# Patient Record
Sex: FEMALE | Race: WHITE | NOT HISPANIC OR LATINO | Employment: OTHER | URBAN - METROPOLITAN AREA
[De-identification: names, ages, dates, MRNs, and addresses within clinical notes are randomized per-mention and may not be internally consistent; named-entity substitution may affect disease eponyms.]

---

## 2017-06-05 DIAGNOSIS — Z76.0 ENCOUNTER FOR MEDICATION REFILL: ICD-10-CM

## 2017-06-05 RX ORDER — SUMATRIPTAN 100 MG/1
TABLET, FILM COATED ORAL
Qty: 6 TABLET | Refills: 1 | Status: SHIPPED | OUTPATIENT
Start: 2017-06-05 | End: 2017-08-30

## 2017-08-15 DIAGNOSIS — Z76.0 ENCOUNTER FOR MEDICATION REFILL: ICD-10-CM

## 2017-08-15 NOTE — TELEPHONE ENCOUNTER
hydrochlorothiazide (MICROZIDE) 12.5 MG capsule; last OV: 12/18/2015      Component      Latest Ref Rng & Units 12/10/2015   Sodium      133 - 144 mmol/L 138   Potassium      3.4 - 5.3 mmol/L 3.8   Chloride      94 - 109 mmol/L 104   Carbon Dioxide      20 - 32 mmol/L 24   Anion Gap      3 - 14 mmol/L 10   Glucose      70 - 99 mg/dL 109 (H)   Urea Nitrogen      7 - 30 mg/dL 14   Creatinine      0.52 - 1.04 mg/dL 0.65   GFR Estimate      >60 mL/min/1.7m2 88   GFR Estimate If Black      >60 mL/min/1.7m2 >90 . . .   Calcium      8.5 - 10.1 mg/dL 9.4   Bilirubin Total      0.2 - 1.3 mg/dL 0.6   Albumin      3.4 - 5.0 g/dL 3.8   Protein Total      6.8 - 8.8 g/dL 8.0   Alkaline Phosphatase      40 - 150 U/L 81   ALT      0 - 50 U/L 28   AST      0 - 45 U/L 23

## 2017-08-16 RX ORDER — HYDROCHLOROTHIAZIDE 12.5 MG/1
CAPSULE ORAL
Qty: 30 CAPSULE | Refills: 0 | Status: SHIPPED | OUTPATIENT
Start: 2017-08-16 | End: 2017-08-30

## 2017-08-30 ENCOUNTER — OFFICE VISIT (OUTPATIENT)
Dept: FAMILY MEDICINE | Facility: CLINIC | Age: 77
End: 2017-08-30

## 2017-08-30 VITALS
SYSTOLIC BLOOD PRESSURE: 115 MMHG | HEART RATE: 74 BPM | OXYGEN SATURATION: 97 % | DIASTOLIC BLOOD PRESSURE: 70 MMHG | WEIGHT: 106 LBS | BODY MASS INDEX: 20.36 KG/M2

## 2017-08-30 DIAGNOSIS — I10 BENIGN ESSENTIAL HYPERTENSION: ICD-10-CM

## 2017-08-30 DIAGNOSIS — R19.5 LOOSE STOOLS: Primary | ICD-10-CM

## 2017-08-30 DIAGNOSIS — Z76.0 ENCOUNTER FOR MEDICATION REFILL: ICD-10-CM

## 2017-08-30 PROCEDURE — 36415 COLL VENOUS BLD VENIPUNCTURE: CPT | Performed by: FAMILY MEDICINE

## 2017-08-30 PROCEDURE — 99214 OFFICE O/P EST MOD 30 MIN: CPT | Performed by: FAMILY MEDICINE

## 2017-08-30 RX ORDER — HYDROCHLOROTHIAZIDE 12.5 MG/1
CAPSULE ORAL
Qty: 90 CAPSULE | Refills: 3 | Status: SHIPPED | OUTPATIENT
Start: 2017-08-30 | End: 2018-09-20

## 2017-08-30 RX ORDER — SUMATRIPTAN 100 MG/1
TABLET, FILM COATED ORAL
Qty: 6 TABLET | Refills: 11 | Status: SHIPPED | OUTPATIENT
Start: 2017-08-30 | End: 2018-09-11

## 2017-08-30 NOTE — PROGRESS NOTES
Trrouble with loose stools, using 1/2 a imodium prn. Started about 3-4 weeks ago. 3-4 /day. No fever, but slight BR blood in the stool. Her normal pattern is one solid BM daily. No vomiting, but history of mild nausea for years.   She also needs refill of HCTZ, and BMP.  /70  Pulse 74  Wt 48.1 kg (106 lb)  SpO2 97%  BMI 20.36 kg/m2 NAD WDWN lungs are clear, RRR no murmur and abdomen quiet and NT.  (R19.5) Loose stools  (primary encounter diagnosis)  Comment: poss microscopic colitis  Plan: gluten free diet for two weeks    (Z76.0) Encounter for medication refill  Comment:   Plan: hydrochlorothiazide (MICROZIDE) 12.5 MG         capsule, SUMAtriptan (IMITREX) 100 MG tablet            (I10) Benign essential hypertension  Comment:   Plan: Basic Metabolic Panel (8) ([facility])

## 2017-08-30 NOTE — MR AVS SNAPSHOT
"              After Visit Summary   2017    Sara Lehman    MRN: [id number]           Patient Information     Date Of Birth          1940        Visit Information        Provider Department      2017 1:30 PM Harpal Farris MD [facility]        Today's Diagnoses     Loose stools    -  1    Encounter for medication refill        Benign essential hypertension           Follow-ups after your visit        Who to contact     If you have questions or need follow up information about today's clinic visit or your schedule please contact [facility] directly at [phone].  Normal or non-critical lab and imaging results will be communicated to you by XanEduhart, letter or phone within 4 business days after the clinic has received the results. If you do not hear from us within 7 days, please contact the clinic through Scicastst or phone. If you have a critical or abnormal lab result, we will notify you by phone as soon as possible.  Submit refill requests through Personal Capital or call your pharmacy and they will forward the refill request to us. Please allow 3 business days for your refill to be completed.          Additional Information About Your Visit        MyChart Information     Personal Capital lets you send messages to your doctor, view your test results, renew your prescriptions, schedule appointments and more. To sign up, go to [email or web address] . Click on \"Log in\" on the left side of the screen, which will take you to the Welcome page. Then click on \"Sign up Now\" on the right side of the page.     You will be asked to enter the access code listed below, as well as some personal information. Please follow the directions to create your username and password.     Your access code is: [email or web address]  Expires: 2017  2:01 PM     Your access code will  in 90 days. If you need help or a new code, please call your [place] clinic or [phone].        Care EveryWhere ID     " This is your Care EveryWhere ID. This could be used by other organizations to access your [facility] medical records  [id number]        Your Vitals Were     Pulse Pulse Oximetry BMI (Body Mass Index)             74 97% 20.36 kg/m2          Blood Pressure from Last 3 Encounters:   08/30/17 115/70   11/08/16 106/68   01/22/16 118/80    Weight from Last 3 Encounters:   08/30/17 48.1 kg (106 lb)   11/08/16 49.5 kg (109 lb 3.2 oz)   01/22/16 50.6 kg (111 lb 9.6 oz)              We Performed the Following     Basic Metabolic Panel (8) ([facility])          Today's Medication Changes          These changes are accurate as of: 8/30/17  2:01 PM.  If you have any questions, ask your nurse or doctor.               These medicines have changed or have updated prescriptions.        Dose/Directions    hydrochlorothiazide 12.5 MG capsule   Commonly known as:  MICROZIDE   This may have changed:  See the new instructions.   Used for:  Encounter for medication refill   Changed by:  Harpal Farris MD        TAKE ONE CAPSULE BY MOUTH ONE TIME DAILY (due for labs and exam, please schedule)   Quantity:  90 capsule   Refills:  3       SUMAtriptan 100 MG tablet   Commonly known as:  IMITREX   This may have changed:  See the new instructions.   Used for:  Encounter for medication refill   Changed by:  Harpal Farris MD        TAKE 1 TABLET BY MOUTH AT onset OF HEADACHE for migraine.  MAY REPEAT IN 2 HOURS IF NEEDED (MAX OF 2 TABLETS PER DAY)   Quantity:  6 tablet   Refills:  11            Where to get your medicines      These medications were sent to [facility] #[id number] - [place], [place] - [address] [address] [address]     Phone:  [phone]     hydrochlorothiazide 12.5 MG capsule    SUMAtriptan 100 MG tablet                Primary Care Provider Office Phone # Fax #    Harpal Farris [phone] [address]  [facility] [address]        Equal Access to Services      CHERI BEAL : Hadii aad ku georgina Sobob, waaxda luqadaha, qaybta kaalmada adeegjoaquín, hien osiel kimberlynmary kelly joanadave lopez . [facility] [phone].    ATENCIÓN: Si habla español, tiene a botello disposición servicios gratuitos de asistencia lingüística. Llame al [phone].    We comply with applicable federal civil rights laws and [place] laws. We do not discriminate on the basis of race, color, national origin, age, disability sex, sexual orientation or gender identity.            Thank you!     Thank you for choosing [facility]  for your care. Our goal is always to provide you with excellent care. Hearing back from our patients is one way we can continue to improve our services. Please take a few minutes to complete the written survey that you may receive in the mail after your visit with us. Thank you!             Your Updated Medication List - Protect others around you: Learn how to safely use, store and throw away your medicines at www.disposemymeds.org.          This list is accurate as of: 8/30/17  2:01 PM.  Always use your most recent med list.                   Brand Name Dispense Instructions for use Diagnosis    amLODIPine 5 MG tablet    NORVASC    90 tablet    take 1 tablet (5 mg) by mouth daily    Encounter for medication refill       biotin 1000 MCG Tabs tablet      Take 1 tablet (1,000 mcg) by mouth daily        hydrochlorothiazide 12.5 MG capsule    MICROZIDE    90 capsule    TAKE ONE CAPSULE BY MOUTH ONE TIME DAILY (due for labs and exam, please schedule)    Encounter for medication refill       ibuprofen 200 MG capsule     120 capsule    Take 200 mg by mouth as needed for pain        metoprolol 25 MG 24 hr tablet    TOPROL-XL    90 tablet    TAKE ONE TABLET BY MOUTH ONE TIME DAILY    Encounter for medication refill       SUMAtriptan 100 MG tablet    IMITREX    6 tablet    TAKE 1 TABLET BY MOUTH AT onset OF HEADACHE for migraine.  MAY REPEAT IN 2 HOURS IF NEEDED (MAX OF 2 TABLETS  PER DAY)    Encounter for medication refill       VENTOLIN  (90 BASE) MCG/ACT Inhaler   Generic drug:  albuterol     18 g    INHALE TWO PUFFS BY MOUTH EVERY SIX HOURS AS NEEDED FOR SHORTNESS OF BREATH    Encounter for medication refill

## 2017-08-30 NOTE — LETTER
[facility] [address] [place]  [address]  Phone: [phone]    September 1, 2017      Sara Lehman  [address] [facility] [address]              Dear Sara,    The results from your recent visit showed that these results are fine.   Let me know if you have questions.         Sincerely,     Harpal Farris M.D.    Results for orders placed or performed in visit on 08/30/17   Basic Metabolic Panel (8) ([facility])   Result Value Ref Range    Glucose 79 65 - 99 mg/dL    Urea Nitrogen 19 8 - 27 mg/dL    Creatinine 0.69 0.57 - 1.00 mg/dL    eGFR If NonAfricn Am 85 >59 mL/min/1.73    eGFR If Africn Am 98 >59 mL/min/1.73    BUN/Creatinine Ratio 28 12 - 28    Sodium 141 134 - 144 mmol/L    Potassium 3.9 3.5 - 5.2 mmol/L    Chloride 99 96 - 106 mmol/L    Total CO2 28 18 - 28 mmol/L    Calcium 10.1 8.7 - 10.3 mg/dL    Narrative    Performed at:  01 - [facility] [address], [place], [place]  [id number]  : Brian Sheehan MD, Phone:  [phone]

## 2017-08-31 LAB
BUN SERPL-MCNC: 19 MG/DL (ref 8–27)
BUN/CREATININE RATIO: 28 (ref 12–28)
CALCIUM SERPL-MCNC: 10.1 MG/DL (ref 8.7–10.3)
CHLORIDE SERPLBLD-SCNC: 99 MMOL/L (ref 96–106)
CREAT SERPL-MCNC: 0.69 MG/DL (ref 0.57–1)
EGFR IF AFRICN AM: 98 ML/MIN/1.73
EGFR IF NONAFRICN AM: 85 ML/MIN/1.73
GLUCOSE SERPL-MCNC: 79 MG/DL (ref 65–99)
POTASSIUM SERPL-SCNC: 3.9 MMOL/L (ref 3.5–5.2)
SODIUM SERPL-SCNC: 141 MMOL/L (ref 134–144)
TOTAL CO2: 28 MMOL/L (ref 18–28)

## 2017-12-14 DIAGNOSIS — I10 BENIGN ESSENTIAL HYPERTENSION: Primary | ICD-10-CM

## 2017-12-14 DIAGNOSIS — Z76.0 ENCOUNTER FOR MEDICATION REFILL: ICD-10-CM

## 2017-12-14 NOTE — TELEPHONE ENCOUNTER
amLODIPine (NORVASC) 5 MG, metoprolol (TOPROL-XL) 25 MG 24 hr tablet   LAST  Med check 8/30/17   last labs(for RX) 8/30/17  Next  appt scheduled =  none  Dora Casas MA    BP Readings from Last 3 Encounters:   08/30/17 115/70   11/08/16 106/68   01/22/16 118/80     Last Basic Metabolic Panel:  Lab Results   Component Value Date     08/30/2017      Lab Results   Component Value Date    POTASSIUM 3.9 08/30/2017     Lab Results   Component Value Date    CHLORIDE 99 08/30/2017     Lab Results   Component Value Date    ALTHEA 10.1 08/30/2017     Lab Results   Component Value Date    CO2 24 12/10/2015     Lab Results   Component Value Date    BUN 19 08/30/2017    BUN 28 08/30/2017     Lab Results   Component Value Date    CR 0.69 08/30/2017     Lab Results   Component Value Date    GLC 79 08/30/2017

## 2017-12-18 RX ORDER — AMLODIPINE BESYLATE 5 MG/1
TABLET ORAL
Qty: 90 TABLET | Refills: 1 | Status: SHIPPED | OUTPATIENT
Start: 2017-12-18 | End: 2018-06-14

## 2017-12-18 RX ORDER — METOPROLOL SUCCINATE 25 MG/1
TABLET, EXTENDED RELEASE ORAL
Qty: 90 TABLET | Refills: 1 | Status: SHIPPED | OUTPATIENT
Start: 2017-12-18 | End: 2018-06-14

## 2018-02-27 NOTE — PROGRESS NOTES
"Cc rash    Dr Farris patient    HCM  Flu vaccine  Migraine action plan  SUBJECTIVE:   Sara Lehman is a 77 year old female who presents to clinic today for the following health issues:  \"Trixie\"    White female  Soap dove scented  Shower   Shampoo Aussie scented  Detergent Tide scented  Tried rinsing clothing more  Held softener and no change  No new jewelry  Travel no  Exposure: no  Medication changes no  Food change no        Rash      Duration: 10 days    Description  Location: Mid of upper chest  Itching: severe    Intensity:  severe    Accompanying signs and symptoms: Yes redness    History (similar episodes/previous evaluation): None    Precipitating or alleviating factors:  New exposures:  None  Recent travel: no      Therapies tried and outcome: hydrocortisone cream OTC -  usually effective    Migraine last time one month ago. Imitrex is working.    Not working or volunteering    No recent illness    Sleep itching awakening her  Appetite ok  Exercise housework    Smoking yes 1 ppd. Tried lozenges. Quit Plan Card given  ETOH Last drink couple weeks ago with dinner at [facility] drugs/MJ no  Caffeine yes tea    Wt Readings from Last 4 Encounters:   03/01/18 51.7 kg (114 lb)   08/30/17 48.1 kg (106 lb)   11/08/16 49.5 kg (109 lb 3.2 oz)   01/22/16 50.6 kg (111 lb 9.6 oz)   Estimated body mass index is 21.9 kg/(m^2) as calculated from the following:    Height as of 11/8/16: 1.537 m (5' 0.5\").    Weight as of this encounter: 51.7 kg (114 lb).    Flu shot had this.    Macular rash on chest without scale  Very dry skin with seb dermatitis  Cat scratch without sign of infection          Problem list and histories reviewed & adjusted, as indicated.  Additional history: as documented    Patient Active Problem List   Diagnosis     Migraine headache     Neck pain     Nephrolithiasis     COPD (chronic obstructive pulmonary disease) (H)     Tobacco use disorder (SMOKERS')     Benign essential hypertension    "  Past Surgical History:   Procedure Laterality Date     LITHOTRIPSY      Lithotrypsy     ORTHOPEDIC SURGERY      right knee exploration     TUBAL LIGATION         Social History   Substance Use Topics     Smoking status: Heavy Tobacco Smoker     Packs/day: 0.50     Types: Cigarettes     Smokeless tobacco: Never Used     Alcohol use 0.0 oz/week     0 Standard drinks or equivalent per week      Comment: socially only- one per month     Family History   Problem Relation Age of Onset     CEREBROVASCULAR DISEASE Father 75     CANCER Mother 76     uterine     C.A.D. Brother 66         Current Outpatient Prescriptions   Medication Sig Dispense Refill     triamcinolone (KENALOG) 0.1 % cream Apply sparingly to affected area three times daily as needed 80 g 0     amLODIPine (NORVASC) 5 MG tablet TAKE ONE TABLET (5MG) BY MOUTH ONE TIME DAILY  90 tablet 1     metoprolol (TOPROL-XL) 25 MG 24 hr tablet TAKE ONE TABLET BY MOUTH ONE TIME DAILY  90 tablet 1     hydrochlorothiazide (MICROZIDE) 12.5 MG capsule TAKE ONE CAPSULE BY MOUTH ONE TIME DAILY (due for labs and exam, please schedule) 90 capsule 3     VENTOLIN  (90 BASE) MCG/ACT Inhaler INHALE TWO PUFFS BY MOUTH EVERY SIX HOURS AS NEEDED FOR SHORTNESS OF BREATH 18 g 3     ibuprofen 200 MG capsule Take 200 mg by mouth as needed for pain 120 capsule      biotin 1000 MCG TABS tablet Take 1 tablet (1,000 mcg) by mouth daily       FLUZONE HIGH-DOSE 0.5 ML injection ADM 0.5ML IM UTD  0     SUMAtriptan (IMITREX) 100 MG tablet TAKE 1 TABLET BY MOUTH AT onset OF HEADACHE for migraine.  MAY REPEAT IN 2 HOURS IF NEEDED (MAX OF 2 TABLETS PER DAY) 6 tablet 11     Allergies   Allergen Reactions     Ace Inhibitors Cough     Aleve [Naproxen Sodium] GI Disturbance     Asa Buff (Mag [Aspirin Buffered] GI Disturbance     Internal bleeding     Atorvastatin Calcium GI Disturbance     Celebrex [Celecoxib] GI Disturbance     Codeine Sulfate Hives     Pravastatin GI Disturbance     Simvastatin  GI Disturbance     Recent Labs   Lab Test  08/30/17   1406  12/10/15   1615  08/10/15   1348  05/18/15   1328   02/22/13   1740   02/19/13   1016   LDL   --    --    --    --    --    --    --   121*   HDL   --    --    --    --    --    --    --   55   TRIG   --    --    --    --    --    --    --   92   ALT   --   28  10  10   --    --    < >   --    CR  0.69  0.65  0.78  0.70   < >  0.67   --    --    GFRESTIMATED   --   88   --    --    --   87   --    --    GFRESTBLACK   --   >90   GFR Calc     --    --    --   >90   --    --    POTASSIUM  3.9  3.8  3.7  3.4*   < >  3.8   --    --     < > = values in this interval not displayed.      BP Readings from Last 3 Encounters:   03/01/18 126/82   08/30/17 115/70   11/08/16 106/68    Wt Readings from Last 3 Encounters:   03/01/18 51.7 kg (114 lb)   08/30/17 48.1 kg (106 lb)   11/08/16 49.5 kg (109 lb 3.2 oz)                  Labs reviewed in EPIC    Reviewed and updated as needed this visit by clinical staff       Reviewed and updated as needed this visit by Provider         ROS:  Constitutional, HEENT, cardiovascular, pulmonary, GI, , musculoskeletal, neuro, skin, endocrine and psych systems are negative, except as otherwise noted.    OBJECTIVE:     /82  Pulse 76  Temp 97.6  F (36.4  C)  Resp 16  Wt 51.7 kg (114 lb)  SpO2 97%  BMI 21.9 kg/m2  Body mass index is 21.9 kg/(m^2).  GENERAL: healthy, alert and no distress  EYES: Eyes grossly normal to inspection, PERRL and conjunctivae and sclerae normal  HENT: ear canals and TM's normal, nose and mouth without ulcers or lesions  NECK: no adenopathy, no asymmetry, masses, or scars and thyroid normal to palpation  RESP: lungs clear to auscultation - no rales, rhonchi or wheezes  CV: regular rate and rhythm, normal S1 S2, no S3 or S4, no murmur, click or rub, no peripheral edema and peripheral pulses strong  ABDOMEN: soft, nontender, no hepatosplenomegaly, no masses and bowel sounds normal  MS: no  gross musculoskeletal defects noted, no edema  SKIN: no suspicious lesions or rashes  SKIN: Macular rash on chest without scale  Very dry skin with seb dermatitis  Cat scratch on arm without sign of infection    NEURO: Normal strength and tone, mentation intact and speech normal  PSYCH: mentation appears normal, affect normal/bright  LYMPH: no cervical, supraclavicular, axillary, or inguinal adenopathy    Diagnostic Test Results:  Results for orders placed or performed in visit on 08/30/17   Basic Metabolic Panel (8) ([facility])   Result Value Ref Range    Glucose 79 65 - 99 mg/dL    Urea Nitrogen 19 8 - 27 mg/dL    Creatinine 0.69 0.57 - 1.00 mg/dL    eGFR If NonAfricn Am 85 >59 mL/min/1.73    eGFR If Africn Am 98 >59 mL/min/1.73    BUN/Creatinine Ratio 28 12 - 28    Sodium 141 134 - 144 mmol/L    Potassium 3.9 3.5 - 5.2 mmol/L    Chloride 99 96 - 106 mmol/L    Total CO2 28 18 - 28 mmol/L    Calcium 10.1 8.7 - 10.3 mg/dL    Narrative    Performed at:  01 - [facility] [address], [place], [place]  [id number]  : Brian Sheehan MD, Phone:  [phone]       ASSESSMENT/PLAN:     ASSESSMENT / PLAN:  (R21) Rash  (primary encounter diagnosis)  Comment: Consider derm reMacular rash on chest without scale  Very dry skin with seb dermatitis  Cat scratch without sign of infection  ferral if no improvement  Plan: triamcinolone (KENALOG) 0.1 % cream            (Z72.0) Tobacco use  Comment:   Plan: Discussed smoking cessation. She does not seem ready yet    (J43.9) Pulmonary emphysema, unspecified emphysema type (H)  Comment: no issues today  Plan: Continue cares    (I10) Benign essential hypertension  Comment:   BP Readings from Last 3 Encounters:   03/01/18 126/82   08/30/17 115/70   11/08/16 106/68       Plan: Continue cares        Patient Instructions   Unscented soap and detergent  Dove unscented    Canola or sassaflower oil after bathing    Triamcinolone topical   Generic claritin/Loritidine over the  counter    F/u one month if still an issue      Echo Moeller MD  [facility]

## 2018-03-01 ENCOUNTER — OFFICE VISIT (OUTPATIENT)
Dept: FAMILY MEDICINE | Facility: CLINIC | Age: 78
End: 2018-03-01

## 2018-03-01 VITALS
WEIGHT: 114 LBS | SYSTOLIC BLOOD PRESSURE: 126 MMHG | BODY MASS INDEX: 21.9 KG/M2 | OXYGEN SATURATION: 97 % | TEMPERATURE: 97.6 F | HEART RATE: 76 BPM | RESPIRATION RATE: 16 BRPM | DIASTOLIC BLOOD PRESSURE: 82 MMHG

## 2018-03-01 DIAGNOSIS — J43.9 PULMONARY EMPHYSEMA, UNSPECIFIED EMPHYSEMA TYPE (H): ICD-10-CM

## 2018-03-01 DIAGNOSIS — I10 BENIGN ESSENTIAL HYPERTENSION: ICD-10-CM

## 2018-03-01 DIAGNOSIS — R21 RASH: Primary | ICD-10-CM

## 2018-03-01 DIAGNOSIS — Z72.0 TOBACCO USE: ICD-10-CM

## 2018-03-01 PROCEDURE — 99214 OFFICE O/P EST MOD 30 MIN: CPT | Performed by: FAMILY MEDICINE

## 2018-03-01 RX ORDER — INFLUENZA A VIRUS A/VICTORIA/4897/2022 IVR-238 (H1N1) ANTIGEN (FORMALDEHYDE INACTIVATED), INFLUENZA A VIRUS A/CALIFORNIA/122/2022 SAN-022 (H3N2) ANTIGEN (FORMALDEHYDE INACTIVATED), AND INFLUENZA B VIRUS B/MICHIGAN/01/2021 ANTIGEN (FORMALDEHYDE INACTIVATED) 60; 60; 60 UG/.5ML; UG/.5ML; UG/.5ML
INJECTION, SUSPENSION INTRAMUSCULAR
Refills: 0 | COMMUNITY
Start: 2017-10-01 | End: 2019-04-01

## 2018-03-01 RX ORDER — TRIAMCINOLONE ACETONIDE 1 MG/G
CREAM TOPICAL
Qty: 80 G | Refills: 0 | Status: SHIPPED | OUTPATIENT
Start: 2018-03-01 | End: 2019-09-09

## 2018-03-01 NOTE — MR AVS SNAPSHOT
"              After Visit Summary   3/1/2018    Sara Lehman    MRN: [id number]           Patient Information     Date Of Birth          1940        Visit Information        Provider Department      3/1/2018 2:15 PM Echo Moeller MD [facility]        Today's Diagnoses     Rash    -  1    Tobacco use        Pulmonary emphysema, unspecified emphysema type (H)        Benign essential hypertension          Care Instructions    Unscented soap and detergent  Dove unscented    Canola or sassaflower oil after bathing    Triamcinolone topical   Generic claritin/Loritidine over the counter    F/u one month if still an issue          Follow-ups after your visit        Who to contact     If you have questions or need follow up information about today's clinic visit or your schedule please contact [facility] directly at [phone].  Normal or non-critical lab and imaging results will be communicated to you by De Correspondenthart, letter or phone within 4 business days after the clinic has received the results. If you do not hear from us within 7 days, please contact the clinic through De Correspondenthart or phone. If you have a critical or abnormal lab result, we will notify you by phone as soon as possible.  Submit refill requests through SoundRoadie or call your pharmacy and they will forward the refill request to us. Please allow 3 business days for your refill to be completed.          Additional Information About Your Visit        De Correspondenthart Information     SoundRoadie lets you send messages to your doctor, view your test results, renew your prescriptions, schedule appointments and more. To sign up, go to [email or web address] . Click on \"Log in\" on the left side of the screen, which will take you to the Welcome page. Then click on \"Sign up Now\" on the right side of the page.     You will be asked to enter the access code listed below, as well as some personal information. Please follow the directions to create " your username and password.     Your access code is: [email or web address]  Expires: 2018  2:56 PM     Your access code will  in 90 days. If you need help or a new code, please call your [facility] or [phone].        Care EveryWhere ID     This is your Care EveryWhere ID. This could be used by other organizations to access your [facility] medical records  [id number]        Your Vitals Were     Pulse Temperature Respirations Pulse Oximetry BMI (Body Mass Index)       76 97.6  F (36.4  C) 16 97% 21.9 kg/m2        Blood Pressure from Last 3 Encounters:   18 126/82   17 115/70   16 106/68    Weight from Last 3 Encounters:   18 51.7 kg (114 lb)   17 48.1 kg (106 lb)   16 49.5 kg (109 lb 3.2 oz)              Today, you had the following     No orders found for display         Today's Medication Changes          These changes are accurate as of 3/1/18  2:56 PM.  If you have any questions, ask your nurse or doctor.               Start taking these medicines.        Dose/Directions    triamcinolone 0.1 % cream   Commonly known as:  KENALOG   Used for:  Rash   Started by:  Echo Moeller MD        Apply sparingly to affected area three times daily as needed   Quantity:  80 g   Refills:  0            Where to get your medicines      These medications were sent to [facility] #[id number] - [place] [address] [address]     Phone:  [phone]     triamcinolone 0.1 % cream                Primary Care Provider Office Phone # Fax #    Harpal Farris [phone] [address] [facility] [address]        Equal Access to Services     JC BEAL : Samara salomono Sobob, waaxda luqadaha, qaybta kaalmada adeegyabarbara, hien pickett. [facility] [phone].    ATENCIÓN: Si habla español, tiene a botello disposición servicios gratuitos de asistencia lingüística. Llame al  [phone].    We comply with applicable federal civil rights laws and [place] laws. We do not discriminate on the basis of race, color, national origin, age, disability, sex, sexual orientation, or gender identity.            Thank you!     Thank you for choosing [facility]  for your care. Our goal is always to provide you with excellent care. Hearing back from our patients is one way we can continue to improve our services. Please take a few minutes to complete the written survey that you may receive in the mail after your visit with us. Thank you!             Your Updated Medication List - Protect others around you: Learn how to safely use, store and throw away your medicines at www.disposemymeds.org.          This list is accurate as of 3/1/18  2:56 PM.  Always use your most recent med list.                   Brand Name Dispense Instructions for use Diagnosis    amLODIPine 5 MG tablet    NORVASC    90 tablet    TAKE ONE TABLET (5MG) BY MOUTH ONE TIME DAILY    Encounter for medication refill, Benign essential hypertension       biotin 1000 MCG Tabs tablet      Take 1 tablet (1,000 mcg) by mouth daily        FLUZONE HIGH-DOSE 0.5 ML injection   Generic drug:  influenza Vac Split High-Dose      ADM 0.5ML IM UTD        hydrochlorothiazide 12.5 MG capsule    MICROZIDE    90 capsule    TAKE ONE CAPSULE BY MOUTH ONE TIME DAILY (due for labs and exam, please schedule)    Encounter for medication refill       ibuprofen 200 MG capsule     120 capsule    Take 200 mg by mouth as needed for pain        metoprolol succinate 25 MG 24 hr tablet    TOPROL-XL    90 tablet    TAKE ONE TABLET BY MOUTH ONE TIME DAILY    Encounter for medication refill, Benign essential hypertension       SUMAtriptan 100 MG tablet    IMITREX    6 tablet    TAKE 1 TABLET BY MOUTH AT onset OF HEADACHE for migraine.  MAY REPEAT IN 2 HOURS IF NEEDED (MAX OF 2 TABLETS PER DAY)    Encounter for medication refill       triamcinolone 0.1 %  cream    KENALOG    80 g    Apply sparingly to affected area three times daily as needed    Rash       VENTOLIN  (90 BASE) MCG/ACT Inhaler   Generic drug:  albuterol     18 g    INHALE TWO PUFFS BY MOUTH EVERY SIX HOURS AS NEEDED FOR SHORTNESS OF BREATH    Encounter for medication refill

## 2018-03-01 NOTE — PATIENT INSTRUCTIONS
Unscented soap and detergent  Dove unscented    Canola or sassaflower oil after bathing    Triamcinolone topical   Generic claritin/Loritidine over the counter    F/u one month if still an issue

## 2018-05-11 ENCOUNTER — OFFICE VISIT (OUTPATIENT)
Dept: FAMILY MEDICINE | Facility: CLINIC | Age: 78
End: 2018-05-11

## 2018-05-11 VITALS
HEIGHT: 61 IN | DIASTOLIC BLOOD PRESSURE: 78 MMHG | RESPIRATION RATE: 16 BRPM | WEIGHT: 119 LBS | HEART RATE: 64 BPM | OXYGEN SATURATION: 98 % | BODY MASS INDEX: 22.47 KG/M2 | SYSTOLIC BLOOD PRESSURE: 136 MMHG

## 2018-05-11 DIAGNOSIS — R11.0 NAUSEA: Primary | ICD-10-CM

## 2018-05-11 DIAGNOSIS — I10 BENIGN ESSENTIAL HYPERTENSION: ICD-10-CM

## 2018-05-11 DIAGNOSIS — R06.02 SHORTNESS OF BREATH: ICD-10-CM

## 2018-05-11 DIAGNOSIS — D58.2 ELEVATED HEMOGLOBIN (H): ICD-10-CM

## 2018-05-11 DIAGNOSIS — Z87.891 FORMER SMOKER: ICD-10-CM

## 2018-05-11 DIAGNOSIS — J43.9 PULMONARY EMPHYSEMA, UNSPECIFIED EMPHYSEMA TYPE (H): ICD-10-CM

## 2018-05-11 LAB
% GRANULOCYTES: 57.3 % (ref 42.2–75.2)
HCT VFR BLD AUTO: 48.6 % (ref 35–46)
HEMOGLOBIN: 15.6 G/DL (ref 11.8–15.5)
LYMPHOCYTES NFR BLD AUTO: 33.7 % (ref 20.5–51.1)
MCH RBC QN AUTO: 29.6 PG (ref 27–31)
MCHC RBC AUTO-ENTMCNC: 32.1 G/DL (ref 33–37)
MCV RBC AUTO: 92.1 FL (ref 80–100)
MONOCYTES NFR BLD AUTO: 9 % (ref 1.7–9.3)
PLATELET # BLD AUTO: 231 K/UL (ref 140–450)
RBC # BLD AUTO: 5.28 X10/CMM (ref 3.7–5.2)
WBC # BLD AUTO: 7.5 X10/CMM (ref 3.8–11)

## 2018-05-11 PROCEDURE — 99214 OFFICE O/P EST MOD 30 MIN: CPT | Performed by: FAMILY MEDICINE

## 2018-05-11 PROCEDURE — 36415 COLL VENOUS BLD VENIPUNCTURE: CPT | Performed by: FAMILY MEDICINE

## 2018-05-11 PROCEDURE — 93000 ELECTROCARDIOGRAM COMPLETE: CPT | Performed by: FAMILY MEDICINE

## 2018-05-11 PROCEDURE — 85025 COMPLETE CBC W/AUTO DIFF WBC: CPT | Performed by: FAMILY MEDICINE

## 2018-05-11 RX ORDER — ONDANSETRON 4 MG/1
4 TABLET, FILM COATED ORAL EVERY 8 HOURS PRN
Qty: 18 TABLET | Refills: 1 | Status: SHIPPED | OUTPATIENT
Start: 2018-05-11 | End: 2018-11-16

## 2018-05-11 NOTE — LETTER
My COPD Action Plan     Name: Sara Lehman    YOB: 1940   Date: 5/11/2018    My doctor: Kaelyn Radford MD   My clinic: [facility] [address] [place] [address] [phone]  My Controller Medicine: Albuterol (Proair/Ventolin/Proventolin)   Dose: 108 mcg     My Rescue Medicine: none   Dose: none     My Flare Up Medicine: none   Dose: none     My COPD Severity: Mild = FeV1 > 80%      Use of Oxygen: Oxygen Not Prescribed      Make sure you've had your pneumonia   vaccines.          GREEN ZONE       Doing well today      Usual level of activity and exercise    Usual amount of cough and mucus    No shortness of breath    Usual level of health (thinking clearly, sleeping well, feel like eating) Actions:      Take daily medicines    Use oxygen as prescribed    Follow regular exercise and diet plan    Avoid cigarette smoke and other irritants that harm the lungs           YELLOW ZONE          Having a bad day or flare up      Short of breath more than usual    A lot more sputum (mucus) than usual    Sputum looks yellow, green, tan, brown or bloody    More coughing or wheezing    Fever or chills    Less energy; trouble completing activities    Trouble thinking or focusing    Using quick relief inhaler or nebulizer more often    Poor sleep; symptoms wake me up    Do not feel like eating Actions:      Get plenty of rest    Take daily medicines    Use quick relief inhaler every PRN hours    If you use oxygen, call you doctor to see if you should adjust your oxygen    Do breathing exercises or other things to help you relax    Let a loved one, friend or neighbor know you are feeling worse    Call your care team if you have 2 or more symptoms.  Start taking steroids or antibiotics if directed by your care team           RED ZONE       Need medical care now      Severe shortness of breath (feel you can't breathe)    Fever, chills    Not enough breath to do any  activity    Trouble coughing up mucus, walking or talking    Blood in mucus    Frequent coughing   Rescue medicines are not working    Not able to sleep because of breathing    Feel confused or drowsy    Chest pain    Actions:      Call your health care team.  If you cannot reach your care team, call 911 or go to the emergency room.        Annual Reminders:  Meet with Care Team, Flu Shot every Fall  Pharmacy: [id number] - [place], [place] - [address]

## 2018-05-11 NOTE — LETTER
"[facility]  [address] [place]  [address]  Phone: [phone]    May 21, 2018      Sarataran Lehman  [address] [facility] [address]              Dear Trixie,    The EKG was within normal limits.      Sincerely,     Kaelyn \"Salina\" MD Prabhakar    Results for orders placed or performed in visit on 05/11/18   CBC with Diff/Plt ([facility])   Result Value Ref Range    WBC x10/cmm 7.5 3.8 - 11.0 x10/cmm    % Lymphocytes 33.7 20.5 - 51.1 %    % Monocytes 9.0 1.7 - 9.3 %    % Granulocytes 57.3 42.2 - 75.2 %    RBC x10/cmm 5.28 (A) 3.7 - 5.2 x10/cmm    Hemoglobin 15.6 (A) 11.8 - 15.5 g/dl    Hematocrit 48.6 (A) 35 - 46 %    MCV 92.1 80 - 100 fL    MCH 29.6 27.0 - 31.0 pg    MCHC 32.1 (A) 33.0 - 37.0 g/dL    Platelet Count 231 140 - 450 K/uL   TSH ([facility])   Result Value Ref Range    TSH 1.430 0.450 - 4.500 uIU/mL    Narrative    Performed at:  01 - [facility] [address], [place], [place]  [id number]  : Brian Sheehan MD, Phone:  [phone]   Lipase  Serum ([facility])   Result Value Ref Range    Lipase 44 14 - 85 U/L    Narrative    Performed at:  01 - [facility] [address], [place], [place]  [id number]  : Brian Sheehan MD, Phone:  [phone]   Comp. Metabolic Panel (14) ([facility])   Result Value Ref Range    Glucose 84 65 - 99 mg/dL    Urea Nitrogen 18 8 - 27 mg/dL    Creatinine 0.67 0.57 - 1.00 mg/dL    eGFR If NonAfricn Am 86 >59 mL/min/1.73    eGFR If Africn Am 99 >59 mL/min/1.73    BUN/Creatinine Ratio 27 12 - 28    Sodium 141 134 - 144 mmol/L    Potassium 3.7 3.5 - 5.2 mmol/L    Chloride 99 96 - 106 mmol/L    Total CO2 32 (H) 18 - 28 mmol/L    Calcium 9.9 8.7 - 10.3 mg/dL    Protein Total 7.1 6.0 - 8.5 g/dL    Albumin 4.3 3.5 - 4.8 g/dL    Globulin, Total 2.8 1.5 - 4.5 g/dL    A/G Ratio 1.5 1.2 - 2.2    Bilirubin Total 0.2 0.0 - 1.2 mg/dL    Alkaline Phosphatase 71 39 - 117 IU/L    AST 21 0 - 40 IU/L    ALT 14 0 - 32 IU/L    Narrative    Performed " at:  01 - [facility]  [address], [place]  [id number]  : Brian Sheehan MD, Phone:  [phone]

## 2018-05-11 NOTE — MR AVS SNAPSHOT
"              After Visit Summary   5/11/2018    Sara Lehman    MRN: [id number]           Patient Information     Date Of Birth          1940        Visit Information        Provider Department      5/11/2018 12:30 PM Kaelyn Radford MD [facility]        Today's Diagnoses     Nausea    -  1    Benign essential hypertension        Pulmonary emphysema, unspecified emphysema type (H)        Shortness of breath        Former smoker          Care Instructions    Please let us know if the anti-nausea medicine is not working - there are others we can try.     Congratulations on stopping smoking!!! Way to Go!!!          Follow-ups after your visit        Future tests that were ordered for you today     Open Future Orders        Priority Expected Expires Ordered    PULSE OXIMETRY, SINGLE DETERMINATION Routine  5/11/2019 5/11/2018            Who to contact     If you have questions or need follow up information about today's clinic visit or your schedule please contact [facility] directly at [phone].  Normal or non-critical lab and imaging results will be communicated to you by [employer], letter or phone within 4 business days after the clinic has received the results. If you do not hear from us within 7 days, please contact the clinic through [employer] or phone. If you have a critical or abnormal lab result, we will notify you by phone as soon as possible.  Submit refill requests through [employer] or call your pharmacy and they will forward the refill request to us. Please allow 3 business days for your refill to be completed.          Additional Information About Your Visit        [employer] Information     [employer] lets you send messages to your doctor, view your test results, renew your prescriptions, schedule appointments and more. To sign up, go to [email or web address] . Click on \"Log in\" on the left side of the screen, which will take you to the Welcome page. Then click on " "\"Sign up Now\" on the right side of the page.     You will be asked to enter the access code listed below, as well as some personal information. Please follow the directions to create your username and password.     Your access code is: [email or web address]  Expires: 2018  3:56 PM     Your access code will  in 90 days. If you need help or a new code, please call your [facility] or [phone].        Care EveryWhere ID     This is your Care EveryWhere ID. This could be used by other organizations to access your [facility] medical records  [id number]        Your Vitals Were     Pulse Respirations Height Pulse Oximetry BMI (Body Mass Index)       64 16 1.537 m (5' 0.5\") 98% 22.86 kg/m2        Blood Pressure from Last 3 Encounters:   18 136/78   18 126/82   17 115/70    Weight from Last 3 Encounters:   18 54 kg (119 lb)   18 51.7 kg (114 lb)   17 48.1 kg (106 lb)              We Performed the Following     CBC with Diff/Plt ([facility])     Comp. Metabolic Panel (14) ([facility])     EKG 12-lead complete w/read - Clinics     Lipase  Serum ([facility])     TSH ([facility])          Today's Medication Changes          These changes are accurate as of 18  2:01 PM.  If you have any questions, ask your nurse or doctor.               Start taking these medicines.        Dose/Directions    ondansetron 4 MG tablet   Commonly known as:  ZOFRAN   Used for:  Nausea   Started by:  Kaelyn Radford MD        Dose:  4 mg   Take 1 tablet (4 mg) by mouth every 8 hours as needed for nausea   Quantity:  18 tablet   Refills:  1            Where to get your medicines      These medications were sent to [facility] #[id number] - [facility] [address] [facility] [address]     Phone:  [phone]     ondansetron 4 MG tablet                Primary Care Provider Office Phone # Fax #    Kaelyn Radford [phone] [address]  [facility] [address]        Equal Access to Services     [facility] HospitalJOCELIN : Hadii kris chavira hadrobinvictoriano Sogenali, waaxda luqadaha, qaybta kaalmada soila, hien simsheverdave pickett. So [facility] [phone].    ATENCIÓN: Si habla español, tiene a botello disposición servicios gratuitos de asistencia lingüística. Llame al [phone].    We comply with applicable federal civil rights laws and [place] laws. We do not discriminate on the basis of race, color, national origin, age, disability, sex, sexual orientation, or gender identity.            Thank you!     Thank you for choosing [facility]  for your care. Our goal is always to provide you with excellent care. Hearing back from our patients is one way we can continue to improve our services. Please take a few minutes to complete the written survey that you may receive in the mail after your visit with us. Thank you!             Your Updated Medication List - Protect others around you: Learn how to safely use, store and throw away your medicines at www.disposemymeds.org.          This list is accurate as of 5/11/18  2:01 PM.  Always use your most recent med list.                   Brand Name Dispense Instructions for use Diagnosis    amLODIPine 5 MG tablet    NORVASC    90 tablet    TAKE ONE TABLET (5MG) BY MOUTH ONE TIME DAILY    Encounter for medication refill, Benign essential hypertension       biotin 1000 MCG Tabs tablet      Take 1 tablet (1,000 mcg) by mouth daily        FLUZONE HIGH-DOSE 0.5 ML injection   Generic drug:  influenza Vac Split High-Dose      ADM 0.5ML IM UTD        hydrochlorothiazide 12.5 MG capsule    MICROZIDE    90 capsule    TAKE ONE CAPSULE BY MOUTH ONE TIME DAILY (due for labs and exam, please schedule)    Encounter for medication refill       ibuprofen 200 MG capsule     120 capsule    Take 200 mg by mouth as needed for pain        metoprolol succinate 25 MG 24 hr tablet    TOPROL-XL    90 tablet    TAKE ONE TABLET BY  MOUTH ONE TIME DAILY    Encounter for medication refill, Benign essential hypertension       ondansetron 4 MG tablet    ZOFRAN    18 tablet    Take 1 tablet (4 mg) by mouth every 8 hours as needed for nausea    Nausea       SUMAtriptan 100 MG tablet    IMITREX    6 tablet    TAKE 1 TABLET BY MOUTH AT onset OF HEADACHE for migraine.  MAY REPEAT IN 2 HOURS IF NEEDED (MAX OF 2 TABLETS PER DAY)    Encounter for medication refill       triamcinolone 0.1 % cream    KENALOG    80 g    Apply sparingly to affected area three times daily as needed    Rash       VENTOLIN  (90 Base) MCG/ACT Inhaler   Generic drug:  albuterol     18 g    INHALE TWO PUFFS BY MOUTH EVERY SIX HOURS AS NEEDED FOR SHORTNESS OF BREATH    Encounter for medication refill

## 2018-05-11 NOTE — PROGRESS NOTES
"Problem(s) Oriented visit      SUBJECTIVE:                                                    Sara Lehman is a 77 year old female who presents to clinic today for difficulty breathing.     \"Trixie\" - yes  Out for a walk w/ daughter, tried to push self when going up a hill. Trouble breathing.   By the time got home, had abd bloated - leveled out   Very nauseated every day - has had before.   No CP.   Quit 1 month ago.   No back pain, no cough.   Has hx of migraine - Imitrex makes it go away. Imitrex use varies - none yet this month.   Most use is once a week.     Ventolin - occas, but not daily.     Tried nicotine lozenges - had diarrhea.  Quit cold turkey.    Nausea - had before, cant' recall when 6-12 mos ago.   Wave of nausea. Better when get up in the morning. All day long has nausea.   Stays and gets better and worse.   Has not taken anything.   Ibu - often 2 a day.  Stooling and urinating Ok. No abd pain.   Tried alterin when eat - triggered by the walk.     When had before, went away on its own.     Problem list, Medication list, Allergies, and Medical/Social/Surgical histories reviewed in [facility] and updated as appropriate.     ROS:  5 point ROS completed and negative except noted above, including Gen, CV, Resp, GI, MS    Histories:   Patient Active Problem List   Diagnosis     Migraine headache     Neck pain     Nephrolithiasis     COPD (chronic obstructive pulmonary disease) (H)     Tobacco use disorder (SMOKERS')     Benign essential hypertension     Past Medical History:   Diagnosis Date     Benign essential hypertension 8/30/2017     Bronchospasm      COPD (chronic obstructive pulmonary disease) (H) 5/28/2014     Hypertension      Migraine      Neck pain      Nephrolithiasis      Past Surgical History:   Procedure Laterality Date     LITHOTRIPSY      Lithotrypsy     ORTHOPEDIC SURGERY      right knee exploration     TUBAL LIGATION       Social History   Substance Use Topics     Smoking status: Heavy " "Tobacco Smoker     Packs/day: 0.50     Types: Cigarettes     Smokeless tobacco: Never Used     Alcohol use 0.0 oz/week     0 Standard drinks or equivalent per week      Comment: socially only- one per month     Family History   Problem Relation Age of Onset     CEREBROVASCULAR DISEASE Father 75     CANCER Mother 76     uterine     C.A.D. Brother 66       OBJECTIVE:                                                    /78  Pulse 64  Resp 16  Ht 1.537 m (5' 0.5\")  Wt 54 kg (119 lb)  SpO2 98%  BMI 22.86 kg/m2  Body mass index is 22.86 kg/(m^2).   APPEARANCE: = Relaxed and in no distress  Ears/Nose = External structures and Nares have usual shape and form  Ear canals and TM's = Canals are not inflammed and have none or little wax and the drums are not injected and have a light reflex   Neck = No anterior or posterior adenopathy appreciated.  Thyroid = Not enlarged and no masses felt  Resp effort = Calm regular breathing  Breath Sounds = Good air movement with no rales or rhonchi in any lung fields  Heart Rate, Rythym, & sounds (no Murm)  = Regular rate and rythym with no S3, S4, or murmer appreciated.  Abdomen = Soft, nontender, no masses, & bowel sounds in all quadrants  Musculsktl =  Strength and ROM of major joints are within normal limits  SKIN = absent significant rashes or lesions   Recent/Remote Memory = Alert and Oriented x 3  Mood/Affect = Cooperative and interested     EKG   ASSESSMENT/PLAN:                                                      Sara was seen today for breathing problem.    Diagnoses and all orders for this visit:    Nausea  -     EKG 12-lead complete w/read - Clinics  -     CBC with Diff/Plt ([facility])  -     TSH ([facility])  -     Cancel: Basic Metabolic Panel (8) ([facility])  -     Lipase  Serum ([facility])  -     ondansetron (ZOFRAN) 4 MG tablet; Take 1 tablet (4 mg) by mouth every 8 hours as needed for nausea  -     Comp. Metabolic Panel (14) ([facility])   Etiology is not clear. Concerning " that it started at the time she was pushing it physically during a walk.    EKG to rule out cardiac; then look for metabolic etiology.    If negative, consider imaging of abdomen.     Benign essential hypertension  -     EKG 12-lead complete w/read - Clinics  -     Comp. Metabolic Panel (14) ([facility])    Pulmonary emphysema, unspecified emphysema type (H)  -     PULSE OXIMETRY, SINGLE DETERMINATION; Future    Shortness of breath  -     EKG 12-lead complete w/read - Clinics    Former smoker    Stopped tobacco 1 month ago. Congratulated and supported decision to stop smoking.     See Patient Instructions    >25 min spent with patient, greater than 50% spent on discussion/education/planning, etc. About The primary encounter diagnosis was Nausea. Diagnoses of Benign essential hypertension, Pulmonary emphysema, unspecified emphysema type (H), Shortness of breath, Former smoker, and Elevated hemoglobin (H) were also pertinent to this visit.    The following health maintenance items are reviewed in Epic and correct as of today:  Health Maintenance   Topic Date Due     SPIROMETRY ONETIME  1940     COPD ACTION PLAN Q1 YR  1940     MIGRAINE ACTION PLAN  10/14/1959     LIPID SCREEN Q5 YR FEMALE (SYSTEM ASSIGNED)  02/19/2018     FALL RISK ASSESSMENT  08/30/2018     INFLUENZA VACCINE (Season Ended) 09/01/2018     TETANUS IMMUNIZATION (SYSTEM ASSIGNED)  12/02/2018     ADVANCE DIRECTIVE PLANNING Q5 YRS  01/22/2021     DEXA SCAN SCREENING (SYSTEM ASSIGNED)  Completed     PNEUMOCOCCAL  Completed       Kaelyn Radford MD  [facility]  Family Medicine  [phone]    For any issues, please call my office [phone].

## 2018-05-11 NOTE — LETTER
My Migraine Action Plan      Date: 5/11/2018     My Name: Sara Lehman   YOB: 1940  My Pharmacy: [id number] - [facility] [address]       My (Preventative) Control Medicine: Ibuprofen 200 mg        My Rescue Medicine: SUMAtriptan 100 mg   My Doctor: Kaelyn Radford     My Clinic: [facility] [address] [place] [address] [phone]        GREEN ZONE = Good Control    My headache plan is working.   I can do what I need to do.           I WILL:     ? Keep managing my triggers.  ? Write in my migraine diary each time I have a headache.  ? Keep taking my preventive (controller) medicine daily.  ? Take my relief and rescue medicine as needed.             YELLOW ZONE = Not Enough Control    My headache plan isn t always working.   My headaches keep me from doing   some of the things I need to do.       I WILL:     ? Set goals to control my triggers and act on them.  ? Write in my migraine diary each time I have a headache and review it for                      patterns or new triggers.  ? Keep taking my preventive (controller) medicine daily.  ? Take my relief and rescue medicine as needed.  ? Call my doctor or clinic at if I stay in the Yellow Zone.             RED ZONE = Poor or No Control    My headache plan has  failed. I can t do anything  when I have one. My  medicines aren t working.           I WILL:   ? Set goals to control my triggers and act on them.  ? Write in my migraine diary each time I have a headache and review it for                      patterns or new triggers.  ? Keep taking my preventive (controller) medicine daily.  ? Take my relief and rescue medicine as needed.  ? Call my doctor or clinic or go to urgent care or an ER if I m having the worst                  headache of my life.  ? Call my doctor or clinic or go to urgent care or an ER if my medicine doesn t work.  ? Let my doctor or clinic know within 2  weeks if I have gone to an urgent care or             emergency department.          Provider specific instructions:

## 2018-05-11 NOTE — PATIENT INSTRUCTIONS
Please let us know if the anti-nausea medicine is not working - there are others we can try.     Congratulations on stopping smoking!!! Way to Go!!!

## 2018-05-11 NOTE — LETTER
"[facility]  [address] [place]  [address]  Phone: [phone]    May 21, 2018      Sara Lehman  [address] [facility] [address]              Dear Trixie,    Your EKG with within normal limits.    Your TSH shows a normal thyroid function. Your lipase was within normal limits indicating your pancreas is less likely contributing to your nausea. Your liver and kidney tests were all in a good range. I do not think the lone elevated CO2 lab is clinically significant in relation to your symptoms.    Your blood counts were a little high, but not as high the last time they were checked in Dec 2015 when you were seen in the ED with a severe headache. There are several things that may cause elevated blood counts including dehydration and sleep apnea. Let's recheck your blood counts again in about a month to see if they have normalized. I ordered this repeat blood test for you. Please do not fast before you have this blood drawn so we have a more accurate picture of your baseline blood counts. Thank you.        Sincerely,     Kaelyn \"Salina\" MD Prabhakar    Results for orders placed or performed in visit on 05/11/18   CBC with Diff/Plt ([facility])   Result Value Ref Range    WBC x10/cmm 7.5 3.8 - 11.0 x10/cmm    % Lymphocytes 33.7 20.5 - 51.1 %    % Monocytes 9.0 1.7 - 9.3 %    % Granulocytes 57.3 42.2 - 75.2 %    RBC x10/cmm 5.28 (A) 3.7 - 5.2 x10/cmm    Hemoglobin 15.6 (A) 11.8 - 15.5 g/dl    Hematocrit 48.6 (A) 35 - 46 %    MCV 92.1 80 - 100 fL    MCH 29.6 27.0 - 31.0 pg    MCHC 32.1 (A) 33.0 - 37.0 g/dL    Platelet Count 231 140 - 450 K/uL   TSH ([facility])   Result Value Ref Range    TSH 1.430 0.450 - 4.500 uIU/mL    Narrative    Performed at:  01 - [facility] [address], [place], [place]  [id number]  : Brian Sheehan MD, Phone:  [phone]   Lipase  Serum ([facility])   Result Value Ref Range    Lipase 44 14 - 85 U/L    Narrative    Performed at:  01 - [facility] " [place] [address], [place], [place]  [id number]  : Brian Sheehan MD, Phone:  [phone]   Comp. Metabolic Panel (14) ([facility])   Result Value Ref Range    Glucose 84 65 - 99 mg/dL    Urea Nitrogen 18 8 - 27 mg/dL    Creatinine 0.67 0.57 - 1.00 mg/dL    eGFR If NonAfricn Am 86 >59 mL/min/1.73    eGFR If Africn Am 99 >59 mL/min/1.73    BUN/Creatinine Ratio 27 12 - 28    Sodium 141 134 - 144 mmol/L    Potassium 3.7 3.5 - 5.2 mmol/L    Chloride 99 96 - 106 mmol/L    Total CO2 32 (H) 18 - 28 mmol/L    Calcium 9.9 8.7 - 10.3 mg/dL    Protein Total 7.1 6.0 - 8.5 g/dL    Albumin 4.3 3.5 - 4.8 g/dL    Globulin, Total 2.8 1.5 - 4.5 g/dL    A/G Ratio 1.5 1.2 - 2.2    Bilirubin Total 0.2 0.0 - 1.2 mg/dL    Alkaline Phosphatase 71 39 - 117 IU/L    AST 21 0 - 40 IU/L    ALT 14 0 - 32 IU/L    Narrative    Performed at:  01 - [facility] [address], [place], [place]  [id number]  : Brian Sheehan MD, Phone:  [phone]

## 2018-05-12 LAB
ALBUMIN SERPL-MCNC: 4.3 G/DL (ref 3.5–4.8)
ALBUMIN/GLOB SERPL: 1.5 {RATIO} (ref 1.2–2.2)
ALP SERPL-CCNC: 71 IU/L (ref 39–117)
ALT SERPL-CCNC: 14 IU/L (ref 0–32)
AST SERPL-CCNC: 21 IU/L (ref 0–40)
BILIRUB SERPL-MCNC: 0.2 MG/DL (ref 0–1.2)
BUN SERPL-MCNC: 18 MG/DL (ref 8–27)
BUN/CREATININE RATIO: 27 (ref 12–28)
CALCIUM SERPL-MCNC: 9.9 MG/DL (ref 8.7–10.3)
CHLORIDE SERPLBLD-SCNC: 99 MMOL/L (ref 96–106)
CREAT SERPL-MCNC: 0.67 MG/DL (ref 0.57–1)
EGFR IF AFRICN AM: 99 ML/MIN/1.73
EGFR IF NONAFRICN AM: 86 ML/MIN/1.73
GLOBULIN, TOTAL: 2.8 G/DL (ref 1.5–4.5)
GLUCOSE SERPL-MCNC: 84 MG/DL (ref 65–99)
LIPASE SERPL-CCNC: 44 U/L (ref 14–85)
POTASSIUM SERPL-SCNC: 3.7 MMOL/L (ref 3.5–5.2)
PROT SERPL-MCNC: 7.1 G/DL (ref 6–8.5)
SODIUM SERPL-SCNC: 141 MMOL/L (ref 134–144)
TOTAL CO2: 32 MMOL/L (ref 18–28)
TSH BLD-ACNC: 1.43 UIU/ML (ref 0.45–4.5)

## 2018-05-14 ENCOUNTER — TELEPHONE (OUTPATIENT)
Dept: FAMILY MEDICINE | Facility: CLINIC | Age: 78
End: 2018-05-14

## 2018-05-14 NOTE — TELEPHONE ENCOUNTER
Received fax from [facility] that patients rx for Ondansetron needs PA.   Called [facility] and was able to get PA approved over the phone.    Approval dates 02/13/2018-05/14/2019.  Called [facility] pharmacy and left message of approval.

## 2018-06-01 DIAGNOSIS — Z76.0 ENCOUNTER FOR MEDICATION REFILL: ICD-10-CM

## 2018-06-01 RX ORDER — ALBUTEROL SULFATE 90 UG/1
AEROSOL, METERED RESPIRATORY (INHALATION)
Qty: 18 G | Refills: 2 | Status: SHIPPED | OUTPATIENT
Start: 2018-06-01 | End: 2019-01-03

## 2018-06-01 NOTE — TELEPHONE ENCOUNTER
VENTOLIN  (90 Base) MCG/ACT Inhaler   LAST  Med check 5/11/18   last labs(for RX) 5/11/18  Next  appt scheduled =  none  Dora Casas MA

## 2018-06-14 DIAGNOSIS — Z76.0 ENCOUNTER FOR MEDICATION REFILL: ICD-10-CM

## 2018-06-14 DIAGNOSIS — I10 BENIGN ESSENTIAL HYPERTENSION: ICD-10-CM

## 2018-06-14 RX ORDER — METOPROLOL SUCCINATE 25 MG/1
TABLET, EXTENDED RELEASE ORAL
Qty: 90 TABLET | Refills: 3 | Status: SHIPPED | OUTPATIENT
Start: 2018-06-14 | End: 2019-04-01

## 2018-06-14 RX ORDER — AMLODIPINE BESYLATE 5 MG/1
TABLET ORAL
Qty: 90 TABLET | Refills: 3 | Status: SHIPPED | OUTPATIENT
Start: 2018-06-14 | End: 2019-09-09

## 2018-06-14 NOTE — TELEPHONE ENCOUNTER
metoprolol succinate (TOPROL-XL) 25 MG 24 hr tablet  amLODIPine (NORVASC) 5 MG    LAST  Med check 5/11/18   last labs(for RX) 5/11/18  Next  appt scheduled =  none  Dora Casas MA    BP Readings from Last 3 Encounters:   05/11/18 136/78   03/01/18 126/82   08/30/17 115/70     Last Basic Metabolic Panel:  Lab Results   Component Value Date     05/11/2018      Lab Results   Component Value Date    POTASSIUM 3.7 05/11/2018     Lab Results   Component Value Date    CHLORIDE 99 05/11/2018     Lab Results   Component Value Date    ALTHEA 9.9 05/11/2018     Lab Results   Component Value Date    CO2 24 12/10/2015     Lab Results   Component Value Date    BUN 18 05/11/2018    BUN 27 05/11/2018     Lab Results   Component Value Date    CR 0.67 05/11/2018     Lab Results   Component Value Date    GLC 84 05/11/2018

## 2018-09-20 DIAGNOSIS — Z76.0 ENCOUNTER FOR MEDICATION REFILL: ICD-10-CM

## 2018-09-21 RX ORDER — HYDROCHLOROTHIAZIDE 12.5 MG/1
1 CAPSULE ORAL DAILY
Qty: 90 CAPSULE | Refills: 1 | Status: SHIPPED | OUTPATIENT
Start: 2018-09-21 | End: 2018-11-16

## 2018-09-21 RX ORDER — HYDROCHLOROTHIAZIDE 12.5 MG/1
CAPSULE ORAL
Qty: 60 CAPSULE | Refills: 2 | Status: SHIPPED | OUTPATIENT
Start: 2018-09-21 | End: 2018-09-21

## 2018-09-21 NOTE — TELEPHONE ENCOUNTER
Pharmacy called requesting prescription be changed to #90 with 1 refill due to insurance coverage.  Prescription for this sent to pharmacy, pharmacy told to disregard previous prescription.  Fabi Marks

## 2018-11-15 NOTE — PROGRESS NOTES
SUBJECTIVE:   Sara Lehman is a 78 year old female who presents to clinic today for the following health issues:    White female    Left forearm itching Triamcinolone cream 0.1% at night.   Soap unscented TIDE, Dove  H/o cat scratches healing    COPD  Smoking quit 1 year ago  Ok sitting  No ESTRADA    CP no    Migraines 2-3/month Imitrex still helping. Aura, nausea/vomit.    Kidney stones none recent    Neck pain ok    Flu shot UTD    Eye exam due  Dental dentures ok    Fall no  Seizure no  LOC no      BP Readings from Last 3 Encounters:   11/16/18 138/80   05/11/18 136/78   03/01/18 126/82     Creatinine   Date Value Ref Range Status   05/11/2018 0.67 0.57 - 1.00 mg/dL Final         Problem list and histories reviewed & adjusted, as indicated.  Additional history: as documented    Patient Active Problem List   Diagnosis     Migraine headache     Neck pain     Nephrolithiasis     COPD (chronic obstructive pulmonary disease) (H)     Tobacco use disorder (SMOKERS')     Benign essential hypertension     Past Surgical History:   Procedure Laterality Date     LITHOTRIPSY      Lithotrypsy     ORTHOPEDIC SURGERY      right knee exploration     TUBAL LIGATION         Social History   Substance Use Topics     Smoking status: Heavy Tobacco Smoker     Packs/day: 0.50     Types: Cigarettes     Smokeless tobacco: Never Used     Alcohol use 0.0 oz/week     0 Standard drinks or equivalent per week      Comment: socially only- one per month     Family History   Problem Relation Age of Onset     Cerebrovascular Disease Father 75     Cancer Mother 76     uterine     C.A.D. Brother 66         Current Outpatient Prescriptions   Medication Sig Dispense Refill     amLODIPine (NORVASC) 5 MG tablet Take 1 tablet (5 mg) by mouth once daily 90 tablet 3     biotin 1000 MCG TABS tablet Take 1 tablet (1,000 mcg) by mouth daily       FLUZONE HIGH-DOSE 0.5 ML injection ADM 0.5ML IM UTD  0     hydrochlorothiazide (MICROZIDE) 12.5 MG capsule Take  1 capsule (12.5 mg) by mouth daily 90 capsule 1     ibuprofen 200 MG capsule Take 200 mg by mouth as needed for pain 120 capsule      metoprolol succinate (TOPROL-XL) 25 MG 24 hr tablet Take 1 tablet by mouth once daily 90 tablet 3     ondansetron (ZOFRAN) 4 MG tablet Take 1 tablet (4 mg) by mouth every 8 hours as needed for nausea 18 tablet 1     SUMAtriptan (IMITREX) 100 MG tablet TAKE ONE TABLET BY MOUTH AT ONSET OF HEADACHE FOR MIGRAINE, MAY REPEAT IN 2 HOURS IF NEEDED (MAX OF 2 TABLETS PER DAY) 6 tablet 5     triamcinolone (KENALOG) 0.1 % cream Apply sparingly to affected area three times daily as needed 80 g 0     VENTOLIN  (90 Base) MCG/ACT Inhaler INHALE TWO PUFFS BY MOUTH EVERY SIX HOURS AS NEEDED FOR SHORTNESS OF BREATH  18 g 2     Allergies   Allergen Reactions     Ace Inhibitors Cough     Aleve [Naproxen Sodium] GI Disturbance     Asa Buff (Mag [Aspirin Buffered] GI Disturbance     Internal bleeding     Atorvastatin Calcium GI Disturbance     Celebrex [Celecoxib] GI Disturbance     Codeine Sulfate Hives     Pravastatin GI Disturbance     Simvastatin GI Disturbance     Recent Labs   Lab Test  05/11/18   1329  08/30/17   1406  12/10/15   1615  08/10/15   1348   02/22/13   1740   02/19/13   1016   LDL   --    --    --    --    --    --    --   121*   HDL   --    --    --    --    --    --    --   55   TRIG   --    --    --    --    --    --    --   92   ALT  14   --   28  10   < >   --    < >   --    CR  0.67  0.69  0.65  0.78   < >  0.67   --    --    GFRESTIMATED   --    --   88   --    --   87   --    --    GFRESTBLACK   --    --   >90   GFR Calc     --    --   >90   --    --    POTASSIUM  3.7  3.9  3.8  3.7   < >  3.8   --    --     < > = values in this interval not displayed.      BP Readings from Last 3 Encounters:   11/16/18 138/80   05/11/18 136/78   03/01/18 126/82    Wt Readings from Last 3 Encounters:   11/16/18 53.5 kg (118 lb)   05/11/18 54 kg (119 lb)   03/01/18 51.7 kg  "(114 lb)       Estimated body mass index is 22.67 kg/(m^2) as calculated from the following:    Height as of 5/11/18: 1.537 m (5' 0.5\").    Weight as of this encounter: 53.5 kg (118 lb).             Labs reviewed in EPIC    Reviewed and updated as needed this visit by clinical staff       Reviewed and updated as needed this visit by Provider         ROS:  Constitutional, HEENT, cardiovascular, pulmonary, GI, , musculoskeletal, neuro, skin, endocrine and psych systems are negative, except as otherwise noted.    OBJECTIVE:     /80  Pulse 89  Resp 16  Wt 53.5 kg (118 lb)  SpO2 (!) 88%  BMI 22.67 kg/m2  There is no height or weight on file to calculate BMI.  GENERAL: healthy, alert and no distress  EYES: Eyes grossly normal to inspection, PERRL and conjunctivae and sclerae normal  HENT: ear canals and TM's normal, nose and mouth without ulcers or lesions  NECK: no adenopathy, no asymmetry, masses, or scars and thyroid normal to palpation  RESP: lungs clear to auscultation - no rales, rhonchi or wheezes COPD  CV: regular rate and rhythm, normal S1 S2, no S3 or S4, no murmur, click or rub, no peripheral edema and peripheral pulses strong  ABDOMEN: soft, nontender, no hepatosplenomegaly, no masses and bowel sounds normal  MS: no gross musculoskeletal defects noted, no edema  SKIN: no suspicious lesions Left forearm eczema rash  NEURO: Normal strength and tone, mentation intact and speech normal  PSYCH: mentation appears normal, affect normal/bright  LYMPH: no cervical, supraclavicular, axillary, or inguinal adenopathy    Diagnostic Test Results:  Results for orders placed or performed in visit on 05/11/18   CBC with Diff/Plt ([facility])   Result Value Ref Range    WBC x10/cmm 7.5 3.8 - 11.0 x10/cmm    % Lymphocytes 33.7 20.5 - 51.1 %    % Monocytes 9.0 1.7 - 9.3 %    % Granulocytes 57.3 42.2 - 75.2 %    RBC x10/cmm 5.28 (A) 3.7 - 5.2 x10/cmm    Hemoglobin 15.6 (A) 11.8 - 15.5 g/dl    Hematocrit 48.6 (A) 35 - 46 %    " MCV 92.1 80 - 100 fL    MCH 29.6 27.0 - 31.0 pg    MCHC 32.1 (A) 33.0 - 37.0 g/dL    Platelet Count 231 140 - 450 K/uL   TSH ([facility])   Result Value Ref Range    TSH 1.430 0.450 - 4.500 uIU/mL    Narrative    Performed at:  01 - [facility] [address], [place], [place]  [id number]  : Brian Sheehan MD, Phone:  [phone]   Lipase  Serum ([facility])   Result Value Ref Range    Lipase 44 14 - 85 U/L    Narrative    Performed at:  01 - [facility] [address], [place], [place]  [id number]  : Brian Sheehan MD, Phone:  [phone]   Comp. Metabolic Panel (14) ([facility])   Result Value Ref Range    Glucose 84 65 - 99 mg/dL    Urea Nitrogen 18 8 - 27 mg/dL    Creatinine 0.67 0.57 - 1.00 mg/dL    eGFR If NonAfricn Am 86 >59 mL/min/1.73    eGFR If Africn Am 99 >59 mL/min/1.73    BUN/Creatinine Ratio 27 12 - 28    Sodium 141 134 - 144 mmol/L    Potassium 3.7 3.5 - 5.2 mmol/L    Chloride 99 96 - 106 mmol/L    Total CO2 32 (H) 18 - 28 mmol/L    Calcium 9.9 8.7 - 10.3 mg/dL    Protein Total 7.1 6.0 - 8.5 g/dL    Albumin 4.3 3.5 - 4.8 g/dL    Globulin, Total 2.8 1.5 - 4.5 g/dL    A/G Ratio 1.5 1.2 - 2.2    Bilirubin Total 0.2 0.0 - 1.2 mg/dL    Alkaline Phosphatase 71 39 - 117 IU/L    AST 21 0 - 40 IU/L    ALT 14 0 - 32 IU/L    Narrative    Performed at:  01 - [facility] [address], [place], [place]  [id number]  : Brian Sheehan MD, Phone:  [phone]       ASSESSMENT/PLAN:     ASSESSMENT / PLAN:  (L30.9) Eczema, unspecified type  (primary encounter diagnosis)  Comment:   Plan: augmented betamethasone dipropionate         (DIPROLENE-AF) 0.05 % cream            (Z76.0) Encounter for medication refill  Comment:   BP Readings from Last 3 Encounters:   11/16/18 138/80   05/11/18 136/78   03/01/18 126/82       Plan: hydrochlorothiazide (MICROZIDE) 12.5 MG capsule            (R11.0) Nausea  Comment:   Plan: ondansetron (ZOFRAN) 4 MG tablet            (D58.2) Elevated hemoglobin  (H)  Comment:   Hemoglobin   Date Value Ref Range Status   11/16/2018 15.1 11.8 - 15.5 g/dl Final   05/11/2018 15.6 (A) 11.8 - 15.5 g/dl Final       Plan: follow    (Z87.891) Former smoker  Comment:   Plan: Continue not smoking    (J43.9) Pulmonary emphysema, unspecified emphysema type (H)  Comment:   Plan: continue with plan    (G43.901) Migraine with status migrainosus, not intractable, unspecified migraine type  Comment:   Plan: Continue with plan    (I10) Benign essential hypertension  Comment:   BP Readings from Last 3 Encounters:   11/16/18 138/80   05/11/18 136/78   03/01/18 126/82       Plan: Basic Metabolic Panel (8) ([facility])            (M54.2) Neck pain  Comment:   Plan: stable    (N20.0) Nephrolithiasis  Comment:   Plan: no recent stones, hydrate        Patient Instructions   TD update needed at pharmacy    Diprolene cream for left arm rash, when improves go back to the triamcinolone. If not better in 2-3 months see dermatology.    Fasting lipids in the future    Refills done.    Labs                  Ecoh Moeller MD  [facility]

## 2018-11-16 ENCOUNTER — OFFICE VISIT (OUTPATIENT)
Dept: FAMILY MEDICINE | Facility: CLINIC | Age: 78
End: 2018-11-16

## 2018-11-16 VITALS
OXYGEN SATURATION: 88 % | HEART RATE: 89 BPM | RESPIRATION RATE: 16 BRPM | BODY MASS INDEX: 22.67 KG/M2 | DIASTOLIC BLOOD PRESSURE: 80 MMHG | SYSTOLIC BLOOD PRESSURE: 138 MMHG | WEIGHT: 118 LBS

## 2018-11-16 DIAGNOSIS — R11.0 NAUSEA: ICD-10-CM

## 2018-11-16 DIAGNOSIS — Z87.891 FORMER SMOKER: ICD-10-CM

## 2018-11-16 DIAGNOSIS — G43.901 MIGRAINE WITH STATUS MIGRAINOSUS, NOT INTRACTABLE, UNSPECIFIED MIGRAINE TYPE: ICD-10-CM

## 2018-11-16 DIAGNOSIS — Z76.0 ENCOUNTER FOR MEDICATION REFILL: ICD-10-CM

## 2018-11-16 DIAGNOSIS — L30.9 ECZEMA, UNSPECIFIED TYPE: Primary | ICD-10-CM

## 2018-11-16 DIAGNOSIS — D58.2 ELEVATED HEMOGLOBIN (H): ICD-10-CM

## 2018-11-16 DIAGNOSIS — N20.0 NEPHROLITHIASIS: ICD-10-CM

## 2018-11-16 DIAGNOSIS — I10 BENIGN ESSENTIAL HYPERTENSION: ICD-10-CM

## 2018-11-16 DIAGNOSIS — M54.2 NECK PAIN: ICD-10-CM

## 2018-11-16 DIAGNOSIS — J43.9 PULMONARY EMPHYSEMA, UNSPECIFIED EMPHYSEMA TYPE (H): ICD-10-CM

## 2018-11-16 LAB
% GRANULOCYTES: 61.1 % (ref 42.2–75.2)
HCT VFR BLD AUTO: 47 % (ref 35–46)
HEMOGLOBIN: 15.1 G/DL (ref 11.8–15.5)
LYMPHOCYTES NFR BLD AUTO: 30.2 % (ref 20.5–51.1)
MCH RBC QN AUTO: 30.1 PG (ref 27–31)
MCHC RBC AUTO-ENTMCNC: 32.1 G/DL (ref 33–37)
MCV RBC AUTO: 93.8 FL (ref 80–100)
MONOCYTES NFR BLD AUTO: 8.7 % (ref 1.7–9.3)
PLATELET # BLD AUTO: 252 K/UL (ref 140–450)
RBC # BLD AUTO: 5.01 X10/CMM (ref 3.7–5.2)
WBC # BLD AUTO: 7.5 X10/CMM (ref 3.8–11)

## 2018-11-16 PROCEDURE — 99214 OFFICE O/P EST MOD 30 MIN: CPT | Performed by: FAMILY MEDICINE

## 2018-11-16 PROCEDURE — 85025 COMPLETE CBC W/AUTO DIFF WBC: CPT | Performed by: FAMILY MEDICINE

## 2018-11-16 PROCEDURE — 36415 COLL VENOUS BLD VENIPUNCTURE: CPT | Performed by: FAMILY MEDICINE

## 2018-11-16 RX ORDER — BETAMETHASONE DIPROPIONATE 0.5 MG/G
CREAM TOPICAL
Qty: 15 G | Refills: 0 | Status: SHIPPED | OUTPATIENT
Start: 2018-11-16 | End: 2019-04-01

## 2018-11-16 RX ORDER — HYDROCHLOROTHIAZIDE 12.5 MG/1
1 CAPSULE ORAL DAILY
Qty: 90 CAPSULE | Refills: 3 | Status: SHIPPED | OUTPATIENT
Start: 2018-11-16 | End: 2019-04-01

## 2018-11-16 RX ORDER — ONDANSETRON 4 MG/1
4 TABLET, FILM COATED ORAL EVERY 8 HOURS PRN
Qty: 18 TABLET | Refills: 1 | Status: SHIPPED | OUTPATIENT
Start: 2018-11-16 | End: 2019-04-01

## 2018-11-16 NOTE — PATIENT INSTRUCTIONS
TD update needed at pharmacy    Diprolene cream for left arm rash, when improves go back to the triamcinolone. If not better in 2-3 months see dermatology.    Fasting lipids in the future    Refills done.    Labs

## 2018-11-16 NOTE — MR AVS SNAPSHOT
"              After Visit Summary   11/16/2018    Sara Lehman    MRN: [id number]           Patient Information     Date Of Birth          1940        Visit Information        Provider Department      11/16/2018 2:30 PM Echo Moeller MD [facility]        Today's Diagnoses     Eczema, unspecified type    -  1    Encounter for medication refill        Nausea        Elevated hemoglobin (H)        Former smoker        Pulmonary emphysema, unspecified emphysema type (H)        Migraine with status migrainosus, not intractable, unspecified migraine type        Benign essential hypertension        Neck pain        Nephrolithiasis          Care Instructions    TD update needed at pharmacy    Diprolene cream for left arm rash, when improves go back to the triamcinolone. If not better in 2-3 months see dermatology.    Fasting lipids in the future    Refills done.    Labs                      Follow-ups after your visit        Who to contact     If you have questions or need follow up information about today's clinic visit or your schedule please contact [facility] directly at [phone].  Normal or non-critical lab and imaging results will be communicated to you by [employer], letter or phone within 4 business days after the clinic has received the results. If you do not hear from us within 7 days, please contact the clinic through [employer] or phone. If you have a critical or abnormal lab result, we will notify you by phone as soon as possible.  Submit refill requests through [employer] or call your pharmacy and they will forward the refill request to us. Please allow 3 business days for your refill to be completed.          Additional Information About Your Visit        [employer] Information     [employer] lets you send messages to your doctor, view your test results, renew your prescriptions, schedule appointments and more. To sign up, go to [email or web address] . Click on \"Log in\" on the " "left side of the screen, which will take you to the Welcome page. Then click on \"Sign up Now\" on the right side of the page.     You will be asked to enter the access code listed below, as well as some personal information. Please follow the directions to create your username and password.     Your access code is: [email or web address]  Expires: 2019  2:59 PM     Your access code will  in 90 days. If you need help or a new code, please call your [facility] clinic or [phone].        Care EveryWhere ID     This is your Care EveryWhere ID. This could be used by other organizations to access your [facility] medical records  [id number]        Your Vitals Were     Pulse Respirations Pulse Oximetry BMI (Body Mass Index)          89 16 88% 22.67 kg/m2         Blood Pressure from Last 3 Encounters:   18 138/80   18 136/78   18 126/82    Weight from Last 3 Encounters:   18 53.5 kg (118 lb)   18 54 kg (119 lb)   18 51.7 kg (114 lb)              We Performed the Following     Basic Metabolic Panel (8) ([facility])     CBC with Diff/Plt ([facility])          Today's Medication Changes          These changes are accurate as of 18  2:59 PM.  If you have any questions, ask your nurse or doctor.               Start taking these medicines.        Dose/Directions    augmented betamethasone dipropionate 0.05 % cream   Commonly known as:  DIPROLENE-AF   Used for:  Eczema, unspecified type   Started by:  Echo Moeller MD        Apply sparingly to affected area  twice daily for 14 days.  Do not apply to face.   Quantity:  15 g   Refills:  0            Where to get your medicines      These medications were sent to [facility] ([place], [place]) [facility] #[id number] - [place], [place] - [facility] [facility]    [facility] [address]     Phone:  [phone]     augmented betamethasone dipropionate 0.05 % cream    hydrochlorothiazide 12.5 MG capsule    ondansetron 4 MG tablet                Primary Care Provider " Office Phone # Fax #    Kaelyn Darling [place] [phone] [address] [facility] [address]        Equal Access to Services     CHERI BEAL : Hadii aad ku hadrobino Soomaali, waaxda luqadaha, qaybta kaalmada adeegyada, hien alemanleroy pickett. So [facility] [phone].    ATENCIÓN: Si habla español, tiene a botello disposición servicios gratuitos de asistencia lingüística. Llame al [phone].    We comply with applicable federal civil rights laws and [place] laws. We do not discriminate on the basis of race, color, national origin, age, disability, sex, sexual orientation, or gender identity.            Thank you!     Thank you for choosing [facility]  for your care. Our goal is always to provide you with excellent care. Hearing back from our patients is one way we can continue to improve our services. Please take a few minutes to complete the written survey that you may receive in the mail after your visit with us. Thank you!             Your Updated Medication List - Protect others around you: Learn how to safely use, store and throw away your medicines at www.disposemymeds.org.          This list is accurate as of 11/16/18  2:59 PM.  Always use your most recent med list.                   Brand Name Dispense Instructions for use Diagnosis    amLODIPine 5 MG tablet    NORVASC    90 tablet    Take 1 tablet (5 mg) by mouth once daily    Encounter for medication refill, Benign essential hypertension       augmented betamethasone dipropionate 0.05 % cream    DIPROLENE-AF    15 g    Apply sparingly to affected area  twice daily for 14 days.  Do not apply to face.    Eczema, unspecified type       biotin 1000 MCG Tabs tablet      Take 1 tablet (1,000 mcg) by mouth daily        FLUZONE HIGH-DOSE 0.5 ML injection   Generic drug:  influenza Vac Split High-Dose      ADM 0.5ML IM UTD        hydrochlorothiazide 12.5 MG capsule    MICROZIDE    90 capsule    Take 1  capsule (12.5 mg) by mouth daily    Encounter for medication refill       ibuprofen 200 MG capsule    ADVIL/MOTRIN    120 capsule    Take 200 mg by mouth as needed for pain        metoprolol succinate 25 MG 24 hr tablet    TOPROL-XL    90 tablet    Take 1 tablet by mouth once daily    Encounter for medication refill, Benign essential hypertension       ondansetron 4 MG tablet    ZOFRAN    18 tablet    Take 1 tablet (4 mg) by mouth every 8 hours as needed for nausea    Nausea       SUMAtriptan 100 MG tablet    IMITREX    6 tablet    TAKE ONE TABLET BY MOUTH AT ONSET OF HEADACHE FOR MIGRAINE, MAY REPEAT IN 2 HOURS IF NEEDED (MAX OF 2 TABLETS PER DAY)    Encounter for medication refill       triamcinolone 0.1 % cream    KENALOG    80 g    Apply sparingly to affected area three times daily as needed    Rash       VENTOLIN  (90 Base) MCG/ACT inhaler   Generic drug:  albuterol     18 g    INHALE TWO PUFFS BY MOUTH EVERY SIX HOURS AS NEEDED FOR SHORTNESS OF BREATH    Encounter for medication refill

## 2018-11-16 NOTE — LETTER
[facility] [address] [place]  [address]  Phone: [phone]    November 20, 2018      Sara Lehman  [address] [address]              Dear Sara,    The results from your recent visit showed BMP results were good        Sincerely,     Echo Moeller M.D.    Results for orders placed or performed in visit on 11/16/18   CBC with Diff/Plt ([facility])   Result Value Ref Range    WBC x10/cmm 7.5 3.8 - 11.0 x10/cmm    % Lymphocytes 30.2 20.5 - 51.1 %    % Monocytes 8.7 1.7 - 9.3 %    % Granulocytes 61.1 42.2 - 75.2 %    RBC x10/cmm 5.01 3.7 - 5.2 x10/cmm    Hemoglobin 15.1 11.8 - 15.5 g/dl    Hematocrit 47.0 (A) 35 - 46 %    MCV 93.8 80 - 100 fL    MCH 30.1 27.0 - 31.0 pg    MCHC 32.1 (A) 33.0 - 37.0 g/dL    Platelet Count 252 140 - 450 K/uL   Basic Metabolic Panel (8) ([facility])   Result Value Ref Range    Glucose 83 65 - 99 mg/dL    Urea Nitrogen 17 8 - 27 mg/dL    Creatinine 0.77 0.57 - 1.00 mg/dL    eGFR If NonAfricn Am 75 >59 mL/min/1.73    eGFR If Africn Am 86 >59 mL/min/1.73    BUN/Creatinine Ratio 22 12 - 28    Sodium 142 134 - 144 mmol/L    Potassium 3.8 3.5 - 5.2 mmol/L    Chloride 102 96 - 106 mmol/L    Total CO2 25 20 - 29 mmol/L    Calcium 9.9 8.7 - 10.3 mg/dL    Narrative    Performed at:  01 - [facility]  [address] [facility], [place], [place]  [id number]  : Jomar Bowles MD, Phone:  [phone]

## 2018-11-17 LAB
BUN SERPL-MCNC: 17 MG/DL (ref 8–27)
BUN/CREATININE RATIO: 22 (ref 12–28)
CALCIUM SERPL-MCNC: 9.9 MG/DL (ref 8.7–10.3)
CHLORIDE SERPLBLD-SCNC: 102 MMOL/L (ref 96–106)
CREAT SERPL-MCNC: 0.77 MG/DL (ref 0.57–1)
EGFR IF AFRICN AM: 86 ML/MIN/1.73
EGFR IF NONAFRICN AM: 75 ML/MIN/1.73
GLUCOSE SERPL-MCNC: 83 MG/DL (ref 65–99)
POTASSIUM SERPL-SCNC: 3.8 MMOL/L (ref 3.5–5.2)
SODIUM SERPL-SCNC: 142 MMOL/L (ref 134–144)
TOTAL CO2: 25 MMOL/L (ref 20–29)

## 2019-01-03 DIAGNOSIS — Z76.0 ENCOUNTER FOR MEDICATION REFILL: ICD-10-CM

## 2019-01-03 RX ORDER — ALBUTEROL SULFATE 90 UG/1
AEROSOL, METERED RESPIRATORY (INHALATION)
Qty: 18 G | Refills: 11 | Status: SHIPPED | OUTPATIENT
Start: 2019-01-03 | End: 2019-09-09

## 2019-04-01 ENCOUNTER — OFFICE VISIT (OUTPATIENT)
Dept: INTERNAL MEDICINE | Facility: CLINIC | Age: 79
End: 2019-04-01
Payer: COMMERCIAL

## 2019-04-01 VITALS
BODY MASS INDEX: 23.75 KG/M2 | DIASTOLIC BLOOD PRESSURE: 88 MMHG | TEMPERATURE: 97.9 F | HEIGHT: 61 IN | HEART RATE: 84 BPM | WEIGHT: 125.8 LBS | OXYGEN SATURATION: 96 % | SYSTOLIC BLOOD PRESSURE: 148 MMHG | RESPIRATION RATE: 16 BRPM

## 2019-04-01 DIAGNOSIS — K62.89 RECTAL MASS: Primary | ICD-10-CM

## 2019-04-01 PROCEDURE — 99213 OFFICE O/P EST LOW 20 MIN: CPT | Performed by: PHYSICIAN ASSISTANT

## 2019-04-01 RX ORDER — HYDROCHLOROTHIAZIDE 12.5 MG/1
1 CAPSULE ORAL DAILY
Qty: 90 CAPSULE | Refills: 3 | COMMUNITY
Start: 2019-04-01 | End: 2019-09-09

## 2019-04-01 RX ORDER — METOPROLOL SUCCINATE 25 MG/1
25 TABLET, EXTENDED RELEASE ORAL DAILY
Qty: 90 TABLET | Refills: 3 | COMMUNITY
Start: 2019-04-01 | End: 2019-09-09

## 2019-04-01 ASSESSMENT — MIFFLIN-ST. JEOR: SCORE: 985.07

## 2019-04-01 NOTE — PROGRESS NOTES
"  SUBJECTIVE:   Sara Lehman is a 78 year old female who presents to clinic today for the following health issues:      Hemorrhoids       Duration: off and on for a couple of months    Description:   Pain: YES- Sometimes   Itching: YES sometimes    Accompanying signs and symptoms:   Blood in stool: Possibly - blood in the underwear at night, also noting blood intermixed in stool, some blood with wiping   Denies black tar stools     Changes in stool pattern: no     History (similar episodes/previous evaluation): None    Precipitating or alleviating factors: None    Therapies tried and outcome: Preporation H and lotion to help with itching    Pt denies weight loss or night sweats    Pt notes occasional constipation       Problem list and histories reviewed & adjusted, as indicated.  Additional history: as documented    Reviewed and updated as needed this visit by clinical staff  Tobacco  Allergies  Meds  Problems  Soc Hx      Reviewed and updated as needed this visit by Provider  Meds  Problems         OBJECTIVE:     /88   Pulse 84   Temp 97.9  F (36.6  C) (Oral)   Resp 16   Ht 1.537 m (5' 0.5\")   Wt 57.1 kg (125 lb 12.8 oz)   SpO2 96%   BMI 24.16 kg/m    Body mass index is 24.16 kg/m .  GENERAL: healthy, alert and no distress  RESP: lungs clear to auscultation - no rales, rhonchi or wheezes  CV: regular rates and rhythm, normal S1 S2, no S3 or S4 and no murmur, click or rub  ABDOMEN: soft, nontender, no hepatosplenomegaly, no masses and bowel sounds normal  RECTAL (female): anal mass noted about an inch in size, lumpy in texture and shape, firm with varying color, skin around the area is erythematous with skin breakdown     ASSESSMENT/PLAN:       (K62.9) Rectal mass  (primary encounter diagnosis)  Comment: unclear etiology concern for cancer   Plan: COLORECTAL SURGERY REFERRAL        - referral for further evaluation and treatment         - reviewed following up to establish care     Zoila GILL" Stephen Soares PA-C  [facility]

## 2019-04-09 ENCOUNTER — HOSPITAL ENCOUNTER (OUTPATIENT)
Facility: CLINIC | Age: 79
Discharge: HOME | End: 2019-04-09
Attending: COLON & RECTAL SURGERY | Admitting: COLON & RECTAL SURGERY
Payer: COMMERCIAL

## 2019-04-09 VITALS
HEART RATE: 70 BPM | RESPIRATION RATE: 15 BRPM | DIASTOLIC BLOOD PRESSURE: 65 MMHG | SYSTOLIC BLOOD PRESSURE: 143 MMHG | OXYGEN SATURATION: 95 %

## 2019-04-09 PROCEDURE — 88305 TISSUE EXAM BY PATHOLOGIST: CPT | Performed by: COLON & RECTAL SURGERY

## 2019-04-09 PROCEDURE — 88342 IMHCHEM/IMCYTCHM 1ST ANTB: CPT | Performed by: COLON & RECTAL SURGERY

## 2019-04-09 PROCEDURE — 88341 IMHCHEM/IMCYTCHM EA ADD ANTB: CPT | Performed by: COLON & RECTAL SURGERY

## 2019-04-09 PROCEDURE — 45380 COLONOSCOPY AND BIOPSY: CPT | Performed by: COLON & RECTAL SURGERY

## 2019-04-09 PROCEDURE — 00000159 ZZHCL STATISTIC H-SEND OUTS PREP: Performed by: COLON & RECTAL SURGERY

## 2019-04-09 PROCEDURE — 25000128 H RX IP 250 OP 636: Performed by: COLON & RECTAL SURGERY

## 2019-04-09 PROCEDURE — G0500 MOD SEDAT ENDO SERVICE >5YRS: HCPCS | Performed by: COLON & RECTAL SURGERY

## 2019-04-09 RX ORDER — ONDANSETRON 2 MG/ML
4 INJECTION INTRAMUSCULAR; INTRAVENOUS
Status: DISCONTINUED | OUTPATIENT
Start: 2019-04-09 | End: 2019-04-09 | Stop reason: HOSPADM

## 2019-04-09 RX ORDER — PROCHLORPERAZINE MALEATE 5 MG
5 TABLET ORAL EVERY 6 HOURS PRN
Status: DISCONTINUED | OUTPATIENT
Start: 2019-04-09 | End: 2019-04-09 | Stop reason: HOSPADM

## 2019-04-09 RX ORDER — FLUMAZENIL 0.1 MG/ML
0.2 INJECTION, SOLUTION INTRAVENOUS
Status: DISCONTINUED | OUTPATIENT
Start: 2019-04-09 | End: 2019-04-09 | Stop reason: HOSPADM

## 2019-04-09 RX ORDER — ONDANSETRON 4 MG/1
4 TABLET, ORALLY DISINTEGRATING ORAL EVERY 6 HOURS PRN
Status: DISCONTINUED | OUTPATIENT
Start: 2019-04-09 | End: 2019-04-09 | Stop reason: HOSPADM

## 2019-04-09 RX ORDER — LIDOCAINE 40 MG/G
CREAM TOPICAL
Status: DISCONTINUED | OUTPATIENT
Start: 2019-04-09 | End: 2019-04-09 | Stop reason: HOSPADM

## 2019-04-09 RX ORDER — NALOXONE HYDROCHLORIDE 0.4 MG/ML
.1-.4 INJECTION, SOLUTION INTRAMUSCULAR; INTRAVENOUS; SUBCUTANEOUS
Status: DISCONTINUED | OUTPATIENT
Start: 2019-04-09 | End: 2019-04-09 | Stop reason: HOSPADM

## 2019-04-09 RX ORDER — ONDANSETRON 2 MG/ML
4 INJECTION INTRAMUSCULAR; INTRAVENOUS EVERY 6 HOURS PRN
Status: DISCONTINUED | OUTPATIENT
Start: 2019-04-09 | End: 2019-04-09 | Stop reason: HOSPADM

## 2019-04-09 RX ORDER — FENTANYL CITRATE 50 UG/ML
INJECTION, SOLUTION INTRAMUSCULAR; INTRAVENOUS PRN
Status: DISCONTINUED | OUTPATIENT
Start: 2019-04-09 | End: 2019-04-09 | Stop reason: HOSPADM

## 2019-04-12 LAB — COPATH REPORT: NORMAL

## 2019-04-19 ENCOUNTER — TRANSFERRED RECORDS (OUTPATIENT)
Dept: FAMILY MEDICINE | Facility: CLINIC | Age: 79
End: 2019-04-19

## 2019-04-23 LAB — COLONOSCOPY: NORMAL

## 2019-04-26 ENCOUNTER — HOSPITAL ENCOUNTER (OUTPATIENT)
Facility: CLINIC | Age: 79
End: 2019-04-26
Payer: COMMERCIAL

## 2019-04-29 RX ORDER — NICOTINE POLACRILEX 4 MG
15-30 LOZENGE BUCCAL
Status: CANCELLED | OUTPATIENT
Start: 2019-04-29

## 2019-04-29 RX ORDER — LIDOCAINE 40 MG/G
CREAM TOPICAL
Status: CANCELLED | OUTPATIENT
Start: 2019-04-29

## 2019-04-29 RX ORDER — DEXTROSE MONOHYDRATE 25 G/50ML
25-50 INJECTION, SOLUTION INTRAVENOUS
Status: CANCELLED | OUTPATIENT
Start: 2019-04-29

## 2019-05-03 ENCOUNTER — HOSPITAL ENCOUNTER (INPATIENT)
Facility: CLINIC | Age: 79
LOS: 3 days | Discharge: HOME | DRG: 690 | End: 2019-05-08
Attending: EMERGENCY MEDICINE | Admitting: INTERNAL MEDICINE
Payer: COMMERCIAL

## 2019-05-03 ENCOUNTER — TELEPHONE (OUTPATIENT)
Dept: INTERVENTIONAL RADIOLOGY/VASCULAR | Facility: CLINIC | Age: 79
End: 2019-05-03

## 2019-05-03 ENCOUNTER — APPOINTMENT (OUTPATIENT)
Dept: CT IMAGING | Facility: CLINIC | Age: 79
DRG: 690 | End: 2019-05-03
Attending: EMERGENCY MEDICINE
Payer: COMMERCIAL

## 2019-05-03 DIAGNOSIS — N39.0 URINARY TRACT INFECTION WITHOUT HEMATURIA, SITE UNSPECIFIED: ICD-10-CM

## 2019-05-03 DIAGNOSIS — N30.90 CYSTITIS: ICD-10-CM

## 2019-05-03 DIAGNOSIS — R11.0 NAUSEA: Primary | ICD-10-CM

## 2019-05-03 LAB
ALBUMIN SERPL-MCNC: 3.4 G/DL (ref 3.4–5)
ALBUMIN UR-MCNC: 30 MG/DL
ALP SERPL-CCNC: 78 U/L (ref 40–150)
ALT SERPL W P-5'-P-CCNC: 15 U/L (ref 0–50)
ANION GAP SERPL CALCULATED.3IONS-SCNC: 8 MMOL/L (ref 3–14)
APPEARANCE UR: ABNORMAL
AST SERPL W P-5'-P-CCNC: 14 U/L (ref 0–45)
BACTERIA #/AREA URNS HPF: ABNORMAL /HPF
BASOPHILS # BLD AUTO: 0 10E9/L (ref 0–0.2)
BASOPHILS NFR BLD AUTO: 0.1 %
BILIRUB SERPL-MCNC: 0.8 MG/DL (ref 0.2–1.3)
BILIRUB UR QL STRIP: NEGATIVE
BUN SERPL-MCNC: 17 MG/DL (ref 7–30)
CALCIUM SERPL-MCNC: 9.6 MG/DL (ref 8.5–10.1)
CHLORIDE SERPL-SCNC: 95 MMOL/L (ref 94–109)
CO2 SERPL-SCNC: 29 MMOL/L (ref 20–32)
COLOR UR AUTO: YELLOW
CREAT SERPL-MCNC: 0.86 MG/DL (ref 0.52–1.04)
DIFFERENTIAL METHOD BLD: ABNORMAL
EOSINOPHIL # BLD AUTO: 0 10E9/L (ref 0–0.7)
EOSINOPHIL NFR BLD AUTO: 0 %
ERYTHROCYTE [DISTWIDTH] IN BLOOD BY AUTOMATED COUNT: 12.8 % (ref 10–15)
GFR SERPL CREATININE-BSD FRML MDRD: 65 ML/MIN/{1.73_M2}
GLUCOSE SERPL-MCNC: 123 MG/DL (ref 70–99)
GLUCOSE UR STRIP-MCNC: NEGATIVE MG/DL
HCT VFR BLD AUTO: 45 % (ref 35–47)
HGB BLD-MCNC: 15.5 G/DL (ref 11.7–15.7)
HGB UR QL STRIP: ABNORMAL
HYALINE CASTS #/AREA URNS LPF: 4 /LPF (ref 0–2)
IMM GRANULOCYTES # BLD: 0.1 10E9/L (ref 0–0.4)
IMM GRANULOCYTES NFR BLD: 0.4 %
KETONES UR STRIP-MCNC: 40 MG/DL
LEUKOCYTE ESTERASE UR QL STRIP: ABNORMAL
LIPASE SERPL-CCNC: 72 U/L (ref 73–393)
LYMPHOCYTES # BLD AUTO: 1 10E9/L (ref 0.8–5.3)
LYMPHOCYTES NFR BLD AUTO: 6.5 %
MCH RBC QN AUTO: 31.5 PG (ref 26.5–33)
MCHC RBC AUTO-ENTMCNC: 34.4 G/DL (ref 31.5–36.5)
MCV RBC AUTO: 92 FL (ref 78–100)
MONOCYTES # BLD AUTO: 2 10E9/L (ref 0–1.3)
MONOCYTES NFR BLD AUTO: 12.8 %
MUCOUS THREADS #/AREA URNS LPF: PRESENT /LPF
NEUTROPHILS # BLD AUTO: 12.3 10E9/L (ref 1.6–8.3)
NEUTROPHILS NFR BLD AUTO: 80.2 %
NITRATE UR QL: POSITIVE
NRBC # BLD AUTO: 0 10*3/UL
NRBC BLD AUTO-RTO: 0 /100
PH UR STRIP: 5.5 PH (ref 5–7)
PLATELET # BLD AUTO: 264 10E9/L (ref 150–450)
POTASSIUM SERPL-SCNC: 3.4 MMOL/L (ref 3.4–5.3)
PROT SERPL-MCNC: 7.7 G/DL (ref 6.8–8.8)
RBC # BLD AUTO: 4.92 10E12/L (ref 3.8–5.2)
RBC #/AREA URNS AUTO: 13 /HPF (ref 0–2)
SODIUM SERPL-SCNC: 132 MMOL/L (ref 133–144)
SOURCE: ABNORMAL
SP GR UR STRIP: 1.02 (ref 1–1.03)
SQUAMOUS #/AREA URNS AUTO: 3 /HPF (ref 0–1)
TRANS CELLS #/AREA URNS HPF: 4 /HPF (ref 0–1)
UROBILINOGEN UR STRIP-MCNC: 2 MG/DL (ref 0–2)
WBC # BLD AUTO: 15.3 10E9/L (ref 4–11)
WBC #/AREA URNS AUTO: >182 /HPF (ref 0–5)

## 2019-05-03 PROCEDURE — 96375 TX/PRO/DX INJ NEW DRUG ADDON: CPT

## 2019-05-03 PROCEDURE — 81001 URINALYSIS AUTO W/SCOPE: CPT | Performed by: EMERGENCY MEDICINE

## 2019-05-03 PROCEDURE — 87086 URINE CULTURE/COLONY COUNT: CPT | Performed by: EMERGENCY MEDICINE

## 2019-05-03 PROCEDURE — 70450 CT HEAD/BRAIN W/O DYE: CPT

## 2019-05-03 PROCEDURE — 87088 URINE BACTERIA CULTURE: CPT | Performed by: EMERGENCY MEDICINE

## 2019-05-03 PROCEDURE — 25000128 H RX IP 250 OP 636: Performed by: EMERGENCY MEDICINE

## 2019-05-03 PROCEDURE — 96361 HYDRATE IV INFUSION ADD-ON: CPT

## 2019-05-03 PROCEDURE — 83690 ASSAY OF LIPASE: CPT | Performed by: EMERGENCY MEDICINE

## 2019-05-03 PROCEDURE — 80053 COMPREHEN METABOLIC PANEL: CPT | Performed by: EMERGENCY MEDICINE

## 2019-05-03 PROCEDURE — 99285 EMERGENCY DEPT VISIT HI MDM: CPT | Mod: 25

## 2019-05-03 PROCEDURE — 87186 SC STD MICRODIL/AGAR DIL: CPT | Performed by: EMERGENCY MEDICINE

## 2019-05-03 PROCEDURE — 85025 COMPLETE CBC W/AUTO DIFF WBC: CPT | Performed by: EMERGENCY MEDICINE

## 2019-05-03 PROCEDURE — 96365 THER/PROPH/DIAG IV INF INIT: CPT

## 2019-05-03 RX ORDER — CEFTRIAXONE 1 G/1
1 INJECTION, POWDER, FOR SOLUTION INTRAMUSCULAR; INTRAVENOUS ONCE
Status: COMPLETED | OUTPATIENT
Start: 2019-05-03 | End: 2019-05-03

## 2019-05-03 RX ORDER — LORAZEPAM 2 MG/ML
0.5 INJECTION INTRAMUSCULAR ONCE
Status: COMPLETED | OUTPATIENT
Start: 2019-05-03 | End: 2019-05-03

## 2019-05-03 RX ORDER — ONDANSETRON 8 MG/1
8 TABLET, FILM COATED ORAL EVERY 8 HOURS PRN
COMMUNITY

## 2019-05-03 RX ORDER — TRAMADOL HYDROCHLORIDE 50 MG/1
50 TABLET ORAL EVERY 6 HOURS PRN
COMMUNITY
End: 2019-09-09

## 2019-05-03 RX ADMIN — SODIUM CHLORIDE 1000 ML: 9 INJECTION, SOLUTION INTRAVENOUS at 21:30

## 2019-05-03 RX ADMIN — CEFTRIAXONE 1 G: 1 INJECTION, POWDER, FOR SOLUTION INTRAMUSCULAR; INTRAVENOUS at 22:43

## 2019-05-03 RX ADMIN — LORAZEPAM 0.5 MG: 2 INJECTION INTRAMUSCULAR; INTRAVENOUS at 21:58

## 2019-05-03 ASSESSMENT — ENCOUNTER SYMPTOMS
UNEXPECTED WEIGHT CHANGE: 0
NAUSEA: 1
FEVER: 0
FATIGUE: 1
HEADACHES: 1

## 2019-05-03 NOTE — TELEPHONE ENCOUNTER
Interventional Radiology   Radiology at [facility] has been requested to perform a Left lung nodule biopsy from Dr Wisdom.  Sara Lehman is a 78 year old woman with a history of hypertension, renal stones, COPD and recent diagnosis of anal cancer on 4/9/19 when she had a colonoscopy after noticing some blood in her stool.  She had further imaging and a lung nodule was demonstrated suspicious for metastasis. A biopsy is requested for diagnosis and staging.   Reviewed imaging and clinical information with IR staff Dr Mccarty who approved the biopsy with CT guidance.   Trixie to hold daily Aspirin 5 days prior to the biopsy.   Central scheduling has contacted the patient and scheduled the biopsy for Monday 5/6/19.     Thanks Yessy [facility] Interventional Radiology CNP ([phone]) (phone [phone])

## 2019-05-04 ENCOUNTER — APPOINTMENT (OUTPATIENT)
Dept: PHYSICAL THERAPY | Facility: CLINIC | Age: 79
DRG: 690 | End: 2019-05-04
Attending: INTERNAL MEDICINE
Payer: COMMERCIAL

## 2019-05-04 PROBLEM — N39.0 UTI (URINARY TRACT INFECTION): Status: ACTIVE | Noted: 2019-05-04

## 2019-05-04 LAB
ANION GAP SERPL CALCULATED.3IONS-SCNC: 7 MMOL/L (ref 3–14)
BUN SERPL-MCNC: 10 MG/DL (ref 7–30)
CALCIUM SERPL-MCNC: 9.1 MG/DL (ref 8.5–10.1)
CHLORIDE SERPL-SCNC: 102 MMOL/L (ref 94–109)
CO2 SERPL-SCNC: 29 MMOL/L (ref 20–32)
CREAT SERPL-MCNC: 0.75 MG/DL (ref 0.52–1.04)
ERYTHROCYTE [DISTWIDTH] IN BLOOD BY AUTOMATED COUNT: 13 % (ref 10–15)
GFR SERPL CREATININE-BSD FRML MDRD: 76 ML/MIN/{1.73_M2}
GLUCOSE SERPL-MCNC: 104 MG/DL (ref 70–99)
HCT VFR BLD AUTO: 42.3 % (ref 35–47)
HGB BLD-MCNC: 13.9 G/DL (ref 11.7–15.7)
LACTATE BLD-SCNC: 1 MMOL/L (ref 0.7–2)
MCH RBC QN AUTO: 30.2 PG (ref 26.5–33)
MCHC RBC AUTO-ENTMCNC: 32.9 G/DL (ref 31.5–36.5)
MCV RBC AUTO: 92 FL (ref 78–100)
PLATELET # BLD AUTO: 236 10E9/L (ref 150–450)
POTASSIUM SERPL-SCNC: 3.3 MMOL/L (ref 3.4–5.3)
RBC # BLD AUTO: 4.6 10E12/L (ref 3.8–5.2)
SODIUM SERPL-SCNC: 138 MMOL/L (ref 133–144)
WBC # BLD AUTO: 13 10E9/L (ref 4–11)

## 2019-05-04 PROCEDURE — 25800030 ZZH RX IP 258 OP 636: Performed by: INTERNAL MEDICINE

## 2019-05-04 PROCEDURE — 97161 PT EVAL LOW COMPLEX 20 MIN: CPT | Mod: GP | Performed by: PHYSICAL THERAPIST

## 2019-05-04 PROCEDURE — 25000132 ZZH RX MED GY IP 250 OP 250 PS 637: Performed by: INTERNAL MEDICINE

## 2019-05-04 PROCEDURE — 96361 HYDRATE IV INFUSION ADD-ON: CPT

## 2019-05-04 PROCEDURE — 96375 TX/PRO/DX INJ NEW DRUG ADDON: CPT

## 2019-05-04 PROCEDURE — 36415 COLL VENOUS BLD VENIPUNCTURE: CPT | Performed by: INTERNAL MEDICINE

## 2019-05-04 PROCEDURE — 96376 TX/PRO/DX INJ SAME DRUG ADON: CPT

## 2019-05-04 PROCEDURE — G0378 HOSPITAL OBSERVATION PER HR: HCPCS

## 2019-05-04 PROCEDURE — 87040 BLOOD CULTURE FOR BACTERIA: CPT | Performed by: INTERNAL MEDICINE

## 2019-05-04 PROCEDURE — 85027 COMPLETE CBC AUTOMATED: CPT | Performed by: INTERNAL MEDICINE

## 2019-05-04 PROCEDURE — 25000128 H RX IP 250 OP 636: Performed by: INTERNAL MEDICINE

## 2019-05-04 PROCEDURE — 80048 BASIC METABOLIC PNL TOTAL CA: CPT | Performed by: INTERNAL MEDICINE

## 2019-05-04 PROCEDURE — 99220 ZZC INITIAL OBSERVATION CARE,LEVL III: CPT | Performed by: INTERNAL MEDICINE

## 2019-05-04 PROCEDURE — 83605 ASSAY OF LACTIC ACID: CPT | Performed by: INTERNAL MEDICINE

## 2019-05-04 RX ORDER — METOPROLOL SUCCINATE 25 MG/1
25 TABLET, EXTENDED RELEASE ORAL DAILY
Status: DISCONTINUED | OUTPATIENT
Start: 2019-05-04 | End: 2019-05-08 | Stop reason: HOSPADM

## 2019-05-04 RX ORDER — CEFTRIAXONE 1 G/1
1 INJECTION, POWDER, FOR SOLUTION INTRAMUSCULAR; INTRAVENOUS EVERY 12 HOURS
Status: DISCONTINUED | OUTPATIENT
Start: 2019-05-04 | End: 2019-05-06

## 2019-05-04 RX ORDER — METOCLOPRAMIDE HYDROCHLORIDE 5 MG/ML
5 INJECTION INTRAMUSCULAR; INTRAVENOUS EVERY 6 HOURS PRN
Status: DISCONTINUED | OUTPATIENT
Start: 2019-05-04 | End: 2019-05-04

## 2019-05-04 RX ORDER — ONDANSETRON 8 MG/1
8 TABLET, FILM COATED ORAL EVERY 8 HOURS PRN
Status: DISCONTINUED | OUTPATIENT
Start: 2019-05-04 | End: 2019-05-05 | Stop reason: ALTCHOICE

## 2019-05-04 RX ORDER — TRAMADOL HYDROCHLORIDE 50 MG/1
50 TABLET ORAL EVERY 6 HOURS PRN
Status: DISCONTINUED | OUTPATIENT
Start: 2019-05-04 | End: 2019-05-04

## 2019-05-04 RX ORDER — NALOXONE HYDROCHLORIDE 0.4 MG/ML
.1-.4 INJECTION, SOLUTION INTRAMUSCULAR; INTRAVENOUS; SUBCUTANEOUS
Status: DISCONTINUED | OUTPATIENT
Start: 2019-05-04 | End: 2019-05-08 | Stop reason: HOSPADM

## 2019-05-04 RX ORDER — HYDROCHLOROTHIAZIDE 12.5 MG/1
12.5 CAPSULE ORAL DAILY
Status: DISCONTINUED | OUTPATIENT
Start: 2019-05-04 | End: 2019-05-04

## 2019-05-04 RX ORDER — POTASSIUM CHLORIDE 1500 MG/1
40 TABLET, EXTENDED RELEASE ORAL EVERY 4 HOURS
Status: COMPLETED | OUTPATIENT
Start: 2019-05-04 | End: 2019-05-04

## 2019-05-04 RX ORDER — PROCHLORPERAZINE MALEATE 5 MG
5 TABLET ORAL EVERY 6 HOURS PRN
Status: DISCONTINUED | OUTPATIENT
Start: 2019-05-04 | End: 2019-05-08 | Stop reason: HOSPADM

## 2019-05-04 RX ORDER — METOCLOPRAMIDE HYDROCHLORIDE 5 MG/ML
5 INJECTION INTRAMUSCULAR; INTRAVENOUS EVERY 6 HOURS PRN
Status: DISCONTINUED | OUTPATIENT
Start: 2019-05-04 | End: 2019-05-08 | Stop reason: HOSPADM

## 2019-05-04 RX ORDER — CEFTRIAXONE 1 G/1
1 INJECTION, POWDER, FOR SOLUTION INTRAMUSCULAR; INTRAVENOUS EVERY 24 HOURS
Status: DISCONTINUED | OUTPATIENT
Start: 2019-05-04 | End: 2019-05-04

## 2019-05-04 RX ORDER — ACETAMINOPHEN 325 MG/1
650 TABLET ORAL EVERY 4 HOURS PRN
Status: DISCONTINUED | OUTPATIENT
Start: 2019-05-04 | End: 2019-05-08 | Stop reason: HOSPADM

## 2019-05-04 RX ORDER — ALBUTEROL SULFATE 90 UG/1
1-2 AEROSOL, METERED RESPIRATORY (INHALATION) EVERY 6 HOURS PRN
Status: DISCONTINUED | OUTPATIENT
Start: 2019-05-04 | End: 2019-05-08 | Stop reason: HOSPADM

## 2019-05-04 RX ORDER — HYDROMORPHONE HYDROCHLORIDE 1 MG/ML
0.2 INJECTION, SOLUTION INTRAMUSCULAR; INTRAVENOUS; SUBCUTANEOUS EVERY 4 HOURS PRN
Status: DISCONTINUED | OUTPATIENT
Start: 2019-05-04 | End: 2019-05-08 | Stop reason: HOSPADM

## 2019-05-04 RX ORDER — ACETAMINOPHEN 650 MG/1
650 SUPPOSITORY RECTAL EVERY 4 HOURS PRN
Status: DISCONTINUED | OUTPATIENT
Start: 2019-05-04 | End: 2019-05-08 | Stop reason: HOSPADM

## 2019-05-04 RX ORDER — SODIUM CHLORIDE, SODIUM LACTATE, POTASSIUM CHLORIDE, CALCIUM CHLORIDE 600; 310; 30; 20 MG/100ML; MG/100ML; MG/100ML; MG/100ML
INJECTION, SOLUTION INTRAVENOUS CONTINUOUS
Status: ACTIVE | OUTPATIENT
Start: 2019-05-04 | End: 2019-05-04

## 2019-05-04 RX ORDER — ONDANSETRON 4 MG/1
4 TABLET, ORALLY DISINTEGRATING ORAL EVERY 6 HOURS PRN
Status: DISCONTINUED | OUTPATIENT
Start: 2019-05-04 | End: 2019-05-08 | Stop reason: HOSPADM

## 2019-05-04 RX ORDER — ONDANSETRON 2 MG/ML
4 INJECTION INTRAMUSCULAR; INTRAVENOUS EVERY 6 HOURS PRN
Status: DISCONTINUED | OUTPATIENT
Start: 2019-05-04 | End: 2019-05-08 | Stop reason: HOSPADM

## 2019-05-04 RX ORDER — PROCHLORPERAZINE 25 MG
12.5 SUPPOSITORY, RECTAL RECTAL EVERY 12 HOURS PRN
Status: DISCONTINUED | OUTPATIENT
Start: 2019-05-04 | End: 2019-05-08 | Stop reason: HOSPADM

## 2019-05-04 RX ORDER — AMLODIPINE BESYLATE 5 MG/1
5 TABLET ORAL DAILY
Status: DISCONTINUED | OUTPATIENT
Start: 2019-05-04 | End: 2019-05-08 | Stop reason: HOSPADM

## 2019-05-04 RX ADMIN — SODIUM CHLORIDE, POTASSIUM CHLORIDE, SODIUM LACTATE AND CALCIUM CHLORIDE: 600; 310; 30; 20 INJECTION, SOLUTION INTRAVENOUS at 01:28

## 2019-05-04 RX ADMIN — HYDROCHLOROTHIAZIDE 12.5 MG: 12.5 CAPSULE ORAL at 08:28

## 2019-05-04 RX ADMIN — PROCHLORPERAZINE EDISYLATE 5 MG: 5 INJECTION INTRAMUSCULAR; INTRAVENOUS at 11:08

## 2019-05-04 RX ADMIN — TRAMADOL HYDROCHLORIDE 50 MG: 50 TABLET, COATED ORAL at 11:08

## 2019-05-04 RX ADMIN — AMLODIPINE BESYLATE 5 MG: 5 TABLET ORAL at 08:28

## 2019-05-04 RX ADMIN — CEFTRIAXONE 1 G: 1 INJECTION, POWDER, FOR SOLUTION INTRAMUSCULAR; INTRAVENOUS at 23:17

## 2019-05-04 RX ADMIN — METOPROLOL SUCCINATE 25 MG: 25 TABLET, EXTENDED RELEASE ORAL at 08:28

## 2019-05-04 RX ADMIN — ONDANSETRON 4 MG: 2 INJECTION INTRAMUSCULAR; INTRAVENOUS at 08:23

## 2019-05-04 RX ADMIN — ACETAMINOPHEN 650 MG: 325 TABLET, FILM COATED ORAL at 01:44

## 2019-05-04 RX ADMIN — CEFTRIAXONE 1 G: 1 INJECTION, POWDER, FOR SOLUTION INTRAMUSCULAR; INTRAVENOUS at 15:52

## 2019-05-04 RX ADMIN — POTASSIUM CHLORIDE 40 MEQ: 1500 TABLET, EXTENDED RELEASE ORAL at 23:17

## 2019-05-04 RX ADMIN — POTASSIUM CHLORIDE 40 MEQ: 1500 TABLET, EXTENDED RELEASE ORAL at 21:06

## 2019-05-04 NOTE — ED PROVIDER NOTES
"  History     Chief Complaint:  Nausea      HPI   Sara Lehman is a 78 year old female with a history of hypertension, migraines, and newly diagnosed cancer who presents to the emergency department with her  and daughters for evaluation of nausea. Of note, three weeks ago the patient was diagnosed with rectal cancer via a PET scan that has metastasized to the lung and lymph nodes. She is scheduled for radiation on 5/9/19 and lung biopsy on 5/13/19 with her oncologist, Dr. Vega at [facility]. She has been taking pain medication for rectal pain, last took half a pill this morning at 0930. Here, the patient reports that she just doesn't feel like she has \"both oars in the water,\" as she is fatigued and extremely nauseous. Her daughter reports that sometimes the nausea is so severe that she is unable to even get up. Additionally, three days ago she was at [place] for a migraine, which was accompanied by severe nausea at that time, and she states that she feels a posterior headache starting here in the ED. Her daughters note that they are concerned the patient is dehydrated from not drinking as much fluids due to the nausea. She states she is having bowel movements every other day and also has increased urinary urgency. She last took Zofran 8 mg at 1600. The patient denies fevers, chest pain, or weight change.    Allergies:  Ace inhibitors  Aleve  Buffered aspirin  Atorvastatin calcium  Celebrex  Pravastatin  Simvastatin      Medications:    Albuterol  Norvasc  Biotin  Microzide  Ibuprofen  Toprol XL  Imitrex  Kenalog      Past Medical History:    Bronchospasm  COPD  Hypertension  Migraines   Rectal cancer   Neck pain   Nephrolithiasis     Past Surgical History:    Colonoscopy  Lithotripsy  Right knee exploration  Tubal ligation      Family History:    Uterine cancer  Cerebrovascular disease  CAD: brother     Social History:  Tobacco Use: Former, quit: 4/7/2016  Alcohol Use: Socially   PCP: Kaelyn" BRIDGET Radford  Marital Status:        Review of Systems   Constitutional: Positive for fatigue. Negative for fever and unexpected weight change.   Cardiovascular: Negative for chest pain.   Gastrointestinal: Positive for nausea.   Genitourinary: Positive for urgency.   Neurological: Positive for headaches.   All other systems reviewed and are negative.        Physical Exam     Patient Vitals for the past 24 hrs:   BP Temp Temp src Heart Rate SpO2 Weight   05/03/19 2245 -- -- -- -- 92 % --   05/03/19 2230 -- -- -- -- 92 % --   05/03/19 2215 -- -- -- -- 95 % --   05/03/19 1952 127/63 98.5  F (36.9  C) Oral 90 97 % 53.1 kg (117 lb)     Physical Exam    GENERAL: well developed, pleasant  HEAD: atraumatic  EYES: pupils reactive, extraocular muscles intact, conjunctivae normal  ENT:  mucus membranes moist  NECK:  trachea midline, normal range of motion  RESPIRATORY: no tachypnea, breath sounds clear to auscultation   CVS: normal S1/S2, no murmurs, intact distal pulses  ABDOMEN: soft, nontender, nondistention  MUSCULOSKELETAL: no deformities  SKIN: warm and dry, no acute rashes or ulceration  NEURO: GCS 15, cranial nerves intact, alert and oriented x3  PSYCH:  Mood/affect normal    Emergency Department Course   Imaging:  Head CT without contrast:  IMPRESSION: Diffuse cerebral volume loss and cerebral white matter  changes consistent with chronic small vessel ischemic disease. No  evidence for acute intracranial pathology.  Report per radiology.     Radiographic findings were communicated with the patient who voiced understanding of the findings.    Laboratory:  CBC: WBC: 15.3 (H), HGB: 15.5, PLT: 264  CMP: Glucose 123 (H), Sodium: 132 (L), o/w WNL (Creatinine: 0.86)    Lipase: 72 (L)    UA: Slightly cloudy yellow urine, ketones: 40, blood: trace, protein albumin: 30, nitrite: positive, leukocyte esterase: large, WBC: >182 (H), RBC: 13 (H), bacteria: moderate, squamous epithelial/HPF: 3 (H), transitional Epi/HPF: 4 (H),  "mucous: present, hyaline casts: 4 (H), otherwise WNL  Urine culture aerobic bacterial: In process    Interventions:  2130 0.9% Sodium Chloride BOLUS 1000 mLs IV   2158 Ativan 0.5 mg IV  2243 Rocephin 1 g IV    Emergency Department Course:  2102 Nursing notes and vitals reviewed. I performed an exam of the patient as documented above.     IV inserted. Medicine administered as documented above. Blood drawn. This was sent to the lab for further testing, results above.    The patient provided a urine sample here in the emergency department. This was sent for laboratory testing, findings above.     The patient was sent for a head CT while in the emergency department, findings above.     2124 I consulted with Dr. Burk, Oncology, regarding the patient's history and presentation here in the emergency department.    2126 I rechecked the patient and discussed the results of her workup thus far.     2200 I rechecked the patient and discussed the results of her workup thus far.     2240  I consulted with Dr. Harris of the hospitalist services. They are in agreement to accept the patient for admission.    Findings and plan explained to the Patient who consents to admission. Discussed the patient with Dr. Harris, who will admit the patient to a med bed for further monitoring, evaluation, and treatment.    Impression & Plan    Medical Decision Making:    Patient presents with nausea, \"the oars are not in the water\", and headaches.  Patient had a recent diagnosis of rectal cancer and recent PET scan showing spot in her lung and lymph nodes.  Family assumed that the imaging included her head.  Spoke with oncology who could review her images and imaging did not include her head.  CT head was obtained and is unremarkable.    Labs and urine look consistent with urinary tract infection.  Given the ongoing nausea despite 8 mg of Zofran and feeling well patient be admitted for ongoing care.  She denies any significant flank pain to " suggest pyelonephritis.  Spoke with the hospitalist regarding admission.    Diagnosis:    ICD-10-CM    1. Cystitis N30.90 Urine Culture Aerobic Bacterial       Disposition:  Admitted to Dr. Kimberly Hidalgoibe Disclosure:  I, Eduardo Gastelum, am serving as a scribe on 5/3/2019 at 9:02 PM to personally document services performed by Dr. Joon MD based on my observations and the provider's statements to me.     Eduardo Gastelum  5/3/2019    EMERGENCY DEPARTMENT       Pierre Dupree MD  05/04/19 0207

## 2019-05-04 NOTE — PHARMACY-ADMISSION MEDICATION HISTORY
Admission medication history interview status for the 5/3/2019  admission is complete. See EPIC admission navigator for prior to admission medications     Medication history source reliability:Poor, patient's family present to give some insight into medications.    Actions taken by pharmacist (provider contacted, etc): Chart and SureScripts review, discussed with patient and family     Additional medication history information not noted on PTA med list :   - Unsure how frequently patient is taking her amlodipine, hydrochlorothiazide, and metoprolol. She has been nauseous recently and family endorses they do not believe she has been taking hydrochlorothiazide on a regular basis, but patient is not able to provide much insight    Medication reconciliation/reorder completed by provider prior to medication history? No    Time spent in this activity: 10 minutes    Prior to Admission medications    Medication Sig Last Dose Taking? Auth Provider   albuterol (VENTOLIN HFA) 108 (90 Base) MCG/ACT inhaler INHALE TWO PUFFS BY MOUTH EVERY SIX HOURS AS NEEDED FOR SHORTNESS OF BREATH prn Yes Kaelyn Radford MD   amLODIPine (NORVASC) 5 MG tablet Take 1 tablet (5 mg) by mouth once daily Past Week at Unknown time Yes Kaelyn Radford MD   hydrochlorothiazide (MICROZIDE) 12.5 MG capsule Take 1 capsule (12.5 mg) by mouth daily Past Week at Unknown time Yes Zoila Carter PA-C   ibuprofen 200 MG capsule Take 200 mg by mouth every 4 hours as needed for pain  prn Yes Harpal Farris MD   metoprolol succinate ER (TOPROL-XL) 25 MG 24 hr tablet Take 1 tablet (25 mg) by mouth daily Past Week at Unknown time Yes Zoila Carter PA-C   ondansetron (ZOFRAN) 8 MG tablet Take 8 mg by mouth every 8 hours as needed for nausea 5/3/2019 at 1600 Yes Unknown, Entered By History   SUMAtriptan (IMITREX) 100 MG tablet TAKE ONE TABLET BY MOUTH AT ONSET OF HEADACHE FOR MIGRAINE, MAY REPEAT IN 2 HOURS IF NEEDED (MAX OF 2  TABLETS PER DAY) prn Yes Kaelyn Radford MD   traMADol (ULTRAM) 50 MG tablet Take 50 mg by mouth every 6 hours as needed for severe pain 5/3/2019 at Unknown time Yes Unknown, Entered By History   triamcinolone (KENALOG) 0.1 % cream Apply sparingly to affected area three times daily as needed prn Yes Echo Moeller MD

## 2019-05-04 NOTE — PLAN OF CARE
PT:  Discharge Planner PT   Patient plan for discharge: Home  Current status: Pt admitted from the ED for nausea. Pt previously living in [facility] with , independent with mobility without device, all self cares and ADLs. Pt presently completes bed mobility with mod indep and transfers w/ SBA for IV pole. Amb to BR w/ assist for IV pole, toilet transfers independently with indep w/ self cares.  Pt ambulated 300ft w/ WW and IV pole, pt endorsing gait is slower than normal, feeling more fatigue in presence of fever and nausea with decreased PO intake. Pt navigated 5 steps w/ single handrail and SBA for IV lines. Pt appears close to baseline for functional mobility and ambulation, with slight changes related to present symptoms and need for hydration and rest. LE strength WFL.   Barriers to return to prior living situation: none  Recommendations for discharge: Home  Rationale for recommendations: Pt mobilizing near baseline, albeit more slowly due to fatigue and nausea which pt reports is improving. Pt would benefit from mobilizing with nursing staff in halls, and up to chair for all meals, and returning home with symptoms are resovled. No IPPT needs observed, PT orders completed.        Entered by: Laura العراقي 05/04/2019 9:46 AM

## 2019-05-04 NOTE — ED NOTES
"[facility]  ED Nurse Handoff Report    ED Chief complaint: Nausea (Pt reports severe nasuea for the last 2 days.  ); Headache (Pt reports having a headache for the last 2 days.); and Altered Mental Status (Daughter states she thinks her mother has been more confused in conversations over the last 2 days.  Pt diagnosed with anal cancer with mets to lung 3 weeks ago.)      ED Diagnosis:   Final diagnoses:   Cystitis       Code Status: Full Code    Allergies:   Allergies   Allergen Reactions     Ace Inhibitors Cough     Aleve [Naproxen Sodium] GI Disturbance     Asa Buff (Mag [Aspirin Buffered] GI Disturbance     Internal bleeding     Atorvastatin Calcium GI Disturbance     Celebrex [Celecoxib] GI Disturbance     Codeine Sulfate Hives     Pravastatin GI Disturbance     Simvastatin GI Disturbance       Activity level - Baseline/Home:  Independent    Activity Level - Current:   Stand with Assist     Needed?: No    Isolation: No  Infection: Not Applicable  Bariatric?: No    Vital Signs:   Vitals:    05/03/19 1952   BP: 127/63   Temp: 98.5  F (36.9  C)   TempSrc: Oral   SpO2: 97%   Weight: 53.1 kg (117 lb)       Cardiac Rhythm: ,        Pain level: 0-10 Pain Scale: 4(headache)    Is this patient confused?: Yes   Does this patient have a guardian?  No         If yes, is there guardianship documents in the Epic \"Code/ACP\" activity?  N/A         Guardian Notified?  N/A  [place] - Suicide Severity Rating Scale Completed?  Yes  If yes, what color did the patient score?  White    Patient Report: Initial Complaint: nausea, headache, confusion  Focused Assessment: 78 year old female coming in with concern of nausea and some confusion.  Of note, three weeks ago the patient was diagnosed with anal cancer that has metastasized to the lung and lymph nodes via a whole body PET scan. She is scheduled for radiation on 5/9/19 and lung biopsy on 5/13/19 with her oncologist, Dr. Vega at [facility]. She " "has been taking pain medication for rectal pain, last took half a pill this morning at 0930. Here, the patient reports that she just doesn't feel like she has \"both ores in the water,\" as she is fatigued and extremely nauseous. Her daughter reports that sometimes the nausea is so severe that she is unable to even get up. Additionally, three days ago she was in the hospital for a migraine at [place], which was accompanied by a lot of nausea at this time, and she feels a posterior headache starting here in the ED. Her daughters note that they are concerned the patient is dehydrated from not drinking as much fluids due to the nausea. She states she is having bowel movements every other day and also has increased urinary urgency. Pt can answer all questions appropriately but reports that she feels \"foggy and confused\"    Tests Performed: labs, ct  Abnormal Results:   Results for orders placed or performed during the hospital encounter of 05/03/19   CT Head w/o Contrast    Narrative    CT OF THE HEAD WITHOUT CONTRAST 5/3/2019 9:43 PM     COMPARISON: None.    HISTORY: Headache, recent diagnosis of rectal cancer.    TECHNIQUE: 5 mm thick axial CT images of the head were acquired  without IV contrast material.    FINDINGS: There is moderate diffuse cerebral volume loss. There are  subtle patchy areas of decreased density in the cerebral white matter  bilaterally that are consistent with sequela of chronic small vessel  ischemic disease.    The ventricles and basal cisterns are within normal limits in  configuration given the degree of cerebral volume loss.  There is no  midline shift. There are no extra-axial fluid collections.    No intracranial hemorrhage, mass or recent infarct.    The visualized paranasal sinuses are well-aerated. There is no  mastoiditis. There are no fractures of the visualized bones.      Impression    IMPRESSION: Diffuse cerebral volume loss and cerebral white matter  changes consistent with " chronic small vessel ischemic disease. No  evidence for acute intracranial pathology.      Radiation dose for this scan was reduced using automated exposure  control, adjustment of the mA and/or kV according to patient size, or  iterative reconstruction technique    GHADA MCKEON MD   CBC with platelets differential   Result Value Ref Range    WBC 15.3 (H) 4.0 - 11.0 10e9/L    RBC Count 4.92 3.8 - 5.2 10e12/L    Hemoglobin 15.5 11.7 - 15.7 g/dL    Hematocrit 45.0 35.0 - 47.0 %    MCV 92 78 - 100 fl    MCH 31.5 26.5 - 33.0 pg    MCHC 34.4 31.5 - 36.5 g/dL    RDW 12.8 10.0 - 15.0 %    Platelet Count 264 150 - 450 10e9/L    Diff Method Automated Method     % Neutrophils 80.2 %    % Lymphocytes 6.5 %    % Monocytes 12.8 %    % Eosinophils 0.0 %    % Basophils 0.1 %    % Immature Granulocytes 0.4 %    Nucleated RBCs 0 0 /100    Absolute Neutrophil 12.3 (H) 1.6 - 8.3 10e9/L    Absolute Lymphocytes 1.0 0.8 - 5.3 10e9/L    Absolute Monocytes 2.0 (H) 0.0 - 1.3 10e9/L    Absolute Eosinophils 0.0 0.0 - 0.7 10e9/L    Absolute Basophils 0.0 0.0 - 0.2 10e9/L    Abs Immature Granulocytes 0.1 0 - 0.4 10e9/L    Absolute Nucleated RBC 0.0    Comprehensive metabolic panel   Result Value Ref Range    Sodium 132 (L) 133 - 144 mmol/L    Potassium 3.4 3.4 - 5.3 mmol/L    Chloride 95 94 - 109 mmol/L    Carbon Dioxide 29 20 - 32 mmol/L    Anion Gap 8 3 - 14 mmol/L    Glucose 123 (H) 70 - 99 mg/dL    Urea Nitrogen 17 7 - 30 mg/dL    Creatinine 0.86 0.52 - 1.04 mg/dL    GFR Estimate 65 >60 mL/min/[1.73_m2]    GFR Estimate If Black 75 >60 mL/min/[1.73_m2]    Calcium 9.6 8.5 - 10.1 mg/dL    Bilirubin Total 0.8 0.2 - 1.3 mg/dL    Albumin 3.4 3.4 - 5.0 g/dL    Protein Total 7.7 6.8 - 8.8 g/dL    Alkaline Phosphatase 78 40 - 150 U/L    ALT 15 0 - 50 U/L    AST 14 0 - 45 U/L   Lipase   Result Value Ref Range    Lipase 72 (L) 73 - 393 U/L   UA with Microscopic   Result Value Ref Range    Color Urine Yellow     Appearance Urine Slightly Cloudy      Glucose Urine Negative NEG^Negative mg/dL    Bilirubin Urine Negative NEG^Negative    Ketones Urine 40 (A) NEG^Negative mg/dL    Specific Gravity Urine 1.017 1.003 - 1.035    Blood Urine Trace (A) NEG^Negative    pH Urine 5.5 5.0 - 7.0 pH    Protein Albumin Urine 30 (A) NEG^Negative mg/dL    Urobilinogen mg/dL 2.0 0.0 - 2.0 mg/dL    Nitrite Urine Positive (A) NEG^Negative    Leukocyte Esterase Urine Large (A) NEG^Negative    Source Midstream Urine     WBC Urine >182 (H) 0 - 5 /HPF    RBC Urine 13 (H) 0 - 2 /HPF    Bacteria Urine Moderate (A) NEG^Negative /HPF    Squamous Epithelial /HPF Urine 3 (H) 0 - 1 /HPF    Transitional Epi 4 (H) 0 - 1 /HPF    Mucous Urine Present (A) NEG^Negative /LPF    Hyaline Casts 4 (H) 0 - 2 /LPF       Treatments provided: bolus, ativan, rocephin    Family Comments: daughters, spouse    OBS brochure/video discussed/provided to patient/family: N/A              Name of person given brochure if not patient: x              Relationship to patient: x    ED Medications:   Medications   cefTRIAXone (ROCEPHIN) 1 g vial to attach to  mL bag for ADULTS or NS 50 mL bag for PEDS (has no administration in time range)   0.9% sodium chloride BOLUS (1,000 mLs Intravenous New Bag 5/3/19 2130)   LORazepam (ATIVAN) injection 0.5 mg (0.5 mg Intravenous Given 5/3/19 2158)       Drips infusing?:  No    For the majority of the shift this patient was Green.   Interventions performed were x.    Severe Sepsis OR Septic Shock Diagnosis Present: No    To be done/followed up on inpatient unit:  x    ED NURSE PHONE NUMBER: [id number]

## 2019-05-04 NOTE — PROGRESS NOTES
05/04/19 0913   Quick Adds   Type of Visit Initial PT Evaluation   Living Environment   Lives With spouse   Living Arrangements house   Home Accessibility stairs to enter home;stairs within home   Number of Stairs, Main Entrance 2   Number of Stairs, Within Home, Primary other (see comments)  (flight to lower level)   Stair Railings, Within Home, Primary railing on right side (ascending)   Transportation Anticipated family or friend will provide   Self-Care   Equipment Currently Used at Home none   Functional Level Prior   Ambulation 0-->independent   Transferring 0-->independent   Toileting 0-->independent   Bathing 0-->independent   Communication 0-->understands/communicates without difficulty   Swallowing 0-->swallows foods/liquids without difficulty   Cognition 0 - no cognition issues reported   Fall history within last six months no   Prior Functional Level Comment Independent   General Information   Onset of Illness/Injury or Date of Surgery - Date 05/03/19   Referring Physician Kimberly   Pertinent History of Current Problem (include personal factors and/or comorbidities that impact the POC)  history of hypertension, migraines, and newly diagnosed cancer who presents to the emergency department with her  and daughters for evaluation of nausea.   Cognitive Status Examination   Orientation orientation to person, place and time   Level of Consciousness alert   Follows Commands and Answers Questions 100% of the time   Personal Safety and Judgment intact   Posture    Posture Forward head position;Protracted shoulders   Range of Motion (ROM)   ROM Comment LE ROM is WFL   Strength   Strength Comments LE strength is WFL, decreased functional endurance d/t nausea dec PO intake   Bed Mobility   Bed Mobility Comments SBA   Transfer Skills   Transfer Comments SBA for IV Pole   Gait   Gait Comments SBA for IV, dec step lenght and slower gait speed due to nausea and fatigue   Balance   Balance Comments good in  "sitting and standing, SLS x 10 sec, negotiated 5 steps with sSHR   Clinical Impression   Criteria for Skilled Therapeutic Intervention evaluation only   Functional limitations due to impairments slower than normal gait in presence of nausea dec PO intake   Clinical Presentation Stable/Uncomplicated   Clinical Presentation Rationale fatigue and nausea contributing to slight dec in functional mobility   Clinical Decision Making (Complexity) Low complexity   Predicted Duration of Therapy Intervention (days/wks) eval only   Anticipated Discharge Disposition Home   Risk & Benefits of therapy have been explained Yes   Patient, Family & other staff in agreement with plan of care Yes   [facility] TM \"6 Clicks\"   2016, Trustees of [facility], under license to [employer].  All rights reserved.   6 Clicks Short Forms Basic Mobility Inpatient Short Form   [facility]  \"6 Clicks\" V.2 Basic Mobility Inpatient Short Form   1. Turning from your back to your side while in a flat bed without using bedrails? 4 - None   2. Moving from lying on your back to sitting on the side of a flat bed without using bedrails? 4 - None   3. Moving to and from a bed to a chair (including a wheelchair)? 4 - None   4. Standing up from a chair using your arms (e.g., wheelchair, or bedside chair)? 4 - None   5. To walk in hospital room? 3 - A Little   6. Climbing 3-5 steps with a railing? 3 - A Little   Basic Mobility Raw Score (Score out of 24.Lower scores equate to lower levels of function) 22   Total Evaluation Time   Total Evaluation Time (Minutes) 20     "

## 2019-05-04 NOTE — PROVIDER NOTIFICATION
MD Notification    Notified Person: MD    Notified Person Name: phone    Notification Date/Time: 5/4/2019 1:59 AM     Notification Interaction: phone    Purpose of Notification: Temp 101.2    Orders Received: BC x2    Comments: Sepsis BPA fired, lactic 1.0

## 2019-05-04 NOTE — PLAN OF CARE
Alert, disoriented to place/time, pleasant and cooperative.VSS, afebrile, denied pain but nauseated much of day. Zofran-compazine and oils given. Up SBA to bathroom, continent, impulsive, does not always remember to call. IVF not compatible with abx (FYI). Seroquel for restlessness/sleep, possible discharge home Sun/mon.

## 2019-05-04 NOTE — H&P
Admitted:     05/03/2019      PRIMARY CARE PROVIDER:  Kaelyn Radford MD      PRIMARY ONCOLOGIST:  Willow Vega MD      CHIEF COMPLAINT:  Nausea, headache, mild confusion.      HISTORY OF PRESENT ILLNESS:  Sara Lehman is a 78-year-old  female who was brought in by family due to complaints.  The patient recently saw her primary care provider on 04/01 when she was noted to have some bright red blood per rectum.  The patient underwent a screening colonoscopy.  Her last colonoscopy was many years ago.  She had abdominal pain, nausea, vomiting, change in bowel habits or unintentional weight loss.  The patient underwent colonoscopy on 04/09.  This revealed an anal mass with the biopsy pathology coming back as moderately differentiated adenocarcinoma.  There was a notation that there may have been some possible metastatic deposits in the lung and lymph node-based on whole body PET scan.  The patient is currently set to undergo radiation on 05/09 and a lung biopsy on 05/13.  The patient over the last couple days has been feeling very weak and tired and fatigued and nauseated.  Of note, the patient has chronic nausea even preceding the cancer.  She was seen at [facility] a few days ago due to migraines with lots of nausea and headache.  The patient admits to not eating or drinking much.  She came to [facility] for further assessment.      In the emergency room, the patient was seen by Dr. Pierre Dupree.  The patient had temperature of 101.2.  Blood work revealed borderline low sodium 132, potassium 3.4, normal kidney function.  LFTs are normal.  Lipase was 72.  Lactic acid was okay.  White count elevated at 15,300, hemoglobin 15.5, platelets of 264,000 with a left shift with absolute neutrophil count 12,300.  Urinalysis had 40 of ketones, specific gravity was 1.017 with positive nitrites, trace blood, large leukocyte esterase and greater than 182 white cells.  She denies any urinary  complaints.  Due to her recent headaches, a CT scan was done to look for any metastatic deposits in the head.  This shows diffuse cerebral volume loss and white matter changes consistent with small vessel ischemic disease, but no other acute intracranial pathology.  The patient was given ceftriaxone for possible urinary tract infection and is being admitted under observation status.      PAST MEDICAL HISTORY:   1.  Recent diagnosis of moderate differential anal cancer with probably metastases to lung and lymph nodes.  Pathology showed moderate differential adenocarcinoma.  The patient is pending biopsy and radiation treatment of hypertension.   2.  Chronic obstructive pulmonary disease with history of bronchospasm hypertension.   3.  Migraine headache.   4.  Neck pain.   5.  Kidney stones      PAST SURGICAL HISTORY:  Colonoscopy, lithotripsy, right version tubal ligation.      FAMILY HISTORY:  Possible uterine cancer, stroke and heart disease in brother.      SOCIAL HISTORY:  Former tobacco smoker, quit in 2016.  Social drinker.  , presents with her family.      ALLERGIES:   ACE INHIBITORS, LEVAQUIN, BUFFERED ASPIRIN, ATORVASTATIN, CELEBREX, PRAVASTATIN, SIMVASTATIN.      CURRENT MEDICATIONS:   1.  Albuterol metered inhaler 2 puffs every 6 hours.   2.  Amlodipine 5 mg once a day.   3.  Hydrochlorothiazide 12.5 mg once day.   4.  Ibuprofen 200 mg every 4 hours.   5.  Metoprolol XL 25 mg daily.   6.  Zofran 8 mg every 8 hours.   7.  Imitrex 100 mg at onset of headache, may repeat in 2 hours, max 2 doses per day.   8.  Ultram 50 mg every 6 hours.   9.  Triamcinolone apply as needed.      REVIEW OF SYSTEMS:  Ten-point systems  reviewed and as dictated in history of present illness.      PHYSICAL EXAMINATION:   VITAL SIGNS:  Temperature is 100.7, heart rate 75, respirations 16, blood pressure 132/52, sats 95% room air.   GENERAL:  The patient is a 78-year-old  female.  She is slightly confused but  oriented.   HEENT:  Pupils equal.  Sclerae are anicteric.  Oropharynx without lesions.  Mucous membranes are tacky.   NECK:  No jugular venous pressure elevation.  No lymphadenopathy.   PULMONARY:  Lungs are clear to auscultation.   CARDIOVASCULAR:  S1, S2, regular rate and rhythm.   GASTROINTESTINAL:  Abdomen is soft.  Bowel sounds are hyperactive.   MUSCULOSKELETAL:  No edema.   NEUROLOGIC:  She is able to move all 4 extremities.  Cranial nerves grossly intact.     DERMATOLOGIC:  Skin is warm, dry, well perfused.   PSYCHIATRIC:  Mood and affect stable.      LABORATORY AND IMAGING STUDIES:  As dictated in history of present illness.      ASSESSMENT:  Sara Lehman is 78 who is admitted with ongoing nausea for the last 2 days recent headache and some mild confusion and fever being admitted for further evaluation.   1.  Fever with abnormal urinalysis.  The patient has no urinary complaints, but urinalysis abnormal.  Blood cultures and urine cultures have been obtained.  The patient will be on IV ceftriaxone.  If the patient has ongoing fevers, then we can do further imaging studies as well.  We will follow her cultures.   2.  Headache.  She has a history of migraines.  She was treated with Imitrex in the past.  She was recently admitted at [facility] for the headaches.  The headache is somewhat better.  Head CT shows no acute finding.  We will make Imitrex available as needed.  We will make IV Dilaudid for her pain.   3.  Anal cancer.  We will obtain a formal consultation from [facility].  The patient is scheduled for biopsy and radiation early next week.   4.  Hypertension.  We are going to hold the patient's diuretic in the setting for ongoing nausea and decreased oral intake.  We will continue the patient on Norvasc; however, and metoprolol.   5.  History of chronic obstructive pulmonary disease.  The patient will be on albuterol metered dose inhaler every 6 hours as needed.   6.  Deep venous  thrombosis prophylaxis:  The patient will receive compression boots.   7.  CODE STATUS:  Full.   8.  Observation status.         MANNY GODFREY MD             D: 2019   T: 2019   MT: [id number]      Name:     ELSY MCGOWAN   MRN:      [id number]        Account:      [id number]   :      1940        Admitted:     2019                   Document: [id number]       cc: Kaelyn Wisdom MD

## 2019-05-04 NOTE — PLAN OF CARE
Alert, disoriented to place/time/situation, pleasant and cooperative. Tmax 101.2, MD notified, tylenol given and BC drawn. PIV infusing LR at 7cc/hr, IV rocephin. Denies pain and nausea at this time. LS clear, denies SOB. Rectal wound with scant amount of rectal bleeding. Up with SBA. Will continue to monitor.

## 2019-05-04 NOTE — PROGRESS NOTES
RECEIVING UNIT ED HANDOFF REVIEW    ED Nurse Handoff Report was reviewed by: Meredith Lancaster on May 3, 2019 at 11:09 PM

## 2019-05-05 LAB
BACTERIA SPEC CULT: ABNORMAL
LACTATE BLD-SCNC: 0.9 MMOL/L (ref 0.7–2)
Lab: ABNORMAL
POTASSIUM SERPL-SCNC: 3.8 MMOL/L (ref 3.4–5.3)
SPECIMEN SOURCE: ABNORMAL

## 2019-05-05 PROCEDURE — G0378 HOSPITAL OBSERVATION PER HR: HCPCS

## 2019-05-05 PROCEDURE — 99232 SBSQ HOSP IP/OBS MODERATE 35: CPT | Performed by: INTERNAL MEDICINE

## 2019-05-05 PROCEDURE — 83605 ASSAY OF LACTIC ACID: CPT | Performed by: INTERNAL MEDICINE

## 2019-05-05 PROCEDURE — 96376 TX/PRO/DX INJ SAME DRUG ADON: CPT

## 2019-05-05 PROCEDURE — 25000132 ZZH RX MED GY IP 250 OP 250 PS 637: Performed by: INTERNAL MEDICINE

## 2019-05-05 PROCEDURE — 12000000 ZZH R&B MED SURG/OB

## 2019-05-05 PROCEDURE — 84132 ASSAY OF SERUM POTASSIUM: CPT | Performed by: INTERNAL MEDICINE

## 2019-05-05 PROCEDURE — 25000128 H RX IP 250 OP 636: Performed by: INTERNAL MEDICINE

## 2019-05-05 PROCEDURE — 96361 HYDRATE IV INFUSION ADD-ON: CPT

## 2019-05-05 PROCEDURE — 36415 COLL VENOUS BLD VENIPUNCTURE: CPT | Performed by: INTERNAL MEDICINE

## 2019-05-05 PROCEDURE — 96375 TX/PRO/DX INJ NEW DRUG ADDON: CPT

## 2019-05-05 RX ORDER — DRONABINOL 2.5 MG/1
2.5 CAPSULE ORAL 2 TIMES DAILY
Status: DISCONTINUED | OUTPATIENT
Start: 2019-05-05 | End: 2019-05-08 | Stop reason: HOSPADM

## 2019-05-05 RX ADMIN — AMLODIPINE BESYLATE 5 MG: 5 TABLET ORAL at 08:36

## 2019-05-05 RX ADMIN — QUETIAPINE 12.5 MG: 25 TABLET, FILM COATED ORAL at 22:32

## 2019-05-05 RX ADMIN — METOPROLOL SUCCINATE 25 MG: 25 TABLET, EXTENDED RELEASE ORAL at 08:36

## 2019-05-05 RX ADMIN — Medication 0.2 MG: at 11:45

## 2019-05-05 RX ADMIN — OMEPRAZOLE 20 MG: 20 CAPSULE, DELAYED RELEASE ORAL at 10:57

## 2019-05-05 RX ADMIN — CEFTRIAXONE 1 G: 1 INJECTION, POWDER, FOR SOLUTION INTRAMUSCULAR; INTRAVENOUS at 13:41

## 2019-05-05 RX ADMIN — PROCHLORPERAZINE MALEATE 5 MG: 5 TABLET, FILM COATED ORAL at 08:36

## 2019-05-05 RX ADMIN — DRONABINOL 2.5 MG: 2.5 CAPSULE ORAL at 21:11

## 2019-05-05 RX ADMIN — DRONABINOL 2.5 MG: 2.5 CAPSULE ORAL at 10:57

## 2019-05-05 RX ADMIN — ACETAMINOPHEN 650 MG: 325 TABLET, FILM COATED ORAL at 13:41

## 2019-05-05 RX ADMIN — QUETIAPINE 12.5 MG: 25 TABLET, FILM COATED ORAL at 21:11

## 2019-05-05 ASSESSMENT — ACTIVITIES OF DAILY LIVING (ADL): ADLS_ACUITY_SCORE: 11

## 2019-05-05 NOTE — PROGRESS NOTES
SPIRITUAL HEALTH SERVICES Progress Note  [facility]    Visit per pt request for HCD.  Pt said she is tired, and not feeling up to discussing HCD at this time, but would appreciate prayer.   provided listening, support and prayer.  SH will follow, and is available as needs arise and upon request.  SH provided pt with HCD form and information.      Estephanie Morejon  Chaplain Resident

## 2019-05-05 NOTE — CONSULTS
Consult Date:  05/05/2019      REQUESTING PHYSICIAN:  Biju Harris MD      PRIMARY ONCOLOGIST:  Rhona Wisdom MD      REASON FOR CONSULTATION:  Newly diagnosed rectal cancer.      HISTORY OF PRESENT ILLNESS:  Medical records reviewed, history obtained, and patient examined.      Sara Lehman is a 78-year-old pleasant woman, who was diagnosed recently with rectal cancer, moderately differentiated adenocarcinoma.  She presented to the primary care physician on 04/01/2019 with intermittent pain, itching, and blood in the underwear for 2 months.  She underwent colonoscopy on 04/09/2019, which showed perianal mass in the left lateral quadrant.  Biopsy showed invasive moderately differentiated adenocarcinoma with normal MMR expression.  CT scan of chest and abdomen showed 1.3 cm left upper lung nodule concerning for metastatic disease, as well as 2 tiny nodules in the right lower lobe, prominent bilateral inguinal adenopathy, a lymph node in the upper right thigh adjacent to the common femoral vessel 2.9 x 2.3 cm, on the left side 2.6 x 3.5 cm.  She was seen by Dr. iWsdom on 04/25/2019 and initially was seen by Dr. Casas, and surgery was not pursued as up front option based on the possible metastatic disease.  PET scan showed a hypermetabolic left upper lobe lung nodule 1.3 x 1.2 cm and enlarged inguinal lymph nodes measuring 2.9 x 2.1 cm on the right and 3.6 x 2.4 cm on the left.  There was nodular thickening of the adrenal 0.7 cm, in addition to the anal mass.  She is considered for rectal cancer therapy with capecitabine and radiation and is scheduled to be seen by Radiation next week.  In regard to the lung nodules, she is scheduled to have biopsy to rule out second primary.        She has chronic nausea for years, according to her daughter, which occurs mostly in the morning and she has emesis a few times a week.  She is admitted to the hospital with fever and urinary symptoms and was found to have a urine  infection with E. coli.  She is currently on [address] 88 and was treated with an antibiotic.      PAST MEDICAL HISTORY:  Rectal/anal cancer as detailed, COPD, hypertension, migraines, nephrolithiasis, lithotripsy, chronic nausea.      MEDICATIONS ON ADMISSION:     1.  Albuterol.   2.  Norvasc.   3.  Biotene.   4.  Microzide.   5.  Ibuprofen.   6.  Toprol.   7.  Imitrex.   8.  Kenalog.        Currently, she is on:   1.  Metoprolol.   2.  Ceftriaxone.   3.  Amlodipine.   4.  Potassium.      ALLERGIES:  ACE INHIBITORS, ALEVE, ASPIRIN, ATORVASTATIN, CELEBREX, CODEINE, PRAVASTATIN, SIMVASTATIN.      SOCIAL HISTORY:  She smoked for many years, averaged 1 pack a day, quit in 2016 after intermittent attempts to quit in the previous 10 years.  She is  and drinks socially.      FAMILY HISTORY:  Her mother had uterine cancer in her 70s.      REVIEW OF SYSTEMS:  Significant for migraines, nausea, vomiting, urinary symptoms on admission.  Low GI symptoms in the last couple of months; she had upper endoscopy, possible acid.  She used medications previously.  Mild exertional dyspnea, uses an inhaler as needed.  The remainder of comprehensive system review is unremarkable.      PHYSICAL EXAMINATION:   GENERAL:  No acute distress.   VITAL SIGNS:  Stable as charted.  Her temperature was 101 on admission, today is 98.   HEENT:  Normocephalic, atraumatic.  Sclerae are anicteric.   NECK:  No lymphadenopathy, no JVD.   LUNGS:  Clear to auscultation.   HEART:  Regular rhythm.   ABDOMEN:  Soft.  She had discomfort on palpation of the epigastrium and had an episode of nausea, but no vomiting.   EXTREMITIES:  No cyanosis or edema.   LYMPHATIC:  No lymphadenopathy in cervical, supraclavicular, axillary, epitrochlear areas.  Some lymphadenopathy in the inguinal areas.   NEUROLOGIC:  Grossly intact.   SKIN:  Limited skin examination, no petechia or ecchymosis, no rash.      ANCILLARY DATA:  Creatinine 0.75.  Liver enzymes normal.  Lipase  72.  White count 15, down to 13, hemoglobin 13.9, platelets 236.  Urinalysis is positive for nitrates and white cells.  Urine culture is positive for E. coli.  CT scan of the brain shows diffuse volume loss consistent with chronic small vessel disease.      IMPRESSION:   1.  Newly diagnosed rectal/anal cancer, moderately differentiated adenocarcinoma, appears to be T2 N1 and possible M1, preparing for starting combined chemo and radiotherapy.   2.  Left lung nodule suspicious for metastasis versus primary.   3.  Chronic nausea and vomiting.  Could be partly due to migraines, but she does have episodes without headaches and some symptoms reported to suggest acid reflux, although not severe.   5.  Urinary tract infection with fever, Escherichia coli.      DISCUSSION AND RECOMMENDATIONS:  She is currently treated for a urine infection and is feeling somewhat better but continues to have nausea.  I shared my concern about her nausea with a proposed regimen with oral Xeloda.  I suggested that we try a PPI in addition to another nausea medication.  Her daughter indicated that they tried nausea medications at home and she does not want to be sedated; therefore, I believe Marinol, small dose, is reasonable and will be started today.  She is scheduled for a biopsy tomorrow, and Dr. Wisdom will follow on the biopsy and to finalize treatment decision.      I appreciate the opportunity to participate in the care of Elsy Lehman.  Our team will follow while hospitalized.         KATIE DOYLE MD             D: 2019   T: 2019   MT: [id number]      Name:     ELSY LEHMAN   MRN:      [id number]        Account:       [id number]   :      1940           Consult Date:  2019      Document: [id number]       cc: Biju Harris MD

## 2019-05-05 NOTE — PROGRESS NOTES
[facility]  Hospitalist Progress Note  Biju Harris MD  05/05/2019    Assessment & Plan   ASSESSMENT:  Sara Lehman is 78was recently diagnosed with anal cancer who is admitted with ongoing nausea for the last 2 days recent headache and some mild confusion and fever being admitted for further evaluation.   1.  Fever e.coli UTI.  The patient has no urinary complaints, but urinalysis abnormal.  Blood cultures and urine cultures have been obtained, UCx + e.coli.  -- continue on IV ceftriaxone.   -- wbc trending for the better and she is feeling much better but but still having fevers,  abx susceptibility show it to be pan-sensitive.  2.  Headache.  She has a history of migraines.  She was treated with Imitrex in the past.  She was recently admitted at [facility] for the headaches.  The headache is somewhat better.  Head CT shows no acute finding.  We will make Imitrex available as needed.  We will make IV Dilaudid for her pain.   3.  Anal cancer.   -- appreciate formal consultation from [facility].    --The patient is scheduled for biopsy on 5/6  -- noted trial of mannitol for nausea, nausea has improved.  4.  Hypertension.  We are going to hold the patient's diuretic in the setting for ongoing nausea and decreased oral intake.   -- continue the patient on Norvasc and metoprolol.   -- BP stable < 140  5.  History of chronic obstructive pulmonary disease.   -- on albuterol metered dose inhaler every 6 hours as needed.   6.  Deep venous thrombosis prophylaxis:  The patient will receive compression boots.   7.  Disposition  -- anticipate in 2 days    Code status :  Full     Interval History   -- nausea better overall improved  -- wbc improved but still having febrile episodes.    -Data reviewed today: I reviewed all new labs and imaging over the last 24 hours. I personally reviewed no images or EKG's today.    Physical Exam   Heart Rate: 86, Blood pressure 116/55, pulse 86,  temperature 101.4  F (38.6  C), temperature source Oral, resp. rate 16, weight 53.1 kg (117 lb), SpO2 91 %, not currently breastfeeding.  Vitals:    05/03/19 1952   Weight: 53.1 kg (117 lb)     Vital Signs with Ranges  Temp:  [98  F (36.7  C)-101.4  F (38.6  C)] 101.4  F (38.6  C)  Pulse:  [85-86] 86  Heart Rate:  [56-91] 86  Resp:  [16-18] 16  BP: (116-156)/(42-89) 116/55  SpO2:  [91 %-94 %] 91 %  I/O's Last 24 hours  I/O last 3 completed shifts:  In: 125 [P.O.:125]  Out: -     Constitutional: Awake, alert, cooperative, no apparent distress  Respiratory: Clear to auscultation bilaterally, no crackles or wheezing  Cardiovascular: Regular rate and rhythm, normal S1 and S2, and no murmur noted  GI: Normal bowel sounds, soft, non-distended, non-tender  Skin/Integumen: No rashes, no cyanosis, no edema  Other:      Medications   All medications were reviewed.      amLODIPine  5 mg Oral Daily     cefTRIAXone  1 g Intravenous Q12H     dronabinol  2.5 mg Oral BID     metoprolol succinate ER  25 mg Oral Daily     omeprazole  20 mg Oral QAM AC        Data   Recent Labs   Lab 05/05/19  0829 05/04/19  0814 05/03/19  2114   WBC  --  13.0* 15.3*   HGB  --  13.9 15.5   MCV  --  92 92   PLT  --  236 264   NA  --  138 132*   POTASSIUM 3.8 3.3* 3.4   CHLORIDE  --  102 95   CO2  --  29 29   BUN  --  10 17   CR  --  0.75 0.86   ANIONGAP  --  7 8   ALTHEA  --  9.1 9.6   GLC  --  104* 123*   ALBUMIN  --   --  3.4   PROTTOTAL  --   --  7.7   BILITOTAL  --   --  0.8   ALKPHOS  --   --  78   ALT  --   --  15   AST  --   --  14   LIPASE  --   --  72*       No results found for this or any previous visit (from the past 24 hour(s)).    Biju Harris MD  Text Page  (7am to 6pm)

## 2019-05-05 NOTE — PLAN OF CARE
Pt disoriented to place and time. VSS on RA. TMAX 101, tylenol given. Denies pain. Regular diet, poor intake. Up SBA to bathroom. Can be impulsive at times, redirectable. IV LR at 125 + abx. No rectal bleeding this shift. K replaced on nights, recheck in AM. Plan to discharge today or tomorrow.

## 2019-05-05 NOTE — PLAN OF CARE
Pt disoriented to place and time. VSS on RA, temp this afternoon, lactic fired. C/O back pain, had 0.2mg dilaudid (and loved it), aqua-k ordered. Up sba, ambulated w-family, Onc ordered Bx tomorrow, as well as PPI and marinol for nausea and appetite. IV replaced, pt accidentally pulled in BR.

## 2019-05-05 NOTE — PLAN OF CARE
A/O  except to time and place, forgetful and impulsive at times. VSS ex low grade temp. Wound on coccyx, ANAHI. Diminished LS. +Flatus +BS +BM. Voiding adequately. Intermittent nausea. Denies pain. Up with SBA. Regular diet. BX scheduled for tomorrow. PRN Seroquel given x2 for confusion and restlessness.

## 2019-05-05 NOTE — PROGRESS NOTES
Called by utilization review that patient could be transitioned to inpatient as will be here for further workup and treatment.  Order placed for inpatient status.

## 2019-05-05 NOTE — PROGRESS NOTES
Consult dictated:  Rectal cancer planning for chem RT  UTI  Chronic NV    P. Add marinol and PPI  Proceed with bx (scheduled tomorrow)  Has appt with RT to finalize treatment plan.

## 2019-05-06 ENCOUNTER — APPOINTMENT (OUTPATIENT)
Dept: GENERAL RADIOLOGY | Facility: CLINIC | Age: 79
DRG: 690 | End: 2019-05-06
Attending: RADIOLOGY
Payer: COMMERCIAL

## 2019-05-06 ENCOUNTER — APPOINTMENT (OUTPATIENT)
Dept: GENERAL RADIOLOGY | Facility: CLINIC | Age: 79
DRG: 690 | End: 2019-05-06
Attending: NURSE PRACTITIONER
Payer: COMMERCIAL

## 2019-05-06 ENCOUNTER — APPOINTMENT (OUTPATIENT)
Dept: CT IMAGING | Facility: CLINIC | Age: 79
DRG: 690 | End: 2019-05-06
Attending: INTERNAL MEDICINE
Payer: COMMERCIAL

## 2019-05-06 LAB
ANION GAP SERPL CALCULATED.3IONS-SCNC: 11 MMOL/L (ref 3–14)
BUN SERPL-MCNC: 8 MG/DL (ref 7–30)
CALCIUM SERPL-MCNC: 9 MG/DL (ref 8.5–10.1)
CHLORIDE SERPL-SCNC: 102 MMOL/L (ref 94–109)
CO2 SERPL-SCNC: 26 MMOL/L (ref 20–32)
CREAT SERPL-MCNC: 0.64 MG/DL (ref 0.52–1.04)
ERYTHROCYTE [DISTWIDTH] IN BLOOD BY AUTOMATED COUNT: 13.1 % (ref 10–15)
GFR SERPL CREATININE-BSD FRML MDRD: 86 ML/MIN/{1.73_M2}
GLUCOSE SERPL-MCNC: 71 MG/DL (ref 70–99)
HCT VFR BLD AUTO: 41.5 % (ref 35–47)
HGB BLD-MCNC: 13.8 G/DL (ref 11.7–15.7)
INR PPP: 1.15 (ref 0.86–1.14)
MAGNESIUM SERPL-MCNC: 2.1 MG/DL (ref 1.6–2.3)
MCH RBC QN AUTO: 30.7 PG (ref 26.5–33)
MCHC RBC AUTO-ENTMCNC: 33.3 G/DL (ref 31.5–36.5)
MCV RBC AUTO: 92 FL (ref 78–100)
PLATELET # BLD AUTO: 239 10E9/L (ref 150–450)
POTASSIUM SERPL-SCNC: 3.3 MMOL/L (ref 3.4–5.3)
RBC # BLD AUTO: 4.5 10E12/L (ref 3.8–5.2)
SODIUM SERPL-SCNC: 139 MMOL/L (ref 133–144)
WBC # BLD AUTO: 16.5 10E9/L (ref 4–11)

## 2019-05-06 PROCEDURE — 88172 CYTP DX EVAL FNA 1ST EA SITE: CPT | Mod: 26 | Performed by: INTERNAL MEDICINE

## 2019-05-06 PROCEDURE — 80048 BASIC METABOLIC PNL TOTAL CA: CPT | Performed by: INTERNAL MEDICINE

## 2019-05-06 PROCEDURE — 88161 CYTOPATH SMEAR OTHER SOURCE: CPT | Performed by: INTERNAL MEDICINE

## 2019-05-06 PROCEDURE — 99233 SBSQ HOSP IP/OBS HIGH 50: CPT | Performed by: INTERNAL MEDICINE

## 2019-05-06 PROCEDURE — 93005 ELECTROCARDIOGRAM TRACING: CPT

## 2019-05-06 PROCEDURE — 83735 ASSAY OF MAGNESIUM: CPT | Performed by: INTERNAL MEDICINE

## 2019-05-06 PROCEDURE — 25000128 H RX IP 250 OP 636: Performed by: RADIOLOGY

## 2019-05-06 PROCEDURE — 36415 COLL VENOUS BLD VENIPUNCTURE: CPT | Performed by: INTERNAL MEDICINE

## 2019-05-06 PROCEDURE — 40000863 ZZH STATISTIC RADIOLOGY XRAY, US, CT, MAR, NM

## 2019-05-06 PROCEDURE — 99291 CRITICAL CARE FIRST HOUR: CPT | Performed by: NURSE PRACTITIONER

## 2019-05-06 PROCEDURE — 85027 COMPLETE CBC AUTOMATED: CPT | Performed by: INTERNAL MEDICINE

## 2019-05-06 PROCEDURE — 71045 X-RAY EXAM CHEST 1 VIEW: CPT | Mod: 76

## 2019-05-06 PROCEDURE — 88173 CYTOPATH EVAL FNA REPORT: CPT | Performed by: INTERNAL MEDICINE

## 2019-05-06 PROCEDURE — 88305 TISSUE EXAM BY PATHOLOGIST: CPT | Performed by: INTERNAL MEDICINE

## 2019-05-06 PROCEDURE — 25000128 H RX IP 250 OP 636: Performed by: INTERNAL MEDICINE

## 2019-05-06 PROCEDURE — 88342 IMHCHEM/IMCYTCHM 1ST ANTB: CPT | Performed by: INTERNAL MEDICINE

## 2019-05-06 PROCEDURE — 71045 X-RAY EXAM CHEST 1 VIEW: CPT

## 2019-05-06 PROCEDURE — 88341 IMHCHEM/IMCYTCHM EA ADD ANTB: CPT | Performed by: INTERNAL MEDICINE

## 2019-05-06 PROCEDURE — 88305 TISSUE EXAM BY PATHOLOGIST: CPT | Mod: 26 | Performed by: INTERNAL MEDICINE

## 2019-05-06 PROCEDURE — 99152 MOD SED SAME PHYS/QHP 5/>YRS: CPT

## 2019-05-06 PROCEDURE — 85610 PROTHROMBIN TIME: CPT | Performed by: INTERNAL MEDICINE

## 2019-05-06 PROCEDURE — 40000986 XR CHEST 1 VW

## 2019-05-06 PROCEDURE — 93010 ELECTROCARDIOGRAM REPORT: CPT | Performed by: INTERNAL MEDICINE

## 2019-05-06 PROCEDURE — 71046 X-RAY EXAM CHEST 2 VIEWS: CPT

## 2019-05-06 PROCEDURE — 12000000 ZZH R&B MED SURG/OB

## 2019-05-06 PROCEDURE — 74177 CT ABD & PELVIS W/CONTRAST: CPT

## 2019-05-06 PROCEDURE — 25000125 ZZHC RX 250: Performed by: INTERNAL MEDICINE

## 2019-05-06 PROCEDURE — 88172 CYTP DX EVAL FNA 1ST EA SITE: CPT | Performed by: INTERNAL MEDICINE

## 2019-05-06 PROCEDURE — 88342 IMHCHEM/IMCYTCHM 1ST ANTB: CPT | Mod: 26 | Performed by: INTERNAL MEDICINE

## 2019-05-06 PROCEDURE — 25000132 ZZH RX MED GY IP 250 OP 250 PS 637: Performed by: NURSE PRACTITIONER

## 2019-05-06 PROCEDURE — 32405 CT LUNG MEDIASTINUM BIOPSY: CPT

## 2019-05-06 PROCEDURE — 25000132 ZZH RX MED GY IP 250 OP 250 PS 637: Performed by: INTERNAL MEDICINE

## 2019-05-06 RX ORDER — MAGNESIUM SULFATE HEPTAHYDRATE 40 MG/ML
4 INJECTION, SOLUTION INTRAVENOUS EVERY 4 HOURS PRN
Status: DISCONTINUED | OUTPATIENT
Start: 2019-05-06 | End: 2019-05-08 | Stop reason: HOSPADM

## 2019-05-06 RX ORDER — POTASSIUM CL/LIDO/0.9 % NACL 10MEQ/0.1L
10 INTRAVENOUS SOLUTION, PIGGYBACK (ML) INTRAVENOUS
Status: DISCONTINUED | OUTPATIENT
Start: 2019-05-06 | End: 2019-05-08 | Stop reason: HOSPADM

## 2019-05-06 RX ORDER — NALOXONE HYDROCHLORIDE 0.4 MG/ML
.1-.4 INJECTION, SOLUTION INTRAMUSCULAR; INTRAVENOUS; SUBCUTANEOUS
Status: DISCONTINUED | OUTPATIENT
Start: 2019-05-06 | End: 2019-05-06

## 2019-05-06 RX ORDER — LIDOCAINE HYDROCHLORIDE 10 MG/ML
30 INJECTION, SOLUTION EPIDURAL; INFILTRATION; INTRACAUDAL; PERINEURAL ONCE
Status: COMPLETED | OUTPATIENT
Start: 2019-05-06 | End: 2019-05-06

## 2019-05-06 RX ORDER — FENTANYL CITRATE 50 UG/ML
25-50 INJECTION, SOLUTION INTRAMUSCULAR; INTRAVENOUS EVERY 5 MIN PRN
Status: DISCONTINUED | OUTPATIENT
Start: 2019-05-06 | End: 2019-05-06

## 2019-05-06 RX ORDER — POTASSIUM CHLORIDE 7.45 MG/ML
10 INJECTION INTRAVENOUS
Status: DISCONTINUED | OUTPATIENT
Start: 2019-05-06 | End: 2019-05-08 | Stop reason: HOSPADM

## 2019-05-06 RX ORDER — DEXTROSE MONOHYDRATE 25 G/50ML
25-50 INJECTION, SOLUTION INTRAVENOUS
Status: DISCONTINUED | OUTPATIENT
Start: 2019-05-06 | End: 2019-05-08 | Stop reason: HOSPADM

## 2019-05-06 RX ORDER — MAGNESIUM SULFATE HEPTAHYDRATE 40 MG/ML
2 INJECTION, SOLUTION INTRAVENOUS DAILY PRN
Status: DISCONTINUED | OUTPATIENT
Start: 2019-05-06 | End: 2019-05-08 | Stop reason: HOSPADM

## 2019-05-06 RX ORDER — CEFTRIAXONE 2 G/1
2 INJECTION, POWDER, FOR SOLUTION INTRAMUSCULAR; INTRAVENOUS EVERY 24 HOURS
Status: DISCONTINUED | OUTPATIENT
Start: 2019-05-06 | End: 2019-05-08 | Stop reason: HOSPADM

## 2019-05-06 RX ORDER — FLUMAZENIL 0.1 MG/ML
0.2 INJECTION, SOLUTION INTRAVENOUS
Status: DISCONTINUED | OUTPATIENT
Start: 2019-05-06 | End: 2019-05-06

## 2019-05-06 RX ORDER — POTASSIUM CHLORIDE 1500 MG/1
20-40 TABLET, EXTENDED RELEASE ORAL
Status: DISCONTINUED | OUTPATIENT
Start: 2019-05-06 | End: 2019-05-08 | Stop reason: HOSPADM

## 2019-05-06 RX ORDER — POTASSIUM CHLORIDE 1.5 G/1.58G
20-40 POWDER, FOR SOLUTION ORAL
Status: DISCONTINUED | OUTPATIENT
Start: 2019-05-06 | End: 2019-05-08 | Stop reason: HOSPADM

## 2019-05-06 RX ORDER — IOPAMIDOL 755 MG/ML
59 INJECTION, SOLUTION INTRAVASCULAR ONCE
Status: COMPLETED | OUTPATIENT
Start: 2019-05-06 | End: 2019-05-06

## 2019-05-06 RX ORDER — POTASSIUM CHLORIDE 29.8 MG/ML
20 INJECTION INTRAVENOUS
Status: DISCONTINUED | OUTPATIENT
Start: 2019-05-06 | End: 2019-05-08 | Stop reason: HOSPADM

## 2019-05-06 RX ORDER — NICOTINE POLACRILEX 4 MG
15-30 LOZENGE BUCCAL
Status: DISCONTINUED | OUTPATIENT
Start: 2019-05-06 | End: 2019-05-08 | Stop reason: HOSPADM

## 2019-05-06 RX ADMIN — METOPROLOL SUCCINATE 25 MG: 25 TABLET, EXTENDED RELEASE ORAL at 11:08

## 2019-05-06 RX ADMIN — QUETIAPINE 12.5 MG: 25 TABLET, FILM COATED ORAL at 22:53

## 2019-05-06 RX ADMIN — POTASSIUM CHLORIDE 20 MEQ: 1500 TABLET, EXTENDED RELEASE ORAL at 21:53

## 2019-05-06 RX ADMIN — MIDAZOLAM HYDROCHLORIDE 0.5 MG: 1 INJECTION, SOLUTION INTRAMUSCULAR; INTRAVENOUS at 09:43

## 2019-05-06 RX ADMIN — CEFTRIAXONE 1 G: 1 INJECTION, POWDER, FOR SOLUTION INTRAMUSCULAR; INTRAVENOUS at 01:10

## 2019-05-06 RX ADMIN — IOPAMIDOL 59 ML: 755 INJECTION, SOLUTION INTRAVENOUS at 13:50

## 2019-05-06 RX ADMIN — OMEPRAZOLE 20 MG: 20 CAPSULE, DELAYED RELEASE ORAL at 11:08

## 2019-05-06 RX ADMIN — AMLODIPINE BESYLATE 5 MG: 5 TABLET ORAL at 11:08

## 2019-05-06 RX ADMIN — DRONABINOL 2.5 MG: 2.5 CAPSULE ORAL at 11:08

## 2019-05-06 RX ADMIN — POTASSIUM CHLORIDE 40 MEQ: 1500 TABLET, EXTENDED RELEASE ORAL at 19:28

## 2019-05-06 RX ADMIN — CEFTRIAXONE SODIUM 2 G: 2 INJECTION, POWDER, FOR SOLUTION INTRAMUSCULAR; INTRAVENOUS at 12:50

## 2019-05-06 RX ADMIN — ACETAMINOPHEN 650 MG: 325 TABLET, FILM COATED ORAL at 01:25

## 2019-05-06 RX ADMIN — SODIUM CHLORIDE 60 ML: 9 INJECTION, SOLUTION INTRAVENOUS at 13:50

## 2019-05-06 RX ADMIN — LIDOCAINE HYDROCHLORIDE 8 ML: 10 INJECTION, SOLUTION EPIDURAL; INFILTRATION; INTRACAUDAL; PERINEURAL at 09:40

## 2019-05-06 RX ADMIN — DRONABINOL 2.5 MG: 2.5 CAPSULE ORAL at 20:48

## 2019-05-06 RX ADMIN — FENTANYL CITRATE 25 MCG: 50 INJECTION, SOLUTION INTRAMUSCULAR; INTRAVENOUS at 09:43

## 2019-05-06 ASSESSMENT — ACTIVITIES OF DAILY LIVING (ADL)
ADLS_ACUITY_SCORE: 11

## 2019-05-06 NOTE — PLAN OF CARE
Disoriented to place/time/situation-easily reorientated. Forgetful and impulsive at times. Tmax 101.9- gave prn tylenol x1-- recheck was 98.4. Other VSS on RA. Denies pain. Up with SBA. Wound on coccyx-ANAHI. Moves well in bed.  NPO since midnight for planned biopsy today. Continue to monitor.

## 2019-05-06 NOTE — PROGRESS NOTES
[facility]    Medicine Progress Note - Hospitalist Service       Date of Admission:  5/3/2019  Assessment & Plan     Sara Lehman is 78was recently diagnosed with anal cancer who is admitted with ongoing nausea for the last 2 days recent headache and some mild confusion and fever being admitted for further evaluation.      Fever   e.coli UTI.    -Patient has been getting appropriate antibiotics for her UTI which is growing E. coli pansensitive, however does have intermittent fever and had fever of up to 101.9 last night.    -Consulted infectious disease, see consult note for details, appreciate input   -Patient actually feels better and wants to go home however due to her ongoing high-grade fever got abdomen pelvis CT to make sure no evidence of abscess or source of infection.   No evidence of abscess in abdomen pelvis however questionable cellulitis in the groin  -Fever could be from malignancy versus?  Other infection  - will await 1 more day for her ceftriaxone to work, monitor    Headache.    She has a history of migraines.  She was treated with Imitrex in the past.  She was recently admitted at [facility] for the headaches.  Head CT shows no acute finding.  Currently denies any headache    Newly diagnosed rectal /anal cancer.   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     Hypertension.   Continue to hold the patient's diuretic in the setting for ongoing nausea and decreased oral intake.   -- continue the patient on Norvasc and metoprolol.   -- BP stable < 140     History of chronic obstructive  pulmonary disease.   -- on albuterol metered dose inhaler every 6 hours as needed.     Diet: Regular Diet Adult    DVT Prophylaxis: Pneumatic Compression Devices  Diaz Catheter: not present  Code Status: Full    Disposition Plan   Expected discharge: Tomorrow, recommended to prior living arrangement once antibiotic plan established.  Entered: Berna Padilla MD 05/06/2019, 3:30 PM       The patient's care was discussed with the Bedside Nurse, Patient and Patient's Family.    Berna Padilla MD  Hospitalist Service  [facility]    ______________________________________________________________________    Interval History   Patient was feeling fine earlier this morning.  Tolerating oral intake.  Wanted to go home.  However did discuss with her and her family by bedside that she is still having fever and the need for evaluation of fever.    Data reviewed today: I reviewed all medications, new labs and imaging results over the last 24 hours. I personally reviewed the abdominal CT image(s) showing No evidence of pus or intra-abdominal abscess    Physical Exam   Vital Signs: Temp: 97.9  F (36.6  C) Temp src: Oral BP: 133/71 Pulse: 74 Heart Rate: 84 Resp: 16 SpO2: 96 % O2 Device: None (Room air)    Weight: 117 lbs 0 oz  Exam:  Constitutional: Awake, alert and no distress. Appears comfortable  Head: Normocephalic. No masses, lesions, tenderness or abnormalities  ENT: ENT exam normal, no neck nodes or sinus tenderness  Cardiovascular: RRR.  no murmurs, no rubs or JVD  Respiratory:normal WOB,b/l equal air entry, no wheezes or crackles   Gastrointestinal: Abdomen soft, non-tender. BS normal. No masses, organomegaly  :  right groin swollen and tender, lymph node in the left groin palpable   extremities : No edema , no clubbing or cyanosis      Data   Recent Labs   Lab 05/06/19  0714 05/05/19  0829 05/04/19  0814 05/03/19  2114   WBC 16.5*  --  13.0* 15.3*   HGB 13.8  --  13.9 15.5   MCV 92  --  92 92     --   236 264   INR 1.15*  --   --   --      --  138 132*   POTASSIUM 3.3* 3.8 3.3* 3.4   CHLORIDE 102  --  102 95   CO2 26  --  29 29   BUN 8  --  10 17   CR 0.64  --  0.75 0.86   ANIONGAP 11  --  7 8   ALTHEA 9.0  --  9.1 9.6   GLC 71  --  104* 123*   ALBUMIN  --   --   --  3.4   PROTTOTAL  --   --   --  7.7   BILITOTAL  --   --   --  0.8   ALKPHOS  --   --   --  78   ALT  --   --   --  15   AST  --   --   --  14   LIPASE  --   --   --  72*     Recent Results (from the past 24 hour(s))   CT Lung Mediastinum Biopsy    Narrative    CT LUNG MEDIASTINUM BIOPSY 5/6/2019 10:02 AM    HISTORY: New anal/rectal cancer with likely metastatic disease.    COMPARISON: None.    TECHNIQUE: Volumetric helical acquisition of CT images without  contrast. Radiation dose for this scan was reduced using automated  exposure control, adjustment of the mA and/or kV according to patient  size, or iterative reconstruction technique.    MEDICATIONS AND DOSE: 8mL Lidocaine 1%, 0.5mg Versed, 25mcg Fentanyl  given over 15 minutes. RN: Mercy Fontenot    FINDINGS: After written and oral informed consent was obtained and a  pause for the cause procedure verifying the correct procedure and the  correct patient, the area was prepped and draped in the usual sterile  fashion. The overlying soft tissues were anesthetized with 8 mL of 1%  subcutaneous lidocaine. Utilizing CT guidance 4 passes were made into  the lesion in the upper lobe of the left lung with a 20 gauge biopsy  device and the tissue was submitted to pathology. Tiny postprocedural  pneumothorax that will be followed with serial chest x-rays.     CONSCIOUS SEDATION NOTE: After obtaining consent for sedation,  conscious sedation was induced using IV Versed and IV fentanyl.  Patient was monitored by nurse under my direct supervision throughout  the exam. There were no complications of the sedation.       15 minutes face-to-face time.      Impression    IMPRESSION:   1. Technically successful  left lung lesion biopsy.   2. Conscious sedation.    TAMELA BARRETO MD   XR Chest 1 View    Narrative    XR CHEST 1 VW 5/6/2019 10:15 AM     HISTORY: Left lung biopsy, evaluate for delayed pneumothorax.      Impression    IMPRESSION: Suspected small left apical pneumothorax measuring less  than 1 cm.    URSZULA YO MD   XR Chest 1 View    Narrative    XR CHEST 1 VW 5/6/2019 11:45 AM     HISTORY: Left lung biopsy      Impression    IMPRESSION: Small left apical pneumothorax measuring less than 1 cm.    URSZULA YO MD   CT Abdomen Pelvis w Contrast    Narrative    CT ABDOMEN AND PELVIS WITH CONTRAST May 6, 2019 1:54 PM     HISTORY: Persistent fever.    COMPARISON: Lung biopsy CT from May 6, 2019.    TECHNIQUE: Volumetric helical acquisition of CT images from the lung  bases through the symphysis pubis after the administration of 59mL  Isovue-370  intravenous contrast. Radiation dose for this scan was  reduced using automated exposure control, adjustment of the mA and/or  kV according to patient size, or iterative reconstruction technique.    FINDINGS: Visualized portions of the pneumothorax at the left lung  base appear slightly more prominent than previous. Repeat chest film  has been ordered for further evaluation. Prominent simple cyst in the  upper left kidney. Additional smaller renal cysts seen on the right.  The liver, spleen, adrenal glands, kidneys, and pancreas demonstrate  no worrisome focal lesion. Upper normal diameter atherosclerotic aorta  noted. There is stranding in the right inguinal region extending into  the upper thigh, question if this is postprocedural or an area of  cellulitis. No hydronephrosis. No bowel obstruction. No free air or  free fluid. Adenopathy in the left groin is noted measuring 3.7 x 2.5  cm. No frankly destructive bony lesions.      Impression    IMPRESSION:   1. Prominent area of stranding in the right groin, question  postprocedural changes versus an area of cellulitis.  No definite  drainable abscess demonstrated.  2. Left groin adenopathy which is nonspecific.  3. At the lung bases the pneumothorax appears slightly more generous  than earlier today from the lung biopsy. Repeat chest x-ray has been  ordered to reevaluate the size of the pneumothorax.     TAMELA BARRETO MD   XR Chest 1 View    Narrative    XR CHEST 1 VW 5/6/2019 3:25 PM     HISTORY: Re-evaluate pneumothorax    COMPARISON: 5/6/2019 at 1149 hours and 1018 hours      Impression    IMPRESSION: Small left apical pneumothorax, increased in size to  approximately 2.4 cm since earlier today.    URSZULA YO MD     Medications       amLODIPine  5 mg Oral Daily     cefTRIAXone  2 g Intravenous Q24H     dronabinol  2.5 mg Oral BID     metoprolol succinate ER  25 mg Oral Daily     omeprazole  20 mg Oral QAM AC

## 2019-05-06 NOTE — PROGRESS NOTES
Left lung bx performed by Dr Liu in CT dept.  VSS, tolerated procedure well with min discomfort, VSS at baseline.    Specimens in path now, pt comfortable on table.  Per post CT pt has small pneumothorax per Dr Liu.  Will monitor, plan for CXR s/p before returning to room.  Pt is comfortable, denies pain, VSS, maintaining sats at 95% on RA.    CXR complete.  Very small pneumo per  Dr Liu. Will report to primary RN on unit.  Return to room via transport in stable condition with family.  Pt will return to xray in 1.5 hours for repeat CXR.  Report called to Rn on st 88.

## 2019-05-06 NOTE — PROGRESS NOTES
RADIOLOGY PROCEDURE NOTE  Patient name: Sara Lehman  MRN: [id number]  : 1940    Pre-procedure diagnosis: Lung nodule  Post-procedure diagnosis: Same    Procedure Date/Time: May 6, 2019  10:05 AM  Procedure: Biopsy.  Estimated blood loss: None  Specimen(s) collected with description: Core biopsy samples.  The patient tolerated the procedure well with a tiny post procedural pneumothorax.  I determined this patient to be an appropriate candidate for the planned sedation and procedure and reassessed the patient IMMEDIATELY PRIOR to sedation and procedure.    See imaging dictation for procedural details and findings.    Provider name: Roque Liu  Assistant(s):None

## 2019-05-06 NOTE — PROGRESS NOTES
[facility] Progress Note          Assessment and Plan:   1.  Newly diagnosed rectal/anal cancer, moderately differentiated adenocarcinoma, appears to be T2 N1 and possible M1, preparing for starting combined chemo and radiotherapy.   -Discussed in detail starting capecitabine with concurrent radiation this Thursday.   2.  Left lung nodule suspicious for metastasis versus primary.   -Patient going for biopsy today.  3.  Chronic nausea and vomiting.  Could be partly due to migraines, but she does have episodes without headaches and some symptoms reported to suggest acid reflux, although not severe.   -Antiemetics prn, improved today.  5.  Urinary tract infection with fever, Escherichia coli.   -Treated with antibiotics.               Interval History:   Patient reports feeling mentally clearer today.              Review of Systems:   As per subjective, otherwise 5 systems reviewed and negative.           Physical Exam:   Blood pressure 130/74, pulse 74, temperature 97.2  F (36.2  C), temperature source Oral, resp. rate 16, weight 53.1 kg (117 lb), SpO2 95 %, not currently breastfeeding.      Vital Sign Ranges  Temperature Temp  Av.3  F (37.4  C)  Min: 97.2  F (36.2  C)  Max: 101.9  F (38.8  C)   Blood pressure Systolic (24hrs), Av , Min:116 , Max:139        Diastolic (24hrs), Av, Min:54, Max:74      Pulse Pulse  Av  Min: 74  Max: 86   Respirations Resp  Av  Min: 16  Max: 16   Pulse oximetry SpO2  Av.2 %  Min: 91 %  Max: 96 %       No intake or output data in the 24 hours ending 19 0931    Constitutional:   No acute distress.   Skin:   No rashes, petechiae, or ecchymoses.   HEENT:   Normocephalic, atraumatic. Oropharynx clear with no mucosal lesions or thrush.   Neck:   Supple.   Abdomen:   Soft, nontender, nondistended with no palpable hepatosplenomegaly.   Extremities:   No clubbing, cyanosis, or edema.   Neurological:   CN II-XII grossly intact. No focal motor deficits.             Medications:     No current outpatient medications on file.                Data:     Results for orders placed or performed during the hospital encounter of 05/03/19 (from the past 24 hour(s))   Lactic acid level STAT for sepsis protocol   Result Value Ref Range    Lactate for Sepsis Protocol 0.9 0.7 - 2.0 mmol/L   CBC with platelets   Result Value Ref Range    WBC 16.5 (H) 4.0 - 11.0 10e9/L    RBC Count 4.50 3.8 - 5.2 10e12/L    Hemoglobin 13.8 11.7 - 15.7 g/dL    Hematocrit 41.5 35.0 - 47.0 %    MCV 92 78 - 100 fl    MCH 30.7 26.5 - 33.0 pg    MCHC 33.3 31.5 - 36.5 g/dL    RDW 13.1 10.0 - 15.0 %    Platelet Count 239 150 - 450 10e9/L   Basic metabolic panel   Result Value Ref Range    Sodium 139 133 - 144 mmol/L    Potassium 3.3 (L) 3.4 - 5.3 mmol/L    Chloride 102 94 - 109 mmol/L    Carbon Dioxide 26 20 - 32 mmol/L    Anion Gap 11 3 - 14 mmol/L    Glucose 71 70 - 99 mg/dL    Urea Nitrogen 8 7 - 30 mg/dL    Creatinine 0.64 0.52 - 1.04 mg/dL    GFR Estimate 86 >60 mL/min/[1.73_m2]    GFR Estimate If Black >90 >60 mL/min/[1.73_m2]    Calcium 9.0 8.5 - 10.1 mg/dL   INR   Result Value Ref Range    INR 1.15 (H) 0.86 - 1.14

## 2019-05-06 NOTE — PROGRESS NOTES
Chest x ray shows an increase in size of pneumothorax, but overall volume remains small and a chest tube isn't currently indicated indicated from imaging standpoint.  Will order chest x ray for 5/7 AM to re-evaluate.

## 2019-05-06 NOTE — CODE/RAPID RESPONSE
[facility]    RRT Note  5/6/2019   Time Called: 3: 41 PM    RRT called for: chest pain     Assessment & Plan   Pneumothorax, left sided, apical  Chest pain in the setting of left sided apical pneumothorax status post left lung biopsy today. Chest xray with increased side. No obvious distress, trachea midline, no hemodynamic changes.     INTERVENTIONS:  - EKG without obvious acute myocardial ischemia   - Oxymask 15- liter/minute with no titration  - continuous pulse oximetry   - will repeat CXR at 2200 and in AM  - please call RRT for respiratory distress, worsening pain, tachycardia, hypotension, tachypnea   - she is a touch confused after sedation from biopsy, attempt to avoid additional opiates     Code Status: Full Code     Discussed with Dr. Padilla, Hospitalist, and Dr. Liu, Radiology.     Allergies   Allergies   Allergen Reactions     Ace Inhibitors Cough     Aleve [Naproxen Sodium] GI Disturbance     Asa Buff (Mag [Aspirin Buffered] GI Disturbance     Internal bleeding     Atorvastatin Calcium GI Disturbance     Celebrex [Celecoxib] GI Disturbance     Codeine Sulfate Hives     Pravastatin GI Disturbance     Simvastatin GI Disturbance       Physical Exam   Vital Signs with Ranges:  Temp:  [97.2  F (36.2  C)-101.9  F (38.8  C)] 99.6  F (37.6  C)  Pulse:  [74] 74  Heart Rate:  [63-96] 63  Resp:  [16] 16  BP: (117-139)/(54-74) 134/66  SpO2:  [92 %-98 %] 96 %  No intake/output data recorded.    Constitutional: 78- year old female without obvious acute distress.   Pulmonary: Bilateral lung fields clear and equal to ausculation. No hypoxia. No obvious respiratory distress.   Cardiovascular: S1, S2 without murmur, rub, or gallop.   GI: Soft, non-tender.   Skin/Integumen: No obvious concerning rashes or lesions.   Neuro: Awake, alert, oriented x 2. Non-focal.   Psych:  Calm, cooperative.   Extremities: Moves all extremities.     IMAGING: (X-ray/CT/MRI)   Recent Results (from the past 24 hour(s))    CT Lung Mediastinum Biopsy    Narrative    CT LUNG MEDIASTINUM BIOPSY 5/6/2019 10:02 AM    HISTORY: New anal/rectal cancer with likely metastatic disease.    COMPARISON: None.    TECHNIQUE: Volumetric helical acquisition of CT images without  contrast. Radiation dose for this scan was reduced using automated  exposure control, adjustment of the mA and/or kV according to patient  size, or iterative reconstruction technique.    MEDICATIONS AND DOSE: 8mL Lidocaine 1%, 0.5mg Versed, 25mcg Fentanyl  given over 15 minutes. RN: Mercy Fontenot    FINDINGS: After written and oral informed consent was obtained and a  pause for the cause procedure verifying the correct procedure and the  correct patient, the area was prepped and draped in the usual sterile  fashion. The overlying soft tissues were anesthetized with 8 mL of 1%  subcutaneous lidocaine. Utilizing CT guidance 4 passes were made into  the lesion in the upper lobe of the left lung with a 20 gauge biopsy  device and the tissue was submitted to pathology. Tiny postprocedural  pneumothorax that will be followed with serial chest x-rays.     CONSCIOUS SEDATION NOTE: After obtaining consent for sedation,  conscious sedation was induced using IV Versed and IV fentanyl.  Patient was monitored by nurse under my direct supervision throughout  the exam. There were no complications of the sedation.       15 minutes face-to-face time.      Impression    IMPRESSION:   1. Technically successful left lung lesion biopsy.   2. Conscious sedation.    TAMELA BARRETO MD   XR Chest 1 View    Narrative    XR CHEST 1 VW 5/6/2019 10:15 AM     HISTORY: Left lung biopsy, evaluate for delayed pneumothorax.      Impression    IMPRESSION: Suspected small left apical pneumothorax measuring less  than 1 cm.    URSZULA YO MD   XR Chest 1 View    Narrative    XR CHEST 1 VW 5/6/2019 11:45 AM     HISTORY: Left lung biopsy      Impression    IMPRESSION: Small left apical pneumothorax measuring  less than 1 cm.    URSZULA YO MD   CT Abdomen Pelvis w Contrast    Narrative    CT ABDOMEN AND PELVIS WITH CONTRAST May 6, 2019 1:54 PM     HISTORY: Persistent fever.    COMPARISON: Lung biopsy CT from May 6, 2019.    TECHNIQUE: Volumetric helical acquisition of CT images from the lung  bases through the symphysis pubis after the administration of 59mL  Isovue-370  intravenous contrast. Radiation dose for this scan was  reduced using automated exposure control, adjustment of the mA and/or  kV according to patient size, or iterative reconstruction technique.    FINDINGS: Visualized portions of the pneumothorax at the left lung  base appear slightly more prominent than previous. Repeat chest film  has been ordered for further evaluation. Prominent simple cyst in the  upper left kidney. Additional smaller renal cysts seen on the right.  The liver, spleen, adrenal glands, kidneys, and pancreas demonstrate  no worrisome focal lesion. Upper normal diameter atherosclerotic aorta  noted. There is stranding in the right inguinal region extending into  the upper thigh, question if this is postprocedural or an area of  cellulitis. No hydronephrosis. No bowel obstruction. No free air or  free fluid. Adenopathy in the left groin is noted measuring 3.7 x 2.5  cm. No frankly destructive bony lesions.      Impression    IMPRESSION:   1. Prominent area of stranding in the right groin, question  postprocedural changes versus an area of cellulitis. No definite  drainable abscess demonstrated.  2. Left groin adenopathy which is nonspecific.  3. At the lung bases the pneumothorax appears slightly more generous  than earlier today from the lung biopsy. Repeat chest x-ray has been  ordered to reevaluate the size of the pneumothorax.     TAMELA BARRETO MD   XR Chest 1 View    Narrative    XR CHEST 1 VW 5/6/2019 3:25 PM     HISTORY: Re-evaluate pneumothorax    COMPARISON: 5/6/2019 at 1149 hours and 1018 hours      Impression     IMPRESSION: Small left apical pneumothorax, increased in size to  approximately 2.4 cm since earlier today.    URSZULA YO MD       CBC with Diff:  Recent Labs   Lab Test 05/06/19 0714   WBC 16.5*   HGB 13.8   MCV 92      INR 1.15*        Comprehensive Metabolic Panel:  Recent Labs   Lab 05/06/19 0714 05/03/19 2114      < > 132*   POTASSIUM 3.3*   < > 3.4   CHLORIDE 102   < > 95   CO2 26   < > 29   ANIONGAP 11   < > 8   GLC 71   < > 123*   BUN 8   < > 17   CR 0.64   < > 0.86   GFRESTIMATED 86   < > 65   GFRESTBLACK >90   < > 75   ALTHEA 9.0   < > 9.6   MAG 2.1  --   --    PROTTOTAL  --   --  7.7   ALBUMIN  --   --  3.4   BILITOTAL  --   --  0.8   ALKPHOS  --   --  78   AST  --   --  14   ALT  --   --  15    < > = values in this interval not displayed.       INR:    Recent Labs   Lab Test 05/06/19 0714   INR 1.15*     UA:  Recent Labs   Lab 05/03/19 2115   COLOR Yellow   APPEARANCE Slightly Cloudy   URINEGLC Negative   URINEBILI Negative   URINEKETONE 40*   SG 1.017   UBLD Trace*   URINEPH 5.5   PROTEIN 30*   NITRITE Positive*   LEUKEST Large*   RBCU 13*   WBCU >182*       Time Spent on this Encounter   I spent 40 minutes of critical care time on the unit/floor managing the care of Sara Lehman. Upon evaluation, this patient had a high probability of imminent or life-threatening deterioration due to pneumothorax, which required my direct attention, intervention, and personal management. 100% of my time was spent at the bedside counseling the patient and/or coordinating care regarding services listed in this note.

## 2019-05-06 NOTE — CONSULTS
[facility]    Infectious Disease Consultation     Date of Admission:  5/3/2019  Date of Consult (When I saw the patient): 05/06/19    Assessment & Plan   Sara Lehman is a 78 year old female who was admitted on 5/3/2019.     Impression:  1. 78 y.o female with recent diagnosis of rectal cancer with mets to the lungs and lymph nodes.   2. Admitted with nausea, found to have urine infection, pansensitive organism on ceftriaxone.   3. Continue to have fever though does not have any chills or rigors.   4. No other complaints.   5. Blood cultures are negative.     Recommendations:   For now ceftriaxone, change frequency to once a day, 2 gm.   Can consider further imaging the abdomen and pelvis if continues to be febrile.   Follow symptoms, fever curve.     Kristina Nevarez MD    Reason for Consult   Reason for consult: I was asked by Dr. Padilla  to evaluate this patient for fever.    Primary Care Physician   Kaelyn Radford    Chief Complaint   Fever     History is obtained from the patient and medical records    History of Present Illness   Sara Lehman is a 78 year old female  with a history of hypertension, migraines, and newly diagnosed cancer,  presented for evaluation of nausea. Of note, three weeks ago the patient was diagnosed with rectal cancer via a PET scan that has metastasized to the lung and lymph nodes. She is scheduled for radiation on 5/9/19 and lung biopsy on 5/13/19 with her oncologist, Dr. Vega at [facility]. She has been taking pain medication for rectal pain. She was found to have UTI with urine cultures positive for a pan sensitive organism. She continues to have fever.         Past Medical History   I have reviewed this patient's medical history and updated it with pertinent information if needed.   Past Medical History:   Diagnosis Date     Benign essential hypertension 8/30/2017     Bronchospasm      COPD (chronic obstructive pulmonary disease) (H) 5/28/2014      Hypertension      Migraine      Neck pain      Nephrolithiasis        Past Surgical History   I have reviewed this patient's surgical history and updated it with pertinent information if needed.  Past Surgical History:   Procedure Laterality Date     COLONOSCOPY N/A 4/9/2019    Procedure: COMBINED COLONOSCOPY, SINGLE OR MULTIPLE BIOPSY/POLYPECTOMY BY BIOPSY;  Surgeon: Saleem Shaw MD;  Location:  GI     LITHOTRIPSY      Lithotrypsy     ORTHOPEDIC SURGERY      right knee exploration     TUBAL LIGATION         Prior to Admission Medications   Prior to Admission Medications   Prescriptions Last Dose Informant Patient Reported? Taking?   SUMAtriptan (IMITREX) 100 MG tablet prn Self No Yes   Sig: TAKE ONE TABLET BY MOUTH AT ONSET OF HEADACHE FOR MIGRAINE, MAY REPEAT IN 2 HOURS IF NEEDED (MAX OF 2 TABLETS PER DAY)   albuterol (VENTOLIN HFA) 108 (90 Base) MCG/ACT inhaler prn Self No Yes   Sig: INHALE TWO PUFFS BY MOUTH EVERY SIX HOURS AS NEEDED FOR SHORTNESS OF BREATH   amLODIPine (NORVASC) 5 MG tablet Past Week at Unknown time Self No Yes   Sig: Take 1 tablet (5 mg) by mouth once daily   hydrochlorothiazide (MICROZIDE) 12.5 MG capsule Past Week at Unknown time Self Yes Yes   Sig: Take 1 capsule (12.5 mg) by mouth daily   ibuprofen 200 MG capsule prn Self Yes Yes   Sig: Take 200 mg by mouth every 4 hours as needed for pain    metoprolol succinate ER (TOPROL-XL) 25 MG 24 hr tablet Past Week at Unknown time Self Yes Yes   Sig: Take 1 tablet (25 mg) by mouth daily   ondansetron (ZOFRAN) 8 MG tablet 5/3/2019 at 1600 Daughter Yes Yes   Sig: Take 8 mg by mouth every 8 hours as needed for nausea   traMADol (ULTRAM) 50 MG tablet 5/3/2019 at Unknown time Daughter Yes Yes   Sig: Take 50 mg by mouth every 6 hours as needed for severe pain   triamcinolone (KENALOG) 0.1 % cream prn Self No Yes   Sig: Apply sparingly to affected area three times daily as needed      Facility-Administered Medications: None     Allergies   Allergies    Allergen Reactions     Ace Inhibitors Cough     Aleve [Naproxen Sodium] GI Disturbance     Asa Buff (Mag [Aspirin Buffered] GI Disturbance     Internal bleeding     Atorvastatin Calcium GI Disturbance     Celebrex [Celecoxib] GI Disturbance     Codeine Sulfate Hives     Pravastatin GI Disturbance     Simvastatin GI Disturbance       Immunization History   Immunization History   Administered Date(s) Administered     Comvax (HIB/HepB) 11/12/2013     Influenza (High Dose) 3 valent vaccine 10/01/2016, 11/08/2016, 09/16/2018     Influenza (IIV3) PF 10/24/2010, 11/10/2011, 10/01/2012, 11/12/2013, 10/11/2014     Pneumo Conj 13-V (2010&after) 11/08/2016     Pneumococcal 23 valent 12/02/2008     TD (ADULT, 7+) 12/02/2008       Social History   I have reviewed this patient's social history and updated it with pertinent information if needed. Sara Lehman  reports that she quit smoking about 3 years ago. Her smoking use included cigarettes. She started smoking about 3 years ago. She smoked 0.50 packs per day. She has never used smokeless tobacco. She reports that she drinks alcohol. She reports that she does not use drugs.    Family History   I have reviewed this patient's family history and updated it with pertinent information if needed.   Family History   Problem Relation Age of Onset     Cancer Mother 76        uterine     Cerebrovascular Disease Father 75     C.A.D. Brother 66       Review of Systems   The 10 point Review of Systems is negative other than noted in the HPI or here.     Physical Exam   Temp: 97.9  F (36.6  C) Temp src: Oral BP: 133/71 Pulse: 74 Heart Rate: 84 Resp: 16 SpO2: 96 % O2 Device: None (Room air)    Vital Signs with Ranges  Temp:  [97.2  F (36.2  C)-101.9  F (38.8  C)] 97.9  F (36.6  C)  Pulse:  [74-86] 74  Heart Rate:  [78-96] 84  Resp:  [16] 16  BP: (116-139)/(54-74) 133/71  SpO2:  [91 %-98 %] 96 %  117 lbs 0 oz  Body mass index is 22.47 kg/m .    GENERAL APPEARANCE:  alert and no  distress  EYES: Eyes grossly normal to inspection, PERRL and conjunctivae and sclerae normal  HENT: ear canals and TM's normal and nose and mouth without ulcers or lesions  NECK: no adenopathy, no asymmetry, masses, or scars and thyroid normal to palpation  RESP: lungs clear to auscultation - no rales, rhonchi or wheezes  CV: regular rates and rhythm, normal S1 S2, no S3 or S4 and no murmur, click or rub  LYMPHATICS: normal ant/post cervical and supraclavicular nodes  ABDOMEN: soft, nontender, without hepatosplenomegaly or masses and bowel sounds normal  MS: extremities normal- no gross deformities noted  SKIN: no suspicious lesions or rashes      Data   Lab Results   Component Value Date    WBC 16.5 (H) 05/06/2019    HGB 13.8 05/06/2019    HCT 41.5 05/06/2019     05/06/2019     05/06/2019    POTASSIUM 3.3 (L) 05/06/2019    CHLORIDE 102 05/06/2019    CO2 26 05/06/2019    BUN 8 05/06/2019    CR 0.64 05/06/2019    GLC 71 05/06/2019    SED 3 08/10/2015    TROPI  12/10/2015     <0.015  The 99th percentile for upper reference range is 0.045 ug/L.  Troponin values in   the range of 0.045 - 0.120 ug/L may be associated with risks of adverse   clinical events.      AST 14 05/03/2019    ALT 15 05/03/2019    ALKPHOS 78 05/03/2019    BILITOTAL 0.8 05/03/2019    BILIDIRECT 0.15 02/19/2013    INR 1.15 (H) 05/06/2019     Recent Labs   Lab 05/04/19  0216 05/04/19 0215 05/03/19 2115   CULT No growth after 2 days No growth after 2 days >100,000 colonies/mL  Escherichia coli  *     Recent Labs   Lab Test 05/04/19  0216 05/04/19  0215 05/03/19 2115   CULT No growth after 2 days No growth after 2 days >100,000 colonies/mL  Escherichia coli  *

## 2019-05-06 NOTE — PLAN OF CARE
Disoriented to place/time/situation-easily orientated. Forgetful and impulsive at times. VSS, afebrile. RRT after lung bx d/t pneumo on (L). On oxymask, TELE, up w SBA, seroquel for restlessness. Family will likely stay in room. Back/abd pain better, nausea better. K+ replaced, re check in AM. Possible discharge home tomorrow.

## 2019-05-07 ENCOUNTER — APPOINTMENT (OUTPATIENT)
Dept: GENERAL RADIOLOGY | Facility: CLINIC | Age: 79
DRG: 690 | End: 2019-05-07
Attending: RADIOLOGY
Payer: COMMERCIAL

## 2019-05-07 ENCOUNTER — APPOINTMENT (OUTPATIENT)
Dept: GENERAL RADIOLOGY | Facility: CLINIC | Age: 79
DRG: 690 | End: 2019-05-07
Attending: NURSE PRACTITIONER
Payer: COMMERCIAL

## 2019-05-07 LAB
LACTATE BLD-SCNC: 1.2 MMOL/L (ref 0.7–2)
POTASSIUM SERPL-SCNC: 3.8 MMOL/L (ref 3.4–5.3)

## 2019-05-07 PROCEDURE — 25000132 ZZH RX MED GY IP 250 OP 250 PS 637: Performed by: NURSE PRACTITIONER

## 2019-05-07 PROCEDURE — 99232 SBSQ HOSP IP/OBS MODERATE 35: CPT | Performed by: INTERNAL MEDICINE

## 2019-05-07 PROCEDURE — 12000000 ZZH R&B MED SURG/OB

## 2019-05-07 PROCEDURE — 25000132 ZZH RX MED GY IP 250 OP 250 PS 637: Performed by: INTERNAL MEDICINE

## 2019-05-07 PROCEDURE — 25000131 ZZH RX MED GY IP 250 OP 636 PS 637: Performed by: INTERNAL MEDICINE

## 2019-05-07 PROCEDURE — 71045 X-RAY EXAM CHEST 1 VIEW: CPT

## 2019-05-07 PROCEDURE — 71046 X-RAY EXAM CHEST 2 VIEWS: CPT

## 2019-05-07 PROCEDURE — 99231 SBSQ HOSP IP/OBS SF/LOW 25: CPT | Performed by: NURSE PRACTITIONER

## 2019-05-07 PROCEDURE — 83605 ASSAY OF LACTIC ACID: CPT | Performed by: INTERNAL MEDICINE

## 2019-05-07 PROCEDURE — 25000128 H RX IP 250 OP 636: Performed by: INTERNAL MEDICINE

## 2019-05-07 PROCEDURE — 36415 COLL VENOUS BLD VENIPUNCTURE: CPT | Performed by: INTERNAL MEDICINE

## 2019-05-07 PROCEDURE — 84132 ASSAY OF SERUM POTASSIUM: CPT | Performed by: INTERNAL MEDICINE

## 2019-05-07 RX ADMIN — DRONABINOL 2.5 MG: 2.5 CAPSULE ORAL at 09:32

## 2019-05-07 RX ADMIN — ONDANSETRON 4 MG: 4 TABLET, ORALLY DISINTEGRATING ORAL at 23:29

## 2019-05-07 RX ADMIN — QUETIAPINE 12.5 MG: 25 TABLET, FILM COATED ORAL at 23:29

## 2019-05-07 RX ADMIN — OMEPRAZOLE 20 MG: 20 CAPSULE, DELAYED RELEASE ORAL at 07:05

## 2019-05-07 RX ADMIN — DRONABINOL 2.5 MG: 2.5 CAPSULE ORAL at 20:57

## 2019-05-07 RX ADMIN — AMLODIPINE BESYLATE 5 MG: 5 TABLET ORAL at 08:04

## 2019-05-07 RX ADMIN — METOPROLOL SUCCINATE 25 MG: 25 TABLET, EXTENDED RELEASE ORAL at 08:04

## 2019-05-07 RX ADMIN — CEFTRIAXONE SODIUM 2 G: 2 INJECTION, POWDER, FOR SOLUTION INTRAMUSCULAR; INTRAVENOUS at 11:46

## 2019-05-07 RX ADMIN — ACETAMINOPHEN 650 MG: 325 TABLET, FILM COATED ORAL at 11:59

## 2019-05-07 RX ADMIN — POTASSIUM CHLORIDE 20 MEQ: 1500 TABLET, EXTENDED RELEASE ORAL at 02:51

## 2019-05-07 ASSESSMENT — ACTIVITIES OF DAILY LIVING (ADL)
ADLS_ACUITY_SCORE: 15
ADLS_ACUITY_SCORE: 15
ADLS_ACUITY_SCORE: 13
ADLS_ACUITY_SCORE: 15
ADLS_ACUITY_SCORE: 11
ADLS_ACUITY_SCORE: 13

## 2019-05-07 NOTE — PROVIDER NOTIFICATION
Brief house HEATHER note:    Notified to look at repeat chest x-ray. Left apical pneumothorax appears stable. Continue to monitor, there is a repeat chest x-ray for the morning.     Page an RRT for worsening appearance/hemodynamics.    ANDRY Wong, CNP  Hospitalist - House HEATHER  Text Page  [phone])

## 2019-05-07 NOTE — PROGRESS NOTES
[facility]    Infectious Disease Progress Note    Date of Service (when I saw the patient): 05/07/2019     Assessment & Plan   Sara Lehman is a 78 year old female who was admitted on 5/3/2019.     Impression:  1. 78 y.o female with recent diagnosis of rectal cancer with mets to the lungs and lymph nodes.   2. Admitted with nausea, found to have urine infection, pansensitive organism on ceftriaxone.   3. Continue to have fever though does not have any chills or rigors.   4. No other complaints.   5. Blood cultures are negative.      Recommendations:   Ct scan with pneumothorax, abdomen ? Inflammatory vs post procedural changes in the groin. Still febrile though fever curve improved, worsening leucocytosis.   Continue ceftriaxone.     Discussed with daughter who is insisting patient be discharged, I did discuss another day in the hospital will help us in continued assessment but she is insisting discharge.   She will talk to her sister, I think the spouse wants patient to stay, if goes can go home on ceftin for a total course of 14 days, I recommend staying in the hospital.         Kristina Nevarez MD    Interval History   S/p lung biopsy   Pneumothorax   Wbc elevated   Still fever   Some confusion   Requiring O2     Physical Exam   Temp: 97.3  F (36.3  C) Temp src: Oral BP: 141/79   Heart Rate: 91 Resp: 18 SpO2: 100 % O2 Device: Oxymask Oxygen Delivery: 15 LPM  Vitals:    05/03/19 1952   Weight: 53.1 kg (117 lb)     Vital Signs with Ranges  Temp:  [97.3  F (36.3  C)-100.9  F (38.3  C)] 97.3  F (36.3  C)  Heart Rate:  [63-92] 91  Resp:  [16-18] 18  BP: (117-149)/(60-79) 141/79  SpO2:  [94 %-100 %] 100 %    Constitutional: Awake, alert, cooperative, no apparent distress  Lungs: Clear to auscultation bilaterally, no crackles or wheezing  Cardiovascular: Regular rate and rhythm, normal S1 and S2, and no murmur noted  Abdomen: Normal bowel sounds, soft, non-distended, non-tender  Skin: No rashes, no  cyanosis, no edema  Other:    Medications       amLODIPine  5 mg Oral Daily     cefTRIAXone  2 g Intravenous Q24H     dronabinol  2.5 mg Oral BID     metoprolol succinate ER  25 mg Oral Daily     omeprazole  20 mg Oral QAM AC       Data   All microbiology laboratory data reviewed.  Recent Labs   Lab Test 05/06/19  0714 05/04/19 0814 05/03/19 2114   WBC 16.5* 13.0* 15.3*   HGB 13.8 13.9 15.5   HCT 41.5 42.3 45.0   MCV 92 92 92    236 264     Recent Labs   Lab Test 05/06/19  0714 05/04/19  0814 05/03/19 2114   CR 0.64 0.75 0.86     Recent Labs   Lab Test 08/10/15  1348   SED 3     Recent Labs   Lab Test 05/04/19  0216 05/04/19  0215 05/03/19  2115   CULT No growth after 3 days No growth after 3 days >100,000 colonies/mL  Escherichia coli  *

## 2019-05-07 NOTE — PLAN OF CARE
Pt is alert to self only. VSS ex tmax 100.9, and on 15 L oxymask per NP following RRT on days. PRN seroquel given x1 for agitation. Reg diet. Tele NSR. Up A1. Denies any worsening of chest pain/SOB. Repeat x-ray at 2200 5/6, MD notified, no changes. K replaced last night- recheck 0200 came back 3.8, pt on high protocol, one more dose given, recheck tomorrow AM. Plan is for possible discharge today pending PO antibiotics and repeat x-ray again this AM. Slept between cares.

## 2019-05-07 NOTE — PROGRESS NOTES
Notified by RN about left sided back pain, known pneumothorax s/p left lung biopsy 5/6/2019. Left lung field diminished in comparison to right. She is not hypoxic. HR slightly tachycardic and above baseline. She is mildly confused, which is not new.    Plan:  - 2- view chest Xray now  - please page House Officer with result     ANDRY Bailey, CNP  Hospitalist Service, House Officer  [facility]     Text Page  Pager: [phone]

## 2019-05-07 NOTE — PROGRESS NOTES
[facility] Progress Note          Assessment and Plan:   1.  Newly diagnosed rectal/anal cancer, moderately differentiated adenocarcinoma, appears to be T2 N1 and possible M1, preparing for starting combined chemo and radiotherapy.   -Patient will meet back with me this Thursday in the clinic, start radiation that day, delay start of capecitabine to next Monday.   2.  Left lung nodule suspicious for metastasis versus primary.   -S/p biopsy .  Path pending.  3.  Chronic nausea and vomiting.  Could be partly due to migraines, but she does have episodes without headaches and some symptoms reported to suggest acid reflux, although not severe.   -Antiemetics prn, improved today.  5.  Urinary tract infection with fever, Escherichia coli.   -Treated with antibiotics.  6. Pneumothorax  -Related to biopsy from yesterday.  Stable.                      Interval History:   Patient has no new complaints.              Review of Systems:   As per subjective, otherwise 5 systems reviewed and negative.           Physical Exam:   Blood pressure 141/79, pulse 74, temperature 97.3  F (36.3  C), temperature source Oral, resp. rate 18, weight 53.1 kg (117 lb), SpO2 100 %, not currently breastfeeding.      Vital Sign Ranges  Temperature Temp  Av.3  F (37.4  C)  Min: 97.3  F (36.3  C)  Max: 100.9  F (38.3  C)   Blood pressure Systolic (24hrs), Av , Min:117 , Max:149        Diastolic (24hrs), Av, Min:60, Max:79      Pulse No data recorded   Respirations Resp  Av.8  Min: 16  Max: 18   Pulse oximetry SpO2  Av.8 %  Min: 94 %  Max: 100 %       No intake or output data in the 24 hours ending 19 0938    Constitutional:   No acute distress.            Medications:     No current outpatient medications on file.                Data:     Results for orders placed or performed during the hospital encounter of 19 (from the past 24 hour(s))   CT Lung Mediastinum Biopsy    Narrative    CT LUNG  MEDIASTINUM BIOPSY 5/6/2019 10:02 AM    HISTORY: New anal/rectal cancer with likely metastatic disease.    COMPARISON: None.    TECHNIQUE: Volumetric helical acquisition of CT images without  contrast. Radiation dose for this scan was reduced using automated  exposure control, adjustment of the mA and/or kV according to patient  size, or iterative reconstruction technique.    MEDICATIONS AND DOSE: 8mL Lidocaine 1%, 0.5mg Versed, 25mcg Fentanyl  given over 15 minutes. RN: Mercy Fontenot    FINDINGS: After written and oral informed consent was obtained and a  pause for the cause procedure verifying the correct procedure and the  correct patient, the area was prepped and draped in the usual sterile  fashion. The overlying soft tissues were anesthetized with 8 mL of 1%  subcutaneous lidocaine. Utilizing CT guidance 4 passes were made into  the lesion in the upper lobe of the left lung with a 20 gauge biopsy  device and the tissue was submitted to pathology. Tiny postprocedural  pneumothorax that will be followed with serial chest x-rays.     CONSCIOUS SEDATION NOTE: After obtaining consent for sedation,  conscious sedation was induced using IV Versed and IV fentanyl.  Patient was monitored by nurse under my direct supervision throughout  the exam. There were no complications of the sedation.       15 minutes face-to-face time.      Impression    IMPRESSION:   1. Technically successful left lung lesion biopsy.   2. Conscious sedation.    TAMELA BARRETO MD   XR Chest 1 View    Narrative    XR CHEST 1 VW 5/6/2019 10:15 AM     HISTORY: Left lung biopsy, evaluate for delayed pneumothorax.      Impression    IMPRESSION: Suspected small left apical pneumothorax measuring less  than 1 cm.    URSZULA YO MD   Infectious Diseases IP Consult: Patient to be seen: Routine - within 24 hours; persistent fever; Consultant may enter orders: Yes; Requesting provider? Attending physician    Kristina Addison MD     5/6/2019  12:07 PM  [facility]    Infectious Disease Consultation     Date of Admission:  5/3/2019  Date of Consult (When I saw the patient): 05/06/19    Assessment & Plan   Sara Lehman is a 78 year old female who was admitted on   5/3/2019.     Impression:  1. 78 y.o female with recent diagnosis of rectal cancer with mets   to the lungs and lymph nodes.   2. Admitted with nausea, found to have urine infection,   pansensitive organism on ceftriaxone.   3. Continue to have fever though does not have any chills or   rigors.   4. No other complaints.   5. Blood cultures are negative.     Recommendations:   For now ceftriaxone, change frequency to once a day, 2 gm.   Can consider further imaging the abdomen and pelvis if continues   to be febrile.   Follow symptoms, fever curve.     Kristina Nevarez MD    Reason for Consult   Reason for consult: I was asked by Dr. Padilla  to evaluate this   patient for fever.    Primary Care Physician   Kaelyn Radford    Chief Complaint   Fever     History is obtained from the patient and medical records    History of Present Illness   Sara Lehman is a 78 year old female  with a history of   hypertension, migraines, and newly diagnosed cancer,  presented   for evaluation of nausea. Of note, three weeks ago the patient   was diagnosed with rectal cancer via a PET scan that has   metastasized to the lung and lymph nodes. She is scheduled for   radiation on 5/9/19 and lung biopsy on 5/13/19 with her   oncologist, Dr. Vega at [facility]. She has been taking   pain medication for rectal pain. She was found to have UTI with   urine cultures positive for a pan sensitive organism. She   continues to have fever.         Past Medical History   I have reviewed this patient's medical history and updated it   with pertinent information if needed.   Past Medical History:   Diagnosis Date     Benign essential hypertension 8/30/2017     Bronchospasm      COPD (chronic obstructive  pulmonary disease) (H) 5/28/2014     Hypertension      Migraine      Neck pain      Nephrolithiasis        Past Surgical History   I have reviewed this patient's surgical history and updated it   with pertinent information if needed.  Past Surgical History:   Procedure Laterality Date     COLONOSCOPY N/A 4/9/2019    Procedure: COMBINED COLONOSCOPY, SINGLE OR MULTIPLE   BIOPSY/POLYPECTOMY BY BIOPSY;  Surgeon: Saleem Shaw MD;    Location:  GI     LITHOTRIPSY      Lithotrypsy     ORTHOPEDIC SURGERY      right knee exploration     TUBAL LIGATION         Prior to Admission Medications   Prior to Admission Medications   Prescriptions Last Dose Informant Patient Reported? Taking?   SUMAtriptan (IMITREX) 100 MG tablet prn Self No Yes   Sig: TAKE ONE TABLET BY MOUTH AT ONSET OF HEADACHE FOR MIGRAINE,   MAY REPEAT IN 2 HOURS IF NEEDED (MAX OF 2 TABLETS PER DAY)   albuterol (VENTOLIN HFA) 108 (90 Base) MCG/ACT inhaler prn Self   No Yes   Sig: INHALE TWO PUFFS BY MOUTH EVERY SIX HOURS AS NEEDED FOR   SHORTNESS OF BREATH   amLODIPine (NORVASC) 5 MG tablet Past Week at Unknown time Self   No Yes   Sig: Take 1 tablet (5 mg) by mouth once daily   hydrochlorothiazide (MICROZIDE) 12.5 MG capsule Past Week at   Unknown time Self Yes Yes   Sig: Take 1 capsule (12.5 mg) by mouth daily   ibuprofen 200 MG capsule prn Self Yes Yes   Sig: Take 200 mg by mouth every 4 hours as needed for pain    metoprolol succinate ER (TOPROL-XL) 25 MG 24 hr tablet Past Week   at Unknown time Self Yes Yes   Sig: Take 1 tablet (25 mg) by mouth daily   ondansetron (ZOFRAN) 8 MG tablet 5/3/2019 at 1600 Daughter Yes   Yes   Sig: Take 8 mg by mouth every 8 hours as needed for nausea   traMADol (ULTRAM) 50 MG tablet 5/3/2019 at Unknown time Daughter   Yes Yes   Sig: Take 50 mg by mouth every 6 hours as needed for severe pain   triamcinolone (KENALOG) 0.1 % cream prn Self No Yes   Sig: Apply sparingly to affected area three times daily as needed         Facility-Administered Medications: None     Allergies   Allergies   Allergen Reactions     Ace Inhibitors Cough     Aleve [Naproxen Sodium] GI Disturbance     Asa Buff (Mag [Aspirin Buffered] GI Disturbance     Internal bleeding     Atorvastatin Calcium GI Disturbance     Celebrex [Celecoxib] GI Disturbance     Codeine Sulfate Hives     Pravastatin GI Disturbance     Simvastatin GI Disturbance       Immunization History   Immunization History   Administered Date(s) Administered     Comvax (HIB/HepB) 11/12/2013     Influenza (High Dose) 3 valent vaccine 10/01/2016, 11/08/2016,   09/16/2018     Influenza (IIV3) PF 10/24/2010, 11/10/2011, 10/01/2012,   11/12/2013, 10/11/2014     Pneumo Conj 13-V (2010&after) 11/08/2016     Pneumococcal 23 valent 12/02/2008     TD (ADULT, 7+) 12/02/2008       Social History   I have reviewed this patient's social history and updated it with   pertinent information if needed. Sara Lehman  reports that   she quit smoking about 3 years ago. Her smoking use included   cigarettes. She started smoking about 3 years ago. She smoked   0.50 packs per day. She has never used smokeless tobacco. She   reports that she drinks alcohol. She reports that she does not   use drugs.    Family History   I have reviewed this patient's family history and updated it with   pertinent information if needed.   Family History   Problem Relation Age of Onset     Cancer Mother 76        uterine     Cerebrovascular Disease Father 75     C.A.D. Brother 66       Review of Systems   The 10 point Review of Systems is negative other than noted in   the HPI or here.     Physical Exam   Temp: 97.9  F (36.6  C) Temp src: Oral BP: 133/71 Pulse: 74 Heart   Rate: 84 Resp: 16 SpO2: 96 % O2 Device: None (Room air)    Vital Signs with Ranges  Temp:  [97.2  F (36.2  C)-101.9  F (38.8  C)] 97.9  F (36.6  C)  Pulse:  [74-86] 74  Heart Rate:  [78-96] 84  Resp:  [16] 16  BP: (116-139)/(54-74) 133/71  SpO2:  [91 %-98 %] 96  %  117 lbs 0 oz  Body mass index is 22.47 kg/m .    GENERAL APPEARANCE:  alert and no distress  EYES: Eyes grossly normal to inspection, PERRL and conjunctivae   and sclerae normal  HENT: ear canals and TM's normal and nose and mouth without   ulcers or lesions  NECK: no adenopathy, no asymmetry, masses, or scars and thyroid   normal to palpation  RESP: lungs clear to auscultation - no rales, rhonchi or wheezes  CV: regular rates and rhythm, normal S1 S2, no S3 or S4 and no   murmur, click or rub  LYMPHATICS: normal ant/post cervical and supraclavicular nodes  ABDOMEN: soft, nontender, without hepatosplenomegaly or masses   and bowel sounds normal  MS: extremities normal- no gross deformities noted  SKIN: no suspicious lesions or rashes      Data   Lab Results   Component Value Date    WBC 16.5 (H) 05/06/2019    HGB 13.8 05/06/2019    HCT 41.5 05/06/2019     05/06/2019     05/06/2019    POTASSIUM 3.3 (L) 05/06/2019    CHLORIDE 102 05/06/2019    CO2 26 05/06/2019    BUN 8 05/06/2019    CR 0.64 05/06/2019    GLC 71 05/06/2019    SED 3 08/10/2015    TROPI  12/10/2015     <0.015  The 99th percentile for upper reference range is 0.045 ug/L.    Troponin values in   the range of 0.045 - 0.120 ug/L may be associated with risks of   adverse   clinical events.      AST 14 05/03/2019    ALT 15 05/03/2019    ALKPHOS 78 05/03/2019    BILITOTAL 0.8 05/03/2019    BILIDIRECT 0.15 02/19/2013    INR 1.15 (H) 05/06/2019     Recent Labs   Lab 05/04/19  0216 05/04/19 0215 05/03/19 2115   CULT No growth after 2 days No growth after 2 days >100,000   colonies/mL  Escherichia coli  *     Recent Labs   Lab Test 05/04/19  0216 05/04/19  0215 05/03/19 2115   CULT No growth after 2 days No growth after 2 days >100,000   colonies/mL  Escherichia coli  *              XR Chest 1 View    Narrative    XR CHEST 1 VW 5/6/2019 11:45 AM     HISTORY: Left lung biopsy      Impression    IMPRESSION: Small left apical pneumothorax measuring  less than 1 cm.    URSZULA YO MD   CT Abdomen Pelvis w Contrast    Narrative    CT ABDOMEN AND PELVIS WITH CONTRAST May 6, 2019 1:54 PM     HISTORY: Persistent fever.    COMPARISON: Lung biopsy CT from May 6, 2019.    TECHNIQUE: Volumetric helical acquisition of CT images from the lung  bases through the symphysis pubis after the administration of 59mL  Isovue-370  intravenous contrast. Radiation dose for this scan was  reduced using automated exposure control, adjustment of the mA and/or  kV according to patient size, or iterative reconstruction technique.    FINDINGS: Visualized portions of the pneumothorax at the left lung  base appear slightly more prominent than previous. Repeat chest film  has been ordered for further evaluation. Prominent simple cyst in the  upper left kidney. Additional smaller renal cysts seen on the right.  The liver, spleen, adrenal glands, kidneys, and pancreas demonstrate  no worrisome focal lesion. Upper normal diameter atherosclerotic aorta  noted. There is stranding in the right inguinal region extending into  the upper thigh, question if this is postprocedural or an area of  cellulitis. No hydronephrosis. No bowel obstruction. No free air or  free fluid. Adenopathy in the left groin is noted measuring 3.7 x 2.5  cm. No frankly destructive bony lesions.      Impression    IMPRESSION:   1. Prominent area of stranding in the right groin, question  postprocedural changes versus an area of cellulitis. No definite  drainable abscess demonstrated.  2. Left groin adenopathy which is nonspecific.  3. At the lung bases the pneumothorax appears slightly more generous  than earlier today from the lung biopsy. Repeat chest x-ray has been  ordered to reevaluate the size of the pneumothorax.     TAMELA BARRETO MD   XR Chest 1 View    Narrative    XR CHEST 1 VW 5/6/2019 3:25 PM     HISTORY: Re-evaluate pneumothorax    COMPARISON: 5/6/2019 at 1149 hours and 1018 hours      Impression     IMPRESSION: Small left apical pneumothorax, increased in size to  approximately 2.4 cm since earlier today.    URSZULA YO MD   EKG 12-lead, tracing only   Result Value Ref Range    Interpretation ECG Click View Image link to view waveform and result    XR Chest 2 Views    Narrative    XR CHEST 2 VW  5/6/2019 10:11 PM     HISTORY: Follow-up left apical pneumothorax.    COMPARISON: 5/6/2019.    FINDINGS: There is again a left apical pneumothorax measuring  approximately 2.5 cm and not significantly changed. There is an  increasing left basilar infiltrate. Minimal right basilar atelectasis  or scarring. The heart size is normal. Thoracic aorta is calcified.  Small left pleural effusion.      Impression    IMPRESSION: Stable small left apical pneumothorax.    PADMINI FONG MD   Potassium   Result Value Ref Range    Potassium 3.8 3.4 - 5.3 mmol/L

## 2019-05-08 VITALS
DIASTOLIC BLOOD PRESSURE: 67 MMHG | TEMPERATURE: 98.3 F | BODY MASS INDEX: 22.47 KG/M2 | HEART RATE: 74 BPM | OXYGEN SATURATION: 96 % | WEIGHT: 117 LBS | SYSTOLIC BLOOD PRESSURE: 131 MMHG | RESPIRATION RATE: 16 BRPM

## 2019-05-08 LAB
BASOPHILS # BLD AUTO: 0 10E9/L (ref 0–0.2)
BASOPHILS NFR BLD AUTO: 0.1 %
COPATH REPORT: NORMAL
DIFFERENTIAL METHOD BLD: ABNORMAL
EOSINOPHIL # BLD AUTO: 0.1 10E9/L (ref 0–0.7)
EOSINOPHIL NFR BLD AUTO: 0.4 %
ERYTHROCYTE [DISTWIDTH] IN BLOOD BY AUTOMATED COUNT: 13.2 % (ref 10–15)
HCT VFR BLD AUTO: 39.7 % (ref 35–47)
HGB BLD-MCNC: 13 G/DL (ref 11.7–15.7)
IMM GRANULOCYTES # BLD: 0.1 10E9/L (ref 0–0.4)
IMM GRANULOCYTES NFR BLD: 0.4 %
LYMPHOCYTES # BLD AUTO: 2.1 10E9/L (ref 0.8–5.3)
LYMPHOCYTES NFR BLD AUTO: 14.7 %
MCH RBC QN AUTO: 30.5 PG (ref 26.5–33)
MCHC RBC AUTO-ENTMCNC: 32.7 G/DL (ref 31.5–36.5)
MCV RBC AUTO: 93 FL (ref 78–100)
MONOCYTES # BLD AUTO: 1.1 10E9/L (ref 0–1.3)
MONOCYTES NFR BLD AUTO: 7.8 %
NEUTROPHILS # BLD AUTO: 11.1 10E9/L (ref 1.6–8.3)
NEUTROPHILS NFR BLD AUTO: 76.6 %
NRBC # BLD AUTO: 0 10*3/UL
NRBC BLD AUTO-RTO: 0 /100
PLATELET # BLD AUTO: 289 10E9/L (ref 150–450)
POTASSIUM SERPL-SCNC: 3.6 MMOL/L (ref 3.4–5.3)
RBC # BLD AUTO: 4.26 10E12/L (ref 3.8–5.2)
WBC # BLD AUTO: 14.5 10E9/L (ref 4–11)

## 2019-05-08 PROCEDURE — 36415 COLL VENOUS BLD VENIPUNCTURE: CPT | Performed by: INTERNAL MEDICINE

## 2019-05-08 PROCEDURE — 85025 COMPLETE CBC W/AUTO DIFF WBC: CPT | Performed by: INTERNAL MEDICINE

## 2019-05-08 PROCEDURE — 25000132 ZZH RX MED GY IP 250 OP 250 PS 637: Performed by: INTERNAL MEDICINE

## 2019-05-08 PROCEDURE — 25000128 H RX IP 250 OP 636: Performed by: INTERNAL MEDICINE

## 2019-05-08 PROCEDURE — 99239 HOSP IP/OBS DSCHRG MGMT >30: CPT | Performed by: INTERNAL MEDICINE

## 2019-05-08 PROCEDURE — 84132 ASSAY OF SERUM POTASSIUM: CPT | Performed by: INTERNAL MEDICINE

## 2019-05-08 RX ORDER — DRONABINOL 2.5 MG/1
2.5 CAPSULE ORAL 2 TIMES DAILY
Qty: 20 CAPSULE | Refills: 0 | Status: SHIPPED | OUTPATIENT
Start: 2019-05-08 | End: 2019-09-09

## 2019-05-08 RX ORDER — ACETAMINOPHEN 325 MG/1
650 TABLET ORAL EVERY 4 HOURS PRN
Start: 2019-05-08

## 2019-05-08 RX ORDER — CEFUROXIME AXETIL 500 MG/1
500 TABLET ORAL 2 TIMES DAILY
Qty: 16 TABLET | Refills: 0 | Status: SHIPPED | OUTPATIENT
Start: 2019-05-08 | End: 2019-09-09

## 2019-05-08 RX ADMIN — OMEPRAZOLE 20 MG: 20 CAPSULE, DELAYED RELEASE ORAL at 06:23

## 2019-05-08 RX ADMIN — CEFTRIAXONE SODIUM 2 G: 2 INJECTION, POWDER, FOR SOLUTION INTRAMUSCULAR; INTRAVENOUS at 10:06

## 2019-05-08 RX ADMIN — METOPROLOL SUCCINATE 25 MG: 25 TABLET, EXTENDED RELEASE ORAL at 08:33

## 2019-05-08 RX ADMIN — AMLODIPINE BESYLATE 5 MG: 5 TABLET ORAL at 08:33

## 2019-05-08 RX ADMIN — ACETAMINOPHEN 650 MG: 325 TABLET, FILM COATED ORAL at 08:21

## 2019-05-08 RX ADMIN — DRONABINOL 2.5 MG: 2.5 CAPSULE ORAL at 08:36

## 2019-05-08 ASSESSMENT — ACTIVITIES OF DAILY LIVING (ADL)
ADLS_ACUITY_SCORE: 17
ADLS_ACUITY_SCORE: 15
ADLS_ACUITY_SCORE: 17

## 2019-05-08 NOTE — DISCHARGE SUMMARY
Patient discharged at 11:58 AM to home. IV was discontinued. Pain at time of discharge was 0/10. Afebrile, VSS.  Belongings returned to patient.  Discharge instructions and medications reviewed with patient.  Patient verbalized understanding and all questions were answered.  Prescriptions given to patient.  At time of discharge, patient condition was stable and left the unit on wheelchair escorted by josefina.

## 2019-05-08 NOTE — DISCHARGE SUMMARY
[facility]  Hospitalist Discharge Summary       Date of Admission:  5/3/2019  Date of Discharge:  5/8/2019 12:03 PM  Discharging Provider: Berna Padilla MD      Discharge Diagnoses   E. coli UTI  Fever  Headache  Newly diagnosed rectal/anal cancer  Possible pulmonary metastasis  Pneumothorax  Nausea vomiting related to above  Hypertension  History of COPD    Follow-ups Needed After Discharge   Follow-up Appointments     Follow-up and recommended labs and tests       Follow up with primary care provider, Kaelyn Radford, within 7 days for   hospital follow- up.  The following labs/tests are recommended: CBC/CMP.  Follow-up with your primary oncologist and radiation oncologist as per   previous schedule             Unresulted Labs Ordered in the Past 30 Days of this Admission     Date and Time Order Name Status Description    5/6/2019 0954 Surgical pathology exam In process     5/4/2019 0207 Blood culture Preliminary     5/4/2019 0207 Blood culture Preliminary           Discharge Disposition   Discharged to home  Condition at discharge: Stable    Hospital Course        Sara Lehman is 78was recently diagnosed with anal cancer who is admitted with ongoing nausea for the last 2 days recent headache and some mild confusion and fever being admitted for further evaluation.      Fever   e.coli UTI.    -Patient has been getting appropriate antibiotics for her UTI which is growing E. coli pansensitive, however did have intermittent fever,\   - abdomen pelvis CT showed no evidence of abscess or source of infection.   No evidence of abscess in abdomen pelvis however questionable cellulitis in the groin  -Infectious disease  followed and recommended for discharge on Ceftin orally total 14 days of antibiotics as the patient and family wanted to be discharged to follow-up with her radiation oncologist tomorrow to continue with radiation treatment.  Had T-max of 99.8 on the evening prior to  discharge    Headache.    She has a history of migraines.  She was treated with Imitrex in the past.  She was recently admitted at [facility] for the headaches.  Head CT shows no acute finding.  Denied any further headache prior to discharge    Newly diagnosed rectal /anal cancer.   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  RRT had evaluated the patient, EKG unremarkable,   Repeat serial chest x-ray did not see any worsening pneumothorax and patient was discharged home in stable condition on room air      Nausea/vomiting  Continue Marinol and symptomatic treatment.     Hypertension.   Patient's PTA antihypertensives continued at the time of discharge with no changes made the time of discharge     History of chronic obstructive pulmonary disease.   -- on albuterol metered dose inhaler every 6 hours as needed.     Consultations This Hospital Stay   PHYSICAL THERAPY ADULT IP CONSULT  ADVANCE DIRECTIVE IP CONSULT  HEMATOLOGY & ONCOLOGY IP CONSULT  INFECTIOUS DISEASES IP CONSULT    Code Status   Full Code    Time Spent on this Encounter   IBerna, personally saw the patient today and spent greater than 30 minutes discharging this patient.       Berna Padilla MD  [facility]  ______________________________________________________________________    Physical Exam   Vital Signs: Temp: 98.3  F (36.8  C) Temp src: Oral BP: 131/67   Heart Rate: 95 Resp: 16 SpO2: 96 % O2 Device: None (Room air) Oxygen Delivery: 2 LPM  Weight: 117 lbs 0 oz  Constitutional: Awake, alert and no distress. Appears comfortable  Head:  Normocephalic. No masses, lesions, tenderness or abnormalities  ENT: ENT exam normal, no neck nodes or sinus tenderness  Cardiovascular: RRR.  no murmurs, no rubs or JVD  Respiratory:normal WOB,b/l equal air entry, no wheezes or crackles   Gastrointestinal: Abdomen soft, non-tender. BS normal. No masses, organomegaly  :  right groin swollen and tender, lymph node in the left groin palpable   extremities : No edema , no clubbing or cyanosis        Primary Care Physician   Kaelyn Radford    Discharge Orders      Reason for your hospital stay    UTI / fever     Follow-up and recommended labs and tests     Follow up with primary care provider, Kaelyn Radford, within 7 days for hospital follow- up.  The following labs/tests are recommended: CBC/CMP.  Follow-up with your primary oncologist and radiation oncologist as per previous schedule     Activity    Your activity upon discharge: activity as tolerated     Monitor and record    blood pressure daily and readings to PCP for any medication adjustment     Full Code     Diet    Follow this diet upon discharge: Orders Placed This Encounter      Regular Diet Adult       Significant Results and Procedures   Most Recent 3 CBC's:  Recent Labs   Lab Test 05/08/19  1121 05/06/19  0714 05/04/19  0814   WBC 14.5* 16.5* 13.0*   HGB 13.0 13.8 13.9   MCV 93 92 92    239 236     Most Recent 3 BMP's:  Recent Labs   Lab Test 05/08/19  0919 05/07/19  0224 05/06/19  0714  05/04/19  0814 05/03/19 2114   NA  --   --  139  --  138 132*   POTASSIUM 3.6 3.8 3.3*   < > 3.3* 3.4   CHLORIDE  --   --  102  --  102 95   CO2  --   --  26  --  29 29   BUN  --   --  8  --  10 17   CR  --   --  0.64  --  0.75 0.86   ANIONGAP  --   --  11  --  7 8   ALTHEA  --   --  9.0  --  9.1 9.6   GLC  --   --  71  --  104* 123*    < > = values in this interval not displayed.     Most Recent 2 LFT's:  Recent Labs   Lab Test 05/03/19  2114 05/11/18  1329   AST 14 21   ALT 15 14   ALKPHOS 78 71   BILITOTAL  0.8 0.2     Most Recent 3 INR's:  Recent Labs   Lab Test 05/06/19  0714   INR 1.15*   ,   Results for orders placed or performed during the hospital encounter of 05/03/19   CT Head w/o Contrast    Narrative    CT OF THE HEAD WITHOUT CONTRAST 5/3/2019 9:43 PM     COMPARISON: None.    HISTORY: Headache, recent diagnosis of rectal cancer.    TECHNIQUE: 5 mm thick axial CT images of the head were acquired  without IV contrast material.    FINDINGS: There is moderate diffuse cerebral volume loss. There are  subtle patchy areas of decreased density in the cerebral white matter  bilaterally that are consistent with sequela of chronic small vessel  ischemic disease.    The ventricles and basal cisterns are within normal limits in  configuration given the degree of cerebral volume loss.  There is no  midline shift. There are no extra-axial fluid collections.    No intracranial hemorrhage, mass or recent infarct.    The visualized paranasal sinuses are well-aerated. There is no  mastoiditis. There are no fractures of the visualized bones.      Impression    IMPRESSION: Diffuse cerebral volume loss and cerebral white matter  changes consistent with chronic small vessel ischemic disease. No  evidence for acute intracranial pathology.      Radiation dose for this scan was reduced using automated exposure  control, adjustment of the mA and/or kV according to patient size, or  iterative reconstruction technique    GHADA MCKEON MD   CT Lung Mediastinum Biopsy    Narrative    CT LUNG MEDIASTINUM BIOPSY 5/6/2019 10:02 AM    HISTORY: New anal/rectal cancer with likely metastatic disease.    COMPARISON: None.    TECHNIQUE: Volumetric helical acquisition of CT images without  contrast. Radiation dose for this scan was reduced using automated  exposure control, adjustment of the mA and/or kV according to patient  size, or iterative reconstruction technique.    MEDICATIONS AND DOSE: 8mL Lidocaine 1%, 0.5mg Versed, 25mcg Fentanyl  given  over 15 minutes. RN: Mercy Fontenot    FINDINGS: After written and oral informed consent was obtained and a  pause for the cause procedure verifying the correct procedure and the  correct patient, the area was prepped and draped in the usual sterile  fashion. The overlying soft tissues were anesthetized with 8 mL of 1%  subcutaneous lidocaine. Utilizing CT guidance 4 passes were made into  the lesion in the upper lobe of the left lung with a 20 gauge biopsy  device and the tissue was submitted to pathology. Tiny postprocedural  pneumothorax that will be followed with serial chest x-rays.     CONSCIOUS SEDATION NOTE: After obtaining consent for sedation,  conscious sedation was induced using IV Versed and IV fentanyl.  Patient was monitored by nurse under my direct supervision throughout  the exam. There were no complications of the sedation.       15 minutes face-to-face time.      Impression    IMPRESSION:   1. Technically successful left lung lesion biopsy.   2. Conscious sedation.    TAMELA BARRETO MD   XR Chest 1 View    Narrative    XR CHEST 1 VW 5/6/2019 10:15 AM     HISTORY: Left lung biopsy, evaluate for delayed pneumothorax.      Impression    IMPRESSION: Suspected small left apical pneumothorax measuring less  than 1 cm.    URSZULA YO MD   XR Chest 1 View    Narrative    XR CHEST 1 VW 5/6/2019 11:45 AM     HISTORY: Left lung biopsy      Impression    IMPRESSION: Small left apical pneumothorax measuring less than 1 cm.    USRZULA YO MD   CT Abdomen Pelvis w Contrast    Narrative    CT ABDOMEN AND PELVIS WITH CONTRAST May 6, 2019 1:54 PM     HISTORY: Persistent fever.    COMPARISON: Lung biopsy CT from May 6, 2019.    TECHNIQUE: Volumetric helical acquisition of CT images from the lung  bases through the symphysis pubis after the administration of 59mL  Isovue-370  intravenous contrast. Radiation dose for this scan was  reduced using automated exposure control, adjustment of the mA and/or  kV  according to patient size, or iterative reconstruction technique.    FINDINGS: Visualized portions of the pneumothorax at the left lung  base appear slightly more prominent than previous. Repeat chest film  has been ordered for further evaluation. Prominent simple cyst in the  upper left kidney. Additional smaller renal cysts seen on the right.  The liver, spleen, adrenal glands, kidneys, and pancreas demonstrate  no worrisome focal lesion. Upper normal diameter atherosclerotic aorta  noted. There is stranding in the right inguinal region extending into  the upper thigh, question if this is postprocedural or an area of  cellulitis. No hydronephrosis. No bowel obstruction. No free air or  free fluid. Adenopathy in the left groin is noted measuring 3.7 x 2.5  cm. No frankly destructive bony lesions.      Impression    IMPRESSION:   1. Prominent area of stranding in the right groin, question  postprocedural changes versus an area of cellulitis. No definite  drainable abscess demonstrated.  2. Left groin adenopathy which is nonspecific.  3. At the lung bases the pneumothorax appears slightly more generous  than earlier today from the lung biopsy. Repeat chest x-ray has been  ordered to reevaluate the size of the pneumothorax.     TAMELA BARRETO MD   XR Chest 1 View    Narrative    XR CHEST 1 VW 5/6/2019 3:25 PM     HISTORY: Re-evaluate pneumothorax    COMPARISON: 5/6/2019 at 1149 hours and 1018 hours      Impression    IMPRESSION: Small left apical pneumothorax, increased in size to  approximately 2.4 cm since earlier today.    URSZULA YO MD   XR Chest 2 Views    Narrative    XR CHEST 2 VW  5/6/2019 10:11 PM     HISTORY: Follow-up left apical pneumothorax.    COMPARISON: 5/6/2019.    FINDINGS: There is again a left apical pneumothorax measuring  approximately 2.5 cm and not significantly changed. There is an  increasing left basilar infiltrate. Minimal right basilar atelectasis  or scarring. The heart size is normal.  Thoracic aorta is calcified.  Small left pleural effusion.      Impression    IMPRESSION: Stable small left apical pneumothorax.    PADMINI FONG MD   XR Chest 1 View    Narrative    CHEST ONE VIEW   5/7/2019 8:34 AM     HISTORY: Re-evaluate small apical pneumothorax on left after biopsy on  5/6.    COMPARISON: 5/6/2019.    FINDINGS: There is again a small left apical pneumothorax which has  decreased in size since previous exam now measured approximately 1.6  cm at the apex. There is small left pleural effusion and left basilar  atelectasis. The heart size is normal. Thoracic aorta is calcified.  The right lung is clear.      Impression    IMPRESSION: Small left apical pneumothorax, decreasing in size.    PADMINI FONG MD   XR Chest 2 Views    Narrative    XR CHEST 2 VW   5/7/2019 6:32 PM     HISTORY: left sided back pain, left apical pneumo after CT biopsy  5/6/2019    COMPARISON: Film dated 5/7/2019.    FINDINGS: There is a small left apical pneumothorax. This extends  about 1.9 cm from the apex. This has not changed when compared to the  earlier film of 5/7/2018. Left basilar opacity is again noted  compatible with a pleural effusion and associated infiltrate or  atelectasis..  Right lung remains clear.. The pulmonary vasculature is  normal.  The bones and soft tissues are unremarkable.      Impression    IMPRESSION: No significant change in the left apical pneumothorax.        NILS FOSTER MD       Discharge Medications   Current Discharge Medication List      START taking these medications    Details   acetaminophen (TYLENOL) 325 MG tablet Take 2 tablets (650 mg) by mouth every 4 hours as needed for mild pain or fever    Associated Diagnoses: Urinary tract infection without hematuria, site unspecified      cefuroxime (CEFTIN) 500 MG tablet Take 1 tablet (500 mg) by mouth 2 times daily for 8 days  Qty: 16 tablet, Refills: 0    Associated Diagnoses: Urinary tract infection without hematuria, site unspecified       dronabinol (MARINOL) 2.5 MG capsule Take 1 capsule (2.5 mg) by mouth 2 times daily  Qty: 20 capsule, Refills: 0    Associated Diagnoses: Nausea         CONTINUE these medications which have NOT CHANGED    Details   albuterol (VENTOLIN HFA) 108 (90 Base) MCG/ACT inhaler INHALE TWO PUFFS BY MOUTH EVERY SIX HOURS AS NEEDED FOR SHORTNESS OF BREATH  Qty: 18 g, Refills: 11    Associated Diagnoses: Encounter for medication refill      amLODIPine (NORVASC) 5 MG tablet Take 1 tablet (5 mg) by mouth once daily  Qty: 90 tablet, Refills: 3    Associated Diagnoses: Encounter for medication refill; Benign essential hypertension      hydrochlorothiazide (MICROZIDE) 12.5 MG capsule Take 1 capsule (12.5 mg) by mouth daily  Qty: 90 capsule, Refills: 3      metoprolol succinate ER (TOPROL-XL) 25 MG 24 hr tablet Take 1 tablet (25 mg) by mouth daily  Qty: 90 tablet, Refills: 3      ondansetron (ZOFRAN) 8 MG tablet Take 8 mg by mouth every 8 hours as needed for nausea      SUMAtriptan (IMITREX) 100 MG tablet TAKE ONE TABLET BY MOUTH AT ONSET OF HEADACHE FOR MIGRAINE, MAY REPEAT IN 2 HOURS IF NEEDED (MAX OF 2 TABLETS PER DAY)  Qty: 6 tablet, Refills: 5    Associated Diagnoses: Encounter for medication refill      traMADol (ULTRAM) 50 MG tablet Take 50 mg by mouth every 6 hours as needed for severe pain      triamcinolone (KENALOG) 0.1 % cream Apply sparingly to affected area three times daily as needed  Qty: 80 g, Refills: 0    Associated Diagnoses: Rash         STOP taking these medications       ibuprofen 200 MG capsule Comments:   Reason for Stopping:             Allergies   Allergies   Allergen Reactions     Ace Inhibitors Cough     Aleve [Naproxen Sodium] GI Disturbance     Asa Buff (Mag [Aspirin Buffered] GI Disturbance     Internal bleeding     Atorvastatin Calcium GI Disturbance     Celebrex [Celecoxib] GI Disturbance     Codeine Sulfate Hives     Pravastatin GI Disturbance     Simvastatin GI Disturbance

## 2019-05-08 NOTE — PLAN OF CARE
Pt is A&O 1-2. VSS ex tmax 100, started on 2L NC for de-satting while asleep, plan to wean this AM. Up SBA. Denies pain. C/o of nausea, PRN zofran given x1. Seroquel given at bedtime. Reg diet, poor PO. Plan for possible discharge today pending fever. Slept between cares.

## 2019-05-08 NOTE — PLAN OF CARE
A/O x 2-3. Continues to run low grade fevers. Satting mid-high 90's on RA, tachycardic and tachypneic at times.  C/O back pain, tylenol given x 1. Regular diet, poor intake.  Plan to discharge tomorrow if fever improves.

## 2019-05-09 ENCOUNTER — TELEPHONE (OUTPATIENT)
Dept: INTERNAL MEDICINE | Facility: CLINIC | Age: 79
End: 2019-05-09

## 2019-05-10 LAB
BACTERIA SPEC CULT: NO GROWTH
BACTERIA SPEC CULT: NO GROWTH
Lab: NORMAL
Lab: NORMAL
SPECIMEN SOURCE: NORMAL
SPECIMEN SOURCE: NORMAL

## 2019-05-13 DIAGNOSIS — C80.1 ADENOCARCINOMA (H): Primary | ICD-10-CM

## 2019-05-13 LAB — INTERPRETATION ECG - MUSE: NORMAL

## 2019-05-13 PROCEDURE — 81275 KRAS GENE VARIANTS EXON 2: CPT | Performed by: COLON & RECTAL SURGERY

## 2019-05-13 PROCEDURE — 81276 KRAS GENE ADDL VARIANTS: CPT | Performed by: COLON & RECTAL SURGERY

## 2019-05-14 ENCOUNTER — TRANSFERRED RECORDS (OUTPATIENT)
Dept: FAMILY MEDICINE | Facility: CLINIC | Age: 79
End: 2019-05-14

## 2019-05-21 LAB — COPATH REPORT: NORMAL

## 2019-06-04 ENCOUNTER — TRANSFERRED RECORDS (OUTPATIENT)
Dept: FAMILY MEDICINE | Facility: CLINIC | Age: 79
End: 2019-06-04

## 2019-06-18 ENCOUNTER — TRANSFERRED RECORDS (OUTPATIENT)
Dept: FAMILY MEDICINE | Facility: CLINIC | Age: 79
End: 2019-06-18

## 2019-06-18 ENCOUNTER — TRANSFERRED RECORDS (OUTPATIENT)
Dept: HEALTH INFORMATION MANAGEMENT | Facility: CLINIC | Age: 79
End: 2019-06-18

## 2019-06-26 ENCOUNTER — TRANSFERRED RECORDS (OUTPATIENT)
Dept: HEALTH INFORMATION MANAGEMENT | Facility: CLINIC | Age: 79
End: 2019-06-26

## 2019-08-26 NOTE — TELEPHONE ENCOUNTER
Transfer of care from [facility]    ONCOLOGY INTAKE: Records Information      APPT INFORMATION:  Referring provider:  Dr. Rhona Wisdom  Referring provider s clinic:  [facility]  Reason for visit/diagnosis:  Anal cancer  Has patient been notified of appointment date and time?: yes    RECORDS INFORMATION:  Were the records received with the referral (via [employer])? no    Has patient been seen for any external appt for this diagnosis? yes    If yes, where? [facility]    Has patient had any imaging or procedures outside of [facility] for this condition? yes      If Yes, where? [facility] and [facility]    ADDITIONAL INFORMATION:  Patient is getting her PET scan on 8/28/19 at [facility], this is a transfer of care from [facility].

## 2019-08-28 ENCOUNTER — TRANSFERRED RECORDS (OUTPATIENT)
Dept: HEALTH INFORMATION MANAGEMENT | Facility: CLINIC | Age: 79
End: 2019-08-28

## 2019-09-08 NOTE — PROGRESS NOTES
[facility]    Hematology/Oncology Established Patient Follow-up Note      Today's Date: 09/09/19    Reason for Follow-up: Metastatic anal adenocarcinoma.    HISTORY OF PRESENT ILLNESS: Sara Lehman is a 78 year old female who presents with the following oncologic history:  1.  4/1/2019: Initially presented to her primary care provider with complaints of hemorrhoids intermittently for 2 months with associated pain and itching as well as blood in the underwear at night and intermixed with stool, no melena.  No associated weight loss or night sweats.  Reports occasional constipation.  Physical exam by primary care provider showed an anal mass about 1 inch in size with perianal erythema and skin breakdown.  2.  4/9/2019: Colonoscopy showed a perianal mass in the left lateral quadrant, severe diverticulosis in the sigmoid colon, descending colon, and ascending colon.  Biopsy of the anal mass showed invasive moderately differentiated adenocarcinoma with normal mismatch repair protein expression.  Specimen consisted of multiple fragments of necrotic tissue with dysplastic appearing glands with foci of invasion.  Surface mucosa is not present and therefore precludes differentiation between colorectal carcinoma or perianal glandular carcinoma.  Depth of invasion cannot be assessed.  KRAS wild-type.  3.  4/16/2019: CT chest/abdomen/pelvis with contrast showed a 1.3 cm left upper lobe lung nodule concerning for metastatic disease as well as 2 tiny nodules in the right lower lobe near the diaphragm.  Diffusely aneurysmal abdominal aorta measures up to 3.2 cm.  Prominent bilateral inguinal adenopathy with a lymph node in the upper right thigh adjacent to the common femoral vessels measures 2.9 x 2.3 cm.  Lymph node in the left medial upper thigh adjacent to the common femoral vessels measures 2.6 x 3.5 cm.  No evidence of obstruction.  Bony structures showed no destruction.  4. 4/19/2019: Consultation with   Miller Casas.  Due to high suspicion of metastatic disease to the lung and bilateral inguinal regions, patient referred to medical oncology and radiation oncology.  5.  4/24/2019: PET/CT scan showed hypermetabolic posterior left upper lobe mid chest nodule adjacent to the major fissure measuring 1.3 x 1.2 cm and emphysematous changes.  Bilateral renal cysts present.  Nonobstructing stone of the lower right kidney measures 0.3 cm.  Nodular thickening of the left adrenal is 0.7 cm.  Intense hypermetabolism at the anal level with heterogeneous appearing soft tissue with SUV max 19.5.  Enlarged inguinal hypermetabolic lymph nodes on the right measuring 2.9 x 2.1 cm and on the left 3.6 x 2.4 cm.  A distal infrarenal abdominal aorta measures 3.4 cm.  6.  5/6/2019: Left lung mass biopsy performed and showed discordant immunohistochemical findings.  Lesion showed a strong reactivity for cytokeratin 7 and focal staining for P40.  Patient's anal adenocarcinoma reportedly positive for both immunohistochemical staining pattern not typical for primary lung tumor and metastatic disease cannot be excluded.  7.  5/9/2019-6/25/2019: Completed radiation therapy to the anal cancer and inguinal lymph nodes.  She received concurrent radiosensitizing chemotherapy with capecitabine.  8.  8/28/2019: PET/CT scan showed hypermetabolic nodule in the left upper lobe measuring 1.5 x 1.4 cm with SUV max 10.5, increased in size and metabolic activity from 1.4 x 1.2 cm with SUV max 9.5 on 4/24/2019 PET/CT scan.  A 0.5 cm pleural-based nodule with low metabolic activity in the left lower lobe is unchanged.  A 3.4 cm infrarenal abdominal aortic aneurysm is present.  The previously noted hypermetabolic anal mass has significantly improved with SUV max 5.1, decreased from SUV max 24.6, consistent with response to treatment.  The bilateral inguinal lymphadenopathy has markedly improved on the current study with nodes measuring up to 1.6 x 1.1 cm with  SUV max 3.5, decreased from 4 x 2.6 cm with SUV max 14, consistent with response to treatment.      INTERIM HISTORY:  Trixie reports significant improvement in her anal pain.  She has not requiring any analgesics for her anal pain at this time.  She notes no new bowel or bladder dysfunction.  She also denies any recent fevers or chills.  She also denies any shortness of breath or cough.      REVIEW OF SYSTEMS:   14 point ROS was reviewed and is negative other than as noted above in HPI.       HOME MEDICATIONS:  Current Outpatient Medications   Medication Sig Dispense Refill     acetaminophen (TYLENOL) 325 MG tablet Take 2 tablets (650 mg) by mouth every 4 hours as needed for mild pain or fever       amLODIPine (NORVASC) 5 MG tablet        hydrochlorothiazide (MICROZIDE) 12.5 MG capsule        SUMAtriptan (IMITREX) 100 MG tablet TAKE ONE TABLET BY MOUTH AT ONSET OF HEADACHE FOR MIGRAINE, MAY REPEAT IN 2 HOURS IF NEEDED (MAX OF 2 TABLETS PER DAY) 6 tablet 5     ondansetron (ZOFRAN) 8 MG tablet Take 8 mg by mouth every 8 hours as needed for nausea           ALLERGIES:  Allergies   Allergen Reactions     Ace Inhibitors Cough     Aleve [Naproxen Sodium] GI Disturbance     Asa Buff (Mag [Aspirin Buffered] GI Disturbance     Internal bleeding     Atorvastatin Calcium GI Disturbance     Celebrex [Celecoxib] GI Disturbance     Codeine Sulfate Hives     Pravastatin GI Disturbance     Simvastatin GI Disturbance         PAST MEDICAL HISTORY:  Past Medical History:   Diagnosis Date     Benign essential hypertension 8/30/2017     Bronchospasm      COPD (chronic obstructive pulmonary disease) (H) 5/28/2014     Hypertension      Migraine      Neck pain      Nephrolithiasis          PAST SURGICAL HISTORY:  Past Surgical History:   Procedure Laterality Date     COLONOSCOPY N/A 4/9/2019    Procedure: COMBINED COLONOSCOPY, SINGLE OR MULTIPLE BIOPSY/POLYPECTOMY BY BIOPSY;  Surgeon: Saleem Shaw MD;  Location:  GI     LITHOTRIPSY       Lithotrypsy     ORTHOPEDIC SURGERY      right knee exploration     TUBAL LIGATION           SOCIAL HISTORY:  Social History     Socioeconomic History     Marital status:      Spouse name: Not on file     Number of children: Not on file     Years of education: Not on file     Highest education level: Not on file   Occupational History     Not on file   Social Needs     Financial resource strain: Not on file     Food insecurity:     Worry: Not on file     Inability: Not on file     Transportation needs:     Medical: Not on file     Non-medical: Not on file   Tobacco Use     Smoking status: Former Smoker     Packs/day: 0.50     Types: Cigarettes     Start date: 01/2016     Last attempt to quit: 4/7/2016     Years since quitting: 3.4     Smokeless tobacco: Never Used   Substance and Sexual Activity     Alcohol use: Yes     Alcohol/week: 0.0 oz     Comment: socially only- one per month     Drug use: No     Sexual activity: Yes   Lifestyle     Physical activity:     Days per week: Not on file     Minutes per session: Not on file     Stress: Not on file   Relationships     Social connections:     Talks on phone: Not on file     Gets together: Not on file     Attends [employer] service: Not on file     Active member of club or organization: Not on file     Attends meetings of clubs or organizations: Not on file     Relationship status: Not on file     Intimate partner violence:     Fear of current or ex partner: Not on file     Emotionally abused: Not on file     Physically abused: Not on file     Forced sexual activity: Not on file   Other Topics Concern     Parent/sibling w/ CABG, MI or angioplasty before 65F 55M? Not Asked   Social History Narrative     Not on file         FAMILY HISTORY:  Family History   Problem Relation Age of Onset     Cancer Mother 76        uterine     Cerebrovascular Disease Father 75     C.A.D. Brother 66         PHYSICAL EXAM:  Vital signs:  BP (!) 140/89   Pulse 80   Resp 16   Ht  "1.537 m (5' 0.5\")   SpO2 97%   BMI 22.47 kg/m     ECO  GENERAL/CONSTITUTIONAL: No acute distress.  EYES: Extraocular movements intact.  No scleral icterus.  ENT/MOUTH: Neck supple. Oropharynx clear, no mucositis.  LYMPH: No anterior cervical, posterior cervical, supraclavicular, axillary adenopathy.  Approximately 1.5 cm palpable firm nontender lymph nodes at the right inguinal region and 1 cm palpable lymph nodes in the left inguinal region.  GASTROINTESTINAL: No hepatosplenomegaly, masses, or tenderness. No guarding.  No distention.  MUSCULOSKELETAL: Warm and well-perfused, no cyanosis, clubbing, or edema.  NEUROLOGIC: Cranial nerves II-XII are intact. Alert, oriented, answers questions appropriately.  INTEGUMENTARY: No rashes or jaundice.  The anorectal region shows new healthy tissue with no significant erythema or discharge.  No obvious masses.  GAIT: Steady, does not use assistive device      LABS:  CBC RESULTS:   Recent Labs   Lab Test 19  1121   WBC 14.5*   RBC 4.26   HGB 13.0   HCT 39.7   MCV 93   MCH 30.5   MCHC 32.7   RDW 13.2          Recent Labs   Lab Test 19  0919 19  0224 19  0714  19  0814   NA  --   --  139  --  138   POTASSIUM 3.6 3.8 3.3*   < > 3.3*   CHLORIDE  --   --  102  --  102   CO2  --   --  26  --  29   ANIONGAP  --   --  11  --  7   GLC  --   --  71  --  104*   BUN  --   --  8  --  10   CR  --   --  0.64  --  0.75   ALTHEA  --   --  9.0  --  9.1    < > = values in this interval not displayed.         PATHOLOGY:  Reviewed as per HPI.    IMAGING:  Reviewed as per HPI.    ASSESSMENT/PLAN:  Sara Lehman is a 78 year old female with the following issues:  1.  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  Left lung nodule, metastasis versus primary non-small cell carcinoma  3.  History of tobacco use  4.  Anal/rectal pain  5.  Diarrhea, drug-induced  -Sara tolerated radiosensitizing " capecitabine with concurrent radiation moderately well.  She completed radiation on 6/25/2019.  -Testing for KRAS mutation showed wild-type.  May consider adding either bevacizumab or an epidermal growth factor receptor inhibitor such as panitumumab to FOLFOX for future systemic therapy if needed.  However, would be cautious with side effects of systemic therapy.  -For the anal/rectal pain, she may use acetaminophen as needed, avoid aspirin, and continue with sitz bath's.  She may use tramadol as needed.  -Review of repeat scans from 8/2019 showed significant improvement in the bilateral inguinal lymphadenopathy and the primary anal mass consistent with excellent response to chemoradiation.  She is now off capecitabine.  I told her that we would consider future systemic therapy should she develop progressive metastatic disease.  -I recommended reevaluation with Dr. Monroy for consideration of stereotactic radiation to the left lung cancer/metastasis now that she has completed chemoradiation the primary anal tumor and bilateral inguinal lymph nodes.  -Would tentatively plan for repeat scans approximately 2-3 months after radiation to the left lung mass.    6.  Chronic nausea  -Of unclear etiology as she has had this for many years.  Continue prochlorperazine and Marinol as needed.    Return in 4-6 weeks with labs.    I spent a total of 40 minutes with the patient, with over >50% of the time in counseling and/or coordination of care.      Rhona Wisdom MD  Hematology/Oncology  [facility]

## 2019-09-09 ENCOUNTER — ONCOLOGY VISIT (OUTPATIENT)
Dept: ONCOLOGY | Facility: CLINIC | Age: 79
End: 2019-09-09
Attending: INTERNAL MEDICINE
Payer: COMMERCIAL

## 2019-09-09 ENCOUNTER — PRE VISIT (OUTPATIENT)
Dept: ONCOLOGY | Facility: CLINIC | Age: 79
End: 2019-09-09

## 2019-09-09 VITALS
HEIGHT: 61 IN | DIASTOLIC BLOOD PRESSURE: 89 MMHG | BODY MASS INDEX: 22.47 KG/M2 | SYSTOLIC BLOOD PRESSURE: 140 MMHG | RESPIRATION RATE: 16 BRPM | HEART RATE: 80 BPM | OXYGEN SATURATION: 97 %

## 2019-09-09 DIAGNOSIS — K62.89 ANAL PAIN: ICD-10-CM

## 2019-09-09 DIAGNOSIS — C21.1 MALIGNANT NEOPLASM OF ANAL CANAL (H): Primary | ICD-10-CM

## 2019-09-09 DIAGNOSIS — C78.02 MALIGNANT NEOPLASM METASTATIC TO LEFT LUNG (H): ICD-10-CM

## 2019-09-09 DIAGNOSIS — R11.0 NAUSEA: ICD-10-CM

## 2019-09-09 PROCEDURE — G0463 HOSPITAL OUTPT CLINIC VISIT: HCPCS

## 2019-09-09 PROCEDURE — 99215 OFFICE O/P EST HI 40 MIN: CPT | Performed by: INTERNAL MEDICINE

## 2019-09-09 RX ORDER — AMLODIPINE BESYLATE 5 MG/1
TABLET ORAL
COMMUNITY
Start: 2019-05-17 | End: 2019-09-30

## 2019-09-09 RX ORDER — HYDROCHLOROTHIAZIDE 12.5 MG/1
CAPSULE ORAL
COMMUNITY
Start: 2019-01-05 | End: 2019-10-21

## 2019-09-09 ASSESSMENT — PAIN SCALES - GENERAL: PAINLEVEL: NO PAIN (0)

## 2019-09-09 NOTE — LETTER
9/9/2019         RE: Sara Lehman  [id number] [address] [id number]        Dear Colleague,    Thank you for referring your patient, Sara Lehman, to the [facility]. Please see a copy of my visit note below.    [facility]    Hematology/Oncology Established Patient Follow-up Note      Today's Date: 09/09/19    Reason for Follow-up: Metastatic anal adenocarcinoma.    HISTORY OF PRESENT ILLNESS: Sara Lehman is a 78 year old female who presents with the following oncologic history:  1.  4/1/2019: Initially presented to her primary care provider with complaints of hemorrhoids intermittently for 2 months with associated pain and itching as well as blood in the underwear at night and intermixed with stool, no melena.  No associated weight loss or night sweats.  Reports occasional constipation.  Physical exam by primary care provider showed an anal mass about 1 inch in size with perianal erythema and skin breakdown.  2.  4/9/2019: Colonoscopy showed a perianal mass in the left lateral quadrant, severe diverticulosis in the sigmoid colon, descending colon, and ascending colon.  Biopsy of the anal mass showed invasive moderately differentiated adenocarcinoma with normal mismatch repair protein expression.  Specimen consisted of multiple fragments of necrotic tissue with dysplastic appearing glands with foci of invasion.  Surface mucosa is not present and therefore precludes differentiation between colorectal carcinoma or perianal glandular carcinoma.  Depth of invasion cannot be assessed.  KRAS wild-type.  3.  4/16/2019: CT chest/abdomen/pelvis with contrast showed a 1.3 cm left upper lobe lung nodule concerning for metastatic disease as well as 2 tiny nodules in the right lower lobe near the diaphragm.  Diffusely aneurysmal abdominal aorta measures up to 3.2 cm.  Prominent bilateral inguinal adenopathy with a lymph node in the upper right thigh adjacent to the common  femoral vessels measures 2.9 x 2.3 cm.  Lymph node in the left medial upper thigh adjacent to the common femoral vessels measures 2.6 x 3.5 cm.  No evidence of obstruction.  Bony structures showed no destruction.  4.  4/19/2019: Consultation with Dr. Miller Casas.  Due to high suspicion of metastatic disease to the lung and bilateral inguinal regions, patient referred to medical oncology and radiation oncology.  5.  4/24/2019: PET/CT scan showed hypermetabolic posterior left upper lobe mid chest nodule adjacent to the major fissure measuring 1.3 x 1.2 cm and emphysematous changes.  Bilateral renal cysts present.  Nonobstructing stone of the lower right kidney measures 0.3 cm.  Nodular thickening of the left adrenal is 0.7 cm.  Intense hypermetabolism at the anal level with heterogeneous appearing soft tissue with SUV max 19.5.  Enlarged inguinal hypermetabolic lymph nodes on the right measuring 2.9 x 2.1 cm and on the left 3.6 x 2.4 cm.  A distal infrarenal abdominal aorta measures 3.4 cm.  6.  5/6/2019: Left lung mass biopsy performed and showed discordant immunohistochemical findings.  Lesion showed a strong reactivity for cytokeratin 7 and focal staining for P40.  Patient's anal adenocarcinoma reportedly positive for both immunohistochemical staining pattern not typical for primary lung tumor and metastatic disease cannot be excluded.  7.  5/9/2019-6/25/2019: Completed radiation therapy to the anal cancer and inguinal lymph nodes.  She received concurrent radiosensitizing chemotherapy with capecitabine.  8.  8/28/2019: PET/CT scan showed hypermetabolic nodule in the left upper lobe measuring 1.5 x 1.4 cm with SUV max 10.5, increased in size and metabolic activity from 1.4 x 1.2 cm with SUV max 9.5 on 4/24/2019 PET/CT scan.  A 0.5 cm pleural-based nodule with low metabolic activity in the left lower lobe is unchanged.  A 3.4 cm infrarenal abdominal aortic aneurysm is present.  The previously noted hypermetabolic  anal mass has significantly improved with SUV max 5.1, decreased from SUV max 24.6, consistent with response to treatment.  The bilateral inguinal lymphadenopathy has markedly improved on the current study with nodes measuring up to 1.6 x 1.1 cm with SUV max 3.5, decreased from 4 x 2.6 cm with SUV max 14, consistent with response to treatment.      INTERIM HISTORY:  Trixie reports significant improvement in her anal pain.  She has not requiring any analgesics for her anal pain at this time.  She notes no new bowel or bladder dysfunction.  She also denies any recent fevers or chills.  She also denies any shortness of breath or cough.      REVIEW OF SYSTEMS:   14 point ROS was reviewed and is negative other than as noted above in HPI.       HOME MEDICATIONS:  Current Outpatient Medications   Medication Sig Dispense Refill     acetaminophen (TYLENOL) 325 MG tablet Take 2 tablets (650 mg) by mouth every 4 hours as needed for mild pain or fever       amLODIPine (NORVASC) 5 MG tablet        hydrochlorothiazide (MICROZIDE) 12.5 MG capsule        SUMAtriptan (IMITREX) 100 MG tablet TAKE ONE TABLET BY MOUTH AT ONSET OF HEADACHE FOR MIGRAINE, MAY REPEAT IN 2 HOURS IF NEEDED (MAX OF 2 TABLETS PER DAY) 6 tablet 5     ondansetron (ZOFRAN) 8 MG tablet Take 8 mg by mouth every 8 hours as needed for nausea           ALLERGIES:  Allergies   Allergen Reactions     Ace Inhibitors Cough     Aleve [Naproxen Sodium] GI Disturbance     Asa Buff (Mag [Aspirin Buffered] GI Disturbance     Internal bleeding     Atorvastatin Calcium GI Disturbance     Celebrex [Celecoxib] GI Disturbance     Codeine Sulfate Hives     Pravastatin GI Disturbance     Simvastatin GI Disturbance         PAST MEDICAL HISTORY:  Past Medical History:   Diagnosis Date     Benign essential hypertension 8/30/2017     Bronchospasm      COPD (chronic obstructive pulmonary disease) (H) 5/28/2014     Hypertension      Migraine      Neck pain      Nephrolithiasis          PAST  SURGICAL HISTORY:  Past Surgical History:   Procedure Laterality Date     COLONOSCOPY N/A 4/9/2019    Procedure: COMBINED COLONOSCOPY, SINGLE OR MULTIPLE BIOPSY/POLYPECTOMY BY BIOPSY;  Surgeon: Saleem Shaw MD;  Location:  GI     LITHOTRIPSY      Lithotrypsy     ORTHOPEDIC SURGERY      right knee exploration     TUBAL LIGATION           SOCIAL HISTORY:  Social History     Socioeconomic History     Marital status:      Spouse name: Not on file     Number of children: Not on file     Years of education: Not on file     Highest education level: Not on file   Occupational History     Not on file   Social Needs     Financial resource strain: Not on file     Food insecurity:     Worry: Not on file     Inability: Not on file     Transportation needs:     Medical: Not on file     Non-medical: Not on file   Tobacco Use     Smoking status: Former Smoker     Packs/day: 0.50     Types: Cigarettes     Start date: 01/2016     Last attempt to quit: 4/7/2016     Years since quitting: 3.4     Smokeless tobacco: Never Used   Substance and Sexual Activity     Alcohol use: Yes     Alcohol/week: 0.0 oz     Comment: socially only- one per month     Drug use: No     Sexual activity: Yes   Lifestyle     Physical activity:     Days per week: Not on file     Minutes per session: Not on file     Stress: Not on file   Relationships     Social connections:     Talks on phone: Not on file     Gets together: Not on file     Attends [employer] service: Not on file     Active member of club or organization: Not on file     Attends meetings of clubs or organizations: Not on file     Relationship status: Not on file     Intimate partner violence:     Fear of current or ex partner: Not on file     Emotionally abused: Not on file     Physically abused: Not on file     Forced sexual activity: Not on file   Other Topics Concern     Parent/sibling w/ CABG, MI or angioplasty before 65F 55M? Not Asked   Social History Narrative     Not on file  "        FAMILY HISTORY:  Family History   Problem Relation Age of Onset     Cancer Mother 76        uterine     Cerebrovascular Disease Father 75     C.A.D. Brother 66         PHYSICAL EXAM:  Vital signs:  BP (!) 140/89   Pulse 80   Resp 16   Ht 1.537 m (5' 0.5\")   SpO2 97%   BMI 22.47 kg/m     ECO  GENERAL/CONSTITUTIONAL: No acute distress.  EYES: Extraocular movements intact.  No scleral icterus.  ENT/MOUTH: Neck supple. Oropharynx clear, no mucositis.  LYMPH: No anterior cervical, posterior cervical, supraclavicular, axillary adenopathy.  Approximately 1.5 cm palpable firm nontender lymph nodes at the right inguinal region and 1 cm palpable lymph nodes in the left inguinal region.  GASTROINTESTINAL: No hepatosplenomegaly, masses, or tenderness. No guarding.  No distention.  MUSCULOSKELETAL: Warm and well-perfused, no cyanosis, clubbing, or edema.  NEUROLOGIC: Cranial nerves II-XII are intact. Alert, oriented, answers questions appropriately.  INTEGUMENTARY: No rashes or jaundice.  The anorectal region shows new healthy tissue with no significant erythema or discharge.  No obvious masses.  GAIT: Steady, does not use assistive device      LABS:  CBC RESULTS:   Recent Labs   Lab Test 19  1121   WBC 14.5*   RBC 4.26   HGB 13.0   HCT 39.7   MCV 93   MCH 30.5   MCHC 32.7   RDW 13.2          Recent Labs   Lab Test 19  0919 19  0224 19  0714  19  0814   NA  --   --  139  --  138   POTASSIUM 3.6 3.8 3.3*   < > 3.3*   CHLORIDE  --   --  102  --  102   CO2  --   --  26  --  29   ANIONGAP  --   --  11  --  7   GLC  --   --  71  --  104*   BUN  --   --  8  --  10   CR  --   --  0.64  --  0.75   ALTHEA  --   --  9.0  --  9.1    < > = values in this interval not displayed.         PATHOLOGY:  Reviewed as per HPI.    IMAGING:  Reviewed as per HPI.    ASSESSMENT/PLAN:  Sara Lehman is a 78 year old female with the following issues:  1.  Stage IV, cT2-N1a-M1, anal adenocarcinoma with " normal mismatch repair protein expression, wild-type KRAS  2.  Left lung nodule, metastasis versus primary non-small cell carcinoma  3.  History of tobacco use  4.  Anal/rectal pain  5.  Diarrhea, drug-induced  -Sara tolerated radiosensitizing capecitabine with concurrent radiation moderately well.  She completed radiation on 6/25/2019.  -Testing for KRAS mutation showed wild-type.  May consider adding either bevacizumab or an epidermal growth factor receptor inhibitor such as panitumumab to FOLFOX for future systemic therapy if needed.  However, would be cautious with side effects of systemic therapy.  -For the anal/rectal pain, she may use acetaminophen as needed, avoid aspirin, and continue with sitz bath's.  She may use tramadol as needed.  -Review of repeat scans from 8/2019 showed significant improvement in the bilateral inguinal lymphadenopathy and the primary anal mass consistent with excellent response to chemoradiation.  She is now off capecitabine.  I told her that we would consider future systemic therapy should she develop progressive metastatic disease.  -I recommended reevaluation with Dr. Monroy for consideration of stereotactic radiation to the left lung cancer/metastasis now that she has completed chemoradiation the primary anal tumor and bilateral inguinal lymph nodes.  -Would tentatively plan for repeat scans approximately 2-3 months after radiation to the left lung mass.    6.  Chronic nausea  -Of unclear etiology as she has had this for many years.  Continue prochlorperazine and Marinol as needed.    Return in 4-6 weeks with labs.    I spent a total of 40 minutes with the patient, with over >50% of the time in counseling and/or coordination of care.      Rhona Wisdom MD  Hematology/Oncology  [facility]      Again, thank you for allowing me to participate in the care of your patient.        Sincerely,        Rhona Wisdom MD

## 2019-09-09 NOTE — PATIENT INSTRUCTIONS
1. Return visit to see Dr. Monroy - Radiation Oncology (next available)  2. Return to see MD in 4-6 weeks.  3. Labs prior to MD visit.

## 2019-09-25 ENCOUNTER — TRANSFERRED RECORDS (OUTPATIENT)
Dept: HEALTH INFORMATION MANAGEMENT | Facility: CLINIC | Age: 79
End: 2019-09-25

## 2019-09-30 DIAGNOSIS — I10 BENIGN ESSENTIAL HYPERTENSION: ICD-10-CM

## 2019-09-30 DIAGNOSIS — Z76.0 ENCOUNTER FOR MEDICATION REFILL: ICD-10-CM

## 2019-09-30 DIAGNOSIS — G43.909 MIGRAINE WITHOUT STATUS MIGRAINOSUS, NOT INTRACTABLE, UNSPECIFIED MIGRAINE TYPE: Primary | ICD-10-CM

## 2019-09-30 RX ORDER — METOPROLOL SUCCINATE 25 MG/1
25 TABLET, EXTENDED RELEASE ORAL DAILY
COMMUNITY
Start: 2019-05-17 | End: 2019-10-02

## 2019-10-02 RX ORDER — SUMATRIPTAN 50 MG/1
50 TABLET, FILM COATED ORAL
Qty: 6 TABLET | Refills: 0 | Status: SHIPPED | OUTPATIENT
Start: 2019-10-02 | End: 2019-12-04

## 2019-10-02 RX ORDER — AMLODIPINE BESYLATE 5 MG/1
5 TABLET ORAL DAILY
Qty: 30 TABLET | Refills: 0 | Status: SHIPPED | OUTPATIENT
Start: 2019-10-02 | End: 2019-10-04

## 2019-10-02 RX ORDER — METOPROLOL SUCCINATE 25 MG/1
25 TABLET, EXTENDED RELEASE ORAL DAILY
Qty: 30 TABLET | Refills: 0 | Status: SHIPPED | OUTPATIENT
Start: 2019-10-02 | End: 2019-10-21

## 2019-10-04 DIAGNOSIS — I10 BENIGN ESSENTIAL HYPERTENSION: ICD-10-CM

## 2019-10-04 RX ORDER — AMLODIPINE BESYLATE 5 MG/1
TABLET ORAL
Qty: 30 TABLET | Refills: 0 | Status: SHIPPED | OUTPATIENT
Start: 2019-10-04 | End: 2019-10-21

## 2019-10-21 ENCOUNTER — HOSPITAL ENCOUNTER (OUTPATIENT)
Facility: CLINIC | Age: 79
Setting detail: SPECIMEN
Discharge: HOME | End: 2019-10-21
Attending: INTERNAL MEDICINE | Admitting: INTERNAL MEDICINE
Payer: COMMERCIAL

## 2019-10-21 ENCOUNTER — INFUSION THERAPY VISIT (OUTPATIENT)
Dept: INFUSION THERAPY | Facility: CLINIC | Age: 79
End: 2019-10-21
Attending: INTERNAL MEDICINE
Payer: COMMERCIAL

## 2019-10-21 ENCOUNTER — ONCOLOGY VISIT (OUTPATIENT)
Dept: ONCOLOGY | Facility: CLINIC | Age: 79
End: 2019-10-21
Attending: INTERNAL MEDICINE
Payer: COMMERCIAL

## 2019-10-21 VITALS
OXYGEN SATURATION: 96 % | DIASTOLIC BLOOD PRESSURE: 81 MMHG | WEIGHT: 106.8 LBS | RESPIRATION RATE: 18 BRPM | SYSTOLIC BLOOD PRESSURE: 118 MMHG | BODY MASS INDEX: 17.79 KG/M2 | HEART RATE: 72 BPM | TEMPERATURE: 97.9 F | HEIGHT: 65 IN

## 2019-10-21 DIAGNOSIS — N39.0 URINARY TRACT INFECTION WITHOUT HEMATURIA, SITE UNSPECIFIED: Primary | ICD-10-CM

## 2019-10-21 DIAGNOSIS — C78.02 MALIGNANT NEOPLASM METASTATIC TO LEFT LUNG (H): ICD-10-CM

## 2019-10-21 DIAGNOSIS — C21.1 MALIGNANT NEOPLASM OF ANAL CANAL (H): Primary | ICD-10-CM

## 2019-10-21 DIAGNOSIS — C21.1 MALIGNANT NEOPLASM OF ANAL CANAL (H): ICD-10-CM

## 2019-10-21 DIAGNOSIS — I10 BENIGN ESSENTIAL HYPERTENSION: ICD-10-CM

## 2019-10-21 DIAGNOSIS — R11.0 NAUSEA: ICD-10-CM

## 2019-10-21 LAB
ALBUMIN SERPL-MCNC: 3.2 G/DL (ref 3.4–5)
ALP SERPL-CCNC: 70 U/L (ref 40–150)
ALT SERPL W P-5'-P-CCNC: 23 U/L (ref 0–50)
ANION GAP SERPL CALCULATED.3IONS-SCNC: 2 MMOL/L (ref 3–14)
AST SERPL W P-5'-P-CCNC: 21 U/L (ref 0–45)
BASOPHILS # BLD AUTO: 0 10E9/L (ref 0–0.2)
BASOPHILS NFR BLD AUTO: 0.2 %
BILIRUB SERPL-MCNC: 0.3 MG/DL (ref 0.2–1.3)
BUN SERPL-MCNC: 20 MG/DL (ref 7–30)
CALCIUM SERPL-MCNC: 8.9 MG/DL (ref 8.5–10.1)
CHLORIDE SERPL-SCNC: 107 MMOL/L (ref 94–109)
CO2 SERPL-SCNC: 31 MMOL/L (ref 20–32)
CREAT SERPL-MCNC: 0.7 MG/DL (ref 0.52–1.04)
DIFFERENTIAL METHOD BLD: ABNORMAL
EOSINOPHIL # BLD AUTO: 0 10E9/L (ref 0–0.7)
EOSINOPHIL NFR BLD AUTO: 0.9 %
ERYTHROCYTE [DISTWIDTH] IN BLOOD BY AUTOMATED COUNT: 12.9 % (ref 10–15)
GFR SERPL CREATININE-BSD FRML MDRD: 83 ML/MIN/{1.73_M2}
GLUCOSE SERPL-MCNC: 82 MG/DL (ref 70–99)
HCT VFR BLD AUTO: 43.3 % (ref 35–47)
HGB BLD-MCNC: 14.1 G/DL (ref 11.7–15.7)
IMM GRANULOCYTES # BLD: 0 10E9/L (ref 0–0.4)
IMM GRANULOCYTES NFR BLD: 0.2 %
LYMPHOCYTES # BLD AUTO: 0.5 10E9/L (ref 0.8–5.3)
LYMPHOCYTES NFR BLD AUTO: 11.9 %
MCH RBC QN AUTO: 31.3 PG (ref 26.5–33)
MCHC RBC AUTO-ENTMCNC: 32.6 G/DL (ref 31.5–36.5)
MCV RBC AUTO: 96 FL (ref 78–100)
MONOCYTES # BLD AUTO: 0.5 10E9/L (ref 0–1.3)
MONOCYTES NFR BLD AUTO: 11.5 %
NEUTROPHILS # BLD AUTO: 3.3 10E9/L (ref 1.6–8.3)
NEUTROPHILS NFR BLD AUTO: 75.3 %
NRBC # BLD AUTO: 0 10*3/UL
NRBC BLD AUTO-RTO: 0 /100
PLATELET # BLD AUTO: 164 10E9/L (ref 150–450)
POTASSIUM SERPL-SCNC: 4.1 MMOL/L (ref 3.4–5.3)
PROT SERPL-MCNC: 7.2 G/DL (ref 6.8–8.8)
RBC # BLD AUTO: 4.51 10E12/L (ref 3.8–5.2)
SODIUM SERPL-SCNC: 140 MMOL/L (ref 133–144)
WBC # BLD AUTO: 4.4 10E9/L (ref 4–11)

## 2019-10-21 PROCEDURE — 36415 COLL VENOUS BLD VENIPUNCTURE: CPT

## 2019-10-21 PROCEDURE — 99214 OFFICE O/P EST MOD 30 MIN: CPT | Performed by: INTERNAL MEDICINE

## 2019-10-21 PROCEDURE — 80053 COMPREHEN METABOLIC PANEL: CPT | Performed by: INTERNAL MEDICINE

## 2019-10-21 PROCEDURE — G0463 HOSPITAL OUTPT CLINIC VISIT: HCPCS

## 2019-10-21 PROCEDURE — 85025 COMPLETE CBC W/AUTO DIFF WBC: CPT | Performed by: INTERNAL MEDICINE

## 2019-10-21 RX ORDER — AMLODIPINE BESYLATE 5 MG/1
5 TABLET ORAL DAILY
Qty: 30 TABLET | Refills: 1 | Status: SHIPPED | OUTPATIENT
Start: 2019-10-21 | End: 2019-12-04

## 2019-10-21 RX ORDER — METOPROLOL SUCCINATE 25 MG/1
25 TABLET, EXTENDED RELEASE ORAL DAILY
Qty: 30 TABLET | Refills: 1 | Status: SHIPPED | OUTPATIENT
Start: 2019-10-21 | End: 2019-12-04

## 2019-10-21 ASSESSMENT — PAIN SCALES - GENERAL: PAINLEVEL: NO PAIN (0)

## 2019-10-21 ASSESSMENT — MIFFLIN-ST. JEOR: SCORE: 965.32

## 2019-10-21 NOTE — PROGRESS NOTES
Medical Assistant Note:  Sara Lehman presents today for lab draw.    Patient seen by provider today: Yes: Dr Wisdom.   present during visit today: Not Applicable.    Concerns: No Concerns.    Procedure:  Lab draw site: LAC, Needle type: BF, Gauge: 21. Gauze and coban applied    Post Assessment:  Labs drawn without difficulty: Yes.    Discharge Plan:  Departure Mode: Ambulatory.    Face to Face Time: 5.    Meredith Grant CMA

## 2019-10-21 NOTE — LETTER
10/21/2019         RE: Sara Lehman  [address] [facility] [address]        Dear Colleague,    Thank you for referring your patient, Sara Lehman, to the [facility]. Please see a copy of my visit note below.    [facility]    Hematology/Oncology Established Patient Follow-up Note      Today's Date: 10/21/19    Reason for Follow-up: Metastatic anal adenocarcinoma.    HISTORY OF PRESENT ILLNESS: Sara Lehman is a 78 year old female who presents with the following oncologic history:  1.  4/1/2019: Initially presented to her primary care provider with complaints of hemorrhoids intermittently for 2 months with associated pain and itching as well as blood in the underwear at night and intermixed with stool, no melena.  No associated weight loss or night sweats.  Reports occasional constipation.  Physical exam by primary care provider showed an anal mass about 1 inch in size with perianal erythema and skin breakdown.  2.  4/9/2019: Colonoscopy showed a perianal mass in the left lateral quadrant, severe diverticulosis in the sigmoid colon, descending colon, and ascending colon.  Biopsy of the anal mass showed invasive moderately differentiated adenocarcinoma with normal mismatch repair protein expression.  Specimen consisted of multiple fragments of necrotic tissue with dysplastic appearing glands with foci of invasion.  Surface mucosa is not present and therefore precludes differentiation between colorectal carcinoma or perianal glandular carcinoma.  Depth of invasion cannot be assessed.  KRAS wild-type.  3.  4/16/2019: CT chest/abdomen/pelvis with contrast showed a 1.3 cm left upper lobe lung nodule concerning for metastatic disease as well as 2 tiny nodules in the right lower lobe near the diaphragm.  Diffusely aneurysmal abdominal aorta measures up to 3.2 cm.  Prominent bilateral inguinal adenopathy with a lymph node in the upper right thigh adjacent to the common  femoral vessels measures 2.9 x 2.3 cm.  Lymph node in the left medial upper thigh adjacent to the common femoral vessels measures 2.6 x 3.5 cm.  No evidence of obstruction.  Bony structures showed no destruction.  4.  4/19/2019: Consultation with Dr. Miller Casas.  Due to high suspicion of metastatic disease to the lung and bilateral inguinal regions, patient referred to medical oncology and radiation oncology.  5.  4/24/2019: PET/CT scan showed hypermetabolic posterior left upper lobe mid chest nodule adjacent to the major fissure measuring 1.3 x 1.2 cm and emphysematous changes.  Bilateral renal cysts present.  Nonobstructing stone of the lower right kidney measures 0.3 cm.  Nodular thickening of the left adrenal is 0.7 cm.  Intense hypermetabolism at the anal level with heterogeneous appearing soft tissue with SUV max 19.5.  Enlarged inguinal hypermetabolic lymph nodes on the right measuring 2.9 x 2.1 cm and on the left 3.6 x 2.4 cm.  A distal infrarenal abdominal aorta measures 3.4 cm.  6.  5/6/2019: Left lung mass biopsy performed and showed discordant immunohistochemical findings.  Lesion showed a strong reactivity for cytokeratin 7 and focal staining for P40.  Patient's anal adenocarcinoma reportedly positive for both immunohistochemical staining pattern not typical for primary lung tumor and metastatic disease cannot be excluded.  7.  5/9/2019-6/25/2019: Completed radiation therapy to the anal cancer and inguinal lymph nodes.  She received concurrent radiosensitizing chemotherapy with capecitabine.  8.  8/28/2019: PET/CT scan showed hypermetabolic nodule in the left upper lobe measuring 1.5 x 1.4 cm with SUV max 10.5, increased in size and metabolic activity from 1.4 x 1.2 cm with SUV max 9.5 on 4/24/2019 PET/CT scan.  A 0.5 cm pleural-based nodule with low metabolic activity in the left lower lobe is unchanged.  A 3.4 cm infrarenal abdominal aortic aneurysm is present.  The previously noted hypermetabolic  anal mass has significantly improved with SUV max 5.1, decreased from SUV max 24.6, consistent with response to treatment.  The bilateral inguinal lymphadenopathy has markedly improved on the current study with nodes measuring up to 1.6 x 1.1 cm with SUV max 3.5, decreased from 4 x 2.6 cm with SUV max 14, consistent with response to treatment.  9.  10/21/2019: Completed stereotactic radiation to the left upper lobe lung nodule/metastasis in 5 fractions under the care of Dr. Clay Monroy.    INTERIM HISTORY:  Trixie reports mild fatigue but no anal pain, chest pain, cough, or recent fevers or chills.  She also denies any dysuria.      REVIEW OF SYSTEMS:   14 point ROS was reviewed and is negative other than as noted above in HPI.       HOME MEDICATIONS:  Current Outpatient Medications   Medication Sig Dispense Refill     acetaminophen (TYLENOL) 325 MG tablet Take 2 tablets (650 mg) by mouth every 4 hours as needed for mild pain or fever       amLODIPine (NORVASC) 5 MG tablet TAKE 1 TABLET(5 MG) BY MOUTH DAILY 30 tablet 0     hydrochlorothiazide (MICROZIDE) 12.5 MG capsule        metoprolol succinate ER (TOPROL-XL) 25 MG 24 hr tablet Take 1 tablet (25 mg) by mouth daily 30 tablet 0     ondansetron (ZOFRAN) 8 MG tablet Take 8 mg by mouth every 8 hours as needed for nausea       SUMAtriptan (IMITREX) 50 MG tablet Take 1 tablet (50 mg) by mouth at onset of headache for migraine 6 tablet 0         ALLERGIES:  Allergies   Allergen Reactions     Ace Inhibitors Cough     Aleve [Naproxen Sodium] GI Disturbance     Asa Buff (Mag [Aspirin Buffered] GI Disturbance     Internal bleeding     Atorvastatin Calcium GI Disturbance     Celebrex [Celecoxib] GI Disturbance     Codeine Sulfate Hives     Pravastatin GI Disturbance     Simvastatin GI Disturbance         PAST MEDICAL HISTORY:  Past Medical History:   Diagnosis Date     Benign essential hypertension 8/30/2017     Bronchospasm      COPD (chronic obstructive pulmonary disease) (H)  5/28/2014     Hypertension      Migraine      Neck pain      Nephrolithiasis          PAST SURGICAL HISTORY:  Past Surgical History:   Procedure Laterality Date     COLONOSCOPY N/A 4/9/2019    Procedure: COMBINED COLONOSCOPY, SINGLE OR MULTIPLE BIOPSY/POLYPECTOMY BY BIOPSY;  Surgeon: Saleem Shaw MD;  Location:  GI     LITHOTRIPSY      Lithotrypsy     ORTHOPEDIC SURGERY      right knee exploration     TUBAL LIGATION           SOCIAL HISTORY:  Social History     Socioeconomic History     Marital status:      Spouse name: Not on file     Number of children: Not on file     Years of education: Not on file     Highest education level: Not on file   Occupational History     Not on file   Social Needs     Financial resource strain: Not on file     Food insecurity:     Worry: Not on file     Inability: Not on file     Transportation needs:     Medical: Not on file     Non-medical: Not on file   Tobacco Use     Smoking status: Former Smoker     Packs/day: 0.50     Types: Cigarettes     Start date: 01/2016     Last attempt to quit: 4/7/2016     Years since quitting: 3.5     Smokeless tobacco: Never Used   Substance and Sexual Activity     Alcohol use: Yes     Alcohol/week: 0.0 standard drinks     Comment: socially only- one per month     Drug use: No     Sexual activity: Yes   Lifestyle     Physical activity:     Days per week: Not on file     Minutes per session: Not on file     Stress: Not on file   Relationships     Social connections:     Talks on phone: Not on file     Gets together: Not on file     Attends Restorationist service: Not on file     Active member of club or organization: Not on file     Attends meetings of clubs or organizations: Not on file     Relationship status: Not on file     Intimate partner violence:     Fear of current or ex partner: Not on file     Emotionally abused: Not on file     Physically abused: Not on file     Forced sexual activity: Not on file   Other Topics Concern      "Parent/sibling w/ CABG, MI or angioplasty before 65F 55M? Not Asked   Social History Narrative     Not on file         FAMILY HISTORY:  Family History   Problem Relation Age of Onset     Cancer Mother 76        uterine     Cerebrovascular Disease Father 75     C.A.D. Brother 66         PHYSICAL EXAM:  Vital signs:  /81 (BP Location: Left arm, Patient Position: Sitting, Cuff Size: Adult Regular)   Pulse 72   Temp 97.9  F (36.6  C) (Oral)   Resp 18   Ht 1.651 m (5' 5\")   Wt 48.4 kg (106 lb 12.8 oz)   SpO2 96%   BMI 17.77 kg/m      ECO  GENERAL/CONSTITUTIONAL: No acute distress.  Exam deferred for detailed discussion.    LABS:  CBC RESULTS:   Recent Labs   Lab Test 10/21/19  1455   WBC 4.4   RBC 4.51   HGB 14.1   HCT 43.3   MCV 96   MCH 31.3   MCHC 32.6   RDW 12.9        Last Comprehensive Metabolic Panel:  Sodium   Date Value Ref Range Status   10/21/2019 140 133 - 144 mmol/L Final     Potassium   Date Value Ref Range Status   10/21/2019 4.1 3.4 - 5.3 mmol/L Final     Chloride   Date Value Ref Range Status   10/21/2019 107 94 - 109 mmol/L Final     Carbon Dioxide   Date Value Ref Range Status   10/21/2019 31 20 - 32 mmol/L Final     Anion Gap   Date Value Ref Range Status   10/21/2019 2 (L) 3 - 14 mmol/L Final     Glucose   Date Value Ref Range Status   10/21/2019 82 70 - 99 mg/dL Final     Urea Nitrogen   Date Value Ref Range Status   10/21/2019 20 7 - 30 mg/dL Final     Creatinine   Date Value Ref Range Status   10/21/2019 0.70 0.52 - 1.04 mg/dL Final     GFR Estimate   Date Value Ref Range Status   10/21/2019 83 >60 mL/min/[1.73_m2] Final     Comment:     Non  GFR Calc  Starting 2018, serum creatinine based estimated GFR (eGFR) will be   calculated using the Chronic Kidney Disease Epidemiology Collaboration   (CKD-EPI) equation.       Calcium   Date Value Ref Range Status   10/21/2019 8.9 8.5 - 10.1 mg/dL Final     Bilirubin Total   Date Value Ref Range Status "   10/21/2019 0.3 0.2 - 1.3 mg/dL Final     Alkaline Phosphatase   Date Value Ref Range Status   10/21/2019 70 40 - 150 U/L Final     ALT   Date Value Ref Range Status   10/21/2019 23 0 - 50 U/L Final     AST   Date Value Ref Range Status   10/21/2019 21 0 - 45 U/L Final     PATHOLOGY:  Reviewed as per HPI.    IMAGING:  Reviewed as per HPI.    ASSESSMENT/PLAN:  Sara Lehman is a 78 year old female with the following issues:  1.  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  Left lung nodule, metastasis versus primary non-small cell carcinoma  -Sara tolerated radiosensitizing capecitabine with concurrent radiation moderately well.  She completed radiation on 6/25/2019.  -She subsequently underwent stereotactic radiation to the left lung metastasis, completing her treatment today, 10/21/2019.  -Testing for KRAS mutation showed wild-type.  May consider adding either bevacizumab or an epidermal growth factor receptor inhibitor such as panitumumab to FOLFOX for future systemic therapy if needed.  However, would be cautious with side effects of systemic therapy.  -Plan for repeat PET scan in 2 months.  -If scans shows recurrent or progressive metastatic disease, will consider mFOLFOX with bevacizumab or panitumumab.  Discussed with Trixie and her family members that such regimens would have different side effects compared to what she has received in the past.  We would need to decide carefully on what she could tolerate if we need to consider systemic therapy.    3.  Chronic nausea  -Of unclear etiology as she has had this for many years.  This has improved overall.  Continue ondansetron as needed.    4.  Essential hypertension  -Blood pressure well controlled and stable.  She needs a new primary care physician.  She will establish care with one sometime in the next month or so.  I did refill her metoprolol and amlodipine in the meantime.  She is no  longer taking hydrochlorothiazide.    Return in 2 months.    Rhona Wisdom MD  Hematology/Oncology  [facility]    I spent a total of 30 minutes with the patient, with 100% in counseling.      Again, thank you for allowing me to participate in the care of your patient.        Sincerely,        Rhona Wisdom MD

## 2019-10-24 NOTE — TELEPHONE ENCOUNTER
spoke with patient, she will call to schedule appointment later    Mohan Goldman,   [facility]  [phone]

## 2019-12-04 ENCOUNTER — OFFICE VISIT (OUTPATIENT)
Dept: INTERNAL MEDICINE | Facility: CLINIC | Age: 79
End: 2019-12-04
Payer: COMMERCIAL

## 2019-12-04 VITALS
BODY MASS INDEX: 18.26 KG/M2 | HEART RATE: 74 BPM | DIASTOLIC BLOOD PRESSURE: 74 MMHG | RESPIRATION RATE: 18 BRPM | WEIGHT: 109.7 LBS | TEMPERATURE: 98.3 F | SYSTOLIC BLOOD PRESSURE: 122 MMHG | OXYGEN SATURATION: 98 %

## 2019-12-04 DIAGNOSIS — I10 BENIGN ESSENTIAL HYPERTENSION: ICD-10-CM

## 2019-12-04 DIAGNOSIS — G43.909 MIGRAINE WITHOUT STATUS MIGRAINOSUS, NOT INTRACTABLE, UNSPECIFIED MIGRAINE TYPE: ICD-10-CM

## 2019-12-04 PROCEDURE — 99214 OFFICE O/P EST MOD 30 MIN: CPT | Performed by: PHYSICIAN ASSISTANT

## 2019-12-04 RX ORDER — METOPROLOL SUCCINATE 25 MG/1
25 TABLET, EXTENDED RELEASE ORAL DAILY
Qty: 90 TABLET | Refills: 2 | Status: SHIPPED | OUTPATIENT
Start: 2019-12-04 | End: 2020-09-10

## 2019-12-04 RX ORDER — SUMATRIPTAN 50 MG/1
50 TABLET, FILM COATED ORAL
Qty: 18 TABLET | Refills: 1 | Status: SHIPPED | OUTPATIENT
Start: 2019-12-04 | End: 2020-06-12

## 2019-12-04 RX ORDER — AMLODIPINE BESYLATE 5 MG/1
5 TABLET ORAL DAILY
Qty: 90 TABLET | Refills: 2 | Status: SHIPPED | OUTPATIENT
Start: 2019-12-04 | End: 2020-09-10

## 2019-12-04 NOTE — PROGRESS NOTES
Subjective     Sara Lehman is a 79 year old female who presents to clinic today for the following health issues:    HPI   Hypertension Follow-up      Do you check your blood pressure regularly outside of the clinic? No     Are you following a low salt diet? Yes    Are your blood pressures ever more than 140 on the top number (systolic) OR more   than 90 on the bottom number (diastolic), for example 140/90?       How many servings of fruits and vegetables do you eat daily?  2-3    On average, how many sweetened beverages do you drink each day (Examples: soda, juice, sweet tea, etc.  Do NOT count diet or artificially sweetened beverages)?   0    How many days per week do you miss taking your medication? 0      Reviewed and updated as needed this visit by Provider  Meds  Problems             Objective    /74   Pulse 74   Temp 98.3  F (36.8  C) (Oral)   Resp 18   Wt 49.8 kg (109 lb 11.2 oz)   SpO2 98%   BMI 18.26 kg/m    Body mass index is 18.26 kg/m .  Physical Exam   GENERAL: healthy, alert and no distress  RESP: lungs clear to auscultation - no rales, rhonchi or wheezes  CV: regular rates and rhythm, normal S1 S2, no S3 or S4 and no murmur, click or rub    Diagnostic Test Results:  Labs reviewed in Epic        Assessment & Plan     1. Benign essential hypertension  - well controlled continue current regimen   - amLODIPine (NORVASC) 5 MG tablet; Take 1 tablet (5 mg) by mouth daily  Dispense: 90 tablet; Refill: 2  - metoprolol succinate ER (TOPROL-XL) 25 MG 24 hr tablet; Take 1 tablet (25 mg) by mouth daily  Dispense: 90 tablet; Refill: 2    2. Migraine without status migrainosus, not intractable, unspecified migraine type  - pt also request imitrex refill, stable migraines with no changes, rare use   - SUMAtriptan (IMITREX) 50 MG tablet; Take 1 tablet (50 mg) by mouth at onset of headache for migraine  Dispense: 18 tablet; Refill: 1       Return in about 6 months (around 6/4/2020) for Physical  Exam.    Zoila Soares PA-C  [facility]

## 2019-12-07 ENCOUNTER — OFFICE VISIT (OUTPATIENT)
Dept: URGENT CARE | Facility: URGENT CARE | Age: 79
End: 2019-12-07
Payer: COMMERCIAL

## 2019-12-07 VITALS
RESPIRATION RATE: 16 BRPM | SYSTOLIC BLOOD PRESSURE: 98 MMHG | WEIGHT: 107 LBS | HEART RATE: 70 BPM | OXYGEN SATURATION: 96 % | DIASTOLIC BLOOD PRESSURE: 62 MMHG | BODY MASS INDEX: 17.81 KG/M2 | TEMPERATURE: 97.8 F

## 2019-12-07 DIAGNOSIS — R35.0 URINARY FREQUENCY: ICD-10-CM

## 2019-12-07 DIAGNOSIS — R11.0 NAUSEA: Primary | ICD-10-CM

## 2019-12-07 LAB
ALBUMIN UR-MCNC: 30 MG/DL
APPEARANCE UR: CLEAR
BACTERIA #/AREA URNS HPF: ABNORMAL /HPF
BILIRUB UR QL STRIP: ABNORMAL
COLOR UR AUTO: YELLOW
GLUCOSE UR STRIP-MCNC: NEGATIVE MG/DL
HGB UR QL STRIP: ABNORMAL
KETONES UR STRIP-MCNC: NEGATIVE MG/DL
LEUKOCYTE ESTERASE UR QL STRIP: ABNORMAL
NITRATE UR QL: NEGATIVE
NON-SQ EPI CELLS #/AREA URNS LPF: ABNORMAL /LPF
PH UR STRIP: 5.5 PH (ref 5–7)
RBC #/AREA URNS AUTO: ABNORMAL /HPF
SOURCE: ABNORMAL
SP GR UR STRIP: 1.03 (ref 1–1.03)
UROBILINOGEN UR STRIP-ACNC: 0.2 EU/DL (ref 0.2–1)
WBC #/AREA URNS AUTO: ABNORMAL /HPF

## 2019-12-07 PROCEDURE — 81001 URINALYSIS AUTO W/SCOPE: CPT | Performed by: PHYSICIAN ASSISTANT

## 2019-12-07 PROCEDURE — 99213 OFFICE O/P EST LOW 20 MIN: CPT | Performed by: PHYSICIAN ASSISTANT

## 2019-12-07 PROCEDURE — 87086 URINE CULTURE/COLONY COUNT: CPT | Performed by: PHYSICIAN ASSISTANT

## 2019-12-07 RX ORDER — CEFDINIR 300 MG/1
300 CAPSULE ORAL 2 TIMES DAILY
Qty: 14 CAPSULE | Refills: 0 | Status: SHIPPED | OUTPATIENT
Start: 2019-12-07 | End: 2020-04-09

## 2019-12-07 ASSESSMENT — ENCOUNTER SYMPTOMS
FREQUENCY: 1
CHILLS: 1
FATIGUE: 1

## 2019-12-07 NOTE — PROGRESS NOTES
SUBJECTIVE:   Sara Lehman is a 79 year old female presenting with a chief complaint of   Chief Complaint   Patient presents with     Urgent Care     Nausea     x 1 week; nausea and body aches, some chills, feeling tired; possible fever; overall feeling under the weather; denies dysuria, discharge or odor but would like to be tested due having a UTI previously with similar symptoms; took a zofran yesterdya which helped with the nausea       She is an established patient of [facility]. Daughters are here concerned about UTI since she had one last summer. History of Stage 4 rectal cancer. Finished 35 rounds of radiation with chemo currently on a break. Fogginess to her thinking. Some chills.     Onset of symptoms was 5 day(s).  Course of illness is worsening  Severity moderate  Current and associated symptoms frequency and and fatigue        Review of Systems   Constitutional: Positive for chills and fatigue.   Genitourinary: Positive for frequency.       Past Medical History:   Diagnosis Date     Benign essential hypertension 8/30/2017     Bronchospasm      COPD (chronic obstructive pulmonary disease) (H) 5/28/2014     Hypertension      Migraine      Neck pain      Nephrolithiasis      Family History   Problem Relation Age of Onset     Cancer Mother 76        uterine     Cerebrovascular Disease Father 75     C.A.D. Brother 66     Current Outpatient Medications   Medication Sig Dispense Refill     acetaminophen (TYLENOL) 325 MG tablet Take 2 tablets (650 mg) by mouth every 4 hours as needed for mild pain or fever       amLODIPine (NORVASC) 5 MG tablet Take 1 tablet (5 mg) by mouth daily 90 tablet 2     metoprolol succinate ER (TOPROL-XL) 25 MG 24 hr tablet Take 1 tablet (25 mg) by mouth daily 90 tablet 2     ondansetron (ZOFRAN) 8 MG tablet Take 8 mg by mouth every 8 hours as needed for nausea       SUMAtriptan (IMITREX) 50 MG tablet Take 1 tablet (50 mg) by mouth at onset of headache for migraine 18 tablet 1      Social History     Tobacco Use     Smoking status: Former Smoker     Packs/day: 0.50     Types: Cigarettes     Start date: 01/2016     Last attempt to quit: 4/7/2016     Years since quitting: 3.6     Smokeless tobacco: Never Used   Substance Use Topics     Alcohol use: Yes     Alcohol/week: 0.0 standard drinks     Comment: socially only- one per month       OBJECTIVE  BP 98/62 (BP Location: Left arm, Patient Position: Chair, Cuff Size: Adult Regular)   Pulse 70   Temp 97.8  F (36.6  C) (Tympanic)   Resp 16   Wt 48.5 kg (107 lb)   LMP  (LMP Unknown)   SpO2 96%   Breastfeeding No   BMI 17.81 kg/m      Physical Exam  HENT:      Right Ear: Tympanic membrane normal.      Left Ear: Tympanic membrane normal.      Mouth/Throat:      Mouth: Mucous membranes are dry.   Eyes:      Extraocular Movements: Extraocular movements intact.      Conjunctiva/sclera: Conjunctivae normal.   Neck:      Musculoskeletal: Normal range of motion and neck supple.   Cardiovascular:      Rate and Rhythm: Normal rate and regular rhythm.      Pulses: Normal pulses.      Heart sounds: Normal heart sounds.   Pulmonary:      Effort: Pulmonary effort is normal.      Breath sounds: Normal breath sounds.   Abdominal:      General: Abdomen is flat.      Palpations: Abdomen is soft.   Neurological:      Mental Status: She is alert.       UA: blood and possible bacteria    ASSESSMENT:    1. Nausea    - UA reflex to Microscopic and Culture  - Urine Microscopic    2. Urinary frequency    - UA reflex to Microscopic and Culture  - cefdinir (OMNICEF) 300 MG capsule; Take 1 capsule (300 mg) by mouth 2 times daily for 7 days  Dispense: 14 capsule; Refill: 0    PLAN:    Start anti-bacterial medication. Culture pending.     Followup:   oncologist in 2 days.

## 2019-12-08 LAB
BACTERIA SPEC CULT: NORMAL
SPECIMEN SOURCE: NORMAL

## 2019-12-16 ENCOUNTER — HOSPITAL ENCOUNTER (OUTPATIENT)
Dept: PET IMAGING | Facility: CLINIC | Age: 79
Discharge: HOME | End: 2019-12-16
Attending: INTERNAL MEDICINE | Admitting: INTERNAL MEDICINE
Payer: COMMERCIAL

## 2019-12-16 ENCOUNTER — PATIENT OUTREACH (OUTPATIENT)
Dept: ONCOLOGY | Facility: CLINIC | Age: 79
End: 2019-12-16

## 2019-12-16 DIAGNOSIS — C78.02 MALIGNANT NEOPLASM METASTATIC TO LEFT LUNG (H): ICD-10-CM

## 2019-12-16 DIAGNOSIS — C21.1 MALIGNANT NEOPLASM OF ANAL CANAL (H): ICD-10-CM

## 2019-12-16 PROCEDURE — 78816 PET IMAGE W/CT FULL BODY: CPT | Mod: PS

## 2019-12-16 PROCEDURE — A9552 F18 FDG: HCPCS | Performed by: INTERNAL MEDICINE

## 2019-12-16 PROCEDURE — 34300033 ZZH RX 343: Performed by: INTERNAL MEDICINE

## 2019-12-16 RX ADMIN — FLUDEOXYGLUCOSE F-18 10.59 MCI.: 500 INJECTION, SOLUTION INTRAVENOUS at 12:39

## 2019-12-16 NOTE — TELEPHONE ENCOUNTER
Patient's daughter called clinic this AM stating that her mother is experiencing a lot of diarrhea and weakness. She has recently  Been  on an antibiotic for a UTI. Suggested to daughter that she come in and obtain stool sample and be evaluated with labs and IV fluids and see NIYA Magdaleno. Carine Norton RN,BSN,OCN

## 2019-12-17 ENCOUNTER — HOSPITAL ENCOUNTER (OUTPATIENT)
Facility: CLINIC | Age: 79
Setting detail: SPECIMEN
Discharge: HOME | End: 2019-12-17
Attending: NURSE PRACTITIONER | Admitting: NURSE PRACTITIONER
Payer: COMMERCIAL

## 2019-12-17 ENCOUNTER — INFUSION THERAPY VISIT (OUTPATIENT)
Dept: INFUSION THERAPY | Facility: CLINIC | Age: 79
End: 2019-12-17
Attending: INTERNAL MEDICINE
Payer: COMMERCIAL

## 2019-12-17 ENCOUNTER — ONCOLOGY VISIT (OUTPATIENT)
Dept: ONCOLOGY | Facility: CLINIC | Age: 79
End: 2019-12-17
Attending: INTERNAL MEDICINE
Payer: COMMERCIAL

## 2019-12-17 VITALS
DIASTOLIC BLOOD PRESSURE: 82 MMHG | HEART RATE: 72 BPM | SYSTOLIC BLOOD PRESSURE: 125 MMHG | RESPIRATION RATE: 18 BRPM | SYSTOLIC BLOOD PRESSURE: 125 MMHG | HEART RATE: 72 BPM | TEMPERATURE: 97.6 F | TEMPERATURE: 97.6 F | DIASTOLIC BLOOD PRESSURE: 82 MMHG

## 2019-12-17 DIAGNOSIS — C21.1 MALIGNANT NEOPLASM OF ANAL CANAL (H): Primary | ICD-10-CM

## 2019-12-17 LAB
ALBUMIN SERPL-MCNC: 3.3 G/DL (ref 3.4–5)
ALBUMIN UR-MCNC: NEGATIVE MG/DL
ALP SERPL-CCNC: 73 U/L (ref 40–150)
ALT SERPL W P-5'-P-CCNC: 15 U/L (ref 0–50)
ANION GAP SERPL CALCULATED.3IONS-SCNC: 4 MMOL/L (ref 3–14)
APPEARANCE UR: CLEAR
AST SERPL W P-5'-P-CCNC: 18 U/L (ref 0–45)
BASOPHILS # BLD AUTO: 0 10E9/L (ref 0–0.2)
BASOPHILS NFR BLD AUTO: 0.2 %
BILIRUB SERPL-MCNC: 0.5 MG/DL (ref 0.2–1.3)
BILIRUB UR QL STRIP: NEGATIVE
BUN SERPL-MCNC: 14 MG/DL (ref 7–30)
CALCIUM SERPL-MCNC: 9.6 MG/DL (ref 8.5–10.1)
CHLORIDE SERPL-SCNC: 104 MMOL/L (ref 94–109)
CO2 SERPL-SCNC: 26 MMOL/L (ref 20–32)
COLOR UR AUTO: ABNORMAL
CREAT SERPL-MCNC: 0.71 MG/DL (ref 0.52–1.04)
DIFFERENTIAL METHOD BLD: NORMAL
EOSINOPHIL # BLD AUTO: 0.1 10E9/L (ref 0–0.7)
EOSINOPHIL NFR BLD AUTO: 1.4 %
ERYTHROCYTE [DISTWIDTH] IN BLOOD BY AUTOMATED COUNT: 14.3 % (ref 10–15)
GFR SERPL CREATININE-BSD FRML MDRD: 81 ML/MIN/{1.73_M2}
GLUCOSE SERPL-MCNC: 93 MG/DL (ref 70–99)
GLUCOSE UR STRIP-MCNC: NEGATIVE MG/DL
HCT VFR BLD AUTO: 44.2 % (ref 35–47)
HGB BLD-MCNC: 14.2 G/DL (ref 11.7–15.7)
HGB UR QL STRIP: NEGATIVE
IMM GRANULOCYTES # BLD: 0 10E9/L (ref 0–0.4)
IMM GRANULOCYTES NFR BLD: 0.4 %
KETONES UR STRIP-MCNC: NEGATIVE MG/DL
LEUKOCYTE ESTERASE UR QL STRIP: ABNORMAL
LYMPHOCYTES # BLD AUTO: 0.8 10E9/L (ref 0.8–5.3)
LYMPHOCYTES NFR BLD AUTO: 13.9 %
MCH RBC QN AUTO: 30.7 PG (ref 26.5–33)
MCHC RBC AUTO-ENTMCNC: 32.1 G/DL (ref 31.5–36.5)
MCV RBC AUTO: 96 FL (ref 78–100)
MONOCYTES # BLD AUTO: 0.6 10E9/L (ref 0–1.3)
MONOCYTES NFR BLD AUTO: 10.1 %
MUCOUS THREADS #/AREA URNS LPF: PRESENT /LPF
NEUTROPHILS # BLD AUTO: 4.2 10E9/L (ref 1.6–8.3)
NEUTROPHILS NFR BLD AUTO: 74 %
NITRATE UR QL: NEGATIVE
NRBC # BLD AUTO: 0 10*3/UL
NRBC BLD AUTO-RTO: 0 /100
PH UR STRIP: 5.5 PH (ref 5–7)
PLATELET # BLD AUTO: 216 10E9/L (ref 150–450)
POTASSIUM SERPL-SCNC: 4 MMOL/L (ref 3.4–5.3)
PROT SERPL-MCNC: 7.3 G/DL (ref 6.8–8.8)
RBC # BLD AUTO: 4.62 10E12/L (ref 3.8–5.2)
RBC #/AREA URNS AUTO: 0 /HPF (ref 0–2)
SODIUM SERPL-SCNC: 134 MMOL/L (ref 133–144)
SOURCE: ABNORMAL
SP GR UR STRIP: 1 (ref 1–1.03)
SQUAMOUS #/AREA URNS AUTO: <1 /HPF (ref 0–1)
UROBILINOGEN UR STRIP-MCNC: NORMAL MG/DL (ref 0–2)
WBC # BLD AUTO: 5.7 10E9/L (ref 4–11)
WBC #/AREA URNS AUTO: 3 /HPF (ref 0–5)

## 2019-12-17 PROCEDURE — 96361 HYDRATE IV INFUSION ADD-ON: CPT

## 2019-12-17 PROCEDURE — 96360 HYDRATION IV INFUSION INIT: CPT

## 2019-12-17 PROCEDURE — 99214 OFFICE O/P EST MOD 30 MIN: CPT | Performed by: NURSE PRACTITIONER

## 2019-12-17 PROCEDURE — 85025 COMPLETE CBC W/AUTO DIFF WBC: CPT | Performed by: NURSE PRACTITIONER

## 2019-12-17 PROCEDURE — 81001 URINALYSIS AUTO W/SCOPE: CPT | Performed by: NURSE PRACTITIONER

## 2019-12-17 PROCEDURE — 87040 BLOOD CULTURE FOR BACTERIA: CPT | Performed by: NURSE PRACTITIONER

## 2019-12-17 PROCEDURE — 80053 COMPREHEN METABOLIC PANEL: CPT | Performed by: NURSE PRACTITIONER

## 2019-12-17 PROCEDURE — 25800030 ZZH RX IP 258 OP 636: Performed by: NURSE PRACTITIONER

## 2019-12-17 PROCEDURE — G0463 HOSPITAL OUTPT CLINIC VISIT: HCPCS | Mod: 25

## 2019-12-17 RX ADMIN — SODIUM CHLORIDE 1000 ML: 9 INJECTION, SOLUTION INTRAVENOUS at 10:13

## 2019-12-17 ASSESSMENT — PAIN SCALES - GENERAL
PAINLEVEL: NO PAIN (0)
PAINLEVEL: NO PAIN (0)

## 2019-12-17 NOTE — LETTER
12/17/2019         RE: Sara Lehman  [id number] [facility] [address]        Dear Colleague,    Thank you for referring your patient, Sara Lehman, to the [facility]. Please see a copy of my visit note below.    Oncology/Hematology Visit Note  Dec 17, 2019    Reason for Visit: follow up of metastatic anal adenocarcinoma  Patient seen as add-on today for follow-up of weakness and diarrhea      Interval History:  Patient reports she had diarrhea for 2 days about 3-4 episodes per day.  Medium amounts of diarrhea.  Patient denies blood in stool denies abdominal pain denies fever chills sweats denies cough no shortness of breath denies chest pain  She also reports since diarrhea she has been more weak.  However she is ambulatory and is able to take short walks at home.  Patient denies edema      Review of Systems:  14 point ROS of systems including Constitutional, Eyes, Respiratory, Cardiovascular, Gastroenterology, Genitourinary, Integumentary, Muscularskeletal, Psychiatric were all negative except for pertinent positives noted in my HPI.      Current Outpatient Medications   Medication Sig Dispense Refill     acetaminophen (TYLENOL) 325 MG tablet Take 2 tablets (650 mg) by mouth every 4 hours as needed for mild pain or fever       amLODIPine (NORVASC) 5 MG tablet Take 1 tablet (5 mg) by mouth daily 90 tablet 2     metoprolol succinate ER (TOPROL-XL) 25 MG 24 hr tablet Take 1 tablet (25 mg) by mouth daily 90 tablet 2     ondansetron (ZOFRAN) 8 MG tablet Take 8 mg by mouth every 8 hours as needed for nausea       SUMAtriptan (IMITREX) 50 MG tablet Take 1 tablet (50 mg) by mouth at onset of headache for migraine 18 tablet 1       Physical Examination:  General: The patient is a pleasant female in no acute distress.  /82   Pulse 72   Temp 97.6  F (36.4  C) (Oral)   LMP  (LMP Unknown)   HEENT: EOMI, PERRL. Sclerae are anicteric. Oral mucosa is pink and moist with no lesions or  thrush.   Lymph: Neck is supple with no lymphadenopathy in the cervical or supraclavicular areas.   Heart: Regular rate and rhythm.   Lungs: Clear to auscultation bilaterally.   GI: Bowel sounds present, soft, nontender with no palpable hepatosplenomegaly or masses.   Extremities: No lower extremity edema noted bilaterally.   Skin: No rashes, petechiae, or bruising noted on exposed skin.    Laboratory Data:  Results for orders placed or performed in visit on 12/17/19 (from the past 24 hour(s))   UA with Microscopic reflex to Culture   Result Value Ref Range    Color Urine Light Yellow     Appearance Urine Clear     Glucose Urine Negative NEG^Negative mg/dL    Bilirubin Urine Negative NEG^Negative    Ketones Urine Negative NEG^Negative mg/dL    Specific Gravity Urine 1.002 (L) 1.003 - 1.035    Blood Urine Negative NEG^Negative    pH Urine 5.5 5.0 - 7.0 pH    Protein Albumin Urine Negative NEG^Negative mg/dL    Urobilinogen mg/dL Normal 0.0 - 2.0 mg/dL    Nitrite Urine Negative NEG^Negative    Leukocyte Esterase Urine Trace (A) NEG^Negative    Source Midstream Urine     WBC Urine 3 0 - 5 /HPF    RBC Urine 0 0 - 2 /HPF    Squamous Epithelial /HPF Urine <1 0 - 1 /HPF    Mucous Urine Present (A) NEG^Negative /LPF         Assessment and Plan:    This is a 79-year-old female    Patient seen today as an add-on  With complaints of weakness and diarrhea patient with history of metastatic anal cancer      Diarrhea  Order C. Difficile  Patient took Imodium so no diarrhea today  -Give NS bolus hydration day scheduled for NS bolus hydration this week again  -Patient advised to call our clinic if diarrhea returns     Weakness  Most likely secondary to diarrhea and dehydration  Order blood cultures and UA  to rule out infection  Give NS bolus hydration.  Schedule  for another IV bolus hydration this week  -Patient advised to call our clinic if worsening of symptoms.      Metastatic anal adenocarcinoma  Patient of Dr. Wisdom  Had PET  CT scan done today results are pending  -Patient will meet with Dr. Wisdom on 01/06 to discuss the results of PET scan and plan of care      Addendum  CBC is normal, CMP unremarkable, WBC in urine normal.  Patient is not in any acute distress  Patient reports she is feeling much better after hydration.  She is scheduled for NS bolus hydration on Saturday as well  Patient advised to call our clinic if worsening of symptoms        Patient advised to  check temperature frequently in the event of fever chills sweats cough shortness of breath chest pain return of diarrhea abdominal pain nausea vomiting or any changes in health condition patient advised to go to ER or call our clinic       ANDRY Alcazar CNP  [facility]     Chart documentation with Dragon Voice recognition Software. Although reviewed after completion, some words and grammatical errors may remain.          Again, thank you for allowing me to participate in the care of your patient.        Sincerely,        ANDRY Alcazar CNP

## 2019-12-17 NOTE — PROGRESS NOTES
Infusion Nursing Note:  Sara Lehman presents today for IVF, labs.    Patient seen by provider today: Yes: Rasta Patel NP   present during visit today: Not Applicable.    Note: pt and dtr sent home with supplies to collect stool sample at home. .    Intravenous Access:  Lab draw site right ac, Needle type bf, Gauge 23.  Peripheral IV placed.    Treatment Conditions:  Results reviewed, labs MET treatment parameters, ok to proceed with treatment.      Post Infusion Assessment:  Patient tolerated infusion without incident.  Site patent and intact, free from redness, edema or discomfort.  No evidence of extravasations.  Access discontinued per protocol.       Discharge Plan:   Discharge instructions reviewed with: Patient and Family.  Patient and/or family verbalized understanding of discharge instructions and all questions answered.  Patient discharged in stable condition accompanied by: daughter.  Departure Mode: Ambulatory.    Farrah Mcduffie RN

## 2019-12-17 NOTE — PROGRESS NOTES
Oncology/Hematology Visit Note  Dec 17, 2019    Reason for Visit: follow up of metastatic anal adenocarcinoma  Patient seen as add-on today for follow-up of weakness and diarrhea      Interval History:  Patient reports she had diarrhea for 2 days about 3-4 episodes per day.  Medium amounts of diarrhea.  Patient denies blood in stool denies abdominal pain denies fever chills sweats denies cough no shortness of breath denies chest pain  She also reports since diarrhea she has been more weak.  However she is ambulatory and is able to take short walks at home.  Patient denies edema      Review of Systems:  14 point ROS of systems including Constitutional, Eyes, Respiratory, Cardiovascular, Gastroenterology, Genitourinary, Integumentary, Muscularskeletal, Psychiatric were all negative except for pertinent positives noted in my HPI.      Current Outpatient Medications   Medication Sig Dispense Refill     acetaminophen (TYLENOL) 325 MG tablet Take 2 tablets (650 mg) by mouth every 4 hours as needed for mild pain or fever       amLODIPine (NORVASC) 5 MG tablet Take 1 tablet (5 mg) by mouth daily 90 tablet 2     metoprolol succinate ER (TOPROL-XL) 25 MG 24 hr tablet Take 1 tablet (25 mg) by mouth daily 90 tablet 2     ondansetron (ZOFRAN) 8 MG tablet Take 8 mg by mouth every 8 hours as needed for nausea       SUMAtriptan (IMITREX) 50 MG tablet Take 1 tablet (50 mg) by mouth at onset of headache for migraine 18 tablet 1       Physical Examination:  General: The patient is a pleasant female in no acute distress.  /82   Pulse 72   Temp 97.6  F (36.4  C) (Oral)   LMP  (LMP Unknown)   HEENT: EOMI, PERRL. Sclerae are anicteric. Oral mucosa is pink and moist with no lesions or thrush.   Lymph: Neck is supple with no lymphadenopathy in the cervical or supraclavicular areas.   Heart: Regular rate and rhythm.   Lungs: Clear to auscultation bilaterally.   GI: Bowel sounds present, soft, nontender with no palpable  hepatosplenomegaly or masses.   Extremities: No lower extremity edema noted bilaterally.   Skin: No rashes, petechiae, or bruising noted on exposed skin.    Laboratory Data:  Results for orders placed or performed in visit on 12/17/19 (from the past 24 hour(s))   UA with Microscopic reflex to Culture   Result Value Ref Range    Color Urine Light Yellow     Appearance Urine Clear     Glucose Urine Negative NEG^Negative mg/dL    Bilirubin Urine Negative NEG^Negative    Ketones Urine Negative NEG^Negative mg/dL    Specific Gravity Urine 1.002 (L) 1.003 - 1.035    Blood Urine Negative NEG^Negative    pH Urine 5.5 5.0 - 7.0 pH    Protein Albumin Urine Negative NEG^Negative mg/dL    Urobilinogen mg/dL Normal 0.0 - 2.0 mg/dL    Nitrite Urine Negative NEG^Negative    Leukocyte Esterase Urine Trace (A) NEG^Negative    Source Midstream Urine     WBC Urine 3 0 - 5 /HPF    RBC Urine 0 0 - 2 /HPF    Squamous Epithelial /HPF Urine <1 0 - 1 /HPF    Mucous Urine Present (A) NEG^Negative /LPF         Assessment and Plan:    This is a 79-year-old female    Patient seen today as an add-on  With complaints of weakness and diarrhea patient with history of metastatic anal cancer      Diarrhea  Order C. Difficile  Patient took Imodium so no diarrhea today  -Give NS bolus hydration day scheduled for NS bolus hydration this week again  -Patient advised to call our clinic if diarrhea returns     Weakness  Most likely secondary to diarrhea and dehydration  Order blood cultures and UA  to rule out infection  Give NS bolus hydration.  Schedule  for another IV bolus hydration this week  -Patient advised to call our clinic if worsening of symptoms.      Metastatic anal adenocarcinoma  Patient of Dr. Wisdom  Had PET CT scan done today results are pending  -Patient will meet with Dr. Wisdom on 01/06 to discuss the results of PET scan and plan of care      Addendum  CBC is normal, CMP unremarkable, WBC in urine normal.  Patient is not in any acute  distress  Patient reports she is feeling much better after hydration.  She is scheduled for NS bolus hydration on Saturday as well  Patient advised to call our clinic if worsening of symptoms        Patient advised to  check temperature frequently in the event of fever chills sweats cough shortness of breath chest pain return of diarrhea abdominal pain nausea vomiting or any changes in health condition patient advised to go to ER or call our clinic       ANDRY Alcazar CNP  [facility] [place]     Chart documentation with Dragon Voice recognition Software. Although reviewed after completion, some words and grammatical errors may remain.

## 2019-12-21 ENCOUNTER — INFUSION THERAPY VISIT (OUTPATIENT)
Dept: INFUSION THERAPY | Facility: CLINIC | Age: 79
End: 2019-12-21
Attending: INTERNAL MEDICINE
Payer: COMMERCIAL

## 2019-12-21 VITALS
SYSTOLIC BLOOD PRESSURE: 129 MMHG | HEART RATE: 72 BPM | TEMPERATURE: 97.5 F | OXYGEN SATURATION: 94 % | RESPIRATION RATE: 16 BRPM | DIASTOLIC BLOOD PRESSURE: 82 MMHG

## 2019-12-21 DIAGNOSIS — C21.1 MALIGNANT NEOPLASM OF ANAL CANAL (H): Primary | ICD-10-CM

## 2019-12-21 PROCEDURE — 25800030 ZZH RX IP 258 OP 636: Performed by: NURSE PRACTITIONER

## 2019-12-21 PROCEDURE — 96360 HYDRATION IV INFUSION INIT: CPT

## 2019-12-21 RX ADMIN — SODIUM CHLORIDE 1000 ML: 9 INJECTION, SOLUTION INTRAVENOUS at 11:41

## 2019-12-21 ASSESSMENT — PAIN SCALES - GENERAL: PAINLEVEL: NO PAIN (0)

## 2019-12-21 NOTE — PROGRESS NOTES
Infusion Nursing Note:  Sara DAWSON Lehman presents today for IVF.    Patient seen by provider today: No   present during visit today: Not Applicable.    Note: N/A.    Intravenous Access:  Peripheral IV placed.    Treatment Conditions:  Not Applicable.      Post Infusion Assessment:  Patient tolerated infusion without incident.  Site patent and intact, free from redness, edema or discomfort.  No evidence of extravasations.       Discharge Plan:   Discharge instructions reviewed with: Family.  Patient and/or family verbalized understanding of discharge instructions and all questions answered.  Copy of AVS reviewed with patient and/or family.  Patient will return 1/6/20  for next appointment.  Patient discharged in stable condition accompanied by:  and daughter.  Departure Mode: Ambulatory.    Stu Rodriguez RN

## 2019-12-23 LAB
BACTERIA SPEC CULT: NO GROWTH
Lab: NORMAL
SPECIMEN SOURCE: NORMAL

## 2019-12-24 NOTE — H&P
Pre-Endoscopy History and Physical   Sara Lehman MRN# [id number]   YOB: 1940 Age: 78 year old   Date of Procedure: 4/9/2019   Primary care provider: Kaelyn Radford   Type of Endoscopy: colonoscopy   Reason for Procedure: screening   Type of Anesthesia Anticipated: Moderate Sedation   HPI:   Sara is a 78 year old female for screening colonoscopy.  She has had a colonoscopy before but thinks it was a long time ago.  She denies abdominal pain, nausea/vomiting, changes in bowel habits and unintentional weight loss.  She does report some BRBPR as well as a perianal ulcer/sore.  She denies a FH of CRC.    Allergies   Allergen Reactions     Ace Inhibitors Cough     Aleve [Naproxen Sodium] GI Disturbance     Asa Buff (Mag [Aspirin Buffered] GI Disturbance     Internal bleeding     Atorvastatin Calcium GI Disturbance     Celebrex [Celecoxib] GI Disturbance     Codeine Sulfate Hives     Pravastatin GI Disturbance     Simvastatin GI Disturbance      Prior to Admission Medications   Prescriptions Last Dose Informant Patient Reported? Taking?   SUMAtriptan (IMITREX) 100 MG tablet Past Month at Unknown time  No Yes   Sig: TAKE ONE TABLET BY MOUTH AT ONSET OF HEADACHE FOR MIGRAINE, MAY REPEAT IN 2 HOURS IF NEEDED (MAX OF 2 TABLETS PER DAY)   albuterol (VENTOLIN HFA) 108 (90 Base) MCG/ACT inhaler 4/9/2019 at Unknown time  No Yes   Sig: INHALE TWO PUFFS BY MOUTH EVERY SIX HOURS AS NEEDED FOR SHORTNESS OF BREATH   amLODIPine (NORVASC) 5 MG tablet More than a month at Unknown time  No No   Sig: Take 1 tablet (5 mg) by mouth once daily   biotin 1000 MCG TABS tablet   Yes No   Sig: Take 1 tablet (1,000 mcg) by mouth daily   hydrochlorothiazide (MICROZIDE) 12.5 MG capsule More than a month at Unknown time  Yes No   Sig: Take 1 capsule (12.5 mg) by mouth daily   ibuprofen 200 MG capsule   Yes No   Sig: Take 200 mg by mouth as needed for pain   metoprolol succinate ER (TOPROL-XL) 25 MG 24 hr tablet More  "than a month at Unknown time  Yes No   Sig: Take 1 tablet (25 mg) by mouth daily   triamcinolone (KENALOG) 0.1 % cream   No No   Sig: Apply sparingly to affected area three times daily as needed      Facility-Administered Medications: None      Patient Active Problem List   Diagnosis     Migraine headache     Neck pain     Nephrolithiasis     COPD (chronic obstructive pulmonary disease) (H)     Benign essential hypertension      Past Medical History:   Diagnosis Date     Benign essential hypertension 8/30/2017     Bronchospasm      COPD (chronic obstructive pulmonary disease) (H) 5/28/2014     Hypertension      Migraine      Neck pain      Nephrolithiasis       Past Surgical History:   Procedure Laterality Date     LITHOTRIPSY      Lithotrypsy     ORTHOPEDIC SURGERY      right knee exploration     TUBAL LIGATION        Social History     Tobacco Use     Smoking status: Former Smoker     Packs/day: 0.50     Types: Cigarettes     Start date: 01/2016     Last attempt to quit: 4/7/2016     Years since quitting: 3.0     Smokeless tobacco: Never Used   Substance Use Topics     Alcohol use: Yes     Alcohol/week: 0.0 oz     Comment: socially only- one per month      Family History   Problem Relation Age of Onset     Cancer Mother 76        uterine     Cerebrovascular Disease Father 75     C.A.D. Brother 66      PHYSICAL EXAM:   /73   Resp 16   SpO2 98%  Estimated body mass index is 24.16 kg/m  as calculated from the following:    Height as of 4/1/19: 1.537 m (5' 0.5\").    Weight as of 4/1/19: 57.1 kg (125 lb 12.8 oz).   RESP: lungs clear to auscultation - no rales, rhonchi or wheezes   CV: regular rates and rhythm   ASA Class 2 - Mild systemic disease    Assessment: 79 y/o woman for screening colonoscopy    Plan: Colonoscopy with moderate sedation.  Risks of the procedure were discussed including, but not limited to, bleeding, perforation and missed lesions.  Patient understands and is willing to proceed.    Saleem " MD Valeria ....................  4/9/2019   8:07 AM  Colon and Rectal Surgery Staff  [phone]     Stable.

## 2020-01-06 ENCOUNTER — ONCOLOGY VISIT (OUTPATIENT)
Dept: ONCOLOGY | Facility: CLINIC | Age: 80
End: 2020-01-06
Attending: INTERNAL MEDICINE
Payer: COMMERCIAL

## 2020-01-06 VITALS
DIASTOLIC BLOOD PRESSURE: 78 MMHG | HEIGHT: 65 IN | SYSTOLIC BLOOD PRESSURE: 131 MMHG | WEIGHT: 107 LBS | OXYGEN SATURATION: 96 % | RESPIRATION RATE: 16 BRPM | HEART RATE: 77 BPM | BODY MASS INDEX: 17.83 KG/M2

## 2020-01-06 DIAGNOSIS — C21.1 MALIGNANT NEOPLASM OF ANAL CANAL (H): Primary | ICD-10-CM

## 2020-01-06 PROCEDURE — G0463 HOSPITAL OUTPT CLINIC VISIT: HCPCS

## 2020-01-06 PROCEDURE — 99215 OFFICE O/P EST HI 40 MIN: CPT | Performed by: INTERNAL MEDICINE

## 2020-01-06 ASSESSMENT — MIFFLIN-ST. JEOR: SCORE: 966.23

## 2020-01-06 ASSESSMENT — PAIN SCALES - GENERAL: PAINLEVEL: NO PAIN (0)

## 2020-01-06 NOTE — LETTER
1/6/2020         RE: Sara Lehman  [address] [address] [address]        Dear Colleague,    Thank you for referring your patient, Sara Lehman, to the [facility]. Please see a copy of my visit note below.    EDUCATION CHEMOTHERAPY INFUSION    Discussed with patient, spouse, and daughter information regarding FOLFOX w/ Avastin every 2 week infusions. Went over side affects of medications, as self care while on chemotherapy.   Informed patient and family when he should call the triage nurse at clinic or seek medical attention if you have chills and/or temperature greater than or equal to 100.5, uncontrolled nausea/vomiting, diarrhea, constipation, dizziness, shortness of breath, chest pain, heart palpitations, weakness or any other new or concerning symptoms, questions or concerns.  You can not have any live virus vaccines prior to or during treatment or up to 6 months post infusion.  If you have an upcoming surgery, medical procedure or dental procedure during treatment, this should be discussed with your ordering physician and your surgeon/dentist.  If you are having any concerning symptom, if you are unsure if you should get your next infusion or wish to speak to a provider before your next infusion, please call your care coordinator or triage nurse at your clinic to notify them so we can adequately serve you.       Giulia Larson RN      Visit Date:   01/06/2020      SUBJECTIVE:  Ms. Lehman is a 79-year-old female with metastatic anal adenocarcinoma.  The patient follows up with Dr. Wisdom.  I am seeing the patient in Dr. Wisdom's absence.  The patient has been treated with radiation and Xeloda between May and June 2019.  She received radiation to the anal canal and inguinal lymph nodes.  The patient also received SBRT to left upper lobe metastasis in 10/2019.      The patient is here for followup.  The patient still has some fatigue.  She is not in pain.  No headache.  No  dizziness, no chest pain.  No shortness of breath.  No cough.  No nausea or vomiting.  Appetite is fair.  No urinary or bowel complaints.  No abnormal bleeding.      PET scan was done on 12/16/2019.  It reveals some progression of disease.  There is new hypermetabolic right middle lobe lung nodule.  The previously hypermetabolic in left upper lobe has decreased in size and metabolism.  There is improvement in bilateral inguinal lymphadenopathy and also in the anal canal mass.  There is slightly increased hypermetabolic activity in the left adrenal nodule suspicious for metastasis.      PHYSICAL EXAMINATION:   GENERAL:  She was alert, oriented x3.   VITAL SIGNS:  Reviewed.   ECOG PS of 2.   The rest of the systems not examined.      ASSESSMENT:   1.  A 79-year-old female with metastatic anal canal adenocarcinoma.  MSI is stable.  Disease is progressing.   2.  Fatigue.      PLAN:   1.  I had a long discussion with the patient and family.  PET scan was reviewed in detail.  I told her there is progression of disease with new right lung hypermetabolic nodule.  There is also some increased activity in the left adrenal nodule.  Other areas have improved including left lung nodule and inguinal area.      The patient at this time is asymptomatic from her metastatic disease.  She does have fatigue which is due to multiple factors including her age and recent radiation and Xeloda.        Discussed regarding further treatment.  Treatment is indicated because of progression of disease.  We discussed regarding different options.      One option would be to give her chemotherapy with FOLFOX and Avastin as this is anal adenocarcinoma.  The patient is worried about chemotherapy and its side effects.      Inquired about radiation.  One potential option is to give radiation to the right lung nodule and consider radiation to the left adrenal nodule.  After radiation, patient can be observed and chemotherapy can be started when there is  progression of disease.      We also discussed regarding option of doing short time for followup with CT scan in 2 months' time.  Systemic treatment can be started when there is a significant increase in disease burden.  These were all discussed in detail.      The patient and family had multiple questions regarding all this.  They want to think about all these options and call us.  We will wait for their call in the next few days.      2.  If the patient goes for FOLFOX chemotherapy, she will need a Port-A-Cath.   3.  The patient's family advised to call us with any questions or concerns.  We will await further decision regarding chemotherapy versus radiation.      Total face to face time spent was 40 minutes, most of time spent in counseling and coordination of care.         MAURO MACEDO MD             D: 2020   T: 2020   MT: VITA      Name:     ELSY MCGOWAN   MRN:      [id number]        Account:      [id number]   :      1940           Visit Date:   2020      Document: [id number]       Again, thank you for allowing me to participate in the care of your patient.        Sincerely,        Mauro Macedo MD

## 2020-01-06 NOTE — PATIENT INSTRUCTIONS
1. Information regarding FOLFLOX and avastin.  2. Patient will call us regarding chemotherapy or radiation.

## 2020-01-07 NOTE — PROGRESS NOTES
Visit Date:   01/06/2020      SUBJECTIVE:  Ms. Lehman is a 79-year-old female with metastatic anal adenocarcinoma.  The patient follows up with Dr. Wisdom.  I am seeing the patient in Dr. Wisdom's absence.  The patient has been treated with radiation and Xeloda between May and June 2019.  She received radiation to the anal canal and inguinal lymph nodes.  The patient also received SBRT to left upper lobe metastasis in 10/2019.      The patient is here for followup.  The patient still has some fatigue.  She is not in pain.  No headache.  No dizziness. No chest pain.  No shortness of breath.  No cough.  No nausea or vomiting.  Appetite is fair.  No urinary or bowel complaints.  No abnormal bleeding.      PET scan was done on 12/16/2019.  It reveals some progression of disease.  There is new hypermetabolic right middle lobe lung nodule.  The previously hypermetabolic in left upper lobe has decreased in size and metabolism.  There is improvement in bilateral inguinal lymphadenopathy and also in the anal canal mass.  There is slightly increased hypermetabolic activity in the left adrenal nodule suspicious for metastasis.      PHYSICAL EXAMINATION:   GENERAL:  She was alert, oriented x3.   VITAL SIGNS:  Reviewed. ECOG PS of 2.   The rest of the systems not examined.      ASSESSMENT:   1.  A 79-year-old female with metastatic anal canal adenocarcinoma.  MSI stable.  Disease is progressing.   2.  Fatigue.      PLAN:   1.  I had a long discussion with the patient and family.  PET scan was reviewed in detail.  I told her there is progression of disease with new right lung hypermetabolic nodule.  There is also some increased activity in the left adrenal nodule.  Other areas have improved including left lung nodule and inguinal area.      The patient at this time is asymptomatic from her metastatic disease.  She does have fatigue which is due to multiple factors including her age and recent radiation and Xeloda.        Discussed  regarding further treatment.  Treatment is indicated because of progression of disease.  We discussed regarding different options.      One option would be to give her chemotherapy with FOLFOX and Avastin as this is anal adenocarcinoma.  The patient is worried about chemotherapy and its side effects.      Inquired about radiation.  One potential option is to give radiation to the right lung nodule and consider radiation to the left adrenal nodule.  After radiation, patient can be observed and chemotherapy can be started when there is progression of disease.      We also discussed regarding option of doing short time for followup with CT scan in 2 months' time.  Systemic treatment can be started when there is a significant increase in disease burden.  These were all discussed in detail.      The patient and family had multiple questions regarding all this.  They want to think about all these options and call us.  We will wait for their call in the next few days.      2.  If the patient goes for FOLFOX chemotherapy, she will need a Port-A-Cath.     3.  The patient's family advised to call us with any questions or concerns.       Total face to face time spent was 40 minutes, most of time spent in counseling and coordination of care.         MAURO MACEDO MD             D: 2020   T: 2020   MT: VITA      Name:     ELSY MCGOWAN   MRN:      [id number]        Account:      [id number]   :      1940           Visit Date:   2020      Document: [id number]

## 2020-01-10 NOTE — PROGRESS NOTES
EDUCATION CHEMOTHERAPY INFUSION    Discussed with patient, spouse, and daughter information regarding FOLFOX w/ Avastin every 2 week infusions. Went over side affects of medications, as self care while on chemotherapy.   Informed patient and family when he should call the triage nurse at clinic or seek medical attention if you have chills and/or temperature greater than or equal to 100.5, uncontrolled nausea/vomiting, diarrhea, constipation, dizziness, shortness of breath, chest pain, heart palpitations, weakness or any other new or concerning symptoms, questions or concerns.  You can not have any live virus vaccines prior to or during treatment or up to 6 months post infusion.  If you have an upcoming surgery, medical procedure or dental procedure during treatment, this should be discussed with your ordering physician and your surgeon/dentist.  If you are having any concerning symptom, if you are unsure if you should get your next infusion or wish to speak to a provider before your next infusion, please call your care coordinator or triage nurse at your clinic to notify them so we can adequately serve you.       Giulia Larson RN

## 2020-01-14 ENCOUNTER — TELEPHONE (OUTPATIENT)
Dept: ONCOLOGY | Facility: CLINIC | Age: 80
End: 2020-01-14

## 2020-01-14 DIAGNOSIS — C21.1 MALIGNANT NEOPLASM OF ANAL CANAL (H): Primary | ICD-10-CM

## 2020-01-14 NOTE — TELEPHONE ENCOUNTER
Patient has been scheduled for Radiation Consult with Dr Monroy at [facility] [place] 1-22-20 at 1:30pm. Patient daughter informed to call back after visit and schedule Return post Radiation with Dr Wisdom.

## 2020-01-27 ENCOUNTER — TELEPHONE (OUTPATIENT)
Dept: ONCOLOGY | Facility: CLINIC | Age: 80
End: 2020-01-27

## 2020-01-31 ENCOUNTER — TELEPHONE (OUTPATIENT)
Dept: ONCOLOGY | Facility: CLINIC | Age: 80
End: 2020-01-31

## 2020-01-31 NOTE — TELEPHONE ENCOUNTER
Called patient to follow-up on no-show appointment on 1/27/2020.  No answer, so voicemail left to call the clinic.    Sebastian De Paz, DOMINICKN, RN, OCN

## 2020-03-03 NOTE — PROGRESS NOTES
[facility]    Hematology/Oncology Established Patient Follow-up Note      Today's Date: 03/04/20    Reason for Follow-up: Metastatic anal adenocarcinoma.    HISTORY OF PRESENT ILLNESS: Sara Lehman is a 79 year old female who presents with the following oncologic history:  1.  4/1/2019: Initially presented to her primary care provider with complaints of hemorrhoids intermittently for 2 months with associated pain and itching as well as blood in the underwear at night and intermixed with stool, no melena.  No associated weight loss or night sweats.  Reports occasional constipation.  Physical exam by primary care provider showed an anal mass about 1 inch in size with perianal erythema and skin breakdown.  2.  4/9/2019: Colonoscopy showed a perianal mass in the left lateral quadrant, severe diverticulosis in the sigmoid colon, descending colon, and ascending colon.  Biopsy of the anal mass showed invasive moderately differentiated adenocarcinoma with normal mismatch repair protein expression.  Specimen consisted of multiple fragments of necrotic tissue with dysplastic appearing glands with foci of invasion.  Surface mucosa is not present and therefore precludes differentiation between colorectal carcinoma or perianal glandular carcinoma.  Depth of invasion cannot be assessed.  KRAS wild-type.  3.  4/16/2019: CT chest/abdomen/pelvis with contrast showed a 1.3 cm left upper lobe lung nodule concerning for metastatic disease as well as 2 tiny nodules in the right lower lobe near the diaphragm.  Diffusely aneurysmal abdominal aorta measures up to 3.2 cm.  Prominent bilateral inguinal adenopathy with a lymph node in the upper right thigh adjacent to the common femoral vessels measures 2.9 x 2.3 cm.  Lymph node in the left medial upper thigh adjacent to the common femoral vessels measures 2.6 x 3.5 cm.  No evidence of obstruction.  Bony structures showed no destruction.  4.  4/19/2019: Consultation  with Dr. Miller Casas.  Due to high suspicion of metastatic disease to the lung and bilateral inguinal regions, patient referred to medical oncology and radiation oncology.  5.  4/24/2019: PET/CT scan showed hypermetabolic posterior left upper lobe mid chest nodule adjacent to the major fissure measuring 1.3 x 1.2 cm and emphysematous changes.  Bilateral renal cysts present.  Nonobstructing stone of the lower right kidney measures 0.3 cm.  Nodular thickening of the left adrenal is 0.7 cm.  Intense hypermetabolism at the anal level with heterogeneous appearing soft tissue with SUV max 19.5.  Enlarged inguinal hypermetabolic lymph nodes on the right measuring 2.9 x 2.1 cm and on the left 3.6 x 2.4 cm.  A distal infrarenal abdominal aorta measures 3.4 cm.  6.  5/6/2019: Left lung mass biopsy performed and showed discordant immunohistochemical findings.  Lesion showed a strong reactivity for cytokeratin 7 and focal staining for P40.  Patient's anal adenocarcinoma reportedly positive for both immunohistochemical staining pattern not typical for primary lung tumor and metastatic disease cannot be excluded.  7.  5/9/2019-6/25/2019: Completed radiation therapy to the anal cancer and inguinal lymph nodes.  She received concurrent radiosensitizing chemotherapy with capecitabine.  8.  8/28/2019: PET/CT scan showed hypermetabolic nodule in the left upper lobe measuring 1.5 x 1.4 cm with SUV max 10.5, increased in size and metabolic activity from 1.4 x 1.2 cm with SUV max 9.5 on 4/24/2019 PET/CT scan.  A 0.5 cm pleural-based nodule with low metabolic activity in the left lower lobe is unchanged.  A 3.4 cm infrarenal abdominal aortic aneurysm is present.  The previously noted hypermetabolic anal mass has significantly improved with SUV max 5.1, decreased from SUV max 24.6, consistent with response to treatment.  The bilateral inguinal lymphadenopathy has markedly improved on the current study with nodes measuring up to 1.6 x 1.1  cm with SUV max 3.5, decreased from 4 x 2.6 cm with SUV max 14, consistent with response to treatment.  9.  10/21/2019: Completed stereotactic radiation to the left upper lobe lung nodule/metastasis in 5 fractions under the care of Dr. Clay Monroy.  10. 12/16/2019: PET scan showed new hypermetabolic medial right middle lobe pulmonary nodule; left upper lobe nodule decreased in size and metabolism; improvement of bilateral inguinal adenopathy with decreased sizes and metabolism; hypermetabolism slightly increased at the left adrenal nodule but stable in size; decreased hypermetabolism at anal level; new small focal hypermetabolism within muscle anterior to left hip joint with no mass effect.  11. 2/07/2020: Completed stereotactic radiation to the right middle lobe nodule and left adrenal nodule in 5 fractions.    INTERIM HISTORY:  Trixie reports very mild fatigue but no new anal pain, chest pain, cough, or recent fevers or chills.  She also denies any dysuria.  She had a small amount discomfort over the lower abdomen but this is intermittent and not persistent.  It is not associated with any bowel issues.      REVIEW OF SYSTEMS:   14 point ROS was reviewed and is negative other than as noted above in HPI.       HOME MEDICATIONS:  Current Outpatient Medications   Medication Sig Dispense Refill     acetaminophen (TYLENOL) 325 MG tablet Take 2 tablets (650 mg) by mouth every 4 hours as needed for mild pain or fever       amLODIPine (NORVASC) 5 MG tablet Take 1 tablet (5 mg) by mouth daily 90 tablet 2     metoprolol succinate ER (TOPROL-XL) 25 MG 24 hr tablet Take 1 tablet (25 mg) by mouth daily 90 tablet 2     ondansetron (ZOFRAN) 8 MG tablet Take 8 mg by mouth every 8 hours as needed for nausea       SUMAtriptan (IMITREX) 50 MG tablet Take 1 tablet (50 mg) by mouth at onset of headache for migraine 18 tablet 1         ALLERGIES:  Allergies   Allergen Reactions     Ace Inhibitors Cough     Aleve [Naproxen Sodium] GI  Disturbance     Asa Buff (Mag [Aspirin Buffered] GI Disturbance     Internal bleeding     Atorvastatin Calcium GI Disturbance     Celebrex [Celecoxib] GI Disturbance     Codeine Sulfate Hives     Pravastatin GI Disturbance     Simvastatin GI Disturbance         PAST MEDICAL HISTORY:  Past Medical History:   Diagnosis Date     Benign essential hypertension 8/30/2017     Bronchospasm      COPD (chronic obstructive pulmonary disease) (H) 5/28/2014     Hypertension      Migraine      Neck pain      Nephrolithiasis          PAST SURGICAL HISTORY:  Past Surgical History:   Procedure Laterality Date     COLONOSCOPY N/A 4/9/2019    Procedure: COMBINED COLONOSCOPY, SINGLE OR MULTIPLE BIOPSY/POLYPECTOMY BY BIOPSY;  Surgeon: Saleem Shaw MD;  Location:  GI     LITHOTRIPSY      Lithotrypsy     ORTHOPEDIC SURGERY      right knee exploration     TUBAL LIGATION           SOCIAL HISTORY:  Social History     Socioeconomic History     Marital status:      Spouse name: Not on file     Number of children: Not on file     Years of education: Not on file     Highest education level: Not on file   Occupational History     Not on file   Social Needs     Financial resource strain: Not on file     Food insecurity:     Worry: Not on file     Inability: Not on file     Transportation needs:     Medical: Not on file     Non-medical: Not on file   Tobacco Use     Smoking status: Former Smoker     Packs/day: 0.50     Types: Cigarettes     Start date: 01/2016     Last attempt to quit: 4/7/2016     Years since quitting: 3.9     Smokeless tobacco: Never Used   Substance and Sexual Activity     Alcohol use: Yes     Alcohol/week: 0.0 standard drinks     Comment: socially only- one per month     Drug use: No     Sexual activity: Yes     Partners: Male     Birth control/protection: Post-menopausal   Lifestyle     Physical activity:     Days per week: Not on file     Minutes per session: Not on file     Stress: Not on file   Relationships  "    Social connections:     Talks on phone: Not on file     Gets together: Not on file     Attends Anabaptism service: Not on file     Active member of club or organization: Not on file     Attends meetings of clubs or organizations: Not on file     Relationship status: Not on file     Intimate partner violence:     Fear of current or ex partner: Not on file     Emotionally abused: Not on file     Physically abused: Not on file     Forced sexual activity: Not on file   Other Topics Concern     Parent/sibling w/ CABG, MI or angioplasty before 65F 55M? Not Asked   Social History Narrative     Not on file         FAMILY HISTORY:  Family History   Problem Relation Age of Onset     Cancer Mother 76        uterine     Cerebrovascular Disease Father 75     C.A.D. Brother 66         PHYSICAL EXAM:  Vital signs:  /82   Pulse 86   Temp 97.8  F (36.6  C)   Resp 16   Ht 1.651 m (5' 5\")   Wt 52.7 kg (116 lb 3.2 oz)   LMP  (LMP Unknown)   SpO2 97%   BMI 19.34 kg/m     ECO  GENERAL/CONSTITUTIONAL: No acute distress.  Exam deferred for detailed discussion.    LABS:  CBC RESULTS:   Recent Labs   Lab Test 10/21/19  1455   WBC 4.4   RBC 4.51   HGB 14.1   HCT 43.3   MCV 96   MCH 31.3   MCHC 32.6   RDW 12.9        Last Comprehensive Metabolic Panel:  Sodium   Date Value Ref Range Status   2019 134 133 - 144 mmol/L Final     Potassium   Date Value Ref Range Status   2019 4.0 3.4 - 5.3 mmol/L Final     Chloride   Date Value Ref Range Status   2019 104 94 - 109 mmol/L Final     Carbon Dioxide   Date Value Ref Range Status   2019 26 20 - 32 mmol/L Final     Anion Gap   Date Value Ref Range Status   2019 4 3 - 14 mmol/L Final     Glucose   Date Value Ref Range Status   2019 93 70 - 99 mg/dL Final     Urea Nitrogen   Date Value Ref Range Status   2019 14 7 - 30 mg/dL Final     Creatinine   Date Value Ref Range Status   2019 0.71 0.52 - 1.04 mg/dL Final     GFR Estimate "   Date Value Ref Range Status   12/17/2019 81 >60 mL/min/[1.73_m2] Final     Comment:     Non  GFR Calc  Starting 12/18/2018, serum creatinine based estimated GFR (eGFR) will be   calculated using the Chronic Kidney Disease Epidemiology Collaboration   (CKD-EPI) equation.       Calcium   Date Value Ref Range Status   12/17/2019 9.6 8.5 - 10.1 mg/dL Final     Bilirubin Total   Date Value Ref Range Status   12/17/2019 0.5 0.2 - 1.3 mg/dL Final     Alkaline Phosphatase   Date Value Ref Range Status   12/17/2019 73 40 - 150 U/L Final     ALT   Date Value Ref Range Status   12/17/2019 15 0 - 50 U/L Final     AST   Date Value Ref Range Status   12/17/2019 18 0 - 45 U/L Final     PATHOLOGY:  Reviewed as per HPI.    IMAGING:  Reviewed as per HPI.    ASSESSMENT/PLAN:  Sara Lehman is a 79 year old female with the following issues:  1.  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  Left lung nodule, metastasis versus primary non-small cell carcinoma  -Sara tolerated radiosensitizing capecitabine with concurrent radiation moderately well.  She completed radiation on 6/25/2019.  -She subsequently underwent stereotactic radiation to the left lung metastasis, completing her treatment 10/21/2019. However PET scan from 12/2019 showed recurrent metastatic disease to the right middle lobe of the lung and possibly left adrenal nodule.  Therefore, she subsequently underwent palliative stereotactic radiation to the right lung nodule and left adrenal nodule, completed on 2/07/2020.  She tolerated this well.  -Testing for KRAS mutation showed wild-type.  May consider adding either bevacizumab or an epidermal growth factor receptor inhibitor such as panitumumab to FOLFOX for future systemic therapy if needed.  However, would be cautious with side effects of systemic therapy.  I reiterated this today.  -She is not keen on systemic therapy, so we will  continue with observation.  -Plan for repeat PET scan in 1 month.  -If next scan shows recurrent or progressive metastatic disease, will consider mFOLFOX with bevacizumab or panitumumab.  We would need to decide carefully on what she could tolerate if we need to consider systemic therapy.    Return in 1 month    Rhona Wisdom MD  Hematology/Oncology  [facility]    I spent a total of 25 minutes with the patient, with greater than 50% of the time in counseling and coordination of care.

## 2020-03-04 ENCOUNTER — ONCOLOGY VISIT (OUTPATIENT)
Dept: ONCOLOGY | Facility: CLINIC | Age: 80
End: 2020-03-04
Attending: INTERNAL MEDICINE
Payer: COMMERCIAL

## 2020-03-04 VITALS
BODY MASS INDEX: 19.36 KG/M2 | TEMPERATURE: 97.8 F | WEIGHT: 116.2 LBS | DIASTOLIC BLOOD PRESSURE: 82 MMHG | SYSTOLIC BLOOD PRESSURE: 122 MMHG | HEART RATE: 86 BPM | OXYGEN SATURATION: 97 % | RESPIRATION RATE: 16 BRPM | HEIGHT: 65 IN

## 2020-03-04 DIAGNOSIS — C78.02 MALIGNANT NEOPLASM METASTATIC TO LEFT LUNG (H): ICD-10-CM

## 2020-03-04 DIAGNOSIS — C21.1 MALIGNANT NEOPLASM OF ANAL CANAL (H): Primary | ICD-10-CM

## 2020-03-04 DIAGNOSIS — C78.01 MALIGNANT NEOPLASM METASTATIC TO RIGHT LUNG (H): ICD-10-CM

## 2020-03-04 DIAGNOSIS — C79.72 MALIGNANT NEOPLASM METASTATIC TO LEFT ADRENAL GLAND (H): ICD-10-CM

## 2020-03-04 PROCEDURE — 99214 OFFICE O/P EST MOD 30 MIN: CPT | Performed by: INTERNAL MEDICINE

## 2020-03-04 PROCEDURE — G0463 HOSPITAL OUTPT CLINIC VISIT: HCPCS

## 2020-03-04 ASSESSMENT — MIFFLIN-ST. JEOR: SCORE: 1007.96

## 2020-03-04 ASSESSMENT — PAIN SCALES - GENERAL: PAINLEVEL: NO PAIN (0)

## 2020-03-04 NOTE — LETTER
3/4/2020         RE: Sara Lehman  [id number] [facility] [place] [address]        Dear Colleague,    Thank you for referring your patient, Sara Lehman, to the [facility]. Please see a copy of my visit note below.    [facility]    Hematology/Oncology Established Patient Follow-up Note      Today's Date: 03/04/20    Reason for Follow-up: Metastatic anal adenocarcinoma.    HISTORY OF PRESENT ILLNESS: Sara Lehman is a 79 year old female who presents with the following oncologic history:  1.  4/1/2019: Initially presented to her primary care provider with complaints of hemorrhoids intermittently for 2 months with associated pain and itching as well as blood in the underwear at night and intermixed with stool, no melena.  No associated weight loss or night sweats.  Reports occasional constipation.  Physical exam by primary care provider showed an anal mass about 1 inch in size with perianal erythema and skin breakdown.  2.  4/9/2019: Colonoscopy showed a perianal mass in the left lateral quadrant, severe diverticulosis in the sigmoid colon, descending colon, and ascending colon.  Biopsy of the anal mass showed invasive moderately differentiated adenocarcinoma with normal mismatch repair protein expression.  Specimen consisted of multiple fragments of necrotic tissue with dysplastic appearing glands with foci of invasion.  Surface mucosa is not present and therefore precludes differentiation between colorectal carcinoma or perianal glandular carcinoma.  Depth of invasion cannot be assessed.  KRAS wild-type.  3.  4/16/2019: CT chest/abdomen/pelvis with contrast showed a 1.3 cm left upper lobe lung nodule concerning for metastatic disease as well as 2 tiny nodules in the right lower lobe near the diaphragm.  Diffusely aneurysmal abdominal aorta measures up to 3.2 cm.  Prominent bilateral inguinal adenopathy with a lymph node in the upper right thigh adjacent to the  common femoral vessels measures 2.9 x 2.3 cm.  Lymph node in the left medial upper thigh adjacent to the common femoral vessels measures 2.6 x 3.5 cm.  No evidence of obstruction.  Bony structures showed no destruction.  4.  4/19/2019: Consultation with Dr. Miller Casas.  Due to high suspicion of metastatic disease to the lung and bilateral inguinal regions, patient referred to medical oncology and radiation oncology.  5.  4/24/2019: PET/CT scan showed hypermetabolic posterior left upper lobe mid chest nodule adjacent to the major fissure measuring 1.3 x 1.2 cm and emphysematous changes.  Bilateral renal cysts present.  Nonobstructing stone of the lower right kidney measures 0.3 cm.  Nodular thickening of the left adrenal is 0.7 cm.  Intense hypermetabolism at the anal level with heterogeneous appearing soft tissue with SUV max 19.5.  Enlarged inguinal hypermetabolic lymph nodes on the right measuring 2.9 x 2.1 cm and on the left 3.6 x 2.4 cm.  A distal infrarenal abdominal aorta measures 3.4 cm.  6.  5/6/2019: Left lung mass biopsy performed and showed discordant immunohistochemical findings.  Lesion showed a strong reactivity for cytokeratin 7 and focal staining for P40.  Patient's anal adenocarcinoma reportedly positive for both immunohistochemical staining pattern not typical for primary lung tumor and metastatic disease cannot be excluded.  7.  5/9/2019-6/25/2019: Completed radiation therapy to the anal cancer and inguinal lymph nodes.  She received concurrent radiosensitizing chemotherapy with capecitabine.  8.  8/28/2019: PET/CT scan showed hypermetabolic nodule in the left upper lobe measuring 1.5 x 1.4 cm with SUV max 10.5, increased in size and metabolic activity from 1.4 x 1.2 cm with SUV max 9.5 on 4/24/2019 PET/CT scan.  A 0.5 cm pleural-based nodule with low metabolic activity in the left lower lobe is unchanged.  A 3.4 cm infrarenal abdominal aortic aneurysm is present.  The previously noted  hypermetabolic anal mass has significantly improved with SUV max 5.1, decreased from SUV max 24.6, consistent with response to treatment.  The bilateral inguinal lymphadenopathy has markedly improved on the current study with nodes measuring up to 1.6 x 1.1 cm with SUV max 3.5, decreased from 4 x 2.6 cm with SUV max 14, consistent with response to treatment.  9.  10/21/2019: Completed stereotactic radiation to the left upper lobe lung nodule/metastasis in 5 fractions under the care of Dr. Clay Monroy.  10. 12/16/2019: PET scan showed new hypermetabolic medial right middle lobe pulmonary nodule; left upper lobe nodule decreased in size and metabolism; improvement of bilateral inguinal adenopathy with decreased sizes and metabolism; hypermetabolism slightly increased at the left adrenal nodule but stable in size; decreased hypermetabolism at anal level; new small focal hypermetabolism within muscle anterior to left hip joint with no mass effect.  11. 2/07/2020: Completed stereotactic radiation to the right middle lobe nodule and left adrenal nodule in 5 fractions.    INTERIM HISTORY:  Trixie reports very mild fatigue but no new anal pain, chest pain, cough, or recent fevers or chills.  She also denies any dysuria.  She had a small amount discomfort over the lower abdomen but this is intermittent and not persistent.  It is not associated with any bowel issues.      REVIEW OF SYSTEMS:   14 point ROS was reviewed and is negative other than as noted above in HPI.       HOME MEDICATIONS:  Current Outpatient Medications   Medication Sig Dispense Refill     acetaminophen (TYLENOL) 325 MG tablet Take 2 tablets (650 mg) by mouth every 4 hours as needed for mild pain or fever       amLODIPine (NORVASC) 5 MG tablet Take 1 tablet (5 mg) by mouth daily 90 tablet 2     metoprolol succinate ER (TOPROL-XL) 25 MG 24 hr tablet Take 1 tablet (25 mg) by mouth daily 90 tablet 2     ondansetron (ZOFRAN) 8 MG tablet Take 8 mg by mouth every 8  hours as needed for nausea       SUMAtriptan (IMITREX) 50 MG tablet Take 1 tablet (50 mg) by mouth at onset of headache for migraine 18 tablet 1         ALLERGIES:  Allergies   Allergen Reactions     Ace Inhibitors Cough     Aleve [Naproxen Sodium] GI Disturbance     Asa Buff (Mag [Aspirin Buffered] GI Disturbance     Internal bleeding     Atorvastatin Calcium GI Disturbance     Celebrex [Celecoxib] GI Disturbance     Codeine Sulfate Hives     Pravastatin GI Disturbance     Simvastatin GI Disturbance         PAST MEDICAL HISTORY:  Past Medical History:   Diagnosis Date     Benign essential hypertension 8/30/2017     Bronchospasm      COPD (chronic obstructive pulmonary disease) (H) 5/28/2014     Hypertension      Migraine      Neck pain      Nephrolithiasis          PAST SURGICAL HISTORY:  Past Surgical History:   Procedure Laterality Date     COLONOSCOPY N/A 4/9/2019    Procedure: COMBINED COLONOSCOPY, SINGLE OR MULTIPLE BIOPSY/POLYPECTOMY BY BIOPSY;  Surgeon: Saleem Shaw MD;  Location:  GI     LITHOTRIPSY      Lithotrypsy     ORTHOPEDIC SURGERY      right knee exploration     TUBAL LIGATION           SOCIAL HISTORY:  Social History     Socioeconomic History     Marital status:      Spouse name: Not on file     Number of children: Not on file     Years of education: Not on file     Highest education level: Not on file   Occupational History     Not on file   Social Needs     Financial resource strain: Not on file     Food insecurity:     Worry: Not on file     Inability: Not on file     Transportation needs:     Medical: Not on file     Non-medical: Not on file   Tobacco Use     Smoking status: Former Smoker     Packs/day: 0.50     Types: Cigarettes     Start date: 01/2016     Last attempt to quit: 4/7/2016     Years since quitting: 3.9     Smokeless tobacco: Never Used   Substance and Sexual Activity     Alcohol use: Yes     Alcohol/week: 0.0 standard drinks     Comment: socially only- one per month  "    Drug use: No     Sexual activity: Yes     Partners: Male     Birth control/protection: Post-menopausal   Lifestyle     Physical activity:     Days per week: Not on file     Minutes per session: Not on file     Stress: Not on file   Relationships     Social connections:     Talks on phone: Not on file     Gets together: Not on file     Attends Shinto service: Not on file     Active member of club or organization: Not on file     Attends meetings of clubs or organizations: Not on file     Relationship status: Not on file     Intimate partner violence:     Fear of current or ex partner: Not on file     Emotionally abused: Not on file     Physically abused: Not on file     Forced sexual activity: Not on file   Other Topics Concern     Parent/sibling w/ CABG, MI or angioplasty before 65F 55M? Not Asked   Social History Narrative     Not on file         FAMILY HISTORY:  Family History   Problem Relation Age of Onset     Cancer Mother 76        uterine     Cerebrovascular Disease Father 75     C.A.D. Brother 66         PHYSICAL EXAM:  Vital signs:  /82   Pulse 86   Temp 97.8  F (36.6  C)   Resp 16   Ht 1.651 m (5' 5\")   Wt 52.7 kg (116 lb 3.2 oz)   LMP  (LMP Unknown)   SpO2 97%   BMI 19.34 kg/m      ECO  GENERAL/CONSTITUTIONAL: No acute distress.  Exam deferred for detailed discussion.    LABS:  CBC RESULTS:   Recent Labs   Lab Test 10/21/19  1455   WBC 4.4   RBC 4.51   HGB 14.1   HCT 43.3   MCV 96   MCH 31.3   MCHC 32.6   RDW 12.9        Last Comprehensive Metabolic Panel:  Sodium   Date Value Ref Range Status   2019 134 133 - 144 mmol/L Final     Potassium   Date Value Ref Range Status   2019 4.0 3.4 - 5.3 mmol/L Final     Chloride   Date Value Ref Range Status   2019 104 94 - 109 mmol/L Final     Carbon Dioxide   Date Value Ref Range Status   2019 26 20 - 32 mmol/L Final     Anion Gap   Date Value Ref Range Status   2019 4 3 - 14 mmol/L Final     Glucose "   Date Value Ref Range Status   12/17/2019 93 70 - 99 mg/dL Final     Urea Nitrogen   Date Value Ref Range Status   12/17/2019 14 7 - 30 mg/dL Final     Creatinine   Date Value Ref Range Status   12/17/2019 0.71 0.52 - 1.04 mg/dL Final     GFR Estimate   Date Value Ref Range Status   12/17/2019 81 >60 mL/min/[1.73_m2] Final     Comment:     Non  GFR Calc  Starting 12/18/2018, serum creatinine based estimated GFR (eGFR) will be   calculated using the Chronic Kidney Disease Epidemiology Collaboration   (CKD-EPI) equation.       Calcium   Date Value Ref Range Status   12/17/2019 9.6 8.5 - 10.1 mg/dL Final     Bilirubin Total   Date Value Ref Range Status   12/17/2019 0.5 0.2 - 1.3 mg/dL Final     Alkaline Phosphatase   Date Value Ref Range Status   12/17/2019 73 40 - 150 U/L Final     ALT   Date Value Ref Range Status   12/17/2019 15 0 - 50 U/L Final     AST   Date Value Ref Range Status   12/17/2019 18 0 - 45 U/L Final     PATHOLOGY:  Reviewed as per HPI.    IMAGING:  Reviewed as per HPI.    ASSESSMENT/PLAN:  Sara Lehman is a 79 year old female with the following issues:  1.  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  Left lung nodule, metastasis versus primary non-small cell carcinoma  -Sara tolerated radiosensitizing capecitabine with concurrent radiation moderately well.  She completed radiation on 6/25/2019.  -She subsequently underwent stereotactic radiation to the left lung metastasis, completing her treatment 10/21/2019. However PET scan from 12/2019 showed recurrent metastatic disease to the right middle lobe of the lung and possibly left adrenal nodule.  Therefore, she subsequently underwent palliative stereotactic radiation to the right lung nodule and left adrenal nodule, completed on 2/07/2020.  She tolerated this well.  -Testing for KRAS mutation showed wild-type.  May consider adding either bevacizumab or an  "epidermal growth factor receptor inhibitor such as panitumumab to FOLFOX for future systemic therapy if needed.  However, would be cautious with side effects of systemic therapy.  I reiterated this today.  -She is not keen on systemic therapy, so we will continue with observation.  -Plan for repeat PET scan in 1 month.  -If next scan shows recurrent or progressive metastatic disease, will consider mFOLFOX with bevacizumab or panitumumab.  We would need to decide carefully on what she could tolerate if we need to consider systemic therapy.    Return in 1 month    Rhona Wisdom MD  Hematology/Oncology  [facility]    I spent a total of 25 minutes with the patient, with greater than 50% of the time in counseling and coordination of care.      Oncology Rooming Note    March 4, 2020 2:53 PM   Sara Lehman is a 79 year old female who presents for:    Chief Complaint   Patient presents with     Oncology Clinic Visit     Initial Vitals: LMP  (LMP Unknown)  Estimated body mass index is 17.81 kg/m  as calculated from the following:    Height as of 1/6/20: 1.651 m (5' 5\").    Weight as of 1/6/20: 48.5 kg (107 lb). There is no height or weight on file to calculate BSA.  Data Unavailable Comment: Data Unavailable   No LMP recorded (lmp unknown). Patient is postmenopausal.  Allergies reviewed: Yes  Medications reviewed: Yes    Medications: Medication refills not needed today.  Pharmacy name entered into [email or web address]:    [facility] #[id number] - [place], [place] - [address] [place] [address] [facility] #[id number] - [place], [place] - [address] [facility] [address]  [id number] [place] [place] [facility] [address] [address] [facility]    Clinical concerns:  doctor was notified.      Cary Arroyo MA              Again, thank you for allowing me to participate in the care of your patient.        Sincerely,        Rhona Wisdom MD    "

## 2020-03-04 NOTE — PROGRESS NOTES
"Oncology Rooming Note    March 4, 2020 2:53 PM   Sara Lehman is a 79 year old female who presents for:    Chief Complaint   Patient presents with     Oncology Clinic Visit     Initial Vitals: LMP  (LMP Unknown)  Estimated body mass index is 17.81 kg/m  as calculated from the following:    Height as of 1/6/20: 1.651 m (5' 5\").    Weight as of 1/6/20: 48.5 kg (107 lb). There is no height or weight on file to calculate BSA.  Data Unavailable Comment: Data Unavailable   No LMP recorded (lmp unknown). Patient is postmenopausal.  Allergies reviewed: Yes  Medications reviewed: Yes    Medications: Medication refills not needed today.  Pharmacy name entered into [facility]:    [facility] #[id number] - [place], [place] - [address] [place] [address] [facility] STORE #[id number] - [place], [place] - [address] AT [facility] [address]  [facility] #[id number] - [place], [place] - [address] [facility]    Clinical concerns:  doctor was notified.      Cary Arroyo MA            "

## 2020-03-04 NOTE — PATIENT INSTRUCTIONS
RTC MD in 1 month with labs and PET/CT scan prior to visit.  Imaging was at 4/06/20 2:00pm [facility]. Coordinated labs/ Sahara     AVS printed and given to patient/ Sahara

## 2020-04-06 ENCOUNTER — HOSPITAL ENCOUNTER (OUTPATIENT)
Dept: PET IMAGING | Facility: CLINIC | Age: 80
End: 2020-04-06
Attending: INTERNAL MEDICINE
Payer: COMMERCIAL

## 2020-04-06 ENCOUNTER — HOSPITAL ENCOUNTER (OUTPATIENT)
Dept: LAB | Facility: CLINIC | Age: 80
End: 2020-04-06
Attending: INTERNAL MEDICINE
Payer: COMMERCIAL

## 2020-04-06 DIAGNOSIS — C78.01 MALIGNANT NEOPLASM METASTATIC TO RIGHT LUNG (H): ICD-10-CM

## 2020-04-06 DIAGNOSIS — C78.02 MALIGNANT NEOPLASM METASTATIC TO LEFT LUNG (H): ICD-10-CM

## 2020-04-06 DIAGNOSIS — C21.1 MALIGNANT NEOPLASM OF ANAL CANAL (H): ICD-10-CM

## 2020-04-06 LAB
ALBUMIN SERPL-MCNC: 3.6 G/DL (ref 3.4–5)
ALP SERPL-CCNC: 77 U/L (ref 40–150)
ALT SERPL W P-5'-P-CCNC: 18 U/L (ref 0–50)
ANION GAP SERPL CALCULATED.3IONS-SCNC: 4 MMOL/L (ref 3–14)
AST SERPL W P-5'-P-CCNC: 17 U/L (ref 0–45)
BASOPHILS # BLD AUTO: 0 10E9/L (ref 0–0.2)
BASOPHILS NFR BLD AUTO: 0.2 %
BILIRUB SERPL-MCNC: 0.4 MG/DL (ref 0.2–1.3)
BUN SERPL-MCNC: 18 MG/DL (ref 7–30)
CALCIUM SERPL-MCNC: 9.2 MG/DL (ref 8.5–10.1)
CHLORIDE SERPL-SCNC: 108 MMOL/L (ref 94–109)
CO2 SERPL-SCNC: 27 MMOL/L (ref 20–32)
CREAT SERPL-MCNC: 0.73 MG/DL (ref 0.52–1.04)
DIFFERENTIAL METHOD BLD: NORMAL
EOSINOPHIL # BLD AUTO: 0.1 10E9/L (ref 0–0.7)
EOSINOPHIL NFR BLD AUTO: 1 %
ERYTHROCYTE [DISTWIDTH] IN BLOOD BY AUTOMATED COUNT: 13.5 % (ref 10–15)
GFR SERPL CREATININE-BSD FRML MDRD: 78 ML/MIN/{1.73_M2}
GLUCOSE SERPL-MCNC: 87 MG/DL (ref 70–99)
HCT VFR BLD AUTO: 41.8 % (ref 35–47)
HGB BLD-MCNC: 14 G/DL (ref 11.7–15.7)
IMM GRANULOCYTES # BLD: 0 10E9/L (ref 0–0.4)
IMM GRANULOCYTES NFR BLD: 0.2 %
LYMPHOCYTES # BLD AUTO: 0.8 10E9/L (ref 0.8–5.3)
LYMPHOCYTES NFR BLD AUTO: 15.7 %
MCH RBC QN AUTO: 31.7 PG (ref 26.5–33)
MCHC RBC AUTO-ENTMCNC: 33.5 G/DL (ref 31.5–36.5)
MCV RBC AUTO: 95 FL (ref 78–100)
MONOCYTES # BLD AUTO: 0.6 10E9/L (ref 0–1.3)
MONOCYTES NFR BLD AUTO: 12.2 %
NEUTROPHILS # BLD AUTO: 3.5 10E9/L (ref 1.6–8.3)
NEUTROPHILS NFR BLD AUTO: 70.7 %
NRBC # BLD AUTO: 0 10*3/UL
NRBC BLD AUTO-RTO: 0 /100
PLATELET # BLD AUTO: 167 10E9/L (ref 150–450)
POTASSIUM SERPL-SCNC: 3.9 MMOL/L (ref 3.4–5.3)
PROT SERPL-MCNC: 7.5 G/DL (ref 6.8–8.8)
RBC # BLD AUTO: 4.41 10E12/L (ref 3.8–5.2)
SODIUM SERPL-SCNC: 139 MMOL/L (ref 133–144)
WBC # BLD AUTO: 4.9 10E9/L (ref 4–11)

## 2020-04-06 PROCEDURE — 36415 COLL VENOUS BLD VENIPUNCTURE: CPT | Performed by: INTERNAL MEDICINE

## 2020-04-06 PROCEDURE — A9552 F18 FDG: HCPCS | Performed by: INTERNAL MEDICINE

## 2020-04-06 PROCEDURE — 34300033 ZZH RX 343: Performed by: INTERNAL MEDICINE

## 2020-04-06 PROCEDURE — 85025 COMPLETE CBC W/AUTO DIFF WBC: CPT | Performed by: INTERNAL MEDICINE

## 2020-04-06 PROCEDURE — 71260 CT THORAX DX C+: CPT

## 2020-04-06 PROCEDURE — 25000128 H RX IP 250 OP 636: Performed by: INTERNAL MEDICINE

## 2020-04-06 PROCEDURE — 80053 COMPREHEN METABOLIC PANEL: CPT | Performed by: INTERNAL MEDICINE

## 2020-04-06 RX ORDER — IOPAMIDOL 755 MG/ML
50-135 INJECTION, SOLUTION INTRAVASCULAR ONCE
Status: COMPLETED | OUTPATIENT
Start: 2020-04-06 | End: 2020-04-06

## 2020-04-06 RX ADMIN — IOPAMIDOL 69 ML: 755 INJECTION, SOLUTION INTRAVENOUS at 14:28

## 2020-04-06 RX ADMIN — FLUDEOXYGLUCOSE F-18 12.4 MCI.: 500 INJECTION, SOLUTION INTRAVENOUS at 14:28

## 2020-04-07 NOTE — PROGRESS NOTES
"[facility]    Hematology/Oncology Established Patient Follow-up Note      Today's Date: 04/09/20    Reason for Follow-up: Metastatic anal adenocarcinoma.    Sara Lehman is a 79 year old female who is being evaluated via a billable video visit.      The patient has been notified of following:     \"This video visit will be conducted via a call between you and your physician/provider. We have found that certain health care needs can be provided without the need for an in-person physical exam.  This service lets us provide the care you need with a video conversation.  If a prescription is necessary we can send it directly to your pharmacy.  If lab work is needed we can place an order for that and you can then stop by our lab to have the test done at a later time.    If during the course of the call the physician/provider feels a video visit is not appropriate, you will not be charged for this service.\"     Patient has given verbal consent for Video visit? Yes    Patient would like the video invitation sent by: Send to e-mail at: [email or web address]    Video Start Time: 2:34 PM      Video-Visit Details    Type of service:  Video Visit    Video End Time (time video stopped): 2:49PM    Originating Location (pt. Location): Home    Distant Location (provider location):  [facility]     Mode of Communication:  Video Conference via [employer]      HISTORY OF PRESENT ILLNESS: Sara Lehman is a 79 year old female who presents with the following oncologic history:  1.  4/1/2019: Initially presented to her primary care provider with complaints of hemorrhoids intermittently for 2 months with associated pain and itching as well as blood in the underwear at night and intermixed with stool, no melena.  No associated weight loss or night sweats.  Reports occasional constipation.  Physical exam by primary care provider showed an anal mass about 1 inch in size with perianal erythema and skin " breakdown.  2.  4/9/2019: Colonoscopy showed a perianal mass in the left lateral quadrant, severe diverticulosis in the sigmoid colon, descending colon, and ascending colon.  Biopsy of the anal mass showed invasive moderately differentiated adenocarcinoma with normal mismatch repair protein expression.  Specimen consisted of multiple fragments of necrotic tissue with dysplastic appearing glands with foci of invasion.  Surface mucosa is not present and therefore precludes differentiation between colorectal carcinoma or perianal glandular carcinoma.  Depth of invasion cannot be assessed.  KRAS wild-type.  3.  4/16/2019: CT chest/abdomen/pelvis with contrast showed a 1.3 cm left upper lobe lung nodule concerning for metastatic disease as well as 2 tiny nodules in the right lower lobe near the diaphragm.  Diffusely aneurysmal abdominal aorta measures up to 3.2 cm.  Prominent bilateral inguinal adenopathy with a lymph node in the upper right thigh adjacent to the common femoral vessels measures 2.9 x 2.3 cm.  Lymph node in the left medial upper thigh adjacent to the common femoral vessels measures 2.6 x 3.5 cm.  No evidence of obstruction.  Bony structures showed no destruction.  4.  4/19/2019: Consultation with Dr. Miller Casas.  Due to high suspicion of metastatic disease to the lung and bilateral inguinal regions, patient referred to medical oncology and radiation oncology.  5.  4/24/2019: PET/CT scan showed hypermetabolic posterior left upper lobe mid chest nodule adjacent to the major fissure measuring 1.3 x 1.2 cm and emphysematous changes.  Bilateral renal cysts present.  Nonobstructing stone of the lower right kidney measures 0.3 cm.  Nodular thickening of the left adrenal is 0.7 cm.  Intense hypermetabolism at the anal level with heterogeneous appearing soft tissue with SUV max 19.5.  Enlarged inguinal hypermetabolic lymph nodes on the right measuring 2.9 x 2.1 cm and on the left 3.6 x 2.4 cm.  A distal  infrarenal abdominal aorta measures 3.4 cm.  6.  5/6/2019: Left lung mass biopsy performed and showed discordant immunohistochemical findings.  Lesion showed a strong reactivity for cytokeratin 7 and focal staining for P40.  Patient's anal adenocarcinoma reportedly positive for both immunohistochemical staining pattern not typical for primary lung tumor and metastatic disease cannot be excluded.  7.  5/9/2019-6/25/2019: Completed radiation therapy to the anal cancer and inguinal lymph nodes.  She received concurrent radiosensitizing chemotherapy with capecitabine.  8.  8/28/2019: PET/CT scan showed hypermetabolic nodule in the left upper lobe measuring 1.5 x 1.4 cm with SUV max 10.5, increased in size and metabolic activity from 1.4 x 1.2 cm with SUV max 9.5 on 4/24/2019 PET/CT scan.  A 0.5 cm pleural-based nodule with low metabolic activity in the left lower lobe is unchanged.  A 3.4 cm infrarenal abdominal aortic aneurysm is present.  The previously noted hypermetabolic anal mass has significantly improved with SUV max 5.1, decreased from SUV max 24.6, consistent with response to treatment.  The bilateral inguinal lymphadenopathy has markedly improved on the current study with nodes measuring up to 1.6 x 1.1 cm with SUV max 3.5, decreased from 4 x 2.6 cm with SUV max 14, consistent with response to treatment.  9.  10/21/2019: Completed stereotactic radiation to the left upper lobe lung nodule/metastasis in 5 fractions under the care of Dr. Clay Monroy.  10. 12/16/2019: PET scan showed new hypermetabolic medial right middle lobe pulmonary nodule; left upper lobe nodule decreased in size and metabolism; improvement of bilateral inguinal adenopathy with decreased sizes and metabolism; hypermetabolism slightly increased at the left adrenal nodule but stable in size; decreased hypermetabolism at anal level; new small focal hypermetabolism within muscle anterior to left hip joint with no mass effect.  11. 2/07/2020:  Completed stereotactic radiation to the right middle lobe nodule and left adrenal nodule in 5 fractions.  12. 4/6/2020: PET/CT scan showed a 0.8 cm nodular density near the right lung apex with no significant hypermetabolism, unchanged from the prior exam.  The previously seen hypermetabolic right middle lobe pulmonary nodule is not seen on the current exam.  Small hypermetabolic left adrenal nodule measuring 1 cm with SUV max 3.3 is unchanged.  Infrarenal abdominal aortic aneurysm also not significantly changed and measures 3.3 x 3.6 cm containing a mild amount of mural thrombus.  The small hypermetabolic focus within the muscle anterior to the left hip joint is unchanged with SUV max 3.6.    INTERIM HISTORY:  Trixie reports feeling overall well with only infrequent mild diarrhea and mild rectal pain, scant hematochezia every now and then but no profuse bleeding.  Her energy level has been pretty good.  She denies any recent chest pain, cough, or recent fevers or chills.  She also denies any dysuria.      REVIEW OF SYSTEMS:   14 point ROS was reviewed and is negative other than as noted above in HPI.       HOME MEDICATIONS:  Current Outpatient Medications   Medication Sig Dispense Refill     acetaminophen (TYLENOL) 325 MG tablet Take 2 tablets (650 mg) by mouth every 4 hours as needed for mild pain or fever       amLODIPine (NORVASC) 5 MG tablet Take 1 tablet (5 mg) by mouth daily 90 tablet 2     metoprolol succinate ER (TOPROL-XL) 25 MG 24 hr tablet Take 1 tablet (25 mg) by mouth daily 90 tablet 2     ondansetron (ZOFRAN) 8 MG tablet Take 8 mg by mouth every 8 hours as needed for nausea       SUMAtriptan (IMITREX) 50 MG tablet Take 1 tablet (50 mg) by mouth at onset of headache for migraine 18 tablet 1         ALLERGIES:  Allergies   Allergen Reactions     Ace Inhibitors Cough     Aleve [Naproxen Sodium] GI Disturbance     Asa Buff (Mag [Aspirin Buffered] GI Disturbance     Internal bleeding     Atorvastatin  Calcium GI Disturbance     Celebrex [Celecoxib] GI Disturbance     Codeine Sulfate Hives     Pravastatin GI Disturbance     Simvastatin GI Disturbance         PAST MEDICAL HISTORY:  Past Medical History:   Diagnosis Date     Benign essential hypertension 2017     Bronchospasm      COPD (chronic obstructive pulmonary disease) (H) 2014     Hypertension      Migraine      Neck pain      Nephrolithiasis          PAST SURGICAL HISTORY:  Past Surgical History:   Procedure Laterality Date     COLONOSCOPY N/A 2019    Procedure: COMBINED COLONOSCOPY, SINGLE OR MULTIPLE BIOPSY/POLYPECTOMY BY BIOPSY;  Surgeon: Saleem Shaw MD;  Location:  GI     LITHOTRIPSY      Lithotrypsy     ORTHOPEDIC SURGERY      right knee exploration     TUBAL LIGATION           SOCIAL HISTORY:  Social History     Socioeconomic History     Marital status:      Spouse name: Not on file     Number of children: Not on file     Years of education: Not on file     Highest education level: Not on file   Occupational History     Not on file   Social Needs     Financial resource strain: Not on file     Food insecurity     Worry: Not on file     Inability: Not on file     Transportation needs     Medical: Not on file     Non-medical: Not on file   Tobacco Use     Smoking status: Former Smoker     Packs/day: 0.50     Types: Cigarettes     Start date: 2016     Last attempt to quit: 2016     Years since quittin.0     Smokeless tobacco: Never Used   Substance and Sexual Activity     Alcohol use: Yes     Alcohol/week: 0.0 standard drinks     Comment: socially only- one per month     Drug use: No     Sexual activity: Yes     Partners: Male     Birth control/protection: Post-menopausal   Lifestyle     Physical activity     Days per week: Not on file     Minutes per session: Not on file     Stress: Not on file   Relationships     Social connections     Talks on phone: Not on file     Gets together: Not on file     Attends Holiness  service: Not on file     Active member of club or organization: Not on file     Attends meetings of clubs or organizations: Not on file     Relationship status: Not on file     Intimate partner violence     Fear of current or ex partner: Not on file     Emotionally abused: Not on file     Physically abused: Not on file     Forced sexual activity: Not on file   Other Topics Concern     Parent/sibling w/ CABG, MI or angioplasty before 65F 55M? Not Asked   Social History Narrative     Not on file         FAMILY HISTORY:  Family History   Problem Relation Age of Onset     Cancer Mother 76        uterine     Cerebrovascular Disease Father 75     C.A.D. Brother 66         PHYSICAL EXAM:  Vital signs:  Not taken.  ECO  GENERAL/CONSTITUTIONAL: No acute distress.  EYES: No scleral icterus.  INTEGUMENT: No overt rashes over the face or neck.    LABS:  CBC RESULTS:   Recent Labs   Lab Test 20  1401   WBC 4.9   RBC 4.41   HGB 14.0   HCT 41.8   MCV 95   MCH 31.7   MCHC 33.5   RDW 13.5          Last Comprehensive Metabolic Panel:  Sodium   Date Value Ref Range Status   2020 139 133 - 144 mmol/L Final     Potassium   Date Value Ref Range Status   2020 3.9 3.4 - 5.3 mmol/L Final     Chloride   Date Value Ref Range Status   2020 108 94 - 109 mmol/L Final     Carbon Dioxide   Date Value Ref Range Status   2020 27 20 - 32 mmol/L Final     Anion Gap   Date Value Ref Range Status   2020 4 3 - 14 mmol/L Final     Glucose   Date Value Ref Range Status   2020 87 70 - 99 mg/dL Final     Urea Nitrogen   Date Value Ref Range Status   2020 18 7 - 30 mg/dL Final     Creatinine   Date Value Ref Range Status   2020 0.73 0.52 - 1.04 mg/dL Final     GFR Estimate   Date Value Ref Range Status   2020 78 >60 mL/min/[1.73_m2] Final     Comment:     Non  GFR Calc  Starting 2018, serum creatinine based estimated GFR (eGFR) will be   calculated using the Chronic  Kidney Disease Epidemiology Collaboration   (CKD-EPI) equation.       Calcium   Date Value Ref Range Status   04/06/2020 9.2 8.5 - 10.1 mg/dL Final     Bilirubin Total   Date Value Ref Range Status   04/06/2020 0.4 0.2 - 1.3 mg/dL Final     Alkaline Phosphatase   Date Value Ref Range Status   04/06/2020 77 40 - 150 U/L Final     ALT   Date Value Ref Range Status   04/06/2020 18 0 - 50 U/L Final     AST   Date Value Ref Range Status   04/06/2020 17 0 - 45 U/L Final     PATHOLOGY:  Reviewed as per HPI.    IMAGING:  Reviewed as per HPI.    ASSESSMENT/PLAN:  Sara Lehman is a 79 year old female with the following issues:  1.  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  Left lung and right middle lobe lung metastases  3. Left adrenal nodule  -Sara completed chemoradiation with capecitabine radiosensitizer on 6/25/2019, stereotactic radiation to the left upper lobe lung metastasis 10/21/2019, and stereotactic radiation to the right middle lobe lung metastasis and left adrenal nodule 2/07/2020.  -I personally reviewed the PET/CT scan from 4/06/2020 and discussed the results with Sara.  She has no new radiographic evidence of metastatic disease.  The right middle lobe lung metastasis is no longer readily visualized on the scan.  The left adrenal nodule is unchanged.  I would recommend continued observation without systemic therapy to avoid significant side effects and immunosuppression.  -Prior testing for KRAS mutation showed wild-type.  May consider adding either bevacizumab or an epidermal growth factor receptor inhibitor such as panitumumab to FOLFOX for future systemic therapy if needed.  However, would be cautious with side effects of systemic therapy.  Again, there is no clear indication for systemic therapy at this time. rTixie was thrilled to continue on observation.  -Plan for repeat PET scan in 3 months.  -If next scan shows recurrent or  progressive metastatic disease, will consider mFOLFOX with bevacizumab or panitumumab.  We would need to decide carefully on what she could tolerate if we need to consider systemic therapy.    Return in 3 months.    Rhona Wisdom MD  Hematology/Oncology  [facility]

## 2020-04-09 ENCOUNTER — VIRTUAL VISIT (OUTPATIENT)
Dept: ONCOLOGY | Facility: CLINIC | Age: 80
End: 2020-04-09
Attending: INTERNAL MEDICINE
Payer: COMMERCIAL

## 2020-04-09 DIAGNOSIS — C78.01 MALIGNANT NEOPLASM METASTATIC TO RIGHT LUNG (H): ICD-10-CM

## 2020-04-09 DIAGNOSIS — C21.1 MALIGNANT NEOPLASM OF ANAL CANAL (H): Primary | ICD-10-CM

## 2020-04-09 DIAGNOSIS — C79.72 MALIGNANT NEOPLASM METASTATIC TO LEFT ADRENAL GLAND (H): ICD-10-CM

## 2020-04-09 DIAGNOSIS — C78.02 MALIGNANT NEOPLASM METASTATIC TO LEFT LUNG (H): ICD-10-CM

## 2020-04-09 PROCEDURE — 99214 OFFICE O/P EST MOD 30 MIN: CPT | Mod: 95 | Performed by: INTERNAL MEDICINE

## 2020-04-09 NOTE — PROGRESS NOTES
"Sara Lehman is a 79 year old female who is being evaluated via a billable video visit.      The patient has been notified of following:     \"This video visit will be conducted via a call between you and your physician/provider. We have found that certain health care needs can be provided without the need for an in-person physical exam.  This service lets us provide the care you need with a video conversation.  If a prescription is necessary we can send it directly to your pharmacy.  If lab work is needed we can place an order for that and you can then stop by our lab to have the test done at a later time.    Video visits are billed at different rates depending on your insurance coverage.  Please reach out to your insurance provider with any questions.    If during the course of the call the physician/provider feels a video visit is not appropriate, you will not be charged for this service.\"    Patient has given verbal consent for Video visit? Yes    How would you like to obtain your AVS? MyChart    Patient would like the video invitation sent by: Send to e-mail at: [email or web address]          Sara Lehman complains of    Chief Complaint   Patient presents with     Oncology Clinic Visit       I have reviewed and updated the patient's Past Medical History, Social History, Family History and Medication List.    ALLERGIES  Ace inhibitors; Aleve [naproxen sodium]; Asa buff (mag [aspirin buffered]; Atorvastatin calcium; Celebrex [celecoxib]; Codeine sulfate; Pravastatin; and Simvastatin    Additional provider notes: insert own note template here n/a      Video-Visit Details    Type of service:  Video Visit      Originating Location (pt. Location): Home    Distant Location (provider location):  [facility]     Mode of Communication:  Video Conference via [employer] Shari J. Schoenberger, CMA      "

## 2020-06-12 ENCOUNTER — TELEPHONE (OUTPATIENT)
Dept: INTERNAL MEDICINE | Facility: CLINIC | Age: 80
End: 2020-06-12

## 2020-06-12 DIAGNOSIS — G43.909 MIGRAINE WITHOUT STATUS MIGRAINOSUS, NOT INTRACTABLE, UNSPECIFIED MIGRAINE TYPE: ICD-10-CM

## 2020-06-12 RX ORDER — SUMATRIPTAN 50 MG/1
50 TABLET, FILM COATED ORAL
Qty: 18 TABLET | Refills: 1 | Status: SHIPPED | OUTPATIENT
Start: 2020-06-12

## 2020-06-12 NOTE — TELEPHONE ENCOUNTER
Patients daughter called needing refill for her migraine medication pharmacy is [facility] pharmacy in [place] [place] please call patient at phone [phone] she is almost out two left

## 2020-07-12 NOTE — PROGRESS NOTES
"[facility]    Hematology/Oncology Established Patient Follow-up Note      Today's Date: 07/16/20    Reason for Follow-up: Metastatic anal adenocarcinoma.    Sara Lehman is a 79 year old female who is being evaluated via a billable video visit.      The patient has been notified of following:     \"This video visit will be conducted via a call between you and your physician/provider. We have found that certain health care needs can be provided without the need for an in-person physical exam.  This service lets us provide the care you need with a video conversation.  If a prescription is necessary we can send it directly to your pharmacy.  If lab work is needed we can place an order for that and you can then stop by our lab to have the test done at a later time.    If during the course of the call the physician/provider feels a video visit is not appropriate, you will not be charged for this service.\"     Patient has given verbal consent for Video visit? Yes    Patient would like the video invitation sent by: Send to e-mail at: [email or web address]      Video-Visit Details    Type of service:  Video Visit    Originating Location (pt. Location): Home    Distant Location (provider location):  [facility]     Mode of Communication:  Video Conference via [employer]    Video visit duration: 25 minutes    HISTORY OF PRESENT ILLNESS: Sara Lehman is a 79 year old female who presents with the following oncologic history:  1.  4/1/2019: Initially presented to her primary care provider with complaints of hemorrhoids intermittently for 2 months with associated pain and itching as well as blood in the underwear at night and intermixed with stool, no melena.  No associated weight loss or night sweats.  Reports occasional constipation.  Physical exam by primary care provider showed an anal mass about 1 inch in size with perianal erythema and skin breakdown.  2.  4/9/2019: Colonoscopy showed a " perianal mass in the left lateral quadrant, severe diverticulosis in the sigmoid colon, descending colon, and ascending colon.  Biopsy of the anal mass showed invasive moderately differentiated adenocarcinoma with normal mismatch repair protein expression.  Specimen consisted of multiple fragments of necrotic tissue with dysplastic appearing glands with foci of invasion.  Surface mucosa is not present and therefore precludes differentiation between colorectal carcinoma or perianal glandular carcinoma.  Depth of invasion cannot be assessed.  KRAS wild-type.  3.  4/16/2019: CT chest/abdomen/pelvis with contrast showed a 1.3 cm left upper lobe lung nodule concerning for metastatic disease as well as 2 tiny nodules in the right lower lobe near the diaphragm.  Diffusely aneurysmal abdominal aorta measures up to 3.2 cm.  Prominent bilateral inguinal adenopathy with a lymph node in the upper right thigh adjacent to the common femoral vessels measures 2.9 x 2.3 cm.  Lymph node in the left medial upper thigh adjacent to the common femoral vessels measures 2.6 x 3.5 cm.  No evidence of obstruction.  Bony structures showed no destruction.  4.  4/19/2019: Consultation with Dr. Miller Casas.  Due to high suspicion of metastatic disease to the lung and bilateral inguinal regions, patient referred to medical oncology and radiation oncology.  5.  4/24/2019: PET/CT scan showed hypermetabolic posterior left upper lobe mid chest nodule adjacent to the major fissure measuring 1.3 x 1.2 cm and emphysematous changes.  Bilateral renal cysts present.  Nonobstructing stone of the lower right kidney measures 0.3 cm.  Nodular thickening of the left adrenal is 0.7 cm.  Intense hypermetabolism at the anal level with heterogeneous appearing soft tissue with SUV max 19.5.  Enlarged inguinal hypermetabolic lymph nodes on the right measuring 2.9 x 2.1 cm and on the left 3.6 x 2.4 cm.  A distal infrarenal abdominal aorta measures 3.4 cm.  6.   5/6/2019: Left lung mass biopsy performed and showed discordant immunohistochemical findings.  Lesion showed a strong reactivity for cytokeratin 7 and focal staining for P40.  Patient's anal adenocarcinoma reportedly positive for both immunohistochemical staining pattern not typical for primary lung tumor and metastatic disease cannot be excluded.  7.  5/9/2019-6/25/2019: Completed radiation therapy to the anal cancer and inguinal lymph nodes.  She received concurrent radiosensitizing chemotherapy with capecitabine.  8.  8/28/2019: PET/CT scan showed hypermetabolic nodule in the left upper lobe measuring 1.5 x 1.4 cm with SUV max 10.5, increased in size and metabolic activity from 1.4 x 1.2 cm with SUV max 9.5 on 4/24/2019 PET/CT scan.  A 0.5 cm pleural-based nodule with low metabolic activity in the left lower lobe is unchanged.  A 3.4 cm infrarenal abdominal aortic aneurysm is present.  The previously noted hypermetabolic anal mass has significantly improved with SUV max 5.1, decreased from SUV max 24.6, consistent with response to treatment.  The bilateral inguinal lymphadenopathy has markedly improved on the current study with nodes measuring up to 1.6 x 1.1 cm with SUV max 3.5, decreased from 4 x 2.6 cm with SUV max 14, consistent with response to treatment.  9.  10/21/2019: Completed stereotactic radiation to the left upper lobe lung nodule/metastasis in 5 fractions under the care of Dr. Clay Monroy.  10. 12/16/2019: PET scan showed new hypermetabolic medial right middle lobe pulmonary nodule; left upper lobe nodule decreased in size and metabolism; improvement of bilateral inguinal adenopathy with decreased sizes and metabolism; hypermetabolism slightly increased at the left adrenal nodule but stable in size; decreased hypermetabolism at anal level; new small focal hypermetabolism within muscle anterior to left hip joint with no mass effect.  11. 2/07/2020: Completed stereotactic radiation to the right middle  lobe nodule and left adrenal nodule in 5 fractions.  12. 4/6/2020: PET/CT scan showed a 0.8 cm nodular density near the right lung apex with no significant hypermetabolism, unchanged from the prior exam.  The previously seen hypermetabolic right middle lobe pulmonary nodule is not seen on the current exam.  Small hypermetabolic left adrenal nodule measuring 1 cm with SUV max 3.3 is unchanged.  Infrarenal abdominal aortic aneurysm also not significantly changed and measures 3.3 x 3.6 cm containing a mild amount of mural thrombus.  The small hypermetabolic focus within the muscle anterior to the left hip joint is unchanged with SUV max 3.6.  13. 7/13/2020: PET/CT scan showed interval decrease in size of the right upper lobe lung nodule with no associated FDG activity; posttreatment changes in the lateral left upper lung; no new lung lesions; hypermetabolic left suprahilar area with no associated CT abnormality, possibly vascular in nature; no adenopathy; stable left adrenal nodule; indeterminate FDG activity in region of anal canal.    INTERIM HISTORY:  Trixie reports intermittent hematochezia but this is per her usual. She denies any rectal pain or diarrhea.  She denies any recent chest pain, cough, or recent fevers or chills.  She also denies any dysuria.      REVIEW OF SYSTEMS:   14 point ROS was reviewed and is negative other than as noted above in HPI.       HOME MEDICATIONS:  Current Outpatient Medications   Medication Sig Dispense Refill     acetaminophen (TYLENOL) 325 MG tablet Take 2 tablets (650 mg) by mouth every 4 hours as needed for mild pain or fever       amLODIPine (NORVASC) 5 MG tablet Take 1 tablet (5 mg) by mouth daily 90 tablet 2     metoprolol succinate ER (TOPROL-XL) 25 MG 24 hr tablet Take 1 tablet (25 mg) by mouth daily 90 tablet 2     ondansetron (ZOFRAN) 8 MG tablet Take 8 mg by mouth every 8 hours as needed for nausea       SUMAtriptan (IMITREX) 50 MG tablet Take 1 tablet (50 mg) by mouth at  onset of headache for migraine 18 tablet 1         ALLERGIES:  Allergies   Allergen Reactions     Ace Inhibitors Cough     Aleve [Naproxen Sodium] GI Disturbance     Asa Buff (Mag [Aspirin Buffered] GI Disturbance     Internal bleeding     Atorvastatin Calcium GI Disturbance     Celebrex [Celecoxib] GI Disturbance     Codeine Sulfate Hives     Pravastatin GI Disturbance     Simvastatin GI Disturbance         PAST MEDICAL HISTORY:  Past Medical History:   Diagnosis Date     Benign essential hypertension 2017     Bronchospasm      COPD (chronic obstructive pulmonary disease) (H) 2014     Hypertension      Migraine      Neck pain      Nephrolithiasis          PAST SURGICAL HISTORY:  Past Surgical History:   Procedure Laterality Date     COLONOSCOPY N/A 2019    Procedure: COMBINED COLONOSCOPY, SINGLE OR MULTIPLE BIOPSY/POLYPECTOMY BY BIOPSY;  Surgeon: Saleem Shaw MD;  Location:  GI     LITHOTRIPSY      Lithotrypsy     ORTHOPEDIC SURGERY      right knee exploration     TUBAL LIGATION           SOCIAL HISTORY:  Social History     Socioeconomic History     Marital status:      Spouse name: Not on file     Number of children: Not on file     Years of education: Not on file     Highest education level: Not on file   Occupational History     Not on file   Social Needs     Financial resource strain: Not on file     Food insecurity     Worry: Not on file     Inability: Not on file     Transportation needs     Medical: Not on file     Non-medical: Not on file   Tobacco Use     Smoking status: Former Smoker     Packs/day: 0.50     Types: Cigarettes     Start date: 2016     Last attempt to quit: 2016     Years since quittin.2     Smokeless tobacco: Never Used   Substance and Sexual Activity     Alcohol use: Yes     Alcohol/week: 0.0 standard drinks     Comment: socially only- one per month     Drug use: No     Sexual activity: Yes     Partners: Male     Birth control/protection:  Post-menopausal   Lifestyle     Physical activity     Days per week: Not on file     Minutes per session: Not on file     Stress: Not on file   Relationships     Social connections     Talks on phone: Not on file     Gets together: Not on file     Attends Sikhism service: Not on file     Active member of club or organization: Not on file     Attends meetings of clubs or organizations: Not on file     Relationship status: Not on file     Intimate partner violence     Fear of current or ex partner: Not on file     Emotionally abused: Not on file     Physically abused: Not on file     Forced sexual activity: Not on file   Other Topics Concern     Parent/sibling w/ CABG, MI or angioplasty before 65F 55M? Not Asked   Social History Narrative     Not on file         FAMILY HISTORY:  Family History   Problem Relation Age of Onset     Cancer Mother 76        uterine     Cerebrovascular Disease Father 75     C.A.D. Brother 66         PHYSICAL EXAM:  Vital signs:  Not taken.  ECO  GENERAL: No acute distress.  EYES: No scleral icterus. No overt erythema.  RESPIRATORY: No cough.  No labored breathing.  MUSCULOSKELETAL: Range of motion in the neck, shoulders, and arms appear normal.  SKIN: No overt rashes, discolorations, or lesions over the face and neck.  NEUROLOGIC: Alert.  No overt tremors.  PSYCHIATRIC: Normal affect and mood.  Does not appear anxious.    The rest of a comprehensive physical examination is deferred due to PHE (public health emergency) video visit restrictions.    LABS:  CBC RESULTS:   Recent Labs   Lab Test 20  1347   WBC 4.5   RBC 4.37   HGB 13.7   HCT 41.5   MCV 95   MCH 31.4   MCHC 33.0   RDW 13.3        Last Comprehensive Metabolic Panel:  Sodium   Date Value Ref Range Status   2020 142 133 - 144 mmol/L Final     Potassium   Date Value Ref Range Status   2020 4.3 3.4 - 5.3 mmol/L Final     Chloride   Date Value Ref Range Status   2020 108 94 - 109 mmol/L Final      Carbon Dioxide   Date Value Ref Range Status   07/13/2020 26 20 - 32 mmol/L Final     Anion Gap   Date Value Ref Range Status   07/13/2020 8 3 - 14 mmol/L Final     Glucose   Date Value Ref Range Status   07/13/2020 90 70 - 99 mg/dL Final     Urea Nitrogen   Date Value Ref Range Status   07/13/2020 16 7 - 30 mg/dL Final     Creatinine   Date Value Ref Range Status   07/13/2020 0.75 0.52 - 1.04 mg/dL Final     GFR Estimate   Date Value Ref Range Status   07/13/2020 76 >60 mL/min/[1.73_m2] Final     Comment:     Non  GFR Calc  Starting 12/18/2018, serum creatinine based estimated GFR (eGFR) will be   calculated using the Chronic Kidney Disease Epidemiology Collaboration   (CKD-EPI) equation.       Calcium   Date Value Ref Range Status   07/13/2020 9.9 8.5 - 10.1 mg/dL Final     Bilirubin Total   Date Value Ref Range Status   07/13/2020 0.5 0.2 - 1.3 mg/dL Final     Alkaline Phosphatase   Date Value Ref Range Status   07/13/2020 84 40 - 150 U/L Final     ALT   Date Value Ref Range Status   07/13/2020 19 0 - 50 U/L Final     AST   Date Value Ref Range Status   07/13/2020 19 0 - 45 U/L Final       PATHOLOGY:  Reviewed as per HPI.    IMAGING:  Reviewed as per HPI.    ASSESSMENT/PLAN:  Sara Lehman is a 79 year old female with the following issues:  1.  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  Left lung and right middle lobe lung metastases  3. Left adrenal nodule  -Sara completed chemoradiation with capecitabine radiosensitizer on 6/25/2019, stereotactic radiation to the left upper lobe lung metastasis 10/21/2019, and stereotactic radiation to the right middle lobe lung metastasis and left adrenal nodule 2/07/2020.  -I personally reviewed the PET/CT scan from 7/13/2020 and discussed the results with Sara.  She has overall no new disease.  The lung nodules have a treated appearance and the left adrenal nodule is stable.   While I cannot exclude possible recurrent disease, the hypermetabolism at the anal canal could potentially be inflammation.  Trixie's stool habits have not changed.  -I recommended to Trixie continued observation without systemic therapy to avoid significant side effects and immunosuppression, particularly during the current pandemic.  -Prior testing for KRAS mutation showed wild-type.  May consider adding either bevacizumab or an epidermal growth factor receptor inhibitor such as panitumumab to FOLFOX for future systemic therapy if needed.  However, would be cautious with side effects of systemic therapy.  Again, there is no clear indication for systemic therapy at this time. Trixie was very happy to continue on observation.  -Plan for repeat PET scan in 3 months.  -If next scan shows recurrent or progressive metastatic disease, will consider mFOLFOX with bevacizumab or panitumumab.  We would need to decide carefully on what she could tolerate if we need to consider systemic therapy.    Return in 3 months.    Rhona Wisdom MD  Hematology/Oncology  [facility]

## 2020-07-13 ENCOUNTER — HOSPITAL ENCOUNTER (OUTPATIENT)
Dept: LAB | Facility: CLINIC | Age: 80
End: 2020-07-13
Attending: INTERNAL MEDICINE
Payer: COMMERCIAL

## 2020-07-13 ENCOUNTER — HOSPITAL ENCOUNTER (OUTPATIENT)
Dept: PET IMAGING | Facility: CLINIC | Age: 80
End: 2020-07-13
Attending: INTERNAL MEDICINE
Payer: COMMERCIAL

## 2020-07-13 DIAGNOSIS — C78.02 MALIGNANT NEOPLASM METASTATIC TO LEFT LUNG (H): ICD-10-CM

## 2020-07-13 DIAGNOSIS — C79.72 MALIGNANT NEOPLASM METASTATIC TO LEFT ADRENAL GLAND (H): ICD-10-CM

## 2020-07-13 DIAGNOSIS — C21.1 MALIGNANT NEOPLASM OF ANAL CANAL (H): ICD-10-CM

## 2020-07-13 DIAGNOSIS — C78.01 MALIGNANT NEOPLASM METASTATIC TO RIGHT LUNG (H): ICD-10-CM

## 2020-07-13 LAB
ALBUMIN SERPL-MCNC: 3.5 G/DL (ref 3.4–5)
ALP SERPL-CCNC: 84 U/L (ref 40–150)
ALT SERPL W P-5'-P-CCNC: 19 U/L (ref 0–50)
ANION GAP SERPL CALCULATED.3IONS-SCNC: 8 MMOL/L (ref 3–14)
AST SERPL W P-5'-P-CCNC: 19 U/L (ref 0–45)
BASOPHILS # BLD AUTO: 0 10E9/L (ref 0–0.2)
BASOPHILS NFR BLD AUTO: 0.2 %
BILIRUB SERPL-MCNC: 0.5 MG/DL (ref 0.2–1.3)
BUN SERPL-MCNC: 16 MG/DL (ref 7–30)
CALCIUM SERPL-MCNC: 9.9 MG/DL (ref 8.5–10.1)
CHLORIDE SERPL-SCNC: 108 MMOL/L (ref 94–109)
CO2 SERPL-SCNC: 26 MMOL/L (ref 20–32)
CREAT SERPL-MCNC: 0.75 MG/DL (ref 0.52–1.04)
DIFFERENTIAL METHOD BLD: ABNORMAL
EOSINOPHIL # BLD AUTO: 0 10E9/L (ref 0–0.7)
EOSINOPHIL NFR BLD AUTO: 0.4 %
ERYTHROCYTE [DISTWIDTH] IN BLOOD BY AUTOMATED COUNT: 13.3 % (ref 10–15)
GFR SERPL CREATININE-BSD FRML MDRD: 76 ML/MIN/{1.73_M2}
GLUCOSE SERPL-MCNC: 90 MG/DL (ref 70–99)
HCT VFR BLD AUTO: 41.5 % (ref 35–47)
HGB BLD-MCNC: 13.7 G/DL (ref 11.7–15.7)
IMM GRANULOCYTES # BLD: 0 10E9/L (ref 0–0.4)
IMM GRANULOCYTES NFR BLD: 0.2 %
LYMPHOCYTES # BLD AUTO: 0.7 10E9/L (ref 0.8–5.3)
LYMPHOCYTES NFR BLD AUTO: 16.3 %
MCH RBC QN AUTO: 31.4 PG (ref 26.5–33)
MCHC RBC AUTO-ENTMCNC: 33 G/DL (ref 31.5–36.5)
MCV RBC AUTO: 95 FL (ref 78–100)
MONOCYTES # BLD AUTO: 0.4 10E9/L (ref 0–1.3)
MONOCYTES NFR BLD AUTO: 9.4 %
NEUTROPHILS # BLD AUTO: 3.3 10E9/L (ref 1.6–8.3)
NEUTROPHILS NFR BLD AUTO: 73.5 %
PLATELET # BLD AUTO: 188 10E9/L (ref 150–450)
POTASSIUM SERPL-SCNC: 4.3 MMOL/L (ref 3.4–5.3)
PROT SERPL-MCNC: 7.5 G/DL (ref 6.8–8.8)
RBC # BLD AUTO: 4.37 10E12/L (ref 3.8–5.2)
SODIUM SERPL-SCNC: 142 MMOL/L (ref 133–144)
WBC # BLD AUTO: 4.5 10E9/L (ref 4–11)

## 2020-07-13 PROCEDURE — A9552 F18 FDG: HCPCS | Performed by: INTERNAL MEDICINE

## 2020-07-13 PROCEDURE — 36415 COLL VENOUS BLD VENIPUNCTURE: CPT | Performed by: INTERNAL MEDICINE

## 2020-07-13 PROCEDURE — 78816 PET IMAGE W/CT FULL BODY: CPT | Mod: PS

## 2020-07-13 PROCEDURE — 85025 COMPLETE CBC W/AUTO DIFF WBC: CPT | Performed by: INTERNAL MEDICINE

## 2020-07-13 PROCEDURE — 80053 COMPREHEN METABOLIC PANEL: CPT | Performed by: INTERNAL MEDICINE

## 2020-07-13 PROCEDURE — 34300033 ZZH RX 343: Performed by: INTERNAL MEDICINE

## 2020-07-13 RX ADMIN — FLUDEOXYGLUCOSE F-18 11.58 MCI.: 500 INJECTION, SOLUTION INTRAVENOUS at 14:26

## 2020-07-16 ENCOUNTER — VIRTUAL VISIT (OUTPATIENT)
Dept: ONCOLOGY | Facility: CLINIC | Age: 80
End: 2020-07-16
Attending: INTERNAL MEDICINE
Payer: COMMERCIAL

## 2020-07-16 VITALS — BODY MASS INDEX: 19.14 KG/M2 | WEIGHT: 115 LBS

## 2020-07-16 DIAGNOSIS — C78.01 MALIGNANT NEOPLASM METASTATIC TO RIGHT LUNG (H): ICD-10-CM

## 2020-07-16 DIAGNOSIS — C21.1 MALIGNANT NEOPLASM OF ANAL CANAL (H): Primary | ICD-10-CM

## 2020-07-16 DIAGNOSIS — C79.72 MALIGNANT NEOPLASM METASTATIC TO LEFT ADRENAL GLAND (H): ICD-10-CM

## 2020-07-16 DIAGNOSIS — C78.02 MALIGNANT NEOPLASM METASTATIC TO LEFT LUNG (H): ICD-10-CM

## 2020-07-16 PROCEDURE — 99214 OFFICE O/P EST MOD 30 MIN: CPT | Mod: 95 | Performed by: INTERNAL MEDICINE

## 2020-07-16 PROCEDURE — 40001009 ZZH VIDEO/TELEPHONE VISIT; NO CHARGE

## 2020-07-16 ASSESSMENT — PAIN SCALES - GENERAL: PAINLEVEL: NO PAIN (0)

## 2020-07-16 NOTE — PROGRESS NOTES
"Sara Lehman is a 79 year old female who is being evaluated via a billable video visit.      The patient has been notified of following:     \"This video visit will be conducted via a call between you and your physician/provider. We have found that certain health care needs can be provided without the need for an in-person physical exam.  This service lets us provide the care you need with a video conversation.  If a prescription is necessary we can send it directly to your pharmacy.  If lab work is needed we can place an order for that and you can then stop by our lab to have the test done at a later time.    Video visits are billed at different rates depending on your insurance coverage.  Please reach out to your insurance provider with any questions.    If during the course of the call the physician/provider feels a video visit is not appropriate, you will not be charged for this service.\"    Patient has given verbal consent for Video visit? Yes  How would you like to obtain your AVS? MyChart  If you are dropped from the video visit, the video invite should be resent to: Send to e-mail at: [email or web address] # [phone]  Will anyone else be joining your video visit? Yes: daughters Myah Ritter and  Don. How would they like to receive their invitation? Send to e-mail at: [email or web address]        Video-Visit Details    Type of service:  Video Visit    Video Start Time:   Video End Time:    Originating Location (pt. Location): Home    Distant Location (provider location):  [facility]     Platform used for Video Visit:         Meredith Grant CMA        "

## 2020-07-16 NOTE — Clinical Note
"    7/16/2020         RE: Sara Lehman  [id number] [facility] [id number]        Dear Colleague,    Thank you for referring your patient, Sara Lehman, to the [facility]. Please see a copy of my visit note below.    [facility]    Hematology/Oncology Established Patient Follow-up Note      Today's Date: 07/16/20    Reason for Follow-up: Metastatic anal adenocarcinoma.    Sara Lehman is a 79 year old female who is being evaluated via a billable video visit.      The patient has been notified of following:     \"This video visit will be conducted via a call between you and your physician/provider. We have found that certain health care needs can be provided without the need for an in-person physical exam.  This service lets us provide the care you need with a video conversation.  If a prescription is necessary we can send it directly to your pharmacy.  If lab work is needed we can place an order for that and you can then stop by our lab to have the test done at a later time.    If during the course of the call the physician/provider feels a video visit is not appropriate, you will not be charged for this service.\"     Patient has given verbal consent for Video visit? Yes    Patient would like the video invitation sent by: Send to e-mail at: [email or web address]      Video-Visit Details    Type of service:  Video Visit    Originating Location (pt. Location): Home    Distant Location (provider location):  [facility]     Mode of Communication:  Video Conference via [employer]    Video visit duration: 25 minutes    HISTORY OF PRESENT ILLNESS: Sara Lehman is a 79 year old female who presents with the following oncologic history:  1.  4/1/2019: Initially presented to her primary care provider with complaints of hemorrhoids intermittently for 2 months with associated pain and itching as well as blood in the underwear at night and intermixed with stool, no " melena.  No associated weight loss or night sweats.  Reports occasional constipation.  Physical exam by primary care provider showed an anal mass about 1 inch in size with perianal erythema and skin breakdown.  2.  4/9/2019: Colonoscopy showed a perianal mass in the left lateral quadrant, severe diverticulosis in the sigmoid colon, descending colon, and ascending colon.  Biopsy of the anal mass showed invasive moderately differentiated adenocarcinoma with normal mismatch repair protein expression.  Specimen consisted of multiple fragments of necrotic tissue with dysplastic appearing glands with foci of invasion.  Surface mucosa is not present and therefore precludes differentiation between colorectal carcinoma or perianal glandular carcinoma.  Depth of invasion cannot be assessed.  KRAS wild-type.  3.  4/16/2019: CT chest/abdomen/pelvis with contrast showed a 1.3 cm left upper lobe lung nodule concerning for metastatic disease as well as 2 tiny nodules in the right lower lobe near the diaphragm.  Diffusely aneurysmal abdominal aorta measures up to 3.2 cm.  Prominent bilateral inguinal adenopathy with a lymph node in the upper right thigh adjacent to the common femoral vessels measures 2.9 x 2.3 cm.  Lymph node in the left medial upper thigh adjacent to the common femoral vessels measures 2.6 x 3.5 cm.  No evidence of obstruction.  Bony structures showed no destruction.  4.  4/19/2019: Consultation with Dr. Miller Casas.  Due to high suspicion of metastatic disease to the lung and bilateral inguinal regions, patient referred to medical oncology and radiation oncology.  5.  4/24/2019: PET/CT scan showed hypermetabolic posterior left upper lobe mid chest nodule adjacent to the major fissure measuring 1.3 x 1.2 cm and emphysematous changes.  Bilateral renal cysts present.  Nonobstructing stone of the lower right kidney measures 0.3 cm.  Nodular thickening of the left adrenal is 0.7 cm.  Intense hypermetabolism at the  anal level with heterogeneous appearing soft tissue with SUV max 19.5.  Enlarged inguinal hypermetabolic lymph nodes on the right measuring 2.9 x 2.1 cm and on the left 3.6 x 2.4 cm.  A distal infrarenal abdominal aorta measures 3.4 cm.  6. 5/6/2019: Left lung mass biopsy performed and showed discordant immunohistochemical findings.  Lesion showed a strong reactivity for cytokeratin 7 and focal staining for P40.  Patient's anal adenocarcinoma reportedly positive for both immunohistochemical staining pattern not typical for primary lung tumor and metastatic disease cannot be excluded.  7.  5/9/2019-6/25/2019: Completed radiation therapy to the anal cancer and inguinal lymph nodes.  She received concurrent radiosensitizing chemotherapy with capecitabine.  8. 8/28/2019: PET/CT scan showed hypermetabolic nodule in the left upper lobe measuring 1.5 x 1.4 cm with SUV max 10.5, increased in size and metabolic activity from 1.4 x 1.2 cm with SUV max 9.5 on 4/24/2019 PET/CT scan.  A 0.5 cm pleural-based nodule with low metabolic activity in the left lower lobe is unchanged.  A 3.4 cm infrarenal abdominal aortic aneurysm is present.  The previously noted hypermetabolic anal mass has significantly improved with SUV max 5.1, decreased from SUV max 24.6, consistent with response to treatment.  The bilateral inguinal lymphadenopathy has markedly improved on the current study with nodes measuring up to 1.6 x 1.1 cm with SUV max 3.5, decreased from 4 x 2.6 cm with SUV max 14, consistent with response to treatment.  9.  10/21/2019: Completed stereotactic radiation to the left upper lobe lung nodule/metastasis in 5 fractions under the care of Dr. Clay Monroy.  10. 12/16/2019: PET scan showed new hypermetabolic medial right middle lobe pulmonary nodule; left upper lobe nodule decreased in size and metabolism; improvement of bilateral inguinal adenopathy with decreased sizes and metabolism; hypermetabolism slightly increased at the  left adrenal nodule but stable in size; decreased hypermetabolism at anal level; new small focal hypermetabolism within muscle anterior to left hip joint with no mass effect.  11. 2/07/2020: Completed stereotactic radiation to the right middle lobe nodule and left adrenal nodule in 5 fractions.  12. 4/6/2020: PET/CT scan showed a 0.8 cm nodular density near the right lung apex with no significant hypermetabolism, unchanged from the prior exam.  The previously seen hypermetabolic right middle lobe pulmonary nodule is not seen on the current exam.  Small hypermetabolic left adrenal nodule measuring 1 cm with SUV max 3.3 is unchanged.  Infrarenal abdominal aortic aneurysm also not significantly changed and measures 3.3 x 3.6 cm containing a mild amount of mural thrombus.  The small hypermetabolic focus within the muscle anterior to the left hip joint is unchanged with SUV max 3.6.  13. 7/13/2020: PET/CT scan showed interval decrease in size of the right upper lobe lung nodule with no associated FDG activity; posttreatment changes in the lateral left upper lung; no new lung lesions; hypermetabolic left suprahilar area with no associated CT abnormality, possibly vascular in nature; no adenopathy; stable left adrenal nodule; indeterminate FDG activity in region of anal canal.    INTERIM HISTORY:  Trixie reports intermittent hematochezia but this is per her usual. She denies any rectal pain or diarrhea.  She denies any recent chest pain, cough, or recent fevers or chills.  She also denies any dysuria.      REVIEW OF SYSTEMS:   14 point ROS was reviewed and is negative other than as noted above in HPI.       HOME MEDICATIONS:  Current Outpatient Medications   Medication Sig Dispense Refill     acetaminophen (TYLENOL) 325 MG tablet Take 2 tablets (650 mg) by mouth every 4 hours as needed for mild pain or fever       amLODIPine (NORVASC) 5 MG tablet Take 1 tablet (5 mg) by mouth daily 90 tablet 2     metoprolol succinate ER  (TOPROL-XL) 25 MG 24 hr tablet Take 1 tablet (25 mg) by mouth daily 90 tablet 2     ondansetron (ZOFRAN) 8 MG tablet Take 8 mg by mouth every 8 hours as needed for nausea       SUMAtriptan (IMITREX) 50 MG tablet Take 1 tablet (50 mg) by mouth at onset of headache for migraine 18 tablet 1         ALLERGIES:  Allergies   Allergen Reactions     Ace Inhibitors Cough     Aleve [Naproxen Sodium] GI Disturbance     Asa Buff (Mag [Aspirin Buffered] GI Disturbance     Internal bleeding     Atorvastatin Calcium GI Disturbance     Celebrex [Celecoxib] GI Disturbance     Codeine Sulfate Hives     Pravastatin GI Disturbance     Simvastatin GI Disturbance         PAST MEDICAL HISTORY:  Past Medical History:   Diagnosis Date     Benign essential hypertension 2017     Bronchospasm      COPD (chronic obstructive pulmonary disease) (H) 2014     Hypertension      Migraine      Neck pain      Nephrolithiasis          PAST SURGICAL HISTORY:  Past Surgical History:   Procedure Laterality Date     COLONOSCOPY N/A 2019    Procedure: COMBINED COLONOSCOPY, SINGLE OR MULTIPLE BIOPSY/POLYPECTOMY BY BIOPSY;  Surgeon: Saleem Shaw MD;  Location:  GI     LITHOTRIPSY      Lithotrypsy     ORTHOPEDIC SURGERY      right knee exploration     TUBAL LIGATION           SOCIAL HISTORY:  Social History     Socioeconomic History     Marital status:      Spouse name: Not on file     Number of children: Not on file     Years of education: Not on file     Highest education level: Not on file   Occupational History     Not on file   Social Needs     Financial resource strain: Not on file     Food insecurity     Worry: Not on file     Inability: Not on file     Transportation needs     Medical: Not on file     Non-medical: Not on file   Tobacco Use     Smoking status: Former Smoker     Packs/day: 0.50     Types: Cigarettes     Start date: 2016     Last attempt to quit: 2016     Years since quittin.2     Smokeless tobacco:  Never Used   Substance and Sexual Activity     Alcohol use: Yes     Alcohol/week: 0.0 standard drinks     Comment: socially only- one per month     Drug use: No     Sexual activity: Yes     Partners: Male     Birth control/protection: Post-menopausal   Lifestyle     Physical activity     Days per week: Not on file     Minutes per session: Not on file     Stress: Not on file   Relationships     Social connections     Talks on phone: Not on file     Gets together: Not on file     Attends [employer] service: Not on file     Active member of club or organization: Not on file     Attends meetings of clubs or organizations: Not on file     Relationship status: Not on file     Intimate partner violence     Fear of current or ex partner: Not on file     Emotionally abused: Not on file     Physically abused: Not on file     Forced sexual activity: Not on file   Other Topics Concern     Parent/sibling w/ CABG, MI or angioplasty before 65F 55M? Not Asked   Social History Narrative     Not on file         FAMILY HISTORY:  Family History   Problem Relation Age of Onset     Cancer Mother 76        uterine     Cerebrovascular Disease Father 75     C.A.D. Brother 66         PHYSICAL EXAM:  Vital signs:  Not taken.  ECO  GENERAL: No acute distress.  EYES: No scleral icterus. No overt erythema.  RESPIRATORY: No cough.  No labored breathing.  MUSCULOSKELETAL: Range of motion in the neck, shoulders, and arms appear normal.  SKIN: No overt rashes, discolorations, or lesions over the face and neck.  NEUROLOGIC: Alert.  No overt tremors.  PSYCHIATRIC: Normal affect and mood.  Does not appear anxious.    The rest of a comprehensive physical examination is deferred due to PHE (public health emergency) video visit restrictions.    LABS:  CBC RESULTS:   Recent Labs   Lab Test 20  1347   WBC 4.5   RBC 4.37   HGB 13.7   HCT 41.5   MCV 95   MCH 31.4   MCHC 33.0   RDW 13.3        Last Comprehensive Metabolic Panel:  Sodium   Date  Value Ref Range Status   07/13/2020 142 133 - 144 mmol/L Final     Potassium   Date Value Ref Range Status   07/13/2020 4.3 3.4 - 5.3 mmol/L Final     Chloride   Date Value Ref Range Status   07/13/2020 108 94 - 109 mmol/L Final     Carbon Dioxide   Date Value Ref Range Status   07/13/2020 26 20 - 32 mmol/L Final     Anion Gap   Date Value Ref Range Status   07/13/2020 8 3 - 14 mmol/L Final     Glucose   Date Value Ref Range Status   07/13/2020 90 70 - 99 mg/dL Final     Urea Nitrogen   Date Value Ref Range Status   07/13/2020 16 7 - 30 mg/dL Final     Creatinine   Date Value Ref Range Status   07/13/2020 0.75 0.52 - 1.04 mg/dL Final     GFR Estimate   Date Value Ref Range Status   07/13/2020 76 >60 mL/min/[1.73_m2] Final     Comment:     Non  GFR Calc  Starting 12/18/2018, serum creatinine based estimated GFR (eGFR) will be   calculated using the Chronic Kidney Disease Epidemiology Collaboration   (CKD-EPI) equation.       Calcium   Date Value Ref Range Status   07/13/2020 9.9 8.5 - 10.1 mg/dL Final     Bilirubin Total   Date Value Ref Range Status   07/13/2020 0.5 0.2 - 1.3 mg/dL Final     Alkaline Phosphatase   Date Value Ref Range Status   07/13/2020 84 40 - 150 U/L Final     ALT   Date Value Ref Range Status   07/13/2020 19 0 - 50 U/L Final     AST   Date Value Ref Range Status   07/13/2020 19 0 - 45 U/L Final       PATHOLOGY:  Reviewed as per HPI.    IMAGING:  Reviewed as per HPI.    ASSESSMENT/PLAN:  Sara Lehman is a 79 year old female with the following issues:  1.  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  Left lung and right middle lobe lung metastases  3. Left adrenal nodule  -Sara completed chemoradiation with capecitabine radiosensitizer on 6/25/2019, stereotactic radiation to the left upper lobe lung metastasis 10/21/2019, and stereotactic radiation to the right middle lobe lung metastasis and left adrenal  "nodule 2/07/2020.  -I personally reviewed the PET/CT scan from 7/13/2020 and discussed the results with Sara.  She has overall no new disease.  The lung nodules have a treated appearance and the left adrenal nodule is stable.  While I cannot exclude possible recurrent disease, the hypermetabolism at the anal canal could potentially be inflammation.  Trixie's stool habits have not changed.  -I recommended to Trixie continued observation without systemic therapy to avoid significant side effects and immunosuppression, particularly during the current pandemic.  -Prior testing for KRAS mutation showed wild-type.  May consider adding either bevacizumab or an epidermal growth factor receptor inhibitor such as panitumumab to FOLFOX for future systemic therapy if needed.  However, would be cautious with side effects of systemic therapy.  Again, there is no clear indication for systemic therapy at this time. Trixie was very happy to continue on observation.  -Plan for repeat PET scan in 3 months.  -If next scan shows recurrent or progressive metastatic disease, will consider mFOLFOX with bevacizumab or panitumumab.  We would need to decide carefully on what she could tolerate if we need to consider systemic therapy.    Return in 3 months.    Rhona Wisdom MD  Hematology/Oncology  [facility]    Sarataran Lehman is a 79 year old female who is being evaluated via a billable video visit.      The patient has been notified of following:     \"This video visit will be conducted via a call between you and your physician/provider. We have found that certain health care needs can be provided without the need for an in-person physical exam.  This service lets us provide the care you need with a video conversation.  If a prescription is necessary we can send it directly to your pharmacy.  If lab work is needed we can place an order for that and you can then stop by our lab to have the test done at a later " "time.    Video visits are billed at different rates depending on your insurance coverage.  Please reach out to your insurance provider with any questions.    If during the course of the call the physician/provider feels a video visit is not appropriate, you will not be charged for this service.\"    Patient has given verbal consent for Video visit? Yes  How would you like to obtain your AVS? MyChart  If you are dropped from the video visit, the video invite should be resent to: Send to e-mail at: [email or web address] # [phone]  Will anyone else be joining your video visit? Yes: daughters Myah Ritter and  Lino. How would they like to receive their invitation? Send to e-mail at: [email or web address]  {If patient encounters technical issues they should call [phone]}      Video-Visit Details    Type of service:  Video Visit    Video Start Time:   Video End Time:    Originating Location (pt. Location): Home    Distant Location (provider location):  [facility]     Platform used for Video Visit:         Meredith Grant CMA          Again, thank you for allowing me to participate in the care of your patient.        Sincerely,        Rhona Wisdom MD  "

## 2020-07-16 NOTE — LETTER
"    7/16/2020         RE: Sara Lehman  [id number] [facility] [id number]        Dear Colleague,    Thank you for referring your patient, Sara Lehman, to the [facility]. Please see a copy of my visit note below.    [facility]    Hematology/Oncology Established Patient Follow-up Note      Today's Date: 07/16/20    Reason for Follow-up: Metastatic anal adenocarcinoma.    Sara Lehman is a 79 year old female who is being evaluated via a billable video visit.      The patient has been notified of following:     \"This video visit will be conducted via a call between you and your physician/provider. We have found that certain health care needs can be provided without the need for an in-person physical exam.  This service lets us provide the care you need with a video conversation.  If a prescription is necessary we can send it directly to your pharmacy.  If lab work is needed we can place an order for that and you can then stop by our lab to have the test done at a later time.    If during the course of the call the physician/provider feels a video visit is not appropriate, you will not be charged for this service.\"     Patient has given verbal consent for Video visit? Yes    Patient would like the video invitation sent by: Send to e-mail at: [email or web address]      Video-Visit Details    Type of service:  Video Visit    Originating Location (pt. Location): Home    Distant Location (provider location):  [facility]     Mode of Communication:  Video Conference via [employer]    Video visit duration: 25 minutes    HISTORY OF PRESENT ILLNESS: Sara Lehman is a 79 year old female who presents with the following oncologic history:  1.  4/1/2019: Initially presented to her primary care provider with complaints of hemorrhoids intermittently for 2 months with associated pain and itching as well as blood in the underwear at night and intermixed with stool, no " melena.  No associated weight loss or night sweats.  Reports occasional constipation.  Physical exam by primary care provider showed an anal mass about 1 inch in size with perianal erythema and skin breakdown.  2.  4/9/2019: Colonoscopy showed a perianal mass in the left lateral quadrant, severe diverticulosis in the sigmoid colon, descending colon, and ascending colon.  Biopsy of the anal mass showed invasive moderately differentiated adenocarcinoma with normal mismatch repair protein expression.  Specimen consisted of multiple fragments of necrotic tissue with dysplastic appearing glands with foci of invasion.  Surface mucosa is not present and therefore precludes differentiation between colorectal carcinoma or perianal glandular carcinoma.  Depth of invasion cannot be assessed.  KRAS wild-type.  3.  4/16/2019: CT chest/abdomen/pelvis with contrast showed a 1.3 cm left upper lobe lung nodule concerning for metastatic disease as well as 2 tiny nodules in the right lower lobe near the diaphragm.  Diffusely aneurysmal abdominal aorta measures up to 3.2 cm.  Prominent bilateral inguinal adenopathy with a lymph node in the upper right thigh adjacent to the common femoral vessels measures 2.9 x 2.3 cm.  Lymph node in the left medial upper thigh adjacent to the common femoral vessels measures 2.6 x 3.5 cm.  No evidence of obstruction.  Bony structures showed no destruction.  4.  4/19/2019: Consultation with Dr. Miller Casas.  Due to high suspicion of metastatic disease to the lung and bilateral inguinal regions, patient referred to medical oncology and radiation oncology.  5.  4/24/2019: PET/CT scan showed hypermetabolic posterior left upper lobe mid chest nodule adjacent to the major fissure measuring 1.3 x 1.2 cm and emphysematous changes.  Bilateral renal cysts present.  Nonobstructing stone of the lower right kidney measures 0.3 cm.  Nodular thickening of the left adrenal is 0.7 cm.  Intense hypermetabolism at the  anal level with heterogeneous appearing soft tissue with SUV max 19.5.  Enlarged inguinal hypermetabolic lymph nodes on the right measuring 2.9 x 2.1 cm and on the left 3.6 x 2.4 cm.  A distal infrarenal abdominal aorta measures 3.4 cm.  6. 5/6/2019: Left lung mass biopsy performed and showed discordant immunohistochemical findings.  Lesion showed a strong reactivity for cytokeratin 7 and focal staining for P40.  Patient's anal adenocarcinoma reportedly positive for both immunohistochemical staining pattern not typical for primary lung tumor and metastatic disease cannot be excluded.  7.  5/9/2019-6/25/2019: Completed radiation therapy to the anal cancer and inguinal lymph nodes.  She received concurrent radiosensitizing chemotherapy with capecitabine.  8. 8/28/2019: PET/CT scan showed hypermetabolic nodule in the left upper lobe measuring 1.5 x 1.4 cm with SUV max 10.5, increased in size and metabolic activity from 1.4 x 1.2 cm with SUV max 9.5 on 4/24/2019 PET/CT scan.  A 0.5 cm pleural-based nodule with low metabolic activity in the left lower lobe is unchanged.  A 3.4 cm infrarenal abdominal aortic aneurysm is present.  The previously noted hypermetabolic anal mass has significantly improved with SUV max 5.1, decreased from SUV max 24.6, consistent with response to treatment.  The bilateral inguinal lymphadenopathy has markedly improved on the current study with nodes measuring up to 1.6 x 1.1 cm with SUV max 3.5, decreased from 4 x 2.6 cm with SUV max 14, consistent with response to treatment.  9.  10/21/2019: Completed stereotactic radiation to the left upper lobe lung nodule/metastasis in 5 fractions under the care of Dr. Clay Monroy.  10. 12/16/2019: PET scan showed new hypermetabolic medial right middle lobe pulmonary nodule; left upper lobe nodule decreased in size and metabolism; improvement of bilateral inguinal adenopathy with decreased sizes and metabolism; hypermetabolism slightly increased at the  left adrenal nodule but stable in size; decreased hypermetabolism at anal level; new small focal hypermetabolism within muscle anterior to left hip joint with no mass effect.  11. 2/07/2020: Completed stereotactic radiation to the right middle lobe nodule and left adrenal nodule in 5 fractions.  12. 4/6/2020: PET/CT scan showed a 0.8 cm nodular density near the right lung apex with no significant hypermetabolism, unchanged from the prior exam.  The previously seen hypermetabolic right middle lobe pulmonary nodule is not seen on the current exam.  Small hypermetabolic left adrenal nodule measuring 1 cm with SUV max 3.3 is unchanged.  Infrarenal abdominal aortic aneurysm also not significantly changed and measures 3.3 x 3.6 cm containing a mild amount of mural thrombus.  The small hypermetabolic focus within the muscle anterior to the left hip joint is unchanged with SUV max 3.6.  13. 7/13/2020: PET/CT scan showed interval decrease in size of the right upper lobe lung nodule with no associated FDG activity; posttreatment changes in the lateral left upper lung; no new lung lesions; hypermetabolic left suprahilar area with no associated CT abnormality, possibly vascular in nature; no adenopathy; stable left adrenal nodule; indeterminate FDG activity in region of anal canal.    INTERIM HISTORY:  Trixie reports intermittent hematochezia but this is per her usual. She denies any rectal pain or diarrhea.  She denies any recent chest pain, cough, or recent fevers or chills.  She also denies any dysuria.      REVIEW OF SYSTEMS:   14 point ROS was reviewed and is negative other than as noted above in HPI.       HOME MEDICATIONS:  Current Outpatient Medications   Medication Sig Dispense Refill     acetaminophen (TYLENOL) 325 MG tablet Take 2 tablets (650 mg) by mouth every 4 hours as needed for mild pain or fever       amLODIPine (NORVASC) 5 MG tablet Take 1 tablet (5 mg) by mouth daily 90 tablet 2     metoprolol succinate ER  (TOPROL-XL) 25 MG 24 hr tablet Take 1 tablet (25 mg) by mouth daily 90 tablet 2     ondansetron (ZOFRAN) 8 MG tablet Take 8 mg by mouth every 8 hours as needed for nausea       SUMAtriptan (IMITREX) 50 MG tablet Take 1 tablet (50 mg) by mouth at onset of headache for migraine 18 tablet 1         ALLERGIES:  Allergies   Allergen Reactions     Ace Inhibitors Cough     Aleve [Naproxen Sodium] GI Disturbance     Asa Buff (Mag [Aspirin Buffered] GI Disturbance     Internal bleeding     Atorvastatin Calcium GI Disturbance     Celebrex [Celecoxib] GI Disturbance     Codeine Sulfate Hives     Pravastatin GI Disturbance     Simvastatin GI Disturbance         PAST MEDICAL HISTORY:  Past Medical History:   Diagnosis Date     Benign essential hypertension 2017     Bronchospasm      COPD (chronic obstructive pulmonary disease) (H) 2014     Hypertension      Migraine      Neck pain      Nephrolithiasis          PAST SURGICAL HISTORY:  Past Surgical History:   Procedure Laterality Date     COLONOSCOPY N/A 2019    Procedure: COMBINED COLONOSCOPY, SINGLE OR MULTIPLE BIOPSY/POLYPECTOMY BY BIOPSY;  Surgeon: Saleem Shaw MD;  Location:  GI     LITHOTRIPSY      Lithotrypsy     ORTHOPEDIC SURGERY      right knee exploration     TUBAL LIGATION           SOCIAL HISTORY:  Social History     Socioeconomic History     Marital status:      Spouse name: Not on file     Number of children: Not on file     Years of education: Not on file     Highest education level: Not on file   Occupational History     Not on file   Social Needs     Financial resource strain: Not on file     Food insecurity     Worry: Not on file     Inability: Not on file     Transportation needs     Medical: Not on file     Non-medical: Not on file   Tobacco Use     Smoking status: Former Smoker     Packs/day: 0.50     Types: Cigarettes     Start date: 2016     Last attempt to quit: 2016     Years since quittin.2     Smokeless tobacco:  Never Used   Substance and Sexual Activity     Alcohol use: Yes     Alcohol/week: 0.0 standard drinks     Comment: socially only- one per month     Drug use: No     Sexual activity: Yes     Partners: Male     Birth control/protection: Post-menopausal   Lifestyle     Physical activity     Days per week: Not on file     Minutes per session: Not on file     Stress: Not on file   Relationships     Social connections     Talks on phone: Not on file     Gets together: Not on file     Attends Quaker service: Not on file     Active member of club or organization: Not on file     Attends meetings of clubs or organizations: Not on file     Relationship status: Not on file     Intimate partner violence     Fear of current or ex partner: Not on file     Emotionally abused: Not on file     Physically abused: Not on file     Forced sexual activity: Not on file   Other Topics Concern     Parent/sibling w/ CABG, MI or angioplasty before 65F 55M? Not Asked   Social History Narrative     Not on file         FAMILY HISTORY:  Family History   Problem Relation Age of Onset     Cancer Mother 76        uterine     Cerebrovascular Disease Father 75     C.A.D. Brother 66         PHYSICAL EXAM:  Vital signs:  Not taken.  ECO  GENERAL: No acute distress.  EYES: No scleral icterus. No overt erythema.  RESPIRATORY: No cough.  No labored breathing.  MUSCULOSKELETAL: Range of motion in the neck, shoulders, and arms appear normal.  SKIN: No overt rashes, discolorations, or lesions over the face and neck.  NEUROLOGIC: Alert.  No overt tremors.  PSYCHIATRIC: Normal affect and mood.  Does not appear anxious.    The rest of a comprehensive physical examination is deferred due to PHE (public health emergency) video visit restrictions.    LABS:  CBC RESULTS:   Recent Labs   Lab Test 20  1347   WBC 4.5   RBC 4.37   HGB 13.7   HCT 41.5   MCV 95   MCH 31.4   MCHC 33.0   RDW 13.3        Last Comprehensive Metabolic Panel:  Sodium   Date  Value Ref Range Status   07/13/2020 142 133 - 144 mmol/L Final     Potassium   Date Value Ref Range Status   07/13/2020 4.3 3.4 - 5.3 mmol/L Final     Chloride   Date Value Ref Range Status   07/13/2020 108 94 - 109 mmol/L Final     Carbon Dioxide   Date Value Ref Range Status   07/13/2020 26 20 - 32 mmol/L Final     Anion Gap   Date Value Ref Range Status   07/13/2020 8 3 - 14 mmol/L Final     Glucose   Date Value Ref Range Status   07/13/2020 90 70 - 99 mg/dL Final     Urea Nitrogen   Date Value Ref Range Status   07/13/2020 16 7 - 30 mg/dL Final     Creatinine   Date Value Ref Range Status   07/13/2020 0.75 0.52 - 1.04 mg/dL Final     GFR Estimate   Date Value Ref Range Status   07/13/2020 76 >60 mL/min/[1.73_m2] Final     Comment:     Non  GFR Calc  Starting 12/18/2018, serum creatinine based estimated GFR (eGFR) will be   calculated using the Chronic Kidney Disease Epidemiology Collaboration   (CKD-EPI) equation.       Calcium   Date Value Ref Range Status   07/13/2020 9.9 8.5 - 10.1 mg/dL Final     Bilirubin Total   Date Value Ref Range Status   07/13/2020 0.5 0.2 - 1.3 mg/dL Final     Alkaline Phosphatase   Date Value Ref Range Status   07/13/2020 84 40 - 150 U/L Final     ALT   Date Value Ref Range Status   07/13/2020 19 0 - 50 U/L Final     AST   Date Value Ref Range Status   07/13/2020 19 0 - 45 U/L Final       PATHOLOGY:  Reviewed as per HPI.    IMAGING:  Reviewed as per HPI.    ASSESSMENT/PLAN:  Sara Lehman is a 79 year old female with the following issues:  1.  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  Left lung and right middle lobe lung metastases  3. Left adrenal nodule  -Sara completed chemoradiation with capecitabine radiosensitizer on 6/25/2019, stereotactic radiation to the left upper lobe lung metastasis 10/21/2019, and stereotactic radiation to the right middle lobe lung metastasis and left adrenal  "nodule 2/07/2020.  -I personally reviewed the PET/CT scan from 7/13/2020 and discussed the results with Sara.  She has overall no new disease.  The lung nodules have a treated appearance and the left adrenal nodule is stable.  While I cannot exclude possible recurrent disease, the hypermetabolism at the anal canal could potentially be inflammation.  Trixie's stool habits have not changed.  -I recommended to Trixie continued observation without systemic therapy to avoid significant side effects and immunosuppression, particularly during the current pandemic.  -Prior testing for KRAS mutation showed wild-type.  May consider adding either bevacizumab or an epidermal growth factor receptor inhibitor such as panitumumab to FOLFOX for future systemic therapy if needed.  However, would be cautious with side effects of systemic therapy.  Again, there is no clear indication for systemic therapy at this time. Trixie was very happy to continue on observation.  -Plan for repeat PET scan in 3 months.  -If next scan shows recurrent or progressive metastatic disease, will consider mFOLFOX with bevacizumab or panitumumab.  We would need to decide carefully on what she could tolerate if we need to consider systemic therapy.    Return in 3 months.    Rhona Wisdom MD  Hematology/Oncology  [facility]    Sarataran Lehman is a 79 year old female who is being evaluated via a billable video visit.      The patient has been notified of following:     \"This video visit will be conducted via a call between you and your physician/provider. We have found that certain health care needs can be provided without the need for an in-person physical exam.  This service lets us provide the care you need with a video conversation.  If a prescription is necessary we can send it directly to your pharmacy.  If lab work is needed we can place an order for that and you can then stop by our lab to have the test done at a later " "time.    Video visits are billed at different rates depending on your insurance coverage.  Please reach out to your insurance provider with any questions.    If during the course of the call the physician/provider feels a video visit is not appropriate, you will not be charged for this service.\"    Patient has given verbal consent for Video visit? Yes  How would you like to obtain your AVS? MyChart  If you are dropped from the video visit, the video invite should be resent to: Send to e-mail at: [email or web address] # [phone]  Will anyone else be joining your video visit? Yes: daughters Myah Ritter and  Lino. How would they like to receive their invitation? Send to e-mail at: [email or web address]  {If patient encounters technical issues they should call [phone]}      Video-Visit Details    Type of service:  Video Visit    Video Start Time:   Video End Time:    Originating Location (pt. Location): Home    Distant Location (provider location):  [facility]     Platform used for Video Visit:         Meredith Grant CMA          Again, thank you for allowing me to participate in the care of your patient.        Sincerely,        Rhona Wisdom MD    "

## 2020-08-11 DIAGNOSIS — G43.909 MIGRAINE WITHOUT STATUS MIGRAINOSUS, NOT INTRACTABLE, UNSPECIFIED MIGRAINE TYPE: ICD-10-CM

## 2020-08-12 RX ORDER — SUMATRIPTAN 50 MG/1
TABLET, FILM COATED ORAL
Qty: 6 TABLET | OUTPATIENT
Start: 2020-08-12

## 2020-08-12 NOTE — TELEPHONE ENCOUNTER
Needs to schedule appointment for refills or send to provider she is currently seeing. LM TO CALL CLINIC TO SCHEDULE

## 2020-08-12 NOTE — TELEPHONE ENCOUNTER
Needs to schedule appointment for refills or send to provider she is currently seeing.    ANDRY Bhatt CNP on 8/12/2020 at 9:50 AM

## 2020-09-10 DIAGNOSIS — I10 BENIGN ESSENTIAL HYPERTENSION: ICD-10-CM

## 2020-09-10 RX ORDER — METOPROLOL SUCCINATE 25 MG/1
25 TABLET, EXTENDED RELEASE ORAL DAILY
Qty: 90 TABLET | Refills: 0 | Status: SHIPPED | OUTPATIENT
Start: 2020-09-10 | End: 2020-12-15

## 2020-09-10 RX ORDER — AMLODIPINE BESYLATE 5 MG/1
5 TABLET ORAL DAILY
Qty: 90 TABLET | Refills: 0 | Status: SHIPPED | OUTPATIENT
Start: 2020-09-10 | End: 2020-12-15

## 2020-09-10 NOTE — TELEPHONE ENCOUNTER
Prescription approved per [facility].    Ashwini TANN, RN, PHN

## 2020-09-16 ENCOUNTER — TELEPHONE (OUTPATIENT)
Dept: ONCOLOGY | Facility: CLINIC | Age: 80
End: 2020-09-16

## 2020-09-16 NOTE — TELEPHONE ENCOUNTER
Message left for patient daughter to review re- scheduled appointments as per her voicemail message left on clinic voicmail.

## 2020-10-23 NOTE — PROGRESS NOTES
"[facility]    Hematology/Oncology Established Patient Follow-up Note      Today's Date: 10/28/20    Reason for Follow-up: Metastatic anal adenocarcinoma.    Sara Lehman is a 80 year old female who is being evaluated via a billable video visit.      The patient has been notified of following:     \"This video visit will be conducted via a call between you and your physician/provider. We have found that certain health care needs can be provided without the need for an in-person physical exam.  This service lets us provide the care you need with a video conversation.  If a prescription is necessary we can send it directly to your pharmacy.  If lab work is needed we can place an order for that and you can then stop by our lab to have the test done at a later time.    If during the course of the call the physician/provider feels a video visit is not appropriate, you will not be charged for this service.\"     Patient has given verbal consent for Video visit? Yes    Patient is in virtual lobby    Video-Visit Details    Type of service:  Video Visit    Originating Location (pt. Location): Home    Distant Location (provider location):  [facility]     Mode of Communication:  Video Conference via [employer]    Video visit duration: *** minutes    HISTORY OF PRESENT ILLNESS: Sara Lehman is a 80 year old female who presents with the following oncologic history:  1.  4/1/2019: Initially presented to her primary care provider with complaints of hemorrhoids intermittently for 2 months with associated pain and itching as well as blood in the underwear at night and intermixed with stool, no melena.  No associated weight loss or night sweats.  Reports occasional constipation.  Physical exam by primary care provider showed an anal mass about 1 inch in size with perianal erythema and skin breakdown.  2.  4/9/2019: Colonoscopy showed a perianal mass in the left lateral quadrant, severe diverticulosis " in the sigmoid colon, descending colon, and ascending colon.  Biopsy of the anal mass showed invasive moderately differentiated adenocarcinoma with normal mismatch repair protein expression.  Specimen consisted of multiple fragments of necrotic tissue with dysplastic appearing glands with foci of invasion.  Surface mucosa is not present and therefore precludes differentiation between colorectal carcinoma or perianal glandular carcinoma.  Depth of invasion cannot be assessed.  KRAS wild-type.  3.  4/16/2019: CT chest/abdomen/pelvis with contrast showed a 1.3 cm left upper lobe lung nodule concerning for metastatic disease as well as 2 tiny nodules in the right lower lobe near the diaphragm.  Diffusely aneurysmal abdominal aorta measures up to 3.2 cm.  Prominent bilateral inguinal adenopathy with a lymph node in the upper right thigh adjacent to the common femoral vessels measures 2.9 x 2.3 cm.  Lymph node in the left medial upper thigh adjacent to the common femoral vessels measures 2.6 x 3.5 cm.  No evidence of obstruction.  Bony structures showed no destruction.  4.  4/19/2019: Consultation with Dr. Miller Casas.  Due to high suspicion of metastatic disease to the lung and bilateral inguinal regions, patient referred to medical oncology and radiation oncology.  5.  4/24/2019: PET/CT scan showed hypermetabolic posterior left upper lobe mid chest nodule adjacent to the major fissure measuring 1.3 x 1.2 cm and emphysematous changes.  Bilateral renal cysts present.  Nonobstructing stone of the lower right kidney measures 0.3 cm.  Nodular thickening of the left adrenal is 0.7 cm.  Intense hypermetabolism at the anal level with heterogeneous appearing soft tissue with SUV max 19.5.  Enlarged inguinal hypermetabolic lymph nodes on the right measuring 2.9 x 2.1 cm and on the left 3.6 x 2.4 cm.  A distal infrarenal abdominal aorta measures 3.4 cm.  6.  5/6/2019: Left lung mass biopsy performed and showed discordant  immunohistochemical findings.  Lesion showed a strong reactivity for cytokeratin 7 and focal staining for P40.  Patient's anal adenocarcinoma reportedly positive for both immunohistochemical staining pattern not typical for primary lung tumor and metastatic disease cannot be excluded.  7.  5/9/2019-6/25/2019: Completed radiation therapy to the anal cancer and inguinal lymph nodes.  She received concurrent radiosensitizing chemotherapy with capecitabine.  8.  8/28/2019: PET/CT scan showed hypermetabolic nodule in the left upper lobe measuring 1.5 x 1.4 cm with SUV max 10.5, increased in size and metabolic activity from 1.4 x 1.2 cm with SUV max 9.5 on 4/24/2019 PET/CT scan.  A 0.5 cm pleural-based nodule with low metabolic activity in the left lower lobe is unchanged.  A 3.4 cm infrarenal abdominal aortic aneurysm is present.  The previously noted hypermetabolic anal mass has significantly improved with SUV max 5.1, decreased from SUV max 24.6, consistent with response to treatment.  The bilateral inguinal lymphadenopathy has markedly improved on the current study with nodes measuring up to 1.6 x 1.1 cm with SUV max 3.5, decreased from 4 x 2.6 cm with SUV max 14, consistent with response to treatment.  9.  10/21/2019: Completed stereotactic radiation to the left upper lobe lung nodule/metastasis in 5 fractions under the care of Dr. Clay Monroy.  10. 12/16/2019: PET scan showed new hypermetabolic medial right middle lobe pulmonary nodule; left upper lobe nodule decreased in size and metabolism; improvement of bilateral inguinal adenopathy with decreased sizes and metabolism; hypermetabolism slightly increased at the left adrenal nodule but stable in size; decreased hypermetabolism at anal level; new small focal hypermetabolism within muscle anterior to left hip joint with no mass effect.  11. 2/07/2020: Completed stereotactic radiation to the right middle lobe nodule and left adrenal nodule in 5 fractions.  12.   4/6/2020: PET/CT scan showed a 0.8 cm nodular density near the right lung apex with no significant hypermetabolism, unchanged from the prior exam.  The previously seen hypermetabolic right middle lobe pulmonary nodule is not seen on the current exam.  Small hypermetabolic left adrenal nodule measuring 1 cm with SUV max 3.3 is unchanged.  Infrarenal abdominal aortic aneurysm also not significantly changed and measures 3.3 x 3.6 cm containing a mild amount of mural thrombus.  The small hypermetabolic focus within the muscle anterior to the left hip joint is unchanged with SUV max 3.6.  13. 7/13/2020: PET/CT scan showed interval decrease in size of the right upper lobe lung nodule with no associated FDG activity; posttreatment changes in the lateral left upper lung; no new lung lesions; hypermetabolic left suprahilar area with no associated CT abnormality, possibly vascular in nature; no adenopathy; stable left adrenal nodule; indeterminate FDG activity in region of anal canal.  14. 10/26/2020: PET/CT scan showed ***    INTERIM HISTORY:  Trixie reports     intermittent hematochezia but this is per her usual. She denies any rectal pain or diarrhea.  She denies any recent chest pain, cough, or recent fevers or chills.  She also denies any dysuria.      REVIEW OF SYSTEMS:   14 point ROS was reviewed and is negative other than as noted above in HPI.       HOME MEDICATIONS:  Current Outpatient Medications   Medication Sig Dispense Refill     acetaminophen (TYLENOL) 325 MG tablet Take 2 tablets (650 mg) by mouth every 4 hours as needed for mild pain or fever       amLODIPine (NORVASC) 5 MG tablet Take 1 tablet (5 mg) by mouth daily 90 tablet 0     metoprolol succinate ER (TOPROL-XL) 25 MG 24 hr tablet Take 1 tablet (25 mg) by mouth daily 90 tablet 0     ondansetron (ZOFRAN) 8 MG tablet Take 8 mg by mouth every 8 hours as needed for nausea       SUMAtriptan (IMITREX) 50 MG tablet Take 1 tablet (50 mg) by mouth at onset of  headache for migraine 18 tablet 1         ALLERGIES:  Allergies   Allergen Reactions     Ace Inhibitors Cough     Aleve [Naproxen Sodium] GI Disturbance     Asa Buff (Mag [Aspirin Buffered] GI Disturbance     Internal bleeding     Atorvastatin Calcium GI Disturbance     Celebrex [Celecoxib] GI Disturbance     Codeine Sulfate Hives     Pravastatin GI Disturbance     Simvastatin GI Disturbance         PAST MEDICAL HISTORY:  Past Medical History:   Diagnosis Date     Benign essential hypertension 2017     Bronchospasm      COPD (chronic obstructive pulmonary disease) (H) 2014     Hypertension      Migraine      Neck pain      Nephrolithiasis          PAST SURGICAL HISTORY:  Past Surgical History:   Procedure Laterality Date     COLONOSCOPY N/A 2019    Procedure: COMBINED COLONOSCOPY, SINGLE OR MULTIPLE BIOPSY/POLYPECTOMY BY BIOPSY;  Surgeon: Saleem Shaw MD;  Location:  GI     LITHOTRIPSY      Lithotrypsy     ORTHOPEDIC SURGERY      right knee exploration     TUBAL LIGATION           SOCIAL HISTORY:  Social History     Socioeconomic History     Marital status:      Spouse name: Not on file     Number of children: Not on file     Years of education: Not on file     Highest education level: Not on file   Occupational History     Not on file   Social Needs     Financial resource strain: Not on file     Food insecurity     Worry: Not on file     Inability: Not on file     Transportation needs     Medical: Not on file     Non-medical: Not on file   Tobacco Use     Smoking status: Former Smoker     Packs/day: 0.50     Types: Cigarettes     Start date: 2016     Quit date: 2016     Years since quittin.5     Smokeless tobacco: Never Used   Substance and Sexual Activity     Alcohol use: Yes     Alcohol/week: 0.0 standard drinks     Comment: socially only- one per month     Drug use: No     Sexual activity: Yes     Partners: Male     Birth control/protection: Post-menopausal   Lifestyle      Physical activity     Days per week: Not on file     Minutes per session: Not on file     Stress: Not on file   Relationships     Social connections     Talks on phone: Not on file     Gets together: Not on file     Attends Rastafari service: Not on file     Active member of club or organization: Not on file     Attends meetings of clubs or organizations: Not on file     Relationship status: Not on file     Intimate partner violence     Fear of current or ex partner: Not on file     Emotionally abused: Not on file     Physically abused: Not on file     Forced sexual activity: Not on file   Other Topics Concern     Parent/sibling w/ CABG, MI or angioplasty before 65F 55M? Not Asked   Social History Narrative     Not on file         FAMILY HISTORY:  Family History   Problem Relation Age of Onset     Cancer Mother 76        uterine     Cerebrovascular Disease Father 75     C.A.D. Brother 66         PHYSICAL EXAM:  Vital signs:  Not taken.  ECO  GENERAL: No acute distress.  EYES: No scleral icterus. No overt erythema.  RESPIRATORY: No cough.  No labored breathing.  MUSCULOSKELETAL: Range of motion in the neck, shoulders, and arms appear normal.  SKIN: No overt rashes, discolorations, or lesions over the face and neck.  NEUROLOGIC: Alert.  No overt tremors.  PSYCHIATRIC: Normal affect and mood.  Does not appear anxious.    The rest of a comprehensive physical examination is deferred due to PHE (public health emergency) video visit restrictions.    LABS:      PATHOLOGY:  Reviewed as per HPI.    IMAGING:  Reviewed as per HPI.    ASSESSMENT/PLAN:  Sara Lehman is a 80 year old female with the following issues:  1.  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  Left lung and right middle lobe lung metastases  3. Left adrenal nodule  -Sara completed chemoradiation with capecitabine radiosensitizer on 2019, stereotactic radiation to the left upper  lobe lung metastasis 10/21/2019, and stereotactic radiation to the right middle lobe lung metastasis and left adrenal nodule 2/07/2020.  -I personally reviewed the PET/CT scan from 10/26/2020 and discussed the results with Sara.  She has *** overall no new disease.  The lung nodules have a treated appearance and the left adrenal nodule is stable.  While I cannot exclude possible recurrent disease, the hypermetabolism at the anal canal could potentially be inflammation.  Trixie's stool habits have not changed.  -I recommended to Trixie continued observation without systemic therapy to avoid significant side effects and immunosuppression, particularly during the current pandemic.  -Prior testing for KRAS mutation showed wild-type.  May consider adding either bevacizumab or an epidermal growth factor receptor inhibitor such as panitumumab to FOLFOX for future systemic therapy if needed.  However, would be cautious with side effects of systemic therapy.  Again, there is no clear indication for systemic therapy at this time. Trixie was very happy to continue on observation.  -Plan for repeat PET scan in 3 months.  -If next scan shows recurrent or progressive metastatic disease, will consider mFOLFOX with bevacizumab or panitumumab.  We would need to decide carefully on what she could tolerate if we need to consider systemic therapy.    Return in 3 months.    Rhona Wisdom MD  Hematology/Oncology  [facility]

## 2020-10-26 ENCOUNTER — HOSPITAL ENCOUNTER (OUTPATIENT)
Dept: PET IMAGING | Facility: CLINIC | Age: 80
End: 2020-10-26
Attending: INTERNAL MEDICINE
Payer: COMMERCIAL

## 2020-10-26 ENCOUNTER — HOSPITAL ENCOUNTER (OUTPATIENT)
Dept: LAB | Facility: CLINIC | Age: 80
End: 2020-10-26
Attending: INTERNAL MEDICINE
Payer: COMMERCIAL

## 2020-10-26 DIAGNOSIS — C78.02 MALIGNANT NEOPLASM METASTATIC TO LEFT LUNG (H): ICD-10-CM

## 2020-10-26 DIAGNOSIS — C21.1 MALIGNANT NEOPLASM OF ANAL CANAL (H): ICD-10-CM

## 2020-10-26 DIAGNOSIS — C78.01 MALIGNANT NEOPLASM METASTATIC TO RIGHT LUNG (H): ICD-10-CM

## 2020-10-26 DIAGNOSIS — C79.72 MALIGNANT NEOPLASM METASTATIC TO LEFT ADRENAL GLAND (H): ICD-10-CM

## 2020-10-26 LAB
ALBUMIN SERPL-MCNC: 3.4 G/DL (ref 3.4–5)
ALP SERPL-CCNC: 87 U/L (ref 40–150)
ALT SERPL W P-5'-P-CCNC: 19 U/L (ref 0–50)
ANION GAP SERPL CALCULATED.3IONS-SCNC: 6 MMOL/L (ref 3–14)
AST SERPL W P-5'-P-CCNC: 18 U/L (ref 0–45)
BASOPHILS # BLD AUTO: 0 10E9/L (ref 0–0.2)
BASOPHILS NFR BLD AUTO: 0.2 %
BILIRUB SERPL-MCNC: 0.6 MG/DL (ref 0.2–1.3)
BUN SERPL-MCNC: 15 MG/DL (ref 7–30)
CALCIUM SERPL-MCNC: 9.4 MG/DL (ref 8.5–10.1)
CHLORIDE SERPL-SCNC: 107 MMOL/L (ref 94–109)
CO2 SERPL-SCNC: 26 MMOL/L (ref 20–32)
CREAT SERPL-MCNC: 0.71 MG/DL (ref 0.52–1.04)
DIFFERENTIAL METHOD BLD: NORMAL
EOSINOPHIL # BLD AUTO: 0 10E9/L (ref 0–0.7)
EOSINOPHIL NFR BLD AUTO: 0.3 %
ERYTHROCYTE [DISTWIDTH] IN BLOOD BY AUTOMATED COUNT: 13.8 % (ref 10–15)
GFR SERPL CREATININE-BSD FRML MDRD: 82 ML/MIN/{1.73_M2}
GLUCOSE SERPL-MCNC: 85 MG/DL (ref 70–99)
HCT VFR BLD AUTO: 37.5 % (ref 35–47)
HGB BLD-MCNC: 12.4 G/DL (ref 11.7–15.7)
IMM GRANULOCYTES # BLD: 0 10E9/L (ref 0–0.4)
IMM GRANULOCYTES NFR BLD: 0.2 %
LYMPHOCYTES # BLD AUTO: 1 10E9/L (ref 0.8–5.3)
LYMPHOCYTES NFR BLD AUTO: 16.4 %
MCH RBC QN AUTO: 32 PG (ref 26.5–33)
MCHC RBC AUTO-ENTMCNC: 33.1 G/DL (ref 31.5–36.5)
MCV RBC AUTO: 97 FL (ref 78–100)
MONOCYTES # BLD AUTO: 0.8 10E9/L (ref 0–1.3)
MONOCYTES NFR BLD AUTO: 13.1 %
NEUTROPHILS # BLD AUTO: 4.2 10E9/L (ref 1.6–8.3)
NEUTROPHILS NFR BLD AUTO: 69.8 %
NRBC # BLD AUTO: 0 10*3/UL
NRBC BLD AUTO-RTO: 0 /100
PLATELET # BLD AUTO: 238 10E9/L (ref 150–450)
POTASSIUM SERPL-SCNC: 3.9 MMOL/L (ref 3.4–5.3)
PROT SERPL-MCNC: 7.6 G/DL (ref 6.8–8.8)
RBC # BLD AUTO: 3.88 10E12/L (ref 3.8–5.2)
SODIUM SERPL-SCNC: 139 MMOL/L (ref 133–144)
WBC # BLD AUTO: 6 10E9/L (ref 4–11)

## 2020-10-26 PROCEDURE — 250N000011 HC RX IP 250 OP 636: Performed by: INTERNAL MEDICINE

## 2020-10-26 PROCEDURE — A9552 F18 FDG: HCPCS | Performed by: INTERNAL MEDICINE

## 2020-10-26 PROCEDURE — 80053 COMPREHEN METABOLIC PANEL: CPT | Performed by: INTERNAL MEDICINE

## 2020-10-26 PROCEDURE — 36415 COLL VENOUS BLD VENIPUNCTURE: CPT | Performed by: INTERNAL MEDICINE

## 2020-10-26 PROCEDURE — 78816 PET IMAGE W/CT FULL BODY: CPT | Mod: PS

## 2020-10-26 PROCEDURE — 343N000001 HC RX 343: Performed by: INTERNAL MEDICINE

## 2020-10-26 PROCEDURE — 85025 COMPLETE CBC W/AUTO DIFF WBC: CPT | Performed by: INTERNAL MEDICINE

## 2020-10-26 RX ORDER — IOPAMIDOL 755 MG/ML
10-135 INJECTION, SOLUTION INTRAVASCULAR ONCE
Status: COMPLETED | OUTPATIENT
Start: 2020-10-26 | End: 2020-10-26

## 2020-10-26 RX ADMIN — IOPAMIDOL 66 ML: 755 INJECTION, SOLUTION INTRAVENOUS at 13:47

## 2020-10-26 RX ADMIN — FLUDEOXYGLUCOSE F-18 13.4 MCI.: 500 INJECTION, SOLUTION INTRAVENOUS at 13:46

## 2020-10-28 ENCOUNTER — VIRTUAL VISIT (OUTPATIENT)
Dept: ONCOLOGY | Facility: CLINIC | Age: 80
End: 2020-10-28
Attending: INTERNAL MEDICINE
Payer: COMMERCIAL

## 2020-10-28 DIAGNOSIS — C79.72 MALIGNANT NEOPLASM METASTATIC TO LEFT ADRENAL GLAND (H): ICD-10-CM

## 2020-10-28 DIAGNOSIS — C78.01 MALIGNANT NEOPLASM METASTATIC TO RIGHT LUNG (H): ICD-10-CM

## 2020-10-28 DIAGNOSIS — R59.1 LYMPHADENOPATHY: ICD-10-CM

## 2020-10-28 DIAGNOSIS — C78.02 MALIGNANT NEOPLASM METASTATIC TO LEFT LUNG (H): ICD-10-CM

## 2020-10-28 DIAGNOSIS — C21.1 MALIGNANT NEOPLASM OF ANAL CANAL (H): Primary | ICD-10-CM

## 2020-10-28 DIAGNOSIS — K92.1 HEMATOCHEZIA: ICD-10-CM

## 2020-10-28 PROCEDURE — 99215 OFFICE O/P EST HI 40 MIN: CPT | Mod: 95 | Performed by: INTERNAL MEDICINE

## 2020-10-28 NOTE — LETTER
"    10/28/2020         RE: Sara Lehman  [address] [facility] [id number]        Dear Colleague,    Thank you for referring your patient, Sara Lehman, to the [facility]. Please see a copy of my visit note below.    [facility]    Hematology/Oncology Established Patient Follow-up Note      Today's Date: 10/28/20    Reason for Follow-up: Metastatic anal adenocarcinoma.    Sara Lehman is a 80 year old female who is being evaluated via a billable video visit.      The patient has been notified of following:     \"This video visit will be conducted via a call between you and your physician/provider. We have found that certain health care needs can be provided without the need for an in-person physical exam.  This service lets us provide the care you need with a video conversation.  If a prescription is necessary we can send it directly to your pharmacy.  If lab work is needed we can place an order for that and you can then stop by our lab to have the test done at a later time.    If during the course of the call the physician/provider feels a video visit is not appropriate, you will not be charged for this service.\"     Patient has given verbal consent for Video visit? Yes    Patient is in virtual lobby    Video-Visit Details    Type of service:  Video Visit    Originating Location (pt. Location): Home    Distant Location (provider location):  [facility]     Mode of Communication:  Video Conference via [facility]    Video visit duration: 21 minutes    HISTORY OF PRESENT ILLNESS: Sara Lehman is a 80 year old female who presents with the following oncologic history:  1.  4/1/2019: Initially presented to her primary care provider with complaints of hemorrhoids intermittently for 2 months with associated pain and itching as well as blood in the underwear at night and intermixed with stool, no melena.  No associated weight loss or night " sweats.  Reports occasional constipation.  Physical exam by primary care provider showed an anal mass about 1 inch in size with perianal erythema and skin breakdown.  2.  4/9/2019: Colonoscopy showed a perianal mass in the left lateral quadrant, severe diverticulosis in the sigmoid colon, descending colon, and ascending colon.  Biopsy of the anal mass showed invasive moderately differentiated adenocarcinoma with normal mismatch repair protein expression.  Specimen consisted of multiple fragments of necrotic tissue with dysplastic appearing glands with foci of invasion.  Surface mucosa is not present and therefore precludes differentiation between colorectal carcinoma or perianal glandular carcinoma.  Depth of invasion cannot be assessed.  KRAS wild-type.  3.  4/16/2019: CT chest/abdomen/pelvis with contrast showed a 1.3 cm left upper lobe lung nodule concerning for metastatic disease as well as 2 tiny nodules in the right lower lobe near the diaphragm.  Diffusely aneurysmal abdominal aorta measures up to 3.2 cm.  Prominent bilateral inguinal adenopathy with a lymph node in the upper right thigh adjacent to the common femoral vessels measures 2.9 x 2.3 cm.  Lymph node in the left medial upper thigh adjacent to the common femoral vessels measures 2.6 x 3.5 cm.  No evidence of obstruction.  Bony structures showed no destruction.  4.  4/19/2019: Consultation with Dr. Miller Casas.  Due to high suspicion of metastatic disease to the lung and bilateral inguinal regions, patient referred to medical oncology and radiation oncology.  5.  4/24/2019: PET/CT scan showed hypermetabolic posterior left upper lobe mid chest nodule adjacent to the major fissure measuring 1.3 x 1.2 cm and emphysematous changes.  Bilateral renal cysts present.  Nonobstructing stone of the lower right kidney measures 0.3 cm.  Nodular thickening of the left adrenal is 0.7 cm.  Intense hypermetabolism at the anal level with heterogeneous appearing soft  tissue with SUV max 19.5.  Enlarged inguinal hypermetabolic lymph nodes on the right measuring 2.9 x 2.1 cm and on the left 3.6 x 2.4 cm.  A distal infrarenal abdominal aorta measures 3.4 cm.  6. 5/6/2019: Left lung mass biopsy performed and showed discordant immunohistochemical findings.  Lesion showed a strong reactivity for cytokeratin 7 and focal staining for P40.  Patient's anal adenocarcinoma reportedly positive for both immunohistochemical staining pattern not typical for primary lung tumor and metastatic disease cannot be excluded.  7.  5/9/2019-6/25/2019: Completed radiation therapy to the anal cancer and inguinal lymph nodes.  She received concurrent radiosensitizing chemotherapy with capecitabine.  8. 8/28/2019: PET/CT scan showed hypermetabolic nodule in the left upper lobe measuring 1.5 x 1.4 cm with SUV max 10.5, increased in size and metabolic activity from 1.4 x 1.2 cm with SUV max 9.5 on 4/24/2019 PET/CT scan.  A 0.5 cm pleural-based nodule with low metabolic activity in the left lower lobe is unchanged.  A 3.4 cm infrarenal abdominal aortic aneurysm is present.  The previously noted hypermetabolic anal mass has significantly improved with SUV max 5.1, decreased from SUV max 24.6, consistent with response to treatment.  The bilateral inguinal lymphadenopathy has markedly improved on the current study with nodes measuring up to 1.6 x 1.1 cm with SUV max 3.5, decreased from 4 x 2.6 cm with SUV max 14, consistent with response to treatment.  9.  10/21/2019: Completed stereotactic radiation to the left upper lobe lung nodule/metastasis in 5 fractions under the care of Dr. Clay Monroy.  10. 12/16/2019: PET scan showed new hypermetabolic medial right middle lobe pulmonary nodule; left upper lobe nodule decreased in size and metabolism; improvement of bilateral inguinal adenopathy with decreased sizes and metabolism; hypermetabolism slightly increased at the left adrenal nodule but stable in size;  decreased hypermetabolism at anal level; new small focal hypermetabolism within muscle anterior to left hip joint with no mass effect.  11. 2/07/2020: Completed stereotactic radiation to the right middle lobe nodule and left adrenal nodule in 5 fractions.  12. 4/6/2020: PET/CT scan showed a 0.8 cm nodular density near the right lung apex with no significant hypermetabolism, unchanged from the prior exam.  The previously seen hypermetabolic right middle lobe pulmonary nodule is not seen on the current exam.  Small hypermetabolic left adrenal nodule measuring 1 cm with SUV max 3.3 is unchanged.  Infrarenal abdominal aortic aneurysm also not significantly changed and measures 3.3 x 3.6 cm containing a mild amount of mural thrombus.  The small hypermetabolic focus within the muscle anterior to the left hip joint is unchanged with SUV max 3.6.  13. 7/13/2020: PET/CT scan showed interval decrease in size of the right upper lobe lung nodule with no associated FDG activity; posttreatment changes in the lateral left upper lung; no new lung lesions; hypermetabolic left suprahilar area with no associated CT abnormality, possibly vascular in nature; no adenopathy; stable left adrenal nodule; indeterminate FDG activity in region of anal canal.   14. 10/26/2020: PET/CT scan showed FDG avid lymph nodes in left hilar region (measuring 1.1 cm with SUV 6.3; anterior to this is another 1.4 x 0.7 cm with SUV 8.9) concerning for progressive metastatic disease; remaining scan negative.    INTERIM HISTORY:  Trixie reports intermittent hematochezia that is sometimes clotty but not necessarily higher in frequency or volume. She denies any rectal pain or diarrhea.  She denies any recent chest pain, cough, shortness of breath or recent fevers or chills.       REVIEW OF SYSTEMS:   14 point ROS was reviewed and is negative other than as noted above in HPI.       HOME MEDICATIONS:  Current Outpatient Medications   Medication Sig Dispense Refill      acetaminophen (TYLENOL) 325 MG tablet Take 2 tablets (650 mg) by mouth every 4 hours as needed for mild pain or fever       amLODIPine (NORVASC) 5 MG tablet Take 1 tablet (5 mg) by mouth daily 90 tablet 0     metoprolol succinate ER (TOPROL-XL) 25 MG 24 hr tablet Take 1 tablet (25 mg) by mouth daily 90 tablet 0     ondansetron (ZOFRAN) 8 MG tablet Take 8 mg by mouth every 8 hours as needed for nausea       SUMAtriptan (IMITREX) 50 MG tablet Take 1 tablet (50 mg) by mouth at onset of headache for migraine 18 tablet 1         ALLERGIES:  Allergies   Allergen Reactions     Ace Inhibitors Cough     Aleve [Naproxen Sodium] GI Disturbance     Asa Buff (Mag [Aspirin Buffered] GI Disturbance     Internal bleeding     Atorvastatin Calcium GI Disturbance     Celebrex [Celecoxib] GI Disturbance     Codeine Sulfate Hives     Pravastatin GI Disturbance     Simvastatin GI Disturbance         PAST MEDICAL HISTORY:  Past Medical History:   Diagnosis Date     Benign essential hypertension 8/30/2017     Bronchospasm      COPD (chronic obstructive pulmonary disease) (H) 5/28/2014     Hypertension      Migraine      Neck pain      Nephrolithiasis          PAST SURGICAL HISTORY:  Past Surgical History:   Procedure Laterality Date     COLONOSCOPY N/A 4/9/2019    Procedure: COMBINED COLONOSCOPY, SINGLE OR MULTIPLE BIOPSY/POLYPECTOMY BY BIOPSY;  Surgeon: Saleem Shaw MD;  Location:  GI     LITHOTRIPSY      Lithotrypsy     ORTHOPEDIC SURGERY      right knee exploration     TUBAL LIGATION           SOCIAL HISTORY:  Social History     Socioeconomic History     Marital status:      Spouse name: Not on file     Number of children: Not on file     Years of education: Not on file     Highest education level: Not on file   Occupational History     Not on file   Social Needs     Financial resource strain: Not on file     Food insecurity     Worry: Not on file     Inability: Not on file     Transportation needs     Medical: Not on  file     Non-medical: Not on file   Tobacco Use     Smoking status: Former Smoker     Packs/day: 0.50     Types: Cigarettes     Start date: 2016     Quit date: 2016     Years since quittin.5     Smokeless tobacco: Never Used   Substance and Sexual Activity     Alcohol use: Yes     Alcohol/week: 0.0 standard drinks     Comment: socially only- one per month     Drug use: No     Sexual activity: Yes     Partners: Male     Birth control/protection: Post-menopausal   Lifestyle     Physical activity     Days per week: Not on file     Minutes per session: Not on file     Stress: Not on file   Relationships     Social connections     Talks on phone: Not on file     Gets together: Not on file     Attends Yazidism service: Not on file     Active member of club or organization: Not on file     Attends meetings of clubs or organizations: Not on file     Relationship status: Not on file     Intimate partner violence     Fear of current or ex partner: Not on file     Emotionally abused: Not on file     Physically abused: Not on file     Forced sexual activity: Not on file   Other Topics Concern     Parent/sibling w/ CABG, MI or angioplasty before 65F 55M? Not Asked   Social History Narrative     Not on file         FAMILY HISTORY:  Family History   Problem Relation Age of Onset     Cancer Mother 76        uterine     Cerebrovascular Disease Father 75     C.A.D. Brother 66         PHYSICAL EXAM:  Vital signs:  Not taken.  ECO  GENERAL: No acute distress.  EYES: No scleral icterus. No overt erythema.  RESPIRATORY: No cough.  No labored breathing.  MUSCULOSKELETAL: Range of motion in the neck, shoulders, and arms appear normal.  SKIN: No overt rashes, discolorations, or lesions over the face and neck.  NEUROLOGIC: Alert.  No overt tremors.  PSYCHIATRIC: Normal affect and mood.  Does not appear anxious.    The rest of a comprehensive physical examination is deferred due to PHE (public health emergency) video visit  restrictions.    LABS:  CBC RESULTS:   Recent Labs   Lab Test 10/26/20  1329   WBC 6.0   RBC 3.88   HGB 12.4   HCT 37.5   MCV 97   MCH 32.0   MCHC 33.1   RDW 13.8        Last Comprehensive Metabolic Panel:  Sodium   Date Value Ref Range Status   10/26/2020 139 133 - 144 mmol/L Final     Potassium   Date Value Ref Range Status   10/26/2020 3.9 3.4 - 5.3 mmol/L Final     Chloride   Date Value Ref Range Status   10/26/2020 107 94 - 109 mmol/L Final     Carbon Dioxide   Date Value Ref Range Status   10/26/2020 26 20 - 32 mmol/L Final     Anion Gap   Date Value Ref Range Status   10/26/2020 6 3 - 14 mmol/L Final     Glucose   Date Value Ref Range Status   10/26/2020 85 70 - 99 mg/dL Final     Urea Nitrogen   Date Value Ref Range Status   10/26/2020 15 7 - 30 mg/dL Final     Creatinine   Date Value Ref Range Status   10/26/2020 0.71 0.52 - 1.04 mg/dL Final     GFR Estimate   Date Value Ref Range Status   10/26/2020 82 >60 mL/min/[1.73_m2] Final     Comment:     Non  GFR Calc  Starting 12/18/2018, serum creatinine based estimated GFR (eGFR) will be   calculated using the Chronic Kidney Disease Epidemiology Collaboration   (CKD-EPI) equation.       Calcium   Date Value Ref Range Status   10/26/2020 9.4 8.5 - 10.1 mg/dL Final     Bilirubin Total   Date Value Ref Range Status   10/26/2020 0.6 0.2 - 1.3 mg/dL Final     Alkaline Phosphatase   Date Value Ref Range Status   10/26/2020 87 40 - 150 U/L Final     ALT   Date Value Ref Range Status   10/26/2020 19 0 - 50 U/L Final     AST   Date Value Ref Range Status   10/26/2020 18 0 - 45 U/L Final       PATHOLOGY:  Reviewed as per HPI.    IMAGING:  Reviewed as per HPI.    ASSESSMENT/PLAN:  Sara Lehman is a 80 year old female with the following issues:  1.  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  Left lung and right middle lobe lung metastases  3. Left adrenal nodule  4. New left hilar  hypermetabolic lymphadenopathy  -Sara completed chemoradiation with capecitabine radiosensitizer on 6/25/2019, stereotactic radiation to the left upper lobe lung metastasis 10/21/2019, and stereotactic radiation to the right middle lobe lung metastasis and left adrenal nodule 2/07/2020.  -I personally reviewed the PET/CT scan from 10/26/2020 and discussed the results with Sara.  She has new hypermetabolic left hilar lymphadenopathy concerning for progressive metastatic disease. However, the lymph nodes are small and not causing her symptoms.  -I recommended to Trixie continued observation without systemic therapy at this time.  I discussed that the rationale for palliative chemotherapy would make more sense if she had any evidence of more florid progressive disease or cancer-related symptoms, both of which she does not have at this time.  -Prior testing for KRAS mutation showed wild-type.  May consider adding either bevacizumab or an epidermal growth factor receptor inhibitor such as panitumumab to FOLFOX for future systemic therapy if needed.  However, would be cautious with side effects of systemic therapy.  -Trixie expressed understanding and agreement with the plan to continue on observation.  -Plan for repeat PET scan in 3 months.  -If next scan shows more significant progressive metastatic disease, we can consider consider mFOLFOX with bevacizumab or panitumumab.  We would need to decide carefully on what she could tolerate if we need to consider systemic therapy in the future.    5. Intermittent hematochezia  -May be related to prior chemoradiation but cannot rule out locally recurrent anal cancer.  Since she is not anemic the pattern of hematochezia appears to be stable, I recommended observation.  However, I cautioned her that if she sees an increase in frequency (such as more continuous) and higher volume of bleeding, I would recommend she see Dr. Casas for a repeat scope.  Will also monitor her  blood counts.  I advised she go to the nearest ED or call 911 if she does experience any truly significant larger clots or higher volume bleeding.    Return in 3 months.    Rhona Wisdom MD  Hematology/Oncology  [facility]      Again, thank you for allowing me to participate in the care of your patient.        Sincerely,        Rhona Wisdom MD

## 2020-10-28 NOTE — PROGRESS NOTES
"[facility]    Hematology/Oncology Established Patient Follow-up Note      Today's Date: 10/28/20    Reason for Follow-up: Metastatic anal adenocarcinoma.    Sara Lehman is a 80 year old female who is being evaluated via a billable video visit.      The patient has been notified of following:     \"This video visit will be conducted via a call between you and your physician/provider. We have found that certain health care needs can be provided without the need for an in-person physical exam.  This service lets us provide the care you need with a video conversation.  If a prescription is necessary we can send it directly to your pharmacy.  If lab work is needed we can place an order for that and you can then stop by our lab to have the test done at a later time.    If during the course of the call the physician/provider feels a video visit is not appropriate, you will not be charged for this service.\"     Patient has given verbal consent for Video visit? Yes    Patient is in virtual lobby    Video-Visit Details    Type of service:  Video Visit    Originating Location (pt. Location): Home    Distant Location (provider location):  [facility]     Mode of Communication:  Video Conference via Cloud Takeoff    Video visit duration: 21 minutes    HISTORY OF PRESENT ILLNESS: Sara Lehman is a 80 year old female who presents with the following oncologic history:  1.  4/1/2019: Initially presented to her primary care provider with complaints of hemorrhoids intermittently for 2 months with associated pain and itching as well as blood in the underwear at night and intermixed with stool, no melena.  No associated weight loss or night sweats.  Reports occasional constipation.  Physical exam by primary care provider showed an anal mass about 1 inch in size with perianal erythema and skin breakdown.  2.  4/9/2019: Colonoscopy showed a perianal mass in the left lateral quadrant, severe diverticulosis " in the sigmoid colon, descending colon, and ascending colon.  Biopsy of the anal mass showed invasive moderately differentiated adenocarcinoma with normal mismatch repair protein expression.  Specimen consisted of multiple fragments of necrotic tissue with dysplastic appearing glands with foci of invasion.  Surface mucosa is not present and therefore precludes differentiation between colorectal carcinoma or perianal glandular carcinoma.  Depth of invasion cannot be assessed.  KRAS wild-type.  3.  4/16/2019: CT chest/abdomen/pelvis with contrast showed a 1.3 cm left upper lobe lung nodule concerning for metastatic disease as well as 2 tiny nodules in the right lower lobe near the diaphragm.  Diffusely aneurysmal abdominal aorta measures up to 3.2 cm.  Prominent bilateral inguinal adenopathy with a lymph node in the upper right thigh adjacent to the common femoral vessels measures 2.9 x 2.3 cm.  Lymph node in the left medial upper thigh adjacent to the common femoral vessels measures 2.6 x 3.5 cm.  No evidence of obstruction.  Bony structures showed no destruction.  4.  4/19/2019: Consultation with Dr. Miller Casas.  Due to high suspicion of metastatic disease to the lung and bilateral inguinal regions, patient referred to medical oncology and radiation oncology.  5.  4/24/2019: PET/CT scan showed hypermetabolic posterior left upper lobe mid chest nodule adjacent to the major fissure measuring 1.3 x 1.2 cm and emphysematous changes.  Bilateral renal cysts present.  Nonobstructing stone of the lower right kidney measures 0.3 cm.  Nodular thickening of the left adrenal is 0.7 cm.  Intense hypermetabolism at the anal level with heterogeneous appearing soft tissue with SUV max 19.5.  Enlarged inguinal hypermetabolic lymph nodes on the right measuring 2.9 x 2.1 cm and on the left 3.6 x 2.4 cm.  A distal infrarenal abdominal aorta measures 3.4 cm.  6.  5/6/2019: Left lung mass biopsy performed and showed discordant  immunohistochemical findings.  Lesion showed a strong reactivity for cytokeratin 7 and focal staining for P40.  Patient's anal adenocarcinoma reportedly positive for both immunohistochemical staining pattern not typical for primary lung tumor and metastatic disease cannot be excluded.  7.  5/9/2019-6/25/2019: Completed radiation therapy to the anal cancer and inguinal lymph nodes.  She received concurrent radiosensitizing chemotherapy with capecitabine.  8.  8/28/2019: PET/CT scan showed hypermetabolic nodule in the left upper lobe measuring 1.5 x 1.4 cm with SUV max 10.5, increased in size and metabolic activity from 1.4 x 1.2 cm with SUV max 9.5 on 4/24/2019 PET/CT scan.  A 0.5 cm pleural-based nodule with low metabolic activity in the left lower lobe is unchanged.  A 3.4 cm infrarenal abdominal aortic aneurysm is present.  The previously noted hypermetabolic anal mass has significantly improved with SUV max 5.1, decreased from SUV max 24.6, consistent with response to treatment.  The bilateral inguinal lymphadenopathy has markedly improved on the current study with nodes measuring up to 1.6 x 1.1 cm with SUV max 3.5, decreased from 4 x 2.6 cm with SUV max 14, consistent with response to treatment.  9.  10/21/2019: Completed stereotactic radiation to the left upper lobe lung nodule/metastasis in 5 fractions under the care of Dr. Clay Monroy.  10. 12/16/2019: PET scan showed new hypermetabolic medial right middle lobe pulmonary nodule; left upper lobe nodule decreased in size and metabolism; improvement of bilateral inguinal adenopathy with decreased sizes and metabolism; hypermetabolism slightly increased at the left adrenal nodule but stable in size; decreased hypermetabolism at anal level; new small focal hypermetabolism within muscle anterior to left hip joint with no mass effect.  11. 2/07/2020: Completed stereotactic radiation to the right middle lobe nodule and left adrenal nodule in 5 fractions.  12.   4/6/2020: PET/CT scan showed a 0.8 cm nodular density near the right lung apex with no significant hypermetabolism, unchanged from the prior exam.  The previously seen hypermetabolic right middle lobe pulmonary nodule is not seen on the current exam.  Small hypermetabolic left adrenal nodule measuring 1 cm with SUV max 3.3 is unchanged.  Infrarenal abdominal aortic aneurysm also not significantly changed and measures 3.3 x 3.6 cm containing a mild amount of mural thrombus.  The small hypermetabolic focus within the muscle anterior to the left hip joint is unchanged with SUV max 3.6.  13. 7/13/2020: PET/CT scan showed interval decrease in size of the right upper lobe lung nodule with no associated FDG activity; posttreatment changes in the lateral left upper lung; no new lung lesions; hypermetabolic left suprahilar area with no associated CT abnormality, possibly vascular in nature; no adenopathy; stable left adrenal nodule; indeterminate FDG activity in region of anal canal.   14. 10/26/2020: PET/CT scan showed FDG avid lymph nodes in left hilar region (measuring 1.1 cm with SUV 6.3; anterior to this is another 1.4 x 0.7 cm with SUV 8.9) concerning for progressive metastatic disease; remaining scan negative.    INTERIM HISTORY:  Trixie reports intermittent hematochezia that is sometimes clotty but not necessarily higher in frequency or volume. She denies any rectal pain or diarrhea.  She denies any recent chest pain, cough, shortness of breath or recent fevers or chills.       REVIEW OF SYSTEMS:   14 point ROS was reviewed and is negative other than as noted above in HPI.       HOME MEDICATIONS:  Current Outpatient Medications   Medication Sig Dispense Refill     acetaminophen (TYLENOL) 325 MG tablet Take 2 tablets (650 mg) by mouth every 4 hours as needed for mild pain or fever       amLODIPine (NORVASC) 5 MG tablet Take 1 tablet (5 mg) by mouth daily 90 tablet 0     metoprolol succinate ER (TOPROL-XL) 25 MG 24 hr  tablet Take 1 tablet (25 mg) by mouth daily 90 tablet 0     ondansetron (ZOFRAN) 8 MG tablet Take 8 mg by mouth every 8 hours as needed for nausea       SUMAtriptan (IMITREX) 50 MG tablet Take 1 tablet (50 mg) by mouth at onset of headache for migraine 18 tablet 1         ALLERGIES:  Allergies   Allergen Reactions     Ace Inhibitors Cough     Aleve [Naproxen Sodium] GI Disturbance     Asa Buff (Mag [Aspirin Buffered] GI Disturbance     Internal bleeding     Atorvastatin Calcium GI Disturbance     Celebrex [Celecoxib] GI Disturbance     Codeine Sulfate Hives     Pravastatin GI Disturbance     Simvastatin GI Disturbance         PAST MEDICAL HISTORY:  Past Medical History:   Diagnosis Date     Benign essential hypertension 2017     Bronchospasm      COPD (chronic obstructive pulmonary disease) (H) 2014     Hypertension      Migraine      Neck pain      Nephrolithiasis          PAST SURGICAL HISTORY:  Past Surgical History:   Procedure Laterality Date     COLONOSCOPY N/A 2019    Procedure: COMBINED COLONOSCOPY, SINGLE OR MULTIPLE BIOPSY/POLYPECTOMY BY BIOPSY;  Surgeon: Saleem Shaw MD;  Location:  GI     LITHOTRIPSY      Lithotrypsy     ORTHOPEDIC SURGERY      right knee exploration     TUBAL LIGATION           SOCIAL HISTORY:  Social History     Socioeconomic History     Marital status:      Spouse name: Not on file     Number of children: Not on file     Years of education: Not on file     Highest education level: Not on file   Occupational History     Not on file   Social Needs     Financial resource strain: Not on file     Food insecurity     Worry: Not on file     Inability: Not on file     Transportation needs     Medical: Not on file     Non-medical: Not on file   Tobacco Use     Smoking status: Former Smoker     Packs/day: 0.50     Types: Cigarettes     Start date: 2016     Quit date: 2016     Years since quittin.5     Smokeless tobacco: Never Used   Substance and Sexual  Activity     Alcohol use: Yes     Alcohol/week: 0.0 standard drinks     Comment: socially only- one per month     Drug use: No     Sexual activity: Yes     Partners: Male     Birth control/protection: Post-menopausal   Lifestyle     Physical activity     Days per week: Not on file     Minutes per session: Not on file     Stress: Not on file   Relationships     Social connections     Talks on phone: Not on file     Gets together: Not on file     Attends [employer]odoxy service: Not on file     Active member of club or organization: Not on file     Attends meetings of clubs or organizations: Not on file     Relationship status: Not on file     Intimate partner violence     Fear of current or ex partner: Not on file     Emotionally abused: Not on file     Physically abused: Not on file     Forced sexual activity: Not on file   Other Topics Concern     Parent/sibling w/ CABG, MI or angioplasty before 65F 55M? Not Asked   Social History Narrative     Not on file         FAMILY HISTORY:  Family History   Problem Relation Age of Onset     Cancer Mother 76        uterine     Cerebrovascular Disease Father 75     C.A.D. Brother 66         PHYSICAL EXAM:  Vital signs:  Not taken.  ECO  GENERAL: No acute distress.  EYES: No scleral icterus. No overt erythema.  RESPIRATORY: No cough.  No labored breathing.  MUSCULOSKELETAL: Range of motion in the neck, shoulders, and arms appear normal.  SKIN: No overt rashes, discolorations, or lesions over the face and neck.  NEUROLOGIC: Alert.  No overt tremors.  PSYCHIATRIC: Normal affect and mood.  Does not appear anxious.    The rest of a comprehensive physical examination is deferred due to PHE (public health emergency) video visit restrictions.    LABS:  CBC RESULTS:   Recent Labs   Lab Test 10/26/20  1329   WBC 6.0   RBC 3.88   HGB 12.4   HCT 37.5   MCV 97   MCH 32.0   MCHC 33.1   RDW 13.8        Last Comprehensive Metabolic Panel:  Sodium   Date Value Ref Range Status    10/26/2020 139 133 - 144 mmol/L Final     Potassium   Date Value Ref Range Status   10/26/2020 3.9 3.4 - 5.3 mmol/L Final     Chloride   Date Value Ref Range Status   10/26/2020 107 94 - 109 mmol/L Final     Carbon Dioxide   Date Value Ref Range Status   10/26/2020 26 20 - 32 mmol/L Final     Anion Gap   Date Value Ref Range Status   10/26/2020 6 3 - 14 mmol/L Final     Glucose   Date Value Ref Range Status   10/26/2020 85 70 - 99 mg/dL Final     Urea Nitrogen   Date Value Ref Range Status   10/26/2020 15 7 - 30 mg/dL Final     Creatinine   Date Value Ref Range Status   10/26/2020 0.71 0.52 - 1.04 mg/dL Final     GFR Estimate   Date Value Ref Range Status   10/26/2020 82 >60 mL/min/[1.73_m2] Final     Comment:     Non  GFR Calc  Starting 12/18/2018, serum creatinine based estimated GFR (eGFR) will be   calculated using the Chronic Kidney Disease Epidemiology Collaboration   (CKD-EPI) equation.       Calcium   Date Value Ref Range Status   10/26/2020 9.4 8.5 - 10.1 mg/dL Final     Bilirubin Total   Date Value Ref Range Status   10/26/2020 0.6 0.2 - 1.3 mg/dL Final     Alkaline Phosphatase   Date Value Ref Range Status   10/26/2020 87 40 - 150 U/L Final     ALT   Date Value Ref Range Status   10/26/2020 19 0 - 50 U/L Final     AST   Date Value Ref Range Status   10/26/2020 18 0 - 45 U/L Final       PATHOLOGY:  Reviewed as per HPI.    IMAGING:  Reviewed as per HPI.    ASSESSMENT/PLAN:  Sara Lehman is a 80 year old female with the following issues:  1.  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  Left lung and right middle lobe lung metastases  3. Left adrenal nodule  4. New left hilar hypermetabolic lymphadenopathy  -Sara completed chemoradiation with capecitabine radiosensitizer on 6/25/2019, stereotactic radiation to the left upper lobe lung metastasis 10/21/2019, and stereotactic radiation to the right middle lobe lung  metastasis and left adrenal nodule 2/07/2020.  -I personally reviewed the PET/CT scan from 10/26/2020 and discussed the results with Sara.  She has new hypermetabolic left hilar lymphadenopathy concerning for progressive metastatic disease. However, the lymph nodes are small and not causing her symptoms.  -I recommended to Trixie continued observation without systemic therapy at this time.  I discussed that the rationale for palliative chemotherapy would make more sense if she had any evidence of more florid progressive disease or cancer-related symptoms, both of which she does not have at this time.  -Prior testing for KRAS mutation showed wild-type.  May consider adding either bevacizumab or an epidermal growth factor receptor inhibitor such as panitumumab to FOLFOX for future systemic therapy if needed.  However, would be cautious with side effects of systemic therapy.  -Trixie expressed understanding and agreement with the plan to continue on observation.  -Plan for repeat PET scan in 3 months.  -If next scan shows more significant progressive metastatic disease, we can consider consider mFOLFOX with bevacizumab or panitumumab.  We would need to decide carefully on what she could tolerate if we need to consider systemic therapy in the future.    5. Intermittent hematochezia  -May be related to prior chemoradiation but cannot rule out locally recurrent anal cancer.  Since she is not anemic the pattern of hematochezia appears to be stable, I recommended observation.  However, I cautioned her that if she sees an increase in frequency (such as more continuous) and higher volume of bleeding, I would recommend she see Dr. Casas for a repeat scope.  Will also monitor her blood counts.  I advised she go to the nearest ED or call 911 if she does experience any truly significant larger clots or higher volume bleeding.    Return in 3 months.    Rhona Wisdom MD  Hematology/Oncology  [facility]

## 2020-10-28 NOTE — Clinical Note
"    10/28/2020         RE: Sara Lehman  [address] [facility] [address]        Dear Colleague,    Thank you for referring your patient, Sara Lehman, to the [facility]. Please see a copy of my visit note below.    [facility]    Hematology/Oncology Established Patient Follow-up Note      Today's Date: 10/28/20    Reason for Follow-up: Metastatic anal adenocarcinoma.    Sara Lehman is a 80 year old female who is being evaluated via a billable video visit.      The patient has been notified of following:     \"This video visit will be conducted via a call between you and your physician/provider. We have found that certain health care needs can be provided without the need for an in-person physical exam.  This service lets us provide the care you need with a video conversation.  If a prescription is necessary we can send it directly to your pharmacy.  If lab work is needed we can place an order for that and you can then stop by our lab to have the test done at a later time.    If during the course of the call the physician/provider feels a video visit is not appropriate, you will not be charged for this service.\"     Patient has given verbal consent for Video visit? Yes    Patient is in virtual lobby    Video-Visit Details    Type of service:  Video Visit    Originating Location (pt. Location): Home    Distant Location (provider location):  [facility]     Mode of Communication:  Video Conference via [id number]    Video visit duration: 21 minutes    HISTORY OF PRESENT ILLNESS: Sara Lehman is a 80 year old female who presents with the following oncologic history:  1.  4/1/2019: Initially presented to her primary care provider with complaints of hemorrhoids intermittently for 2 months with associated pain and itching as well as blood in the underwear at night and intermixed with stool, no melena.  No associated weight loss or night " sweats.  Reports occasional constipation.  Physical exam by primary care provider showed an anal mass about 1 inch in size with perianal erythema and skin breakdown.  2.  4/9/2019: Colonoscopy showed a perianal mass in the left lateral quadrant, severe diverticulosis in the sigmoid colon, descending colon, and ascending colon.  Biopsy of the anal mass showed invasive moderately differentiated adenocarcinoma with normal mismatch repair protein expression.  Specimen consisted of multiple fragments of necrotic tissue with dysplastic appearing glands with foci of invasion.  Surface mucosa is not present and therefore precludes differentiation between colorectal carcinoma or perianal glandular carcinoma.  Depth of invasion cannot be assessed.  KRAS wild-type.  3.  4/16/2019: CT chest/abdomen/pelvis with contrast showed a 1.3 cm left upper lobe lung nodule concerning for metastatic disease as well as 2 tiny nodules in the right lower lobe near the diaphragm.  Diffusely aneurysmal abdominal aorta measures up to 3.2 cm.  Prominent bilateral inguinal adenopathy with a lymph node in the upper right thigh adjacent to the common femoral vessels measures 2.9 x 2.3 cm.  Lymph node in the left medial upper thigh adjacent to the common femoral vessels measures 2.6 x 3.5 cm.  No evidence of obstruction.  Bony structures showed no destruction.  4.  4/19/2019: Consultation with Dr. Miller Casas.  Due to high suspicion of metastatic disease to the lung and bilateral inguinal regions, patient referred to medical oncology and radiation oncology.  5.  4/24/2019: PET/CT scan showed hypermetabolic posterior left upper lobe mid chest nodule adjacent to the major fissure measuring 1.3 x 1.2 cm and emphysematous changes.  Bilateral renal cysts present.  Nonobstructing stone of the lower right kidney measures 0.3 cm.  Nodular thickening of the left adrenal is 0.7 cm.  Intense hypermetabolism at the anal level with heterogeneous appearing soft  tissue with SUV max 19.5.  Enlarged inguinal hypermetabolic lymph nodes on the right measuring 2.9 x 2.1 cm and on the left 3.6 x 2.4 cm.  A distal infrarenal abdominal aorta measures 3.4 cm.  6. 5/6/2019: Left lung mass biopsy performed and showed discordant immunohistochemical findings.  Lesion showed a strong reactivity for cytokeratin 7 and focal staining for P40.  Patient's anal adenocarcinoma reportedly positive for both immunohistochemical staining pattern not typical for primary lung tumor and metastatic disease cannot be excluded.  7.  5/9/2019-6/25/2019: Completed radiation therapy to the anal cancer and inguinal lymph nodes.  She received concurrent radiosensitizing chemotherapy with capecitabine.  8. 8/28/2019: PET/CT scan showed hypermetabolic nodule in the left upper lobe measuring 1.5 x 1.4 cm with SUV max 10.5, increased in size and metabolic activity from 1.4 x 1.2 cm with SUV max 9.5 on 4/24/2019 PET/CT scan.  A 0.5 cm pleural-based nodule with low metabolic activity in the left lower lobe is unchanged.  A 3.4 cm infrarenal abdominal aortic aneurysm is present.  The previously noted hypermetabolic anal mass has significantly improved with SUV max 5.1, decreased from SUV max 24.6, consistent with response to treatment.  The bilateral inguinal lymphadenopathy has markedly improved on the current study with nodes measuring up to 1.6 x 1.1 cm with SUV max 3.5, decreased from 4 x 2.6 cm with SUV max 14, consistent with response to treatment.  9.  10/21/2019: Completed stereotactic radiation to the left upper lobe lung nodule/metastasis in 5 fractions under the care of Dr. Clay Monroy.  10. 12/16/2019: PET scan showed new hypermetabolic medial right middle lobe pulmonary nodule; left upper lobe nodule decreased in size and metabolism; improvement of bilateral inguinal adenopathy with decreased sizes and metabolism; hypermetabolism slightly increased at the left adrenal nodule but stable in size;  decreased hypermetabolism at anal level; new small focal hypermetabolism within muscle anterior to left hip joint with no mass effect.  11. 2/07/2020: Completed stereotactic radiation to the right middle lobe nodule and left adrenal nodule in 5 fractions.  12. 4/6/2020: PET/CT scan showed a 0.8 cm nodular density near the right lung apex with no significant hypermetabolism, unchanged from the prior exam.  The previously seen hypermetabolic right middle lobe pulmonary nodule is not seen on the current exam.  Small hypermetabolic left adrenal nodule measuring 1 cm with SUV max 3.3 is unchanged.  Infrarenal abdominal aortic aneurysm also not significantly changed and measures 3.3 x 3.6 cm containing a mild amount of mural thrombus.  The small hypermetabolic focus within the muscle anterior to the left hip joint is unchanged with SUV max 3.6.  13. 7/13/2020: PET/CT scan showed interval decrease in size of the right upper lobe lung nodule with no associated FDG activity; posttreatment changes in the lateral left upper lung; no new lung lesions; hypermetabolic left suprahilar area with no associated CT abnormality, possibly vascular in nature; no adenopathy; stable left adrenal nodule; indeterminate FDG activity in region of anal canal.   14. 10/26/2020: PET/CT scan showed FDG avid lymph nodes in left hilar region (measuring 1.1 cm with SUV 6.3; anterior to this is another 1.4 x 0.7 cm with SUV 8.9) concerning for progressive metastatic disease; remaining scan negative.    INTERIM HISTORY:  Trixie reports intermittent hematochezia that is sometimes clotty but not necessarily higher in frequency or volume. She denies any rectal pain or diarrhea.  She denies any recent chest pain, cough, shortness of breath or recent fevers or chills.       REVIEW OF SYSTEMS:   14 point ROS was reviewed and is negative other than as noted above in HPI.       HOME MEDICATIONS:  Current Outpatient Medications   Medication Sig Dispense Refill      acetaminophen (TYLENOL) 325 MG tablet Take 2 tablets (650 mg) by mouth every 4 hours as needed for mild pain or fever       amLODIPine (NORVASC) 5 MG tablet Take 1 tablet (5 mg) by mouth daily 90 tablet 0     metoprolol succinate ER (TOPROL-XL) 25 MG 24 hr tablet Take 1 tablet (25 mg) by mouth daily 90 tablet 0     ondansetron (ZOFRAN) 8 MG tablet Take 8 mg by mouth every 8 hours as needed for nausea       SUMAtriptan (IMITREX) 50 MG tablet Take 1 tablet (50 mg) by mouth at onset of headache for migraine 18 tablet 1         ALLERGIES:  Allergies   Allergen Reactions     Ace Inhibitors Cough     Aleve [Naproxen Sodium] GI Disturbance     Asa Buff (Mag [Aspirin Buffered] GI Disturbance     Internal bleeding     Atorvastatin Calcium GI Disturbance     Celebrex [Celecoxib] GI Disturbance     Codeine Sulfate Hives     Pravastatin GI Disturbance     Simvastatin GI Disturbance         PAST MEDICAL HISTORY:  Past Medical History:   Diagnosis Date     Benign essential hypertension 8/30/2017     Bronchospasm      COPD (chronic obstructive pulmonary disease) (H) 5/28/2014     Hypertension      Migraine      Neck pain      Nephrolithiasis          PAST SURGICAL HISTORY:  Past Surgical History:   Procedure Laterality Date     COLONOSCOPY N/A 4/9/2019    Procedure: COMBINED COLONOSCOPY, SINGLE OR MULTIPLE BIOPSY/POLYPECTOMY BY BIOPSY;  Surgeon: Saleem Shaw MD;  Location:  GI     LITHOTRIPSY      Lithotrypsy     ORTHOPEDIC SURGERY      right knee exploration     TUBAL LIGATION           SOCIAL HISTORY:  Social History     Socioeconomic History     Marital status:      Spouse name: Not on file     Number of children: Not on file     Years of education: Not on file     Highest education level: Not on file   Occupational History     Not on file   Social Needs     Financial resource strain: Not on file     Food insecurity     Worry: Not on file     Inability: Not on file     Transportation needs     Medical: Not on  file     Non-medical: Not on file   Tobacco Use     Smoking status: Former Smoker     Packs/day: 0.50     Types: Cigarettes     Start date: 2016     Quit date: 2016     Years since quittin.5     Smokeless tobacco: Never Used   Substance and Sexual Activity     Alcohol use: Yes     Alcohol/week: 0.0 standard drinks     Comment: socially only- one per month     Drug use: No     Sexual activity: Yes     Partners: Male     Birth control/protection: Post-menopausal   Lifestyle     Physical activity     Days per week: Not on file     Minutes per session: Not on file     Stress: Not on file   Relationships     Social connections     Talks on phone: Not on file     Gets together: Not on file     Attends Confucianist service: Not on file     Active member of club or organization: Not on file     Attends meetings of clubs or organizations: Not on file     Relationship status: Not on file     Intimate partner violence     Fear of current or ex partner: Not on file     Emotionally abused: Not on file     Physically abused: Not on file     Forced sexual activity: Not on file   Other Topics Concern     Parent/sibling w/ CABG, MI or angioplasty before 65F 55M? Not Asked   Social History Narrative     Not on file         FAMILY HISTORY:  Family History   Problem Relation Age of Onset     Cancer Mother 76        uterine     Cerebrovascular Disease Father 75     C.A.D. Brother 66         PHYSICAL EXAM:  Vital signs:  Not taken.  ECO  GENERAL: No acute distress.  EYES: No scleral icterus. No overt erythema.  RESPIRATORY: No cough.  No labored breathing.  MUSCULOSKELETAL: Range of motion in the neck, shoulders, and arms appear normal.  SKIN: No overt rashes, discolorations, or lesions over the face and neck.  NEUROLOGIC: Alert.  No overt tremors.  PSYCHIATRIC: Normal affect and mood.  Does not appear anxious.    The rest of a comprehensive physical examination is deferred due to PHE (public health emergency) video visit  restrictions.    LABS:  CBC RESULTS:   Recent Labs   Lab Test 10/26/20  1329   WBC 6.0   RBC 3.88   HGB 12.4   HCT 37.5   MCV 97   MCH 32.0   MCHC 33.1   RDW 13.8        Last Comprehensive Metabolic Panel:  Sodium   Date Value Ref Range Status   10/26/2020 139 133 - 144 mmol/L Final     Potassium   Date Value Ref Range Status   10/26/2020 3.9 3.4 - 5.3 mmol/L Final     Chloride   Date Value Ref Range Status   10/26/2020 107 94 - 109 mmol/L Final     Carbon Dioxide   Date Value Ref Range Status   10/26/2020 26 20 - 32 mmol/L Final     Anion Gap   Date Value Ref Range Status   10/26/2020 6 3 - 14 mmol/L Final     Glucose   Date Value Ref Range Status   10/26/2020 85 70 - 99 mg/dL Final     Urea Nitrogen   Date Value Ref Range Status   10/26/2020 15 7 - 30 mg/dL Final     Creatinine   Date Value Ref Range Status   10/26/2020 0.71 0.52 - 1.04 mg/dL Final     GFR Estimate   Date Value Ref Range Status   10/26/2020 82 >60 mL/min/[1.73_m2] Final     Comment:     Non  GFR Calc  Starting 12/18/2018, serum creatinine based estimated GFR (eGFR) will be   calculated using the Chronic Kidney Disease Epidemiology Collaboration   (CKD-EPI) equation.       Calcium   Date Value Ref Range Status   10/26/2020 9.4 8.5 - 10.1 mg/dL Final     Bilirubin Total   Date Value Ref Range Status   10/26/2020 0.6 0.2 - 1.3 mg/dL Final     Alkaline Phosphatase   Date Value Ref Range Status   10/26/2020 87 40 - 150 U/L Final     ALT   Date Value Ref Range Status   10/26/2020 19 0 - 50 U/L Final     AST   Date Value Ref Range Status   10/26/2020 18 0 - 45 U/L Final       PATHOLOGY:  Reviewed as per HPI.    IMAGING:  Reviewed as per HPI.    ASSESSMENT/PLAN:  Sara Lehman is a 80 year old female with the following issues:  1.  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  Left lung and right middle lobe lung metastases  3. Left adrenal nodule  4. New left hilar  hypermetabolic lymphadenopathy  -Sara completed chemoradiation with capecitabine radiosensitizer on 6/25/2019, stereotactic radiation to the left upper lobe lung metastasis 10/21/2019, and stereotactic radiation to the right middle lobe lung metastasis and left adrenal nodule 2/07/2020.  -I personally reviewed the PET/CT scan from 10/26/2020 and discussed the results with Sara.  She has new hypermetabolic left hilar lymphadenopathy concerning for progressive metastatic disease. However, the lymph nodes are small and not causing her symptoms.  -I recommended to Trixie continued observation without systemic therapy at this time.  I discussed that the rationale for palliative chemotherapy would make more sense if she had any evidence of more florid progressive disease or cancer-related symptoms, both of which she does not have at this time.  -Prior testing for KRAS mutation showed wild-type.  May consider adding either bevacizumab or an epidermal growth factor receptor inhibitor such as panitumumab to FOLFOX for future systemic therapy if needed.  However, would be cautious with side effects of systemic therapy.  -Trixie expressed understanding and agreement with the plan to continue on observation.  -Plan for repeat PET scan in 3 months.  -If next scan shows more significant progressive metastatic disease, we can consider consider mFOLFOX with bevacizumab or panitumumab.  We would need to decide carefully on what she could tolerate if we need to consider systemic therapy in the future.    5. Intermittent hematochezia  -May be related to prior chemoradiation but cannot rule out locally recurrent anal cancer.  Since she is not anemic the pattern of hematochezia appears to be stable, I recommended observation.  However, I cautioned her that if she sees an increase in frequency (such as more continuous) and higher volume of bleeding, I would recommend she see Dr. Casas for a repeat scope.  Will also monitor her  blood counts.  I advised she go to the nearest ED or call 911 if she does experience any truly significant larger clots or higher volume bleeding.    Return in 3 months.    Rhona Wisdom MD  Hematology/Oncology  [facility]      Again, thank you for allowing me to participate in the care of your patient.        Sincerely,        Rhona Wisdom MD

## 2020-12-14 DIAGNOSIS — I10 BENIGN ESSENTIAL HYPERTENSION: ICD-10-CM

## 2020-12-14 NOTE — LETTER
[facility]  [address] [id number] [phone]            Sara Lehman  [address] [facility] [place] [address]        December 15, 2020    Dear Trixie,    While refilling your prescription today, we noticed that you are due for an appointment with your provider.  We will refill your prescription for 30 days, but a follow-up appointment must be made before any additional refills can be approved.     Taking care of your health is important to us and we look forward to seeing you in the near future.  Please call us at [phone] or [phone] (or use [employer]) to schedule an appointment.     Please disregard this notice if you have already made an appointment.    Sincerely,        [facility]

## 2020-12-15 RX ORDER — METOPROLOL SUCCINATE 25 MG/1
25 TABLET, EXTENDED RELEASE ORAL DAILY
Qty: 90 TABLET | Refills: 0 | Status: SHIPPED | OUTPATIENT
Start: 2020-12-15 | End: 2021-02-11

## 2020-12-15 RX ORDER — AMLODIPINE BESYLATE 5 MG/1
5 TABLET ORAL DAILY
Qty: 90 TABLET | Refills: 0 | Status: SHIPPED | OUTPATIENT
Start: 2020-12-15 | End: 2021-02-11

## 2021-01-01 ENCOUNTER — INFUSION THERAPY VISIT (OUTPATIENT)
Dept: INFUSION THERAPY | Facility: CLINIC | Age: 81
End: 2021-01-01
Attending: INTERNAL MEDICINE
Payer: COMMERCIAL

## 2021-01-01 ENCOUNTER — ONCOLOGY VISIT (OUTPATIENT)
Dept: ONCOLOGY | Facility: CLINIC | Age: 81
End: 2021-01-01
Attending: INTERNAL MEDICINE
Payer: COMMERCIAL

## 2021-01-01 ENCOUNTER — HOSPITAL ENCOUNTER (OUTPATIENT)
Dept: PET IMAGING | Facility: CLINIC | Age: 81
End: 2021-11-01
Attending: INTERNAL MEDICINE
Payer: COMMERCIAL

## 2021-01-01 ENCOUNTER — PATIENT OUTREACH (OUTPATIENT)
Dept: ONCOLOGY | Facility: CLINIC | Age: 81
End: 2021-01-01

## 2021-01-01 ENCOUNTER — LAB (OUTPATIENT)
Dept: LAB | Facility: CLINIC | Age: 81
End: 2021-01-01
Attending: INTERNAL MEDICINE
Payer: COMMERCIAL

## 2021-01-01 ENCOUNTER — TELEPHONE (OUTPATIENT)
Dept: ONCOLOGY | Facility: CLINIC | Age: 81
End: 2021-01-01

## 2021-01-01 ENCOUNTER — TELEPHONE (OUTPATIENT)
Dept: ONCOLOGY | Facility: CLINIC | Age: 81
End: 2021-01-01
Payer: COMMERCIAL

## 2021-01-01 ENCOUNTER — LAB (OUTPATIENT)
Dept: INFUSION THERAPY | Facility: CLINIC | Age: 81
End: 2021-01-01
Attending: NURSE PRACTITIONER
Payer: COMMERCIAL

## 2021-01-01 ENCOUNTER — HEALTH MAINTENANCE LETTER (OUTPATIENT)
Age: 81
End: 2021-01-01

## 2021-01-01 ENCOUNTER — PATIENT OUTREACH (OUTPATIENT)
Dept: ONCOLOGY | Facility: CLINIC | Age: 81
End: 2021-01-01
Payer: COMMERCIAL

## 2021-01-01 ENCOUNTER — HOSPITAL ENCOUNTER (OUTPATIENT)
Dept: PET IMAGING | Facility: CLINIC | Age: 81
End: 2021-07-26
Attending: INTERNAL MEDICINE
Payer: COMMERCIAL

## 2021-01-01 VITALS
BODY MASS INDEX: 21.87 KG/M2 | SYSTOLIC BLOOD PRESSURE: 126 MMHG | HEART RATE: 89 BPM | TEMPERATURE: 98.3 F | WEIGHT: 112 LBS | OXYGEN SATURATION: 99 % | DIASTOLIC BLOOD PRESSURE: 75 MMHG | RESPIRATION RATE: 20 BRPM

## 2021-01-01 VITALS
RESPIRATION RATE: 16 BRPM | BODY MASS INDEX: 21.17 KG/M2 | HEIGHT: 60 IN | HEART RATE: 83 BPM | DIASTOLIC BLOOD PRESSURE: 82 MMHG | SYSTOLIC BLOOD PRESSURE: 124 MMHG | WEIGHT: 107.8 LBS | TEMPERATURE: 98.3 F | OXYGEN SATURATION: 97 %

## 2021-01-01 VITALS
DIASTOLIC BLOOD PRESSURE: 55 MMHG | TEMPERATURE: 97.6 F | RESPIRATION RATE: 16 BRPM | OXYGEN SATURATION: 97 % | SYSTOLIC BLOOD PRESSURE: 104 MMHG | HEART RATE: 82 BPM

## 2021-01-01 VITALS
HEIGHT: 60 IN | WEIGHT: 106.6 LBS | SYSTOLIC BLOOD PRESSURE: 121 MMHG | BODY MASS INDEX: 20.93 KG/M2 | OXYGEN SATURATION: 97 % | RESPIRATION RATE: 16 BRPM | TEMPERATURE: 98.6 F | DIASTOLIC BLOOD PRESSURE: 81 MMHG | HEART RATE: 96 BPM

## 2021-01-01 DIAGNOSIS — C78.7 METASTASIS TO LIVER (H): ICD-10-CM

## 2021-01-01 DIAGNOSIS — C21.1 MALIGNANT NEOPLASM OF ANAL CANAL (H): ICD-10-CM

## 2021-01-01 DIAGNOSIS — C78.02 MALIGNANT NEOPLASM METASTATIC TO LEFT LUNG (H): Primary | ICD-10-CM

## 2021-01-01 DIAGNOSIS — C78.02 MALIGNANT NEOPLASM METASTATIC TO LEFT LUNG (H): ICD-10-CM

## 2021-01-01 DIAGNOSIS — C78.01 MALIGNANT NEOPLASM METASTATIC TO RIGHT LUNG (H): ICD-10-CM

## 2021-01-01 DIAGNOSIS — C79.72 MALIGNANT NEOPLASM METASTATIC TO LEFT ADRENAL GLAND (H): ICD-10-CM

## 2021-01-01 DIAGNOSIS — R59.1 LYMPHADENOPATHY: ICD-10-CM

## 2021-01-01 DIAGNOSIS — Z13.29 SCREENING FOR THYROID DISORDER: ICD-10-CM

## 2021-01-01 DIAGNOSIS — C21.1 MALIGNANT NEOPLASM OF ANAL CANAL (H): Primary | ICD-10-CM

## 2021-01-01 DIAGNOSIS — R53.0 NEOPLASTIC (MALIGNANT) RELATED FATIGUE: ICD-10-CM

## 2021-01-01 DIAGNOSIS — D50.0 IRON DEFICIENCY ANEMIA DUE TO CHRONIC BLOOD LOSS: ICD-10-CM

## 2021-01-01 LAB
ALBUMIN SERPL-MCNC: 3.1 G/DL (ref 3.4–5)
ALBUMIN SERPL-MCNC: 3.1 G/DL (ref 3.4–5)
ALBUMIN SERPL-MCNC: 3.4 G/DL (ref 3.4–5)
ALBUMIN SERPL-MCNC: 3.4 G/DL (ref 3.4–5)
ALBUMIN SERPL-MCNC: 3.5 G/DL (ref 3.4–5)
ALP SERPL-CCNC: 80 U/L (ref 40–150)
ALP SERPL-CCNC: 80 U/L (ref 40–150)
ALP SERPL-CCNC: 81 U/L (ref 40–150)
ALP SERPL-CCNC: 82 U/L (ref 40–150)
ALP SERPL-CCNC: 89 U/L (ref 40–150)
ALT SERPL W P-5'-P-CCNC: 14 U/L (ref 0–50)
ALT SERPL W P-5'-P-CCNC: 14 U/L (ref 0–50)
ALT SERPL W P-5'-P-CCNC: 17 U/L (ref 0–50)
ALT SERPL W P-5'-P-CCNC: 20 U/L (ref 0–50)
ALT SERPL W P-5'-P-CCNC: 20 U/L (ref 0–50)
ANION GAP SERPL CALCULATED.3IONS-SCNC: 4 MMOL/L (ref 3–14)
ANION GAP SERPL CALCULATED.3IONS-SCNC: 5 MMOL/L (ref 3–14)
ANION GAP SERPL CALCULATED.3IONS-SCNC: 6 MMOL/L (ref 3–14)
ANION GAP SERPL CALCULATED.3IONS-SCNC: 8 MMOL/L (ref 3–14)
ANION GAP SERPL CALCULATED.3IONS-SCNC: <1 MMOL/L (ref 3–14)
AST SERPL W P-5'-P-CCNC: 18 U/L (ref 0–45)
AST SERPL W P-5'-P-CCNC: 19 U/L (ref 0–45)
AST SERPL W P-5'-P-CCNC: 21 U/L (ref 0–45)
AST SERPL W P-5'-P-CCNC: 22 U/L (ref 0–45)
AST SERPL W P-5'-P-CCNC: 24 U/L (ref 0–45)
BASOPHILS # BLD AUTO: 0 10E3/UL (ref 0–0.2)
BASOPHILS NFR BLD AUTO: 0 %
BILIRUB SERPL-MCNC: 0.4 MG/DL (ref 0.2–1.3)
BILIRUB SERPL-MCNC: 0.5 MG/DL (ref 0.2–1.3)
BUN SERPL-MCNC: 13 MG/DL (ref 7–30)
BUN SERPL-MCNC: 14 MG/DL (ref 7–30)
BUN SERPL-MCNC: 14 MG/DL (ref 7–30)
BUN SERPL-MCNC: 15 MG/DL (ref 7–30)
BUN SERPL-MCNC: 16 MG/DL (ref 7–30)
CALCIUM SERPL-MCNC: 9.3 MG/DL (ref 8.5–10.1)
CALCIUM SERPL-MCNC: 9.4 MG/DL (ref 8.5–10.1)
CALCIUM SERPL-MCNC: 9.5 MG/DL (ref 8.5–10.1)
CALCIUM SERPL-MCNC: 9.7 MG/DL (ref 8.5–10.1)
CALCIUM SERPL-MCNC: 9.9 MG/DL (ref 8.5–10.1)
CHLORIDE BLD-SCNC: 104 MMOL/L (ref 94–109)
CHLORIDE BLD-SCNC: 105 MMOL/L (ref 94–109)
CHLORIDE BLD-SCNC: 107 MMOL/L (ref 94–109)
CHLORIDE BLD-SCNC: 108 MMOL/L (ref 94–109)
CHLORIDE BLD-SCNC: 109 MMOL/L (ref 94–109)
CO2 SERPL-SCNC: 25 MMOL/L (ref 20–32)
CO2 SERPL-SCNC: 25 MMOL/L (ref 20–32)
CO2 SERPL-SCNC: 27 MMOL/L (ref 20–32)
CO2 SERPL-SCNC: 28 MMOL/L (ref 20–32)
CO2 SERPL-SCNC: 29 MMOL/L (ref 20–32)
CREAT SERPL-MCNC: 0.69 MG/DL (ref 0.52–1.04)
CREAT SERPL-MCNC: 0.7 MG/DL (ref 0.52–1.04)
CREAT SERPL-MCNC: 0.76 MG/DL (ref 0.52–1.04)
CREAT SERPL-MCNC: 0.81 MG/DL (ref 0.52–1.04)
CREAT SERPL-MCNC: 0.84 MG/DL (ref 0.52–1.04)
EOSINOPHIL # BLD AUTO: 0 10E3/UL (ref 0–0.7)
EOSINOPHIL NFR BLD AUTO: 0 %
EOSINOPHIL NFR BLD AUTO: 0 %
EOSINOPHIL NFR BLD AUTO: 1 %
ERYTHROCYTE [DISTWIDTH] IN BLOOD BY AUTOMATED COUNT: 12.8 % (ref 10–15)
ERYTHROCYTE [DISTWIDTH] IN BLOOD BY AUTOMATED COUNT: 13.3 % (ref 10–15)
ERYTHROCYTE [DISTWIDTH] IN BLOOD BY AUTOMATED COUNT: 14.8 % (ref 10–15)
FERRITIN SERPL-MCNC: 73 NG/ML (ref 8–252)
GFR SERPL CREATININE-BSD FRML MDRD: 66 ML/MIN/1.73M2
GFR SERPL CREATININE-BSD FRML MDRD: 69 ML/MIN/1.73M2
GFR SERPL CREATININE-BSD FRML MDRD: 75 ML/MIN/1.73M2
GFR SERPL CREATININE-BSD FRML MDRD: 82 ML/MIN/1.73M2
GFR SERPL CREATININE-BSD FRML MDRD: 83 ML/MIN/1.73M2
GLUCOSE BLD-MCNC: 83 MG/DL (ref 70–99)
GLUCOSE BLD-MCNC: 87 MG/DL (ref 70–99)
GLUCOSE BLD-MCNC: 88 MG/DL (ref 70–99)
GLUCOSE BLD-MCNC: 90 MG/DL (ref 70–99)
GLUCOSE BLD-MCNC: 93 MG/DL (ref 70–99)
HCT VFR BLD AUTO: 38.4 % (ref 35–47)
HCT VFR BLD AUTO: 42.1 % (ref 35–47)
HCT VFR BLD AUTO: 43.1 % (ref 35–47)
HGB BLD-MCNC: 12.4 G/DL (ref 11.7–15.7)
HGB BLD-MCNC: 13.5 G/DL (ref 11.7–15.7)
HGB BLD-MCNC: 13.6 G/DL (ref 11.7–15.7)
IMM GRANULOCYTES # BLD: 0 10E3/UL
IMM GRANULOCYTES NFR BLD: 0 %
IMM GRANULOCYTES NFR BLD: 1 %
IMM GRANULOCYTES NFR BLD: 1 %
IRON SATN MFR SERPL: 22 % (ref 15–46)
IRON SERPL-MCNC: 70 UG/DL (ref 35–180)
LYMPHOCYTES # BLD AUTO: 0.7 10E3/UL (ref 0.8–5.3)
LYMPHOCYTES # BLD AUTO: 0.8 10E3/UL (ref 0.8–5.3)
LYMPHOCYTES # BLD AUTO: 0.9 10E3/UL (ref 0.8–5.3)
LYMPHOCYTES NFR BLD AUTO: 11 %
LYMPHOCYTES NFR BLD AUTO: 15 %
LYMPHOCYTES NFR BLD AUTO: 15 %
MCH RBC QN AUTO: 30 PG (ref 26.5–33)
MCH RBC QN AUTO: 31.2 PG (ref 26.5–33)
MCH RBC QN AUTO: 32.3 PG (ref 26.5–33)
MCHC RBC AUTO-ENTMCNC: 31.6 G/DL (ref 31.5–36.5)
MCHC RBC AUTO-ENTMCNC: 32.1 G/DL (ref 31.5–36.5)
MCHC RBC AUTO-ENTMCNC: 32.3 G/DL (ref 31.5–36.5)
MCV RBC AUTO: 101 FL (ref 78–100)
MCV RBC AUTO: 95 FL (ref 78–100)
MCV RBC AUTO: 97 FL (ref 78–100)
MONOCYTES # BLD AUTO: 0.6 10E3/UL (ref 0–1.3)
MONOCYTES # BLD AUTO: 0.6 10E3/UL (ref 0–1.3)
MONOCYTES # BLD AUTO: 0.7 10E3/UL (ref 0–1.3)
MONOCYTES NFR BLD AUTO: 10 %
MONOCYTES NFR BLD AUTO: 12 %
MONOCYTES NFR BLD AUTO: 12 %
NEUTROPHILS # BLD AUTO: 3.8 10E3/UL (ref 1.6–8.3)
NEUTROPHILS # BLD AUTO: 4.6 10E3/UL (ref 1.6–8.3)
NEUTROPHILS # BLD AUTO: 4.8 10E3/UL (ref 1.6–8.3)
NEUTROPHILS NFR BLD AUTO: 72 %
NEUTROPHILS NFR BLD AUTO: 74 %
NEUTROPHILS NFR BLD AUTO: 76 %
NRBC # BLD AUTO: 0 10E3/UL
NRBC BLD AUTO-RTO: 0 /100
PLATELET # BLD AUTO: 204 10E3/UL (ref 150–450)
PLATELET # BLD AUTO: 216 10E3/UL (ref 150–450)
PLATELET # BLD AUTO: 273 10E3/UL (ref 150–450)
POTASSIUM BLD-SCNC: 3.8 MMOL/L (ref 3.4–5.3)
POTASSIUM BLD-SCNC: 3.9 MMOL/L (ref 3.4–5.3)
POTASSIUM BLD-SCNC: 4.1 MMOL/L (ref 3.4–5.3)
POTASSIUM BLD-SCNC: 4.3 MMOL/L (ref 3.4–5.3)
POTASSIUM BLD-SCNC: 4.4 MMOL/L (ref 3.4–5.3)
PROT SERPL-MCNC: 7.5 G/DL (ref 6.8–8.8)
PROT SERPL-MCNC: 7.6 G/DL (ref 6.8–8.8)
PROT SERPL-MCNC: 7.8 G/DL (ref 6.8–8.8)
PROT SERPL-MCNC: 7.8 G/DL (ref 6.8–8.8)
PROT SERPL-MCNC: 7.9 G/DL (ref 6.8–8.8)
RBC # BLD AUTO: 3.98 10E6/UL (ref 3.8–5.2)
RBC # BLD AUTO: 4.18 10E6/UL (ref 3.8–5.2)
RBC # BLD AUTO: 4.54 10E6/UL (ref 3.8–5.2)
SODIUM SERPL-SCNC: 137 MMOL/L (ref 133–144)
SODIUM SERPL-SCNC: 137 MMOL/L (ref 133–144)
SODIUM SERPL-SCNC: 138 MMOL/L (ref 133–144)
SODIUM SERPL-SCNC: 139 MMOL/L (ref 133–144)
SODIUM SERPL-SCNC: 139 MMOL/L (ref 133–144)
TIBC SERPL-MCNC: 317 UG/DL (ref 240–430)
TSH SERPL DL<=0.005 MIU/L-ACNC: 1.04 MU/L (ref 0.4–4)
TSH SERPL DL<=0.005 MIU/L-ACNC: 1.07 MU/L (ref 0.4–4)
TSH SERPL DL<=0.005 MIU/L-ACNC: 1.14 MU/L (ref 0.4–4)
TSH SERPL DL<=0.005 MIU/L-ACNC: 1.7 MU/L (ref 0.4–4)
WBC # BLD AUTO: 5.3 10E3/UL (ref 4–11)
WBC # BLD AUTO: 6.2 10E3/UL (ref 4–11)
WBC # BLD AUTO: 6.3 10E3/UL (ref 4–11)

## 2021-01-01 PROCEDURE — 84443 ASSAY THYROID STIM HORMONE: CPT

## 2021-01-01 PROCEDURE — 84450 TRANSFERASE (AST) (SGOT): CPT

## 2021-01-01 PROCEDURE — 343N000001 HC RX 343: Performed by: INTERNAL MEDICINE

## 2021-01-01 PROCEDURE — 99214 OFFICE O/P EST MOD 30 MIN: CPT | Performed by: NURSE PRACTITIONER

## 2021-01-01 PROCEDURE — 250N000011 HC RX IP 250 OP 636: Performed by: INTERNAL MEDICINE

## 2021-01-01 PROCEDURE — 84460 ALANINE AMINO (ALT) (SGPT): CPT

## 2021-01-01 PROCEDURE — 78816 PET IMAGE W/CT FULL BODY: CPT | Mod: PS

## 2021-01-01 PROCEDURE — 83550 IRON BINDING TEST: CPT

## 2021-01-01 PROCEDURE — 85004 AUTOMATED DIFF WBC COUNT: CPT | Performed by: INTERNAL MEDICINE

## 2021-01-01 PROCEDURE — 84443 ASSAY THYROID STIM HORMONE: CPT | Performed by: NURSE PRACTITIONER

## 2021-01-01 PROCEDURE — 85025 COMPLETE CBC W/AUTO DIFF WBC: CPT | Performed by: NURSE PRACTITIONER

## 2021-01-01 PROCEDURE — 258N000003 HC RX IP 258 OP 636: Performed by: INTERNAL MEDICINE

## 2021-01-01 PROCEDURE — 78816 PET IMAGE W/CT FULL BODY: CPT | Mod: 26 | Performed by: RADIOLOGY

## 2021-01-01 PROCEDURE — 85025 COMPLETE CBC W/AUTO DIFF WBC: CPT

## 2021-01-01 PROCEDURE — 36415 COLL VENOUS BLD VENIPUNCTURE: CPT

## 2021-01-01 PROCEDURE — 82040 ASSAY OF SERUM ALBUMIN: CPT | Performed by: INTERNAL MEDICINE

## 2021-01-01 PROCEDURE — G0463 HOSPITAL OUTPT CLINIC VISIT: HCPCS | Mod: 25

## 2021-01-01 PROCEDURE — 96413 CHEMO IV INFUSION 1 HR: CPT

## 2021-01-01 PROCEDURE — 80053 COMPREHEN METABOLIC PANEL: CPT | Performed by: NURSE PRACTITIONER

## 2021-01-01 PROCEDURE — 99215 OFFICE O/P EST HI 40 MIN: CPT | Mod: 95 | Performed by: INTERNAL MEDICINE

## 2021-01-01 PROCEDURE — 84443 ASSAY THYROID STIM HORMONE: CPT | Performed by: INTERNAL MEDICINE

## 2021-01-01 PROCEDURE — 82565 ASSAY OF CREATININE: CPT | Performed by: INTERNAL MEDICINE

## 2021-01-01 PROCEDURE — 36415 COLL VENOUS BLD VENIPUNCTURE: CPT | Performed by: NURSE PRACTITIONER

## 2021-01-01 PROCEDURE — 74177 CT ABD & PELVIS W/CONTRAST: CPT | Mod: 26

## 2021-01-01 PROCEDURE — A9552 F18 FDG: HCPCS | Performed by: INTERNAL MEDICINE

## 2021-01-01 PROCEDURE — 36415 COLL VENOUS BLD VENIPUNCTURE: CPT | Performed by: INTERNAL MEDICINE

## 2021-01-01 PROCEDURE — 71260 CT THORAX DX C+: CPT | Mod: 26

## 2021-01-01 PROCEDURE — 71260 CT THORAX DX C+: CPT

## 2021-01-01 PROCEDURE — 78816 PET IMAGE W/CT FULL BODY: CPT | Mod: 26

## 2021-01-01 PROCEDURE — 250N000011 HC RX IP 250 OP 636: Performed by: NURSE PRACTITIONER

## 2021-01-01 PROCEDURE — 258N000003 HC RX IP 258 OP 636: Performed by: NURSE PRACTITIONER

## 2021-01-01 PROCEDURE — 82728 ASSAY OF FERRITIN: CPT

## 2021-01-01 PROCEDURE — 99214 OFFICE O/P EST MOD 30 MIN: CPT | Mod: 95 | Performed by: INTERNAL MEDICINE

## 2021-01-01 PROCEDURE — 82040 ASSAY OF SERUM ALBUMIN: CPT

## 2021-01-01 RX ORDER — METHYLPREDNISOLONE SODIUM SUCCINATE 125 MG/2ML
125 INJECTION, POWDER, LYOPHILIZED, FOR SOLUTION INTRAMUSCULAR; INTRAVENOUS
Status: CANCELLED
Start: 2021-01-01

## 2021-01-01 RX ORDER — IOPAMIDOL 755 MG/ML
10-135 INJECTION, SOLUTION INTRAVASCULAR ONCE
Status: COMPLETED | OUTPATIENT
Start: 2021-01-01 | End: 2021-01-01

## 2021-01-01 RX ORDER — NALOXONE HYDROCHLORIDE 0.4 MG/ML
0.2 INJECTION, SOLUTION INTRAMUSCULAR; INTRAVENOUS; SUBCUTANEOUS
Status: CANCELLED | OUTPATIENT
Start: 2021-01-01

## 2021-01-01 RX ORDER — MEPERIDINE HYDROCHLORIDE 25 MG/ML
25 INJECTION INTRAMUSCULAR; INTRAVENOUS; SUBCUTANEOUS EVERY 30 MIN PRN
Status: CANCELLED | OUTPATIENT
Start: 2021-01-01

## 2021-01-01 RX ORDER — EPINEPHRINE 1 MG/ML
0.3 INJECTION, SOLUTION INTRAMUSCULAR; SUBCUTANEOUS EVERY 5 MIN PRN
Status: CANCELLED | OUTPATIENT
Start: 2021-01-01

## 2021-01-01 RX ORDER — HEPARIN SODIUM (PORCINE) LOCK FLUSH IV SOLN 100 UNIT/ML 100 UNIT/ML
5 SOLUTION INTRAVENOUS
Status: CANCELLED | OUTPATIENT
Start: 2021-01-01

## 2021-01-01 RX ORDER — ALBUTEROL SULFATE 90 UG/1
1-2 AEROSOL, METERED RESPIRATORY (INHALATION)
Status: CANCELLED
Start: 2021-01-01

## 2021-01-01 RX ORDER — ALBUTEROL SULFATE 0.83 MG/ML
2.5 SOLUTION RESPIRATORY (INHALATION)
Status: CANCELLED | OUTPATIENT
Start: 2021-01-01

## 2021-01-01 RX ORDER — HEPARIN SODIUM,PORCINE 10 UNIT/ML
5 VIAL (ML) INTRAVENOUS
Status: CANCELLED | OUTPATIENT
Start: 2021-01-01

## 2021-01-01 RX ORDER — LORAZEPAM 2 MG/ML
0.5 INJECTION INTRAMUSCULAR EVERY 4 HOURS PRN
Status: CANCELLED | OUTPATIENT
Start: 2021-01-01

## 2021-01-01 RX ORDER — DIPHENHYDRAMINE HYDROCHLORIDE 50 MG/ML
50 INJECTION INTRAMUSCULAR; INTRAVENOUS
Status: CANCELLED
Start: 2021-01-01

## 2021-01-01 RX ORDER — LORAZEPAM 0.5 MG/1
0.5 TABLET ORAL EVERY 4 HOURS PRN
Qty: 30 TABLET | Refills: 2 | Status: SHIPPED | OUTPATIENT
Start: 2021-01-01 | End: 2022-01-01

## 2021-01-01 RX ORDER — PROCHLORPERAZINE MALEATE 10 MG
10 TABLET ORAL EVERY 6 HOURS PRN
Qty: 30 TABLET | Refills: 2 | Status: SHIPPED | OUTPATIENT
Start: 2021-01-01

## 2021-01-01 RX ORDER — EPINEPHRINE 1 MG/ML
0.3 INJECTION, SOLUTION, CONCENTRATE INTRAVENOUS EVERY 5 MIN PRN
Status: CANCELLED | OUTPATIENT
Start: 2021-01-01

## 2021-01-01 RX ADMIN — FLUDEOXYGLUCOSE F-18 11.83 MCI.: 500 INJECTION, SOLUTION INTRAVENOUS at 13:19

## 2021-01-01 RX ADMIN — FLUDEOXYGLUCOSE F-18 12.6 MCI.: 500 INJECTION, SOLUTION INTRAVENOUS at 12:12

## 2021-01-01 RX ADMIN — IOPAMIDOL 70 ML: 755 INJECTION, SOLUTION INTRAVENOUS at 12:24

## 2021-01-01 RX ADMIN — SODIUM CHLORIDE 400 MG: 9 INJECTION, SOLUTION INTRAVENOUS at 13:24

## 2021-01-01 RX ADMIN — SODIUM CHLORIDE 250 ML: 9 INJECTION, SOLUTION INTRAVENOUS at 13:37

## 2021-01-01 RX ADMIN — SODIUM CHLORIDE 250 ML: 9 INJECTION, SOLUTION INTRAVENOUS at 13:22

## 2021-01-01 RX ADMIN — SODIUM CHLORIDE 400 MG: 9 INJECTION, SOLUTION INTRAVENOUS at 15:06

## 2021-01-01 RX ADMIN — SODIUM CHLORIDE 250 ML: 9 INJECTION, SOLUTION INTRAVENOUS at 15:05

## 2021-01-01 RX ADMIN — SODIUM CHLORIDE 250 ML: 9 INJECTION, SOLUTION INTRAVENOUS at 14:16

## 2021-01-01 RX ADMIN — SODIUM CHLORIDE 400 MG: 9 INJECTION, SOLUTION INTRAVENOUS at 14:16

## 2021-01-01 RX ADMIN — SODIUM CHLORIDE 400 MG: 9 INJECTION, SOLUTION INTRAVENOUS at 13:37

## 2021-01-01 ASSESSMENT — PAIN SCALES - GENERAL
PAINLEVEL: NO PAIN (0)

## 2021-01-01 ASSESSMENT — MIFFLIN-ST. JEOR
SCORE: 880.03
SCORE: 880.48

## 2021-01-15 ENCOUNTER — HEALTH MAINTENANCE LETTER (OUTPATIENT)
Age: 81
End: 2021-01-15

## 2021-01-20 NOTE — PROGRESS NOTES
"[facility]    Hematology/Oncology Established Patient Follow-up Note      Today's Date: 1/27/21    Reason for Follow-up: Metastatic anal adenocarcinoma.    Sara Lehman is a 80 year old female who is being evaluated via a billable video visit.      The patient has been notified of following:     \"This video visit will be conducted via a call between you and your physician/provider. We have found that certain health care needs can be provided without the need for an in-person physical exam.  This service lets us provide the care you need with a video conversation.  If a prescription is necessary we can send it directly to your pharmacy.  If lab work is needed we can place an order for that and you can then stop by our lab to have the test done at a later time.    If during the course of the call the physician/provider feels a video visit is not appropriate, you will not be charged for this service.\"     Patient has given verbal consent for Video visit? Yes    Patient is in virtual lobby    Video-Visit Details    Type of service:  Video Visit    Originating Location (pt. Location): Home    Distant Location (provider location):  [facility]     Mode of Communication:  Video Conference via [email or web address]    Video visit duration: 28 minutes    HISTORY OF PRESENT ILLNESS: Sara Lehman is a 80 year old female who presents with the following oncologic history:  1.  4/1/2019: Initially presented to her primary care provider with complaints of hemorrhoids intermittently for 2 months with associated pain and itching as well as blood in the underwear at night and intermixed with stool, no melena.  No associated weight loss or night sweats.  Reports occasional constipation.  Physical exam by primary care provider showed an anal mass about 1 inch in size with perianal erythema and skin breakdown.  2.  4/9/2019: Colonoscopy showed a perianal mass in the left lateral quadrant, severe diverticulosis in " the sigmoid colon, descending colon, and ascending colon.  Biopsy of the anal mass showed invasive moderately differentiated adenocarcinoma with normal mismatch repair protein expression.  Specimen consisted of multiple fragments of necrotic tissue with dysplastic appearing glands with foci of invasion.  Surface mucosa is not present and therefore precludes differentiation between colorectal carcinoma or perianal glandular carcinoma.  Depth of invasion cannot be assessed.  KRAS wild-type.  3.  4/16/2019: CT chest/abdomen/pelvis with contrast showed a 1.3 cm left upper lobe lung nodule concerning for metastatic disease as well as 2 tiny nodules in the right lower lobe near the diaphragm.  Diffusely aneurysmal abdominal aorta measures up to 3.2 cm.  Prominent bilateral inguinal adenopathy with a lymph node in the upper right thigh adjacent to the common femoral vessels measures 2.9 x 2.3 cm.  Lymph node in the left medial upper thigh adjacent to the common femoral vessels measures 2.6 x 3.5 cm.  No evidence of obstruction.  Bony structures showed no destruction.  4.  4/19/2019: Consultation with Dr. Miller Casas.  Due to high suspicion of metastatic disease to the lung and bilateral inguinal regions, patient referred to medical oncology and radiation oncology.  5.  4/24/2019: PET/CT scan showed hypermetabolic posterior left upper lobe mid chest nodule adjacent to the major fissure measuring 1.3 x 1.2 cm and emphysematous changes.  Bilateral renal cysts present.  Nonobstructing stone of the lower right kidney measures 0.3 cm.  Nodular thickening of the left adrenal is 0.7 cm.  Intense hypermetabolism at the anal level with heterogeneous appearing soft tissue with SUV max 19.5.  Enlarged inguinal hypermetabolic lymph nodes on the right measuring 2.9 x 2.1 cm and on the left 3.6 x 2.4 cm.  A distal infrarenal abdominal aorta measures 3.4 cm.  6.  5/6/2019: Left lung mass biopsy performed and showed discordant  immunohistochemical findings.  Lesion showed a strong reactivity for cytokeratin 7 and focal staining for P40.  Patient's anal adenocarcinoma reportedly positive for both immunohistochemical staining pattern not typical for primary lung tumor and metastatic disease cannot be excluded.  7.  5/9/2019-6/25/2019: Completed radiation therapy to the anal cancer and inguinal lymph nodes.  She received concurrent radiosensitizing chemotherapy with capecitabine.  8.  8/28/2019: PET/CT scan showed hypermetabolic nodule in the left upper lobe measuring 1.5 x 1.4 cm with SUV max 10.5, increased in size and metabolic activity from 1.4 x 1.2 cm with SUV max 9.5 on 4/24/2019 PET/CT scan.  A 0.5 cm pleural-based nodule with low metabolic activity in the left lower lobe is unchanged.  A 3.4 cm infrarenal abdominal aortic aneurysm is present.  The previously noted hypermetabolic anal mass has significantly improved with SUV max 5.1, decreased from SUV max 24.6, consistent with response to treatment.  The bilateral inguinal lymphadenopathy has markedly improved on the current study with nodes measuring up to 1.6 x 1.1 cm with SUV max 3.5, decreased from 4 x 2.6 cm with SUV max 14, consistent with response to treatment.  9.  10/21/2019: Completed stereotactic radiation to the left upper lobe lung nodule/metastasis in 5 fractions under the care of Dr. Clay Monroy.  10. 12/16/2019: PET scan showed new hypermetabolic medial right middle lobe pulmonary nodule; left upper lobe nodule decreased in size and metabolism; improvement of bilateral inguinal adenopathy with decreased sizes and metabolism; hypermetabolism slightly increased at the left adrenal nodule but stable in size; decreased hypermetabolism at anal level; new small focal hypermetabolism within muscle anterior to left hip joint with no mass effect.  11. 2/07/2020: Completed stereotactic radiation to the right middle lobe nodule and left adrenal nodule in 5 fractions.  12.   4/6/2020: PET/CT scan showed a 0.8 cm nodular density near the right lung apex with no significant hypermetabolism, unchanged from the prior exam.  The previously seen hypermetabolic right middle lobe pulmonary nodule is not seen on the current exam.  Small hypermetabolic left adrenal nodule measuring 1 cm with SUV max 3.3 is unchanged.  Infrarenal abdominal aortic aneurysm also not significantly changed and measures 3.3 x 3.6 cm containing a mild amount of mural thrombus.  The small hypermetabolic focus within the muscle anterior to the left hip joint is unchanged with SUV max 3.6.  13. 7/13/2020: PET/CT scan showed interval decrease in size of the right upper lobe lung nodule with no associated FDG activity; posttreatment changes in the lateral left upper lung; no new lung lesions; hypermetabolic left suprahilar area with no associated CT abnormality, possibly vascular in nature; no adenopathy; stable left adrenal nodule; indeterminate FDG activity in region of anal canal.   14. 10/26/2020: PET/CT scan showed FDG avid lymph nodes in left hilar region (measuring 1.1 cm with SUV 6.3; anterior to this is another 1.4 x 0.7 cm with SUV 8.9) concerning for progressive metastatic disease; remaining scan negative.  15. 1/25/2021: PET scan showed signs concerning for progressive metastatic disease, with increased FDG uptake within a left hilar lymph node, several new FDG-avid mediastinal lymph nodes; unchanged FDG-avid 10 mm left adrenal nodule; no new or enlarging lung nodules; unchanged 3.2 cm infrarenal AAA.    INTERIM HISTORY:  Trixie reports intermittent hematochezia that is occasionally clotty but not necessarily higher in volume. She denies rectal pain or diarrhea.  She denies any recent chest pain, cough, significant shortness of breath or recent fevers or chills.       REVIEW OF SYSTEMS:   14 point ROS was reviewed and is negative other than as noted above in HPI.       HOME MEDICATIONS:  Current Outpatient  Medications   Medication Sig Dispense Refill     acetaminophen (TYLENOL) 325 MG tablet Take 2 tablets (650 mg) by mouth every 4 hours as needed for mild pain or fever       amLODIPine (NORVASC) 5 MG tablet Take 1 tablet (5 mg) by mouth daily 90 tablet 0     metoprolol succinate ER (TOPROL-XL) 25 MG 24 hr tablet Take 1 tablet (25 mg) by mouth daily 90 tablet 0     ondansetron (ZOFRAN) 8 MG tablet Take 8 mg by mouth every 8 hours as needed for nausea       SUMAtriptan (IMITREX) 50 MG tablet Take 1 tablet (50 mg) by mouth at onset of headache for migraine 18 tablet 1         ALLERGIES:  Allergies   Allergen Reactions     Ace Inhibitors Cough     Aleve [Naproxen Sodium] GI Disturbance     Asa Buff (Mag [Aspirin Buffered] GI Disturbance     Internal bleeding     Atorvastatin Calcium GI Disturbance     Celebrex [Celecoxib] GI Disturbance     Codeine Sulfate Hives     Pravastatin GI Disturbance     Simvastatin GI Disturbance         PAST MEDICAL HISTORY:  Past Medical History:   Diagnosis Date     Benign essential hypertension 8/30/2017     Bronchospasm      COPD (chronic obstructive pulmonary disease) (H) 5/28/2014     Hypertension      Migraine      Neck pain      Nephrolithiasis          PAST SURGICAL HISTORY:  Past Surgical History:   Procedure Laterality Date     COLONOSCOPY N/A 4/9/2019    Procedure: COMBINED COLONOSCOPY, SINGLE OR MULTIPLE BIOPSY/POLYPECTOMY BY BIOPSY;  Surgeon: Saleem Shaw MD;  Location:  GI     LITHOTRIPSY      Lithotrypsy     ORTHOPEDIC SURGERY      right knee exploration     TUBAL LIGATION           SOCIAL HISTORY:  Social History     Socioeconomic History     Marital status:      Spouse name: Not on file     Number of children: Not on file     Years of education: Not on file     Highest education level: Not on file   Occupational History     Not on file   Social Needs     Financial resource strain: Not on file     Food insecurity     Worry: Not on file     Inability: Not on file      Transportation needs     Medical: Not on file     Non-medical: Not on file   Tobacco Use     Smoking status: Former Smoker     Packs/day: 0.50     Types: Cigarettes     Start date: 2016     Quit date: 2016     Years since quittin.7     Smokeless tobacco: Never Used   Substance and Sexual Activity     Alcohol use: Yes     Alcohol/week: 0.0 standard drinks     Comment: socially only- one per month     Drug use: No     Sexual activity: Yes     Partners: Male     Birth control/protection: Post-menopausal   Lifestyle     Physical activity     Days per week: Not on file     Minutes per session: Not on file     Stress: Not on file   Relationships     Social connections     Talks on phone: Not on file     Gets together: Not on file     Attends [employer] service: Not on file     Active member of club or organization: Not on file     Attends meetings of clubs or organizations: Not on file     Relationship status: Not on file     Intimate partner violence     Fear of current or ex partner: Not on file     Emotionally abused: Not on file     Physically abused: Not on file     Forced sexual activity: Not on file   Other Topics Concern     Parent/sibling w/ CABG, MI or angioplasty before 65F 55M? Not Asked   Social History Narrative     Not on file         FAMILY HISTORY:  Family History   Problem Relation Age of Onset     Cancer Mother 76        uterine     Cerebrovascular Disease Father 75     C.A.D. Brother 66         PHYSICAL EXAM:  Vital signs:  Not taken.  ECO  GENERAL: No acute distress.  EYES: No scleral icterus. No overt erythema.  RESPIRATORY: No cough.  No labored breathing.  MUSCULOSKELETAL: Range of motion in the neck, shoulders, and arms appear normal.  SKIN: No overt rashes, discolorations, or lesions over the face and neck.  NEUROLOGIC: Alert.  No overt tremors.  PSYCHIATRIC: Normal affect and mood.  Does not appear anxious.    The rest of a comprehensive physical examination is deferred due to  PHE (public health emergency) video visit restrictions.    LABS:  CBC RESULTS:   Recent Labs   Lab Test 21  1240   WBC 4.2   RBC 3.53*   HGB 9.4*   HCT 31.1*   MCV 88   MCH 26.6   MCHC 30.2*   RDW 14.7        Last Comprehensive Metabolic Panel:  Sodium   Date Value Ref Range Status   2021 139 133 - 144 mmol/L Final     Potassium   Date Value Ref Range Status   2021 3.9 3.4 - 5.3 mmol/L Final     Chloride   Date Value Ref Range Status   2021 110 (H) 94 - 109 mmol/L Final     Carbon Dioxide   Date Value Ref Range Status   2021 25 20 - 32 mmol/L Final     Anion Gap   Date Value Ref Range Status   2021 4 3 - 14 mmol/L Final     Glucose   Date Value Ref Range Status   2021 87 70 - 99 mg/dL Final     Urea Nitrogen   Date Value Ref Range Status   2021 17 7 - 30 mg/dL Final     Creatinine   Date Value Ref Range Status   2021 0.80 0.52 - 1.04 mg/dL Final     GFR Estimate   Date Value Ref Range Status   2021 70 >60 mL/min/[1.73_m2] Final     Comment:     Non  GFR Calc  Starting 2018, serum creatinine based estimated GFR (eGFR) will be   calculated using the Chronic Kidney Disease Epidemiology Collaboration   (CKD-EPI) equation.       Calcium   Date Value Ref Range Status   2021 9.2 8.5 - 10.1 mg/dL Final     Bilirubin Total   Date Value Ref Range Status   2021 0.2 0.2 - 1.3 mg/dL Final     Alkaline Phosphatase   Date Value Ref Range Status   2021 75 40 - 150 U/L Final     ALT   Date Value Ref Range Status   2021 17 0 - 50 U/L Final     AST   Date Value Ref Range Status   2021 14 0 - 45 U/L Final         PATHOLOGY:  None new since prior visit.    IMAGIN2021 PET scan showed:  1. Findings concerning for progression of metastatic disease.  Increased FDG uptake within a left hilar lymph node, since the PET/CT  on 10/26/2020. Several additional mediastinal lymph nodes also  demonstrate new or increased FDG  uptake.  2. Unchanged FDG avid 10 mm left adrenal nodule.  3. Stable postradiation changes in the left upper lobe. No new or  enlarging pulmonary nodules are identified.  4. Unchanged 3.2 cm infrarenal abdominal aortic aneurysm.    ASSESSMENT/PLAN:  Sara Lehman is a 80 year old female with the following issues:  1.  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  Left lung and right middle lobe lung metastases s/p radiation  3. Left adrenal nodule, stable  4. Left hilar and mediastinal hypermetabolic lymphadenopathy, worse  -Sara completed chemoradiation with capecitabine radiosensitizer on 6/25/2019, stereotactic radiation to the left upper lobe lung metastasis 10/21/2019, and stereotactic radiation to the right middle lobe lung metastasis and left adrenal nodule 2/07/2020.  -I personally reviewed the PET/CT scan from 1/25/2021 and discussed the results with Sara.  She has slightly worse hypermetabolic left hilar lymphadenopathy and new mediastinal lymphadenopathy concerning for progressive metastatic disease. However, the lymph nodes are still small and not causing her symptoms at this time.  -I offered Trixie continued observation or try systemic therapy.   -Prior testing for KRAS mutation showed wild-type.  May consider adding either bevacizumab (or an epidermal growth factor receptor inhibitor such as panitumumab) to 5-fluorouracil for systemic therapy.  However, would be cautious with side effects of systemic therapy. I discussed the potential adverse effects of each of these drugs.    -Trixie, with her family members present, are contemplating whether to proceed with chemotherapy. I gave option of observing for 2 months then repeating CT chest.  In meantime, I recommended working up her anemia with additional labs.  -Ultimately Trixie expressed understanding and agreement with the plan to continue on observation for the next 2 months  .    5. Intermittent hematochezia  6. Anemia, likely related to blood loss  -May be related to prior chemoradiation but cannot rule out locally recurrent anal cancer.  She is now anemic.  I cautioned her that if she sees an increase in frequency (such as more continuous) and higher volume of bleeding, I would recommend she see Dr. Casas for a repeat scope.  I advised she go to the nearest ED or call 911 if she does experience any truly significant larger clots or higher volume bleeding.  -Will repeat her Hgb and check iron studies and B12 next week.    Return in 2 months.    Rhona Wisdom MD  Hematology/Oncology  [facility]

## 2021-01-25 ENCOUNTER — HOSPITAL ENCOUNTER (OUTPATIENT)
Dept: PET IMAGING | Facility: CLINIC | Age: 81
End: 2021-01-25
Attending: INTERNAL MEDICINE
Payer: COMMERCIAL

## 2021-01-25 ENCOUNTER — HOSPITAL ENCOUNTER (OUTPATIENT)
Dept: LAB | Facility: CLINIC | Age: 81
End: 2021-01-25
Attending: INTERNAL MEDICINE
Payer: COMMERCIAL

## 2021-01-25 DIAGNOSIS — C78.02 MALIGNANT NEOPLASM METASTATIC TO LEFT LUNG (H): ICD-10-CM

## 2021-01-25 DIAGNOSIS — C79.72 MALIGNANT NEOPLASM METASTATIC TO LEFT ADRENAL GLAND (H): ICD-10-CM

## 2021-01-25 DIAGNOSIS — C78.01 MALIGNANT NEOPLASM METASTATIC TO RIGHT LUNG (H): ICD-10-CM

## 2021-01-25 DIAGNOSIS — C21.1 MALIGNANT NEOPLASM OF ANAL CANAL (H): ICD-10-CM

## 2021-01-25 DIAGNOSIS — R59.1 LYMPHADENOPATHY: ICD-10-CM

## 2021-01-25 LAB
ALBUMIN SERPL-MCNC: 3.2 G/DL (ref 3.4–5)
ALP SERPL-CCNC: 75 U/L (ref 40–150)
ALT SERPL W P-5'-P-CCNC: 17 U/L (ref 0–50)
ANION GAP SERPL CALCULATED.3IONS-SCNC: 4 MMOL/L (ref 3–14)
AST SERPL W P-5'-P-CCNC: 14 U/L (ref 0–45)
BASOPHILS # BLD AUTO: 0 10E9/L (ref 0–0.2)
BASOPHILS NFR BLD AUTO: 0.2 %
BILIRUB SERPL-MCNC: 0.2 MG/DL (ref 0.2–1.3)
BUN SERPL-MCNC: 17 MG/DL (ref 7–30)
CALCIUM SERPL-MCNC: 9.2 MG/DL (ref 8.5–10.1)
CHLORIDE SERPL-SCNC: 110 MMOL/L (ref 94–109)
CO2 SERPL-SCNC: 25 MMOL/L (ref 20–32)
CREAT SERPL-MCNC: 0.8 MG/DL (ref 0.52–1.04)
DIFFERENTIAL METHOD BLD: ABNORMAL
EOSINOPHIL # BLD AUTO: 0.1 10E9/L (ref 0–0.7)
EOSINOPHIL NFR BLD AUTO: 1.2 %
ERYTHROCYTE [DISTWIDTH] IN BLOOD BY AUTOMATED COUNT: 14.7 % (ref 10–15)
GFR SERPL CREATININE-BSD FRML MDRD: 70 ML/MIN/{1.73_M2}
GLUCOSE SERPL-MCNC: 87 MG/DL (ref 70–99)
HCT VFR BLD AUTO: 31.1 % (ref 35–47)
HGB BLD-MCNC: 9.4 G/DL (ref 11.7–15.7)
IMM GRANULOCYTES # BLD: 0 10E9/L (ref 0–0.4)
IMM GRANULOCYTES NFR BLD: 0.2 %
LYMPHOCYTES # BLD AUTO: 0.6 10E9/L (ref 0.8–5.3)
LYMPHOCYTES NFR BLD AUTO: 14.2 %
MCH RBC QN AUTO: 26.6 PG (ref 26.5–33)
MCHC RBC AUTO-ENTMCNC: 30.2 G/DL (ref 31.5–36.5)
MCV RBC AUTO: 88 FL (ref 78–100)
MONOCYTES # BLD AUTO: 0.5 10E9/L (ref 0–1.3)
MONOCYTES NFR BLD AUTO: 10.9 %
NEUTROPHILS # BLD AUTO: 3.1 10E9/L (ref 1.6–8.3)
NEUTROPHILS NFR BLD AUTO: 73.3 %
NRBC # BLD AUTO: 0 10*3/UL
NRBC BLD AUTO-RTO: 0 /100
PLATELET # BLD AUTO: 220 10E9/L (ref 150–450)
POTASSIUM SERPL-SCNC: 3.9 MMOL/L (ref 3.4–5.3)
PROT SERPL-MCNC: 7.1 G/DL (ref 6.8–8.8)
RBC # BLD AUTO: 3.53 10E12/L (ref 3.8–5.2)
SODIUM SERPL-SCNC: 139 MMOL/L (ref 133–144)
WBC # BLD AUTO: 4.2 10E9/L (ref 4–11)

## 2021-01-25 PROCEDURE — 85025 COMPLETE CBC W/AUTO DIFF WBC: CPT | Performed by: INTERNAL MEDICINE

## 2021-01-25 PROCEDURE — 80053 COMPREHEN METABOLIC PANEL: CPT | Performed by: INTERNAL MEDICINE

## 2021-01-25 PROCEDURE — 78816 PET IMAGE W/CT FULL BODY: CPT | Mod: 26

## 2021-01-25 PROCEDURE — 343N000001 HC RX 343: Performed by: INTERNAL MEDICINE

## 2021-01-25 PROCEDURE — 78816 PET IMAGE W/CT FULL BODY: CPT | Mod: PS

## 2021-01-25 PROCEDURE — A9552 F18 FDG: HCPCS | Performed by: INTERNAL MEDICINE

## 2021-01-25 RX ADMIN — FLUDEOXYGLUCOSE F-18 11.82 MCI.: 500 INJECTION, SOLUTION INTRAVENOUS at 12:21

## 2021-01-27 ENCOUNTER — VIRTUAL VISIT (OUTPATIENT)
Dept: ONCOLOGY | Facility: CLINIC | Age: 81
End: 2021-01-27
Attending: INTERNAL MEDICINE
Payer: COMMERCIAL

## 2021-01-27 VITALS — BODY MASS INDEX: 18.3 KG/M2 | WEIGHT: 110 LBS

## 2021-01-27 DIAGNOSIS — K92.1 HEMATOCHEZIA: ICD-10-CM

## 2021-01-27 DIAGNOSIS — D63.0 ANEMIA IN NEOPLASTIC DISEASE: ICD-10-CM

## 2021-01-27 DIAGNOSIS — C79.72 MALIGNANT NEOPLASM METASTATIC TO LEFT ADRENAL GLAND (H): ICD-10-CM

## 2021-01-27 DIAGNOSIS — C78.02 MALIGNANT NEOPLASM METASTATIC TO LEFT LUNG (H): ICD-10-CM

## 2021-01-27 DIAGNOSIS — R59.1 LYMPHADENOPATHY: ICD-10-CM

## 2021-01-27 DIAGNOSIS — C21.1 MALIGNANT NEOPLASM OF ANAL CANAL (H): Primary | ICD-10-CM

## 2021-01-27 DIAGNOSIS — C78.01 MALIGNANT NEOPLASM METASTATIC TO RIGHT LUNG (H): ICD-10-CM

## 2021-01-27 PROCEDURE — 999N001193 HC VIDEO/TELEPHONE VISIT; NO CHARGE

## 2021-01-27 PROCEDURE — 99215 OFFICE O/P EST HI 40 MIN: CPT | Mod: 95 | Performed by: INTERNAL MEDICINE

## 2021-01-27 ASSESSMENT — PAIN SCALES - GENERAL: PAINLEVEL: NO PAIN (0)

## 2021-01-27 NOTE — PROGRESS NOTES
Trixie is a 80 year old who is being evaluated via a billable video visit.      How would you like to obtain your AVS? MyChart  If the video visit is dropped, the invitation should be resent by: Send to e-mail at: [email or web address]  Will anyone else be joining your video visit? Yes: daughter. How would they like to receive their invitation? Send to e-mail at: [email or web address]      Video Start Time: Video-Visit Details    Type of service:  Video Visit    Video End Time:     Originating Location (pt. Location): Home    Distant Location (provider location):  [facility] [place] [facility]     Platform used for Video Visit: Mario Grant CMA

## 2021-01-27 NOTE — LETTER
"    1/27/2021         RE: Sara Lehman  [address] [facility] [id number]        Dear Colleague,    Thank you for referring your patient, Sara Lehman, to the [facility]. Please see a copy of my visit note below.    [facility]    Hematology/Oncology Established Patient Follow-up Note      Today's Date: 1/27/21    Reason for Follow-up: Metastatic anal adenocarcinoma.    Sara Lehman is a 80 year old female who is being evaluated via a billable video visit.      The patient has been notified of following:     \"This video visit will be conducted via a call between you and your physician/provider. We have found that certain health care needs can be provided without the need for an in-person physical exam.  This service lets us provide the care you need with a video conversation.  If a prescription is necessary we can send it directly to your pharmacy.  If lab work is needed we can place an order for that and you can then stop by our lab to have the test done at a later time.    If during the course of the call the physician/provider feels a video visit is not appropriate, you will not be charged for this service.\"     Patient has given verbal consent for Video visit? Yes    Patient is in virtual lobby    Video-Visit Details    Type of service:  Video Visit    Originating Location (pt. Location): Home    Distant Location (provider location):  [facility]     Mode of Communication:  Video Conference via [employer]    Video visit duration: 28 minutes    HISTORY OF PRESENT ILLNESS: Sara Lehman is a 80 year old female who presents with the following oncologic history:  1.  4/1/2019: Initially presented to her primary care provider with complaints of hemorrhoids intermittently for 2 months with associated pain and itching as well as blood in the underwear at night and intermixed with stool, no melena.  No associated weight loss or night sweats. "  Reports occasional constipation.  Physical exam by primary care provider showed an anal mass about 1 inch in size with perianal erythema and skin breakdown.  2.  4/9/2019: Colonoscopy showed a perianal mass in the left lateral quadrant, severe diverticulosis in the sigmoid colon, descending colon, and ascending colon.  Biopsy of the anal mass showed invasive moderately differentiated adenocarcinoma with normal mismatch repair protein expression.  Specimen consisted of multiple fragments of necrotic tissue with dysplastic appearing glands with foci of invasion.  Surface mucosa is not present and therefore precludes differentiation between colorectal carcinoma or perianal glandular carcinoma.  Depth of invasion cannot be assessed.  KRAS wild-type.  3.  4/16/2019: CT chest/abdomen/pelvis with contrast showed a 1.3 cm left upper lobe lung nodule concerning for metastatic disease as well as 2 tiny nodules in the right lower lobe near the diaphragm.  Diffusely aneurysmal abdominal aorta measures up to 3.2 cm.  Prominent bilateral inguinal adenopathy with a lymph node in the upper right thigh adjacent to the common femoral vessels measures 2.9 x 2.3 cm.  Lymph node in the left medial upper thigh adjacent to the common femoral vessels measures 2.6 x 3.5 cm.  No evidence of obstruction.  Bony structures showed no destruction.  4.  4/19/2019: Consultation with Dr. Miller Casas.  Due to high suspicion of metastatic disease to the lung and bilateral inguinal regions, patient referred to medical oncology and radiation oncology.  5.  4/24/2019: PET/CT scan showed hypermetabolic posterior left upper lobe mid chest nodule adjacent to the major fissure measuring 1.3 x 1.2 cm and emphysematous changes.  Bilateral renal cysts present.  Nonobstructing stone of the lower right kidney measures 0.3 cm.  Nodular thickening of the left adrenal is 0.7 cm.  Intense hypermetabolism at the anal level with heterogeneous appearing soft tissue  with SUV max 19.5.  Enlarged inguinal hypermetabolic lymph nodes on the right measuring 2.9 x 2.1 cm and on the left 3.6 x 2.4 cm.  A distal infrarenal abdominal aorta measures 3.4 cm.  6. 5/6/2019: Left lung mass biopsy performed and showed discordant immunohistochemical findings.  Lesion showed a strong reactivity for cytokeratin 7 and focal staining for P40.  Patient's anal adenocarcinoma reportedly positive for both immunohistochemical staining pattern not typical for primary lung tumor and metastatic disease cannot be excluded.  7.  5/9/2019-6/25/2019: Completed radiation therapy to the anal cancer and inguinal lymph nodes.  She received concurrent radiosensitizing chemotherapy with capecitabine.  8. 8/28/2019: PET/CT scan showed hypermetabolic nodule in the left upper lobe measuring 1.5 x 1.4 cm with SUV max 10.5, increased in size and metabolic activity from 1.4 x 1.2 cm with SUV max 9.5 on 4/24/2019 PET/CT scan.  A 0.5 cm pleural-based nodule with low metabolic activity in the left lower lobe is unchanged.  A 3.4 cm infrarenal abdominal aortic aneurysm is present.  The previously noted hypermetabolic anal mass has significantly improved with SUV max 5.1, decreased from SUV max 24.6, consistent with response to treatment.  The bilateral inguinal lymphadenopathy has markedly improved on the current study with nodes measuring up to 1.6 x 1.1 cm with SUV max 3.5, decreased from 4 x 2.6 cm with SUV max 14, consistent with response to treatment.  9.  10/21/2019: Completed stereotactic radiation to the left upper lobe lung nodule/metastasis in 5 fractions under the care of Dr. Clay Monroy.  10. 12/16/2019: PET scan showed new hypermetabolic medial right middle lobe pulmonary nodule; left upper lobe nodule decreased in size and metabolism; improvement of bilateral inguinal adenopathy with decreased sizes and metabolism; hypermetabolism slightly increased at the left adrenal nodule but stable in size; decreased  hypermetabolism at anal level; new small focal hypermetabolism within muscle anterior to left hip joint with no mass effect.  11. 2/07/2020: Completed stereotactic radiation to the right middle lobe nodule and left adrenal nodule in 5 fractions.  12.  4/6/2020: PET/CT scan showed a 0.8 cm nodular density near the right lung apex with no significant hypermetabolism, unchanged from the prior exam.  The previously seen hypermetabolic right middle lobe pulmonary nodule is not seen on the current exam.  Small hypermetabolic left adrenal nodule measuring 1 cm with SUV max 3.3 is unchanged.  Infrarenal abdominal aortic aneurysm also not significantly changed and measures 3.3 x 3.6 cm containing a mild amount of mural thrombus.  The small hypermetabolic focus within the muscle anterior to the left hip joint is unchanged with SUV max 3.6.  13. 7/13/2020: PET/CT scan showed interval decrease in size of the right upper lobe lung nodule with no associated FDG activity; posttreatment changes in the lateral left upper lung; no new lung lesions; hypermetabolic left suprahilar area with no associated CT abnormality, possibly vascular in nature; no adenopathy; stable left adrenal nodule; indeterminate FDG activity in region of anal canal.   14. 10/26/2020: PET/CT scan showed FDG avid lymph nodes in left hilar region (measuring 1.1 cm with SUV 6.3; anterior to this is another 1.4 x 0.7 cm with SUV 8.9) concerning for progressive metastatic disease; remaining scan negative.  15. 1/25/2021: PET scan showed signs concerning for progressive metastatic disease, with increased FDG uptake within a left hilar lymph node, several new FDG-avid mediastinal lymph nodes; unchanged FDG-avid 10 mm left adrenal nodule; no new or enlarging lung nodules; unchanged 3.2 cm infrarenal AAA.    INTERIM HISTORY:  Trixie reports intermittent hematochezia that is occasionally clotty but not necessarily higher in volume. She denies rectal pain or diarrhea.  She  denies any recent chest pain, cough, significant shortness of breath or recent fevers or chills.       REVIEW OF SYSTEMS:   14 point ROS was reviewed and is negative other than as noted above in HPI.       HOME MEDICATIONS:  Current Outpatient Medications   Medication Sig Dispense Refill     acetaminophen (TYLENOL) 325 MG tablet Take 2 tablets (650 mg) by mouth every 4 hours as needed for mild pain or fever       amLODIPine (NORVASC) 5 MG tablet Take 1 tablet (5 mg) by mouth daily 90 tablet 0     metoprolol succinate ER (TOPROL-XL) 25 MG 24 hr tablet Take 1 tablet (25 mg) by mouth daily 90 tablet 0     ondansetron (ZOFRAN) 8 MG tablet Take 8 mg by mouth every 8 hours as needed for nausea       SUMAtriptan (IMITREX) 50 MG tablet Take 1 tablet (50 mg) by mouth at onset of headache for migraine 18 tablet 1         ALLERGIES:  Allergies   Allergen Reactions     Ace Inhibitors Cough     Aleve [Naproxen Sodium] GI Disturbance     Asa Buff (Mag [Aspirin Buffered] GI Disturbance     Internal bleeding     Atorvastatin Calcium GI Disturbance     Celebrex [Celecoxib] GI Disturbance     Codeine Sulfate Hives     Pravastatin GI Disturbance     Simvastatin GI Disturbance         PAST MEDICAL HISTORY:  Past Medical History:   Diagnosis Date     Benign essential hypertension 8/30/2017     Bronchospasm      COPD (chronic obstructive pulmonary disease) (H) 5/28/2014     Hypertension      Migraine      Neck pain      Nephrolithiasis          PAST SURGICAL HISTORY:  Past Surgical History:   Procedure Laterality Date     COLONOSCOPY N/A 4/9/2019    Procedure: COMBINED COLONOSCOPY, SINGLE OR MULTIPLE BIOPSY/POLYPECTOMY BY BIOPSY;  Surgeon: Saleem Shaw MD;  Location:  GI     LITHOTRIPSY      Lithotrypsy     ORTHOPEDIC SURGERY      right knee exploration     TUBAL LIGATION           SOCIAL HISTORY:  Social History     Socioeconomic History     Marital status:      Spouse name: Not on file     Number of children: Not on file      Years of education: Not on file     Highest education level: Not on file   Occupational History     Not on file   Social Needs     Financial resource strain: Not on file     Food insecurity     Worry: Not on file     Inability: Not on file     Transportation needs     Medical: Not on file     Non-medical: Not on file   Tobacco Use     Smoking status: Former Smoker     Packs/day: 0.50     Types: Cigarettes     Start date: 2016     Quit date: 2016     Years since quittin.7     Smokeless tobacco: Never Used   Substance and Sexual Activity     Alcohol use: Yes     Alcohol/week: 0.0 standard drinks     Comment: socially only- one per month     Drug use: No     Sexual activity: Yes     Partners: Male     Birth control/protection: Post-menopausal   Lifestyle     Physical activity     Days per week: Not on file     Minutes per session: Not on file     Stress: Not on file   Relationships     Social connections     Talks on phone: Not on file     Gets together: Not on file     Attends [employer] service: Not on file     Active member of club or organization: Not on file     Attends meetings of clubs or organizations: Not on file     Relationship status: Not on file     Intimate partner violence     Fear of current or ex partner: Not on file     Emotionally abused: Not on file     Physically abused: Not on file     Forced sexual activity: Not on file   Other Topics Concern     Parent/sibling w/ CABG, MI or angioplasty before 65F 55M? Not Asked   Social History Narrative     Not on file         FAMILY HISTORY:  Family History   Problem Relation Age of Onset     Cancer Mother 76        uterine     Cerebrovascular Disease Father 75     C.A.D. Brother 66         PHYSICAL EXAM:  Vital signs:  Not taken.  ECO  GENERAL: No acute distress.  EYES: No scleral icterus. No overt erythema.  RESPIRATORY: No cough.  No labored breathing.  MUSCULOSKELETAL: Range of motion in the neck, shoulders, and arms appear  normal.  SKIN: No overt rashes, discolorations, or lesions over the face and neck.  NEUROLOGIC: Alert.  No overt tremors.  PSYCHIATRIC: Normal affect and mood.  Does not appear anxious.    The rest of a comprehensive physical examination is deferred due to PHE (public health emergency) video visit restrictions.    LABS:  CBC RESULTS:   Recent Labs   Lab Test 01/25/21  1240   WBC 4.2   RBC 3.53*   HGB 9.4*   HCT 31.1*   MCV 88   MCH 26.6   MCHC 30.2*   RDW 14.7        Last Comprehensive Metabolic Panel:  Sodium   Date Value Ref Range Status   01/25/2021 139 133 - 144 mmol/L Final     Potassium   Date Value Ref Range Status   01/25/2021 3.9 3.4 - 5.3 mmol/L Final     Chloride   Date Value Ref Range Status   01/25/2021 110 (H) 94 - 109 mmol/L Final     Carbon Dioxide   Date Value Ref Range Status   01/25/2021 25 20 - 32 mmol/L Final     Anion Gap   Date Value Ref Range Status   01/25/2021 4 3 - 14 mmol/L Final     Glucose   Date Value Ref Range Status   01/25/2021 87 70 - 99 mg/dL Final     Urea Nitrogen   Date Value Ref Range Status   01/25/2021 17 7 - 30 mg/dL Final     Creatinine   Date Value Ref Range Status   01/25/2021 0.80 0.52 - 1.04 mg/dL Final     GFR Estimate   Date Value Ref Range Status   01/25/2021 70 >60 mL/min/[1.73_m2] Final     Comment:     Non  GFR Calc  Starting 12/18/2018, serum creatinine based estimated GFR (eGFR) will be   calculated using the Chronic Kidney Disease Epidemiology Collaboration   (CKD-EPI) equation.       Calcium   Date Value Ref Range Status   01/25/2021 9.2 8.5 - 10.1 mg/dL Final     Bilirubin Total   Date Value Ref Range Status   01/25/2021 0.2 0.2 - 1.3 mg/dL Final     Alkaline Phosphatase   Date Value Ref Range Status   01/25/2021 75 40 - 150 U/L Final     ALT   Date Value Ref Range Status   01/25/2021 17 0 - 50 U/L Final     AST   Date Value Ref Range Status   01/25/2021 14 0 - 45 U/L Final         PATHOLOGY:  None new since prior  visit.    IMAGIN2021 PET scan showed:  1. Findings concerning for progression of metastatic disease.  Increased FDG uptake within a left hilar lymph node, since the PET/CT  on 10/26/2020. Several additional mediastinal lymph nodes also  demonstrate new or increased FDG uptake.  2. Unchanged FDG avid 10 mm left adrenal nodule.  3. Stable postradiation changes in the left upper lobe. No new or  enlarging pulmonary nodules are identified.  4. Unchanged 3.2 cm infrarenal abdominal aortic aneurysm.    ASSESSMENT/PLAN:  Sara Lehman is a 80 year old female with the following issues:  1.  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  Left lung and right middle lobe lung metastases s/p radiation  3. Left adrenal nodule, stable  4. Left hilar and mediastinal hypermetabolic lymphadenopathy, worse  -Sara completed chemoradiation with capecitabine radiosensitizer on 2019, stereotactic radiation to the left upper lobe lung metastasis 10/21/2019, and stereotactic radiation to the right middle lobe lung metastasis and left adrenal nodule 2020.  -I personally reviewed the PET/CT scan from 2021 and discussed the results with Sara.  She has slightly worse hypermetabolic left hilar lymphadenopathy and new mediastinal lymphadenopathy concerning for progressive metastatic disease. However, the lymph nodes are still small and not causing her symptoms at this time.  -I offered Trixie continued observation or try systemic therapy.   -Prior testing for KRAS mutation showed wild-type.  May consider adding either bevacizumab (or an epidermal growth factor receptor inhibitor such as panitumumab) to 5-fluorouracil for systemic therapy.  However, would be cautious with side effects of systemic therapy. I discussed the potential adverse effects of each of these drugs.    -Trixie, with her family members present, are contemplating whether to proceed  with chemotherapy. I gave option of observing for 2 months then repeating CT chest.  In meantime, I recommended working up her anemia with additional labs.  -Ultimately Trixie expressed understanding and agreement with the plan to continue on observation for the next 2 months .    5. Intermittent hematochezia  6. Anemia, likely related to blood loss  -May be related to prior chemoradiation but cannot rule out locally recurrent anal cancer.  She is now anemic.  I cautioned her that if she sees an increase in frequency (such as more continuous) and higher volume of bleeding, I would recommend she see Dr. Casas for a repeat scope.  I advised she go to the nearest ED or call 911 if she does experience any truly significant larger clots or higher volume bleeding.  -Will repeat her Hgb and check iron studies and B12 next week.    Return in 2 months.    Rhona Wisdom MD  Hematology/Oncology  [facility]    Trixie is a 80 year old who is being evaluated via a billable video visit.      How would you like to obtain your AVS? MyChart  If the video visit is dropped, the invitation should be resent by: Send to e-mail at: [email or web address]  Will anyone else be joining your video visit? Yes: daughter. How would they like to receive their invitation? Send to e-mail at: [email or web address]  {If patient encounters technical issues they should call [phone]}    Video Start Time: Video-Visit Details    Type of service:  Video Visit    Video End Time:     Originating Location (pt. Location): Home    Distant Location (provider location):  [facility]     Platform used for Video Visit: Mario Grant CMA          Again, thank you for allowing me to participate in the care of your patient.        Sincerely,        Rhona Wisdom MD

## 2021-01-27 NOTE — LETTER
"    1/27/2021         RE: Sara Lehman  [address] [facility] [id number]        Dear Colleague,    Thank you for referring your patient, Sara Lehman, to the [facility]. Please see a copy of my visit note below.    [facility]    Hematology/Oncology Established Patient Follow-up Note      Today's Date: 1/27/21    Reason for Follow-up: Metastatic anal adenocarcinoma.    Sara Lehman is a 80 year old female who is being evaluated via a billable video visit.      The patient has been notified of following:     \"This video visit will be conducted via a call between you and your physician/provider. We have found that certain health care needs can be provided without the need for an in-person physical exam.  This service lets us provide the care you need with a video conversation.  If a prescription is necessary we can send it directly to your pharmacy.  If lab work is needed we can place an order for that and you can then stop by our lab to have the test done at a later time.    If during the course of the call the physician/provider feels a video visit is not appropriate, you will not be charged for this service.\"     Patient has given verbal consent for Video visit? Yes    Patient is in virtual lobby    Video-Visit Details    Type of service:  Video Visit    Originating Location (pt. Location): Home    Distant Location (provider location):  [facility]     Mode of Communication:  Video Conference via [employer]    Video visit duration: 28 minutes    HISTORY OF PRESENT ILLNESS: Sara Lehman is a 80 year old female who presents with the following oncologic history:  1.  4/1/2019: Initially presented to her primary care provider with complaints of hemorrhoids intermittently for 2 months with associated pain and itching as well as blood in the underwear at night and intermixed with stool, no melena.  No associated weight loss or night sweats. "  Reports occasional constipation.  Physical exam by primary care provider showed an anal mass about 1 inch in size with perianal erythema and skin breakdown.  2.  4/9/2019: Colonoscopy showed a perianal mass in the left lateral quadrant, severe diverticulosis in the sigmoid colon, descending colon, and ascending colon.  Biopsy of the anal mass showed invasive moderately differentiated adenocarcinoma with normal mismatch repair protein expression.  Specimen consisted of multiple fragments of necrotic tissue with dysplastic appearing glands with foci of invasion.  Surface mucosa is not present and therefore precludes differentiation between colorectal carcinoma or perianal glandular carcinoma.  Depth of invasion cannot be assessed.  KRAS wild-type.  3.  4/16/2019: CT chest/abdomen/pelvis with contrast showed a 1.3 cm left upper lobe lung nodule concerning for metastatic disease as well as 2 tiny nodules in the right lower lobe near the diaphragm.  Diffusely aneurysmal abdominal aorta measures up to 3.2 cm.  Prominent bilateral inguinal adenopathy with a lymph node in the upper right thigh adjacent to the common femoral vessels measures 2.9 x 2.3 cm.  Lymph node in the left medial upper thigh adjacent to the common femoral vessels measures 2.6 x 3.5 cm.  No evidence of obstruction.  Bony structures showed no destruction.  4.  4/19/2019: Consultation with Dr. Milelr Casas.  Due to high suspicion of metastatic disease to the lung and bilateral inguinal regions, patient referred to medical oncology and radiation oncology.  5.  4/24/2019: PET/CT scan showed hypermetabolic posterior left upper lobe mid chest nodule adjacent to the major fissure measuring 1.3 x 1.2 cm and emphysematous changes.  Bilateral renal cysts present.  Nonobstructing stone of the lower right kidney measures 0.3 cm.  Nodular thickening of the left adrenal is 0.7 cm.  Intense hypermetabolism at the anal level with heterogeneous appearing soft tissue  with SUV max 19.5.  Enlarged inguinal hypermetabolic lymph nodes on the right measuring 2.9 x 2.1 cm and on the left 3.6 x 2.4 cm.  A distal infrarenal abdominal aorta measures 3.4 cm.  6. 5/6/2019: Left lung mass biopsy performed and showed discordant immunohistochemical findings.  Lesion showed a strong reactivity for cytokeratin 7 and focal staining for P40.  Patient's anal adenocarcinoma reportedly positive for both immunohistochemical staining pattern not typical for primary lung tumor and metastatic disease cannot be excluded.  7.  5/9/2019-6/25/2019: Completed radiation therapy to the anal cancer and inguinal lymph nodes.  She received concurrent radiosensitizing chemotherapy with capecitabine.  8. 8/28/2019: PET/CT scan showed hypermetabolic nodule in the left upper lobe measuring 1.5 x 1.4 cm with SUV max 10.5, increased in size and metabolic activity from 1.4 x 1.2 cm with SUV max 9.5 on 4/24/2019 PET/CT scan.  A 0.5 cm pleural-based nodule with low metabolic activity in the left lower lobe is unchanged.  A 3.4 cm infrarenal abdominal aortic aneurysm is present.  The previously noted hypermetabolic anal mass has significantly improved with SUV max 5.1, decreased from SUV max 24.6, consistent with response to treatment.  The bilateral inguinal lymphadenopathy has markedly improved on the current study with nodes measuring up to 1.6 x 1.1 cm with SUV max 3.5, decreased from 4 x 2.6 cm with SUV max 14, consistent with response to treatment.  9.  10/21/2019: Completed stereotactic radiation to the left upper lobe lung nodule/metastasis in 5 fractions under the care of Dr. Clay Monroy.  10. 12/16/2019: PET scan showed new hypermetabolic medial right middle lobe pulmonary nodule; left upper lobe nodule decreased in size and metabolism; improvement of bilateral inguinal adenopathy with decreased sizes and metabolism; hypermetabolism slightly increased at the left adrenal nodule but stable in size; decreased  hypermetabolism at anal level; new small focal hypermetabolism within muscle anterior to left hip joint with no mass effect.  11. 2/07/2020: Completed stereotactic radiation to the right middle lobe nodule and left adrenal nodule in 5 fractions.  12.  4/6/2020: PET/CT scan showed a 0.8 cm nodular density near the right lung apex with no significant hypermetabolism, unchanged from the prior exam.  The previously seen hypermetabolic right middle lobe pulmonary nodule is not seen on the current exam.  Small hypermetabolic left adrenal nodule measuring 1 cm with SUV max 3.3 is unchanged.  Infrarenal abdominal aortic aneurysm also not significantly changed and measures 3.3 x 3.6 cm containing a mild amount of mural thrombus.  The small hypermetabolic focus within the muscle anterior to the left hip joint is unchanged with SUV max 3.6.  13. 7/13/2020: PET/CT scan showed interval decrease in size of the right upper lobe lung nodule with no associated FDG activity; posttreatment changes in the lateral left upper lung; no new lung lesions; hypermetabolic left suprahilar area with no associated CT abnormality, possibly vascular in nature; no adenopathy; stable left adrenal nodule; indeterminate FDG activity in region of anal canal.   14. 10/26/2020: PET/CT scan showed FDG avid lymph nodes in left hilar region (measuring 1.1 cm with SUV 6.3; anterior to this is another 1.4 x 0.7 cm with SUV 8.9) concerning for progressive metastatic disease; remaining scan negative.  15. 1/25/2021: PET scan showed signs concerning for progressive metastatic disease, with increased FDG uptake within a left hilar lymph node, several new FDG-avid mediastinal lymph nodes; unchanged FDG-avid 10 mm left adrenal nodule; no new or enlarging lung nodules; unchanged 3.2 cm infrarenal AAA.    INTERIM HISTORY:  Trixie reports intermittent hematochezia that is occasionally clotty but not necessarily higher in volume. She denies rectal pain or diarrhea.  She  denies any recent chest pain, cough, significant shortness of breath or recent fevers or chills.       REVIEW OF SYSTEMS:   14 point ROS was reviewed and is negative other than as noted above in HPI.       HOME MEDICATIONS:  Current Outpatient Medications   Medication Sig Dispense Refill     acetaminophen (TYLENOL) 325 MG tablet Take 2 tablets (650 mg) by mouth every 4 hours as needed for mild pain or fever       amLODIPine (NORVASC) 5 MG tablet Take 1 tablet (5 mg) by mouth daily 90 tablet 0     metoprolol succinate ER (TOPROL-XL) 25 MG 24 hr tablet Take 1 tablet (25 mg) by mouth daily 90 tablet 0     ondansetron (ZOFRAN) 8 MG tablet Take 8 mg by mouth every 8 hours as needed for nausea       SUMAtriptan (IMITREX) 50 MG tablet Take 1 tablet (50 mg) by mouth at onset of headache for migraine 18 tablet 1         ALLERGIES:  Allergies   Allergen Reactions     Ace Inhibitors Cough     Aleve [Naproxen Sodium] GI Disturbance     Asa Buff (Mag [Aspirin Buffered] GI Disturbance     Internal bleeding     Atorvastatin Calcium GI Disturbance     Celebrex [Celecoxib] GI Disturbance     Codeine Sulfate Hives     Pravastatin GI Disturbance     Simvastatin GI Disturbance         PAST MEDICAL HISTORY:  Past Medical History:   Diagnosis Date     Benign essential hypertension 8/30/2017     Bronchospasm      COPD (chronic obstructive pulmonary disease) (H) 5/28/2014     Hypertension      Migraine      Neck pain      Nephrolithiasis          PAST SURGICAL HISTORY:  Past Surgical History:   Procedure Laterality Date     COLONOSCOPY N/A 4/9/2019    Procedure: COMBINED COLONOSCOPY, SINGLE OR MULTIPLE BIOPSY/POLYPECTOMY BY BIOPSY;  Surgeon: Saleem Shaw MD;  Location:  GI     LITHOTRIPSY      Lithotrypsy     ORTHOPEDIC SURGERY      right knee exploration     TUBAL LIGATION           SOCIAL HISTORY:  Social History     Socioeconomic History     Marital status:      Spouse name: Not on file     Number of children: Not on file      Years of education: Not on file     Highest education level: Not on file   Occupational History     Not on file   Social Needs     Financial resource strain: Not on file     Food insecurity     Worry: Not on file     Inability: Not on file     Transportation needs     Medical: Not on file     Non-medical: Not on file   Tobacco Use     Smoking status: Former Smoker     Packs/day: 0.50     Types: Cigarettes     Start date: 2016     Quit date: 2016     Years since quittin.7     Smokeless tobacco: Never Used   Substance and Sexual Activity     Alcohol use: Yes     Alcohol/week: 0.0 standard drinks     Comment: socially only- one per month     Drug use: No     Sexual activity: Yes     Partners: Male     Birth control/protection: Post-menopausal   Lifestyle     Physical activity     Days per week: Not on file     Minutes per session: Not on file     Stress: Not on file   Relationships     Social connections     Talks on phone: Not on file     Gets together: Not on file     Attends Congregational service: Not on file     Active member of club or organization: Not on file     Attends meetings of clubs or organizations: Not on file     Relationship status: Not on file     Intimate partner violence     Fear of current or ex partner: Not on file     Emotionally abused: Not on file     Physically abused: Not on file     Forced sexual activity: Not on file   Other Topics Concern     Parent/sibling w/ CABG, MI or angioplasty before 65F 55M? Not Asked   Social History Narrative     Not on file         FAMILY HISTORY:  Family History   Problem Relation Age of Onset     Cancer Mother 76        uterine     Cerebrovascular Disease Father 75     C.A.D. Brother 66         PHYSICAL EXAM:  Vital signs:  Not taken.  ECO  GENERAL: No acute distress.  EYES: No scleral icterus. No overt erythema.  RESPIRATORY: No cough.  No labored breathing.  MUSCULOSKELETAL: Range of motion in the neck, shoulders, and arms appear  normal.  SKIN: No overt rashes, discolorations, or lesions over the face and neck.  NEUROLOGIC: Alert.  No overt tremors.  PSYCHIATRIC: Normal affect and mood.  Does not appear anxious.    The rest of a comprehensive physical examination is deferred due to PHE (public health emergency) video visit restrictions.    LABS:  CBC RESULTS:   Recent Labs   Lab Test 01/25/21  1240   WBC 4.2   RBC 3.53*   HGB 9.4*   HCT 31.1*   MCV 88   MCH 26.6   MCHC 30.2*   RDW 14.7        Last Comprehensive Metabolic Panel:  Sodium   Date Value Ref Range Status   01/25/2021 139 133 - 144 mmol/L Final     Potassium   Date Value Ref Range Status   01/25/2021 3.9 3.4 - 5.3 mmol/L Final     Chloride   Date Value Ref Range Status   01/25/2021 110 (H) 94 - 109 mmol/L Final     Carbon Dioxide   Date Value Ref Range Status   01/25/2021 25 20 - 32 mmol/L Final     Anion Gap   Date Value Ref Range Status   01/25/2021 4 3 - 14 mmol/L Final     Glucose   Date Value Ref Range Status   01/25/2021 87 70 - 99 mg/dL Final     Urea Nitrogen   Date Value Ref Range Status   01/25/2021 17 7 - 30 mg/dL Final     Creatinine   Date Value Ref Range Status   01/25/2021 0.80 0.52 - 1.04 mg/dL Final     GFR Estimate   Date Value Ref Range Status   01/25/2021 70 >60 mL/min/[1.73_m2] Final     Comment:     Non  GFR Calc  Starting 12/18/2018, serum creatinine based estimated GFR (eGFR) will be   calculated using the Chronic Kidney Disease Epidemiology Collaboration   (CKD-EPI) equation.       Calcium   Date Value Ref Range Status   01/25/2021 9.2 8.5 - 10.1 mg/dL Final     Bilirubin Total   Date Value Ref Range Status   01/25/2021 0.2 0.2 - 1.3 mg/dL Final     Alkaline Phosphatase   Date Value Ref Range Status   01/25/2021 75 40 - 150 U/L Final     ALT   Date Value Ref Range Status   01/25/2021 17 0 - 50 U/L Final     AST   Date Value Ref Range Status   01/25/2021 14 0 - 45 U/L Final         PATHOLOGY:  None new since prior  visit.    IMAGIN2021 PET scan showed:  1. Findings concerning for progression of metastatic disease.  Increased FDG uptake within a left hilar lymph node, since the PET/CT  on 10/26/2020. Several additional mediastinal lymph nodes also  demonstrate new or increased FDG uptake.  2. Unchanged FDG avid 10 mm left adrenal nodule.  3. Stable postradiation changes in the left upper lobe. No new or  enlarging pulmonary nodules are identified.  4. Unchanged 3.2 cm infrarenal abdominal aortic aneurysm.    ASSESSMENT/PLAN:  Sara Lehman is a 80 year old female with the following issues:  1.  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  Left lung and right middle lobe lung metastases s/p radiation  3. Left adrenal nodule, stable  4. Left hilar and mediastinal hypermetabolic lymphadenopathy, worse  -Sara completed chemoradiation with capecitabine radiosensitizer on 2019, stereotactic radiation to the left upper lobe lung metastasis 10/21/2019, and stereotactic radiation to the right middle lobe lung metastasis and left adrenal nodule 2020.  -I personally reviewed the PET/CT scan from 2021 and discussed the results with Sara.  She has slightly worse hypermetabolic left hilar lymphadenopathy and new mediastinal lymphadenopathy concerning for progressive metastatic disease. However, the lymph nodes are still small and not causing her symptoms at this time.  -I offered Trixie continued observation or try systemic therapy.   -Prior testing for KRAS mutation showed wild-type.  May consider adding either bevacizumab (or an epidermal growth factor receptor inhibitor such as panitumumab) to 5-fluorouracil for systemic therapy.  However, would be cautious with side effects of systemic therapy. I discussed the potential adverse effects of each of these drugs.    -Trixie, with her family members present, are contemplating whether to proceed  with chemotherapy. I gave option of observing for 2 months then repeating CT chest.  In meantime, I recommended working up her anemia with additional labs.  -Ultimately Trixie expressed understanding and agreement with the plan to continue on observation for the next 2 months .    5. Intermittent hematochezia  6. Anemia, likely related to blood loss  -May be related to prior chemoradiation but cannot rule out locally recurrent anal cancer.  She is now anemic.  I cautioned her that if she sees an increase in frequency (such as more continuous) and higher volume of bleeding, I would recommend she see Dr. Casas for a repeat scope.  I advised she go to the nearest ED or call 911 if she does experience any truly significant larger clots or higher volume bleeding.  -Will repeat her Hgb and check iron studies and B12 next week.    Return in 2 months.    Rhona Wisdom MD  Hematology/Oncology  [facility]    Trixie is a 80 year old who is being evaluated via a billable video visit.      How would you like to obtain your AVS? MyChart  If the video visit is dropped, the invitation should be resent by: Send to e-mail at: [email or web address]  Will anyone else be joining your video visit? Yes: daughter. How would they like to receive their invitation? Send to e-mail at: [email or web address]  {If patient encounters technical issues they should call [phone]}    Video Start Time: Video-Visit Details    Type of service:  Video Visit    Video End Time:     Originating Location (pt. Location): Home    Distant Location (provider location):  [facility]     Platform used for Video Visit: Mario Grant CMA          Again, thank you for allowing me to participate in the care of your patient.        Sincerely,        Rhona Wisdom MD

## 2021-01-28 ENCOUNTER — IMMUNIZATION (OUTPATIENT)
Dept: NURSING | Facility: CLINIC | Age: 81
End: 2021-01-28
Payer: COMMERCIAL

## 2021-01-28 PROCEDURE — 0001A PR COVID VAC PFIZER DIL RECON 30 MCG/0.3 ML IM: CPT

## 2021-01-28 PROCEDURE — 91300 PR COVID VAC PFIZER DIL RECON 30 MCG/0.3 ML IM: CPT

## 2021-02-01 ENCOUNTER — TELEPHONE (OUTPATIENT)
Dept: ONCOLOGY | Facility: CLINIC | Age: 81
End: 2021-02-01

## 2021-02-03 ENCOUNTER — HOSPITAL ENCOUNTER (OUTPATIENT)
Facility: CLINIC | Age: 81
Setting detail: SPECIMEN
Discharge: HOME | End: 2021-02-03
Attending: INTERNAL MEDICINE | Admitting: INTERNAL MEDICINE
Payer: COMMERCIAL

## 2021-02-03 ENCOUNTER — OFFICE VISIT (OUTPATIENT)
Dept: INFUSION THERAPY | Facility: CLINIC | Age: 81
End: 2021-02-03
Attending: INTERNAL MEDICINE
Payer: COMMERCIAL

## 2021-02-03 DIAGNOSIS — D63.0 ANEMIA IN NEOPLASTIC DISEASE: ICD-10-CM

## 2021-02-03 DIAGNOSIS — C21.1 MALIGNANT NEOPLASM OF ANAL CANAL (H): ICD-10-CM

## 2021-02-03 LAB
BASOPHILS # BLD AUTO: 0 10E9/L (ref 0–0.2)
BASOPHILS NFR BLD AUTO: 0.4 %
DIFFERENTIAL METHOD BLD: ABNORMAL
EOSINOPHIL # BLD AUTO: 0.1 10E9/L (ref 0–0.7)
EOSINOPHIL NFR BLD AUTO: 2 %
ERYTHROCYTE [DISTWIDTH] IN BLOOD BY AUTOMATED COUNT: 15.1 % (ref 10–15)
FERRITIN SERPL-MCNC: 14 NG/ML (ref 8–252)
HCT VFR BLD AUTO: 32.8 % (ref 35–47)
HGB BLD-MCNC: 9.9 G/DL (ref 11.7–15.7)
IMM GRANULOCYTES # BLD: 0 10E9/L (ref 0–0.4)
IMM GRANULOCYTES NFR BLD: 0.4 %
IRON SATN MFR SERPL: 6 % (ref 15–46)
IRON SERPL-MCNC: 24 UG/DL (ref 35–180)
LYMPHOCYTES # BLD AUTO: 0.8 10E9/L (ref 0.8–5.3)
LYMPHOCYTES NFR BLD AUTO: 17.4 %
MCH RBC QN AUTO: 26.3 PG (ref 26.5–33)
MCHC RBC AUTO-ENTMCNC: 30.2 G/DL (ref 31.5–36.5)
MCV RBC AUTO: 87 FL (ref 78–100)
MONOCYTES # BLD AUTO: 0.7 10E9/L (ref 0–1.3)
MONOCYTES NFR BLD AUTO: 14.8 %
NEUTROPHILS # BLD AUTO: 3 10E9/L (ref 1.6–8.3)
NEUTROPHILS NFR BLD AUTO: 65 %
NRBC # BLD AUTO: 0 10*3/UL
NRBC BLD AUTO-RTO: 0 /100
PLATELET # BLD AUTO: 248 10E9/L (ref 150–450)
RBC # BLD AUTO: 3.77 10E12/L (ref 3.8–5.2)
TIBC SERPL-MCNC: 419 UG/DL (ref 240–430)
VIT B12 SERPL-MCNC: 338 PG/ML (ref 193–986)
WBC # BLD AUTO: 4.6 10E9/L (ref 4–11)

## 2021-02-03 PROCEDURE — 82728 ASSAY OF FERRITIN: CPT | Performed by: INTERNAL MEDICINE

## 2021-02-03 PROCEDURE — 83550 IRON BINDING TEST: CPT | Performed by: INTERNAL MEDICINE

## 2021-02-03 PROCEDURE — 83540 ASSAY OF IRON: CPT | Performed by: INTERNAL MEDICINE

## 2021-02-03 PROCEDURE — 82607 VITAMIN B-12: CPT | Performed by: INTERNAL MEDICINE

## 2021-02-03 PROCEDURE — 85025 COMPLETE CBC W/AUTO DIFF WBC: CPT | Performed by: INTERNAL MEDICINE

## 2021-02-04 ENCOUNTER — TELEPHONE (OUTPATIENT)
Dept: ONCOLOGY | Facility: CLINIC | Age: 81
End: 2021-02-04

## 2021-02-04 DIAGNOSIS — D50.8 OTHER IRON DEFICIENCY ANEMIA: Primary | ICD-10-CM

## 2021-02-04 RX ORDER — FERROUS SULFATE 325(65) MG
325 TABLET ORAL
Qty: 90 TABLET | Refills: 3 | Status: SHIPPED | OUTPATIENT
Start: 2021-02-04

## 2021-02-04 NOTE — TELEPHONE ENCOUNTER
Could you please inform Trixie that although her Hgb is stable it is still low (9.9) and that her iron tests show iron deficiency? I would recommend she start on ferrous sulfate 1 tab PO daily.  I will put through iron supplement prescription.     Thank you!   Rhona     Called Trixie, she is aware per Dr. Wisdom to start Ferrous sulfate . She will  script at [facility] pharmacy in [place]. Informed her to take with vitamin C and to take stool softeners if needed with the Ferrous sulfate. Carine Norton RN,BSN,OCN

## 2021-02-08 NOTE — TELEPHONE ENCOUNTER
Pt's daughter Riya Rodgers called requesting lab results. Writer advised on results and also Dr. Wisdom's recommendations (patient did not recall conversation with JONO Hawk). Riya will  Ferrous Sulfate today. No further questions at this time.     Page Peraza, DOMINICKN, RN, PHN, OCN  Oncology Care Coordinator  [facility]

## 2021-02-11 ENCOUNTER — OFFICE VISIT (OUTPATIENT)
Dept: INTERNAL MEDICINE | Facility: CLINIC | Age: 81
End: 2021-02-11
Payer: COMMERCIAL

## 2021-02-11 VITALS
DIASTOLIC BLOOD PRESSURE: 72 MMHG | OXYGEN SATURATION: 98 % | WEIGHT: 115 LBS | HEIGHT: 60 IN | TEMPERATURE: 97.1 F | BODY MASS INDEX: 22.58 KG/M2 | SYSTOLIC BLOOD PRESSURE: 122 MMHG | HEART RATE: 50 BPM | RESPIRATION RATE: 20 BRPM

## 2021-02-11 DIAGNOSIS — G43.909 MIGRAINE WITHOUT STATUS MIGRAINOSUS, NOT INTRACTABLE, UNSPECIFIED MIGRAINE TYPE: ICD-10-CM

## 2021-02-11 DIAGNOSIS — I10 BENIGN ESSENTIAL HYPERTENSION: ICD-10-CM

## 2021-02-11 DIAGNOSIS — Z00.00 MEDICARE ANNUAL WELLNESS VISIT, SUBSEQUENT: Primary | ICD-10-CM

## 2021-02-11 PROBLEM — N39.0 UTI (URINARY TRACT INFECTION): Status: RESOLVED | Noted: 2019-05-04 | Resolved: 2021-02-11

## 2021-02-11 PROCEDURE — 99214 OFFICE O/P EST MOD 30 MIN: CPT | Mod: 25 | Performed by: INTERNAL MEDICINE

## 2021-02-11 PROCEDURE — G0438 PPPS, INITIAL VISIT: HCPCS | Performed by: INTERNAL MEDICINE

## 2021-02-11 RX ORDER — AMLODIPINE BESYLATE 5 MG/1
5 TABLET ORAL DAILY
Qty: 90 TABLET | Refills: 3 | Status: SHIPPED | OUTPATIENT
Start: 2021-02-11 | End: 2022-01-01

## 2021-02-11 RX ORDER — METOPROLOL SUCCINATE 25 MG/1
12.5 TABLET, EXTENDED RELEASE ORAL DAILY
Qty: 45 TABLET | Refills: 3 | Status: SHIPPED | OUTPATIENT
Start: 2021-02-11 | End: 2022-01-01

## 2021-02-11 SDOH — ECONOMIC STABILITY: TRANSPORTATION INSECURITY
IN THE PAST 12 MONTHS, HAS LACK OF TRANSPORTATION KEPT YOU FROM MEETINGS, WORK, OR FROM GETTING THINGS NEEDED FOR DAILY LIVING?: NOT ASKED

## 2021-02-11 SDOH — ECONOMIC STABILITY: FOOD INSECURITY: WITHIN THE PAST 12 MONTHS, YOU WORRIED THAT YOUR FOOD WOULD RUN OUT BEFORE YOU GOT MONEY TO BUY MORE.: NOT ASKED

## 2021-02-11 SDOH — ECONOMIC STABILITY: TRANSPORTATION INSECURITY
IN THE PAST 12 MONTHS, HAS THE LACK OF TRANSPORTATION KEPT YOU FROM MEDICAL APPOINTMENTS OR FROM GETTING MEDICATIONS?: NOT ASKED

## 2021-02-11 SDOH — ECONOMIC STABILITY: INCOME INSECURITY: HOW HARD IS IT FOR YOU TO PAY FOR THE VERY BASICS LIKE FOOD, HOUSING, MEDICAL CARE, AND HEATING?: NOT ASKED

## 2021-02-11 SDOH — HEALTH STABILITY: MENTAL HEALTH: HOW OFTEN DO YOU HAVE 6 OR MORE DRINKS ON ONE OCCASION?: NEVER

## 2021-02-11 SDOH — ECONOMIC STABILITY: FOOD INSECURITY: WITHIN THE PAST 12 MONTHS, THE FOOD YOU BOUGHT JUST DIDN'T LAST AND YOU DIDN'T HAVE MONEY TO GET MORE.: NOT ASKED

## 2021-02-11 SDOH — HEALTH STABILITY: MENTAL HEALTH: HOW OFTEN DO YOU HAVE A DRINK CONTAINING ALCOHOL?: MONTHLY OR LESS

## 2021-02-11 SDOH — HEALTH STABILITY: MENTAL HEALTH: HOW MANY STANDARD DRINKS CONTAINING ALCOHOL DO YOU HAVE ON A TYPICAL DAY?: 1 OR 2

## 2021-02-11 ASSESSMENT — MIFFLIN-ST. JEOR: SCORE: 918.14

## 2021-02-11 NOTE — PATIENT INSTRUCTIONS
Refills of both blood pressure medications in.    TRIAL of Toprol XL HALF pill (12.5mg) daily.    ---

## 2021-02-11 NOTE — PROGRESS NOTES
ASSESSMENT/PLAN                                                       (Z00.00) Medicare annual wellness visit, subsequent  (primary encounter diagnosis)  Comment: PMH, PSH, FH, SH, medications, allergies, immunizations, and preventative health measures reviewed and updated as appropriate.  Plan: see below for plans.      (I10) Benign essential hypertension  (G43.909) Migraine without status migrainosus, not intractable, unspecified migraine type  Comment: well-controlled on current regimen but heart rate too low.   Plan: CONTINUE amlodipine 5mg daily; DECREASE Toprol XL from 25 to 12.5mg daily.    Michelle Forrester MD   [place] [address] [address] [address]  T: [phone], F: [phone]    SUBJECTIVE                                                      Sara Lehman is a very pleasant 80 year old female who presents for her AWV:    Accompanied by daughter.     PMH, PSH, FH, SH, medications, allergies, immunizations, preventative health, and health risk assessment reviewed.     Past Medical History:   Diagnosis Date     Benign essential hypertension      Malignant neoplasm of anal canal (H)     metastatic; s/p chemoradiation and stereotactic radiation of lung mets     Migraines      Past Surgical History:   Procedure Laterality Date     ARTHROSCOPY KNEE Right      COLONOSCOPY N/A 04/09/2019    Procedure: COMBINED COLONOSCOPY, SINGLE OR MULTIPLE BIOPSY/POLYPECTOMY BY BIOPSY;  Surgeon: Saleem Shaw MD;  Location:  GI     EXTRACORPOREAL SHOCK WAVE LITHOTRIPSY (ESWL)       TUBAL LIGATION       Family History   Problem Relation Age of Onset     Uterine Cancer Mother      Hypertension Maternal Grandmother      Breast Cancer Daughter      Diabetes Type 2  No family hx of      Myocardial Infarction No family hx of      Cerebrovascular Disease No family hx of      Ovarian Cancer No family hx of      Colon Cancer No family hx of      Social History     Occupational  History     Occupation: Retired - homemaker, temp work   Tobacco Use     Smoking status: Former Smoker     Packs/day: 2.00     Years: 40.00     Pack years: 80.00     Types: Cigarettes     Quit date: 2016     Years since quittin.1     Smokeless tobacco: Never Used   Substance and Sexual Activity     Alcohol use: Yes     Frequency: Monthly or less     Drinks per session: 1 or 2     Binge frequency: Never     Drug use: No     Sexual activity: Yes     Partners: Male     Birth control/protection: Post-menopausal   Social History Narrative    Maried.    Lives at home with  - fully independent.     4 adult children.    8 grandchildren.    Active, walks when able.      Allergies   Allergen Reactions     Ace Inhibitors Cough     Aleve [Naproxen Sodium] GI Disturbance     Asa Buff (Mag [Aspirin Buffered] GI Disturbance     Atorvastatin Calcium GI Disturbance     Celebrex [Celecoxib] GI Disturbance     Codeine Sulfate Hives     Pravastatin GI Disturbance     Simvastatin GI Disturbance     Current Outpatient Medications   Medication Sig     acetaminophen (TYLENOL) 325 MG tablet Take 2 tablets (650 mg) by mouth every 4 hours as needed for mild pain or fever     amLODIPine (NORVASC) 5 MG tablet Take 1 tablet (5 mg) by mouth daily     ferrous sulfate (FEROSUL) 325 (65 Fe) MG tablet Take 1 tablet (325 mg) by mouth daily (with breakfast) (do not take with calcium or dairy products)     metoprolol succinate ER (TOPROL-XL) 25 MG 24 hr tablet Take 0.5 tablets (12.5 mg) by mouth daily     ondansetron (ZOFRAN) 8 MG tablet Take 8 mg by mouth every 8 hours as needed for nausea     SUMAtriptan (IMITREX) 50 MG tablet Take 1 tablet (50 mg) by mouth at onset of headache for migraine     Immunization History   Administered Date(s) Administered     COVID-19,PF,Pfizer 2021     Comvax (HIB/HepB) 2013     Influenza (High Dose) 3 valent vaccine 10/01/2016, 2016, 2018     Influenza (IIV3) PF 10/24/2010,  "11/10/2011, 10/01/2012, 11/12/2013, 10/11/2014     Pneumo Conj 13-V (2010&after) 11/08/2016     Pneumococcal 23 valent 12/02/2008     TD (ADULT, 7+) 12/02/2008     PREVENTATIVE HEALTH                                                      BMI: within normal limits   Blood pressure: well-controlled on current regimen   Breast CA screening: screening no longer indicated  Colon CA screening: screening no longer indicated  Lung CA screening: screening no longer indicated  Dexa: DUE  Screening HCV: DUE  Screening HIV: n/a   Screening cholesterol: screening no longer indicated   Screening diabetes: up to date   Alcohol misuse screening: alcohol use reviewed - no intervention indicated at this time  Immunizations: reviewed; flu shot DUE - patient declines, Shingrix series DUE - not covered by Medicare (may obtain in pharmacy if desired)  and tetanus booster DUE - not covered by Medicare (may obtain in pharmacy if desired)     HEALTH RISK ASSESSMENT                                                      In general, how would you rate your overall physical health? very good  Outside of work, how many days during the week do you exercise? 2-3 days/week  Outside of work, approximately how many minutes a day do you exercise? 15-30 minutes    If you drink alcohol do you typically have >3 drinks per day or >7 drinks per week? No  Do you usually eat at least 4 servings of fruit and vegetables a day, include whole grains & fiber and avoid regularly eating high fat or \"junk\" foods? Yes     Do you have any problems taking medications regularly? No  Do you have any side effects from medications? No    Assistance with daily activities: No    Safety concerns: No    Hearing concerns: No    In the past 6 months, have you been bothered by leaking of urine: Yes    In general, how would you rate your overall mental or emotional health: good    Additional concerns today: No    OBJECTIVE                                                      BP " 122/72 (BP Location: Left arm, Patient Position: Chair, Cuff Size: Adult Regular)   Pulse 50   Temp 97.1  F (36.2  C) (Temporal)   Resp 20   Ht 1.524 m (5')   Wt 52.2 kg (115 lb)   LMP  (LMP Unknown)   SpO2 98%   BMI 22.46 kg/m    Constitutional: well-appearing  Head, Ears, and Eyes: normocephalic; normal external auditory canal and pinna; tympanic membranes visualized and normal; normal lids and conjunctivae  Neck: supple, symmetric, no thyromegaly or lymphadenopathy  Respiratory: normal respiratory effort; clear to auscultation bilaterally  Cardiovascular: regular rate and rhythm; no edema  Gastrointestinal: soft, non-tender, non-distended; no organomegaly or masses   Musculoskeletal: normal gait and station  Psych: normal mood and affect    Cognitive impairment noted: No  ---  (Note was completed, in part, with B-152 voice-recognition software. Documentation was reviewed, but some grammatical, spelling, and word errors may remain.)

## 2021-02-18 ENCOUNTER — IMMUNIZATION (OUTPATIENT)
Dept: NURSING | Facility: CLINIC | Age: 81
End: 2021-02-18
Attending: INTERNAL MEDICINE
Payer: COMMERCIAL

## 2021-02-18 PROCEDURE — 0002A PR COVID VAC PFIZER DIL RECON 30 MCG/0.3 ML IM: CPT

## 2021-02-18 PROCEDURE — 91300 PR COVID VAC PFIZER DIL RECON 30 MCG/0.3 ML IM: CPT

## 2021-03-17 ENCOUNTER — TELEPHONE (OUTPATIENT)
Dept: ONCOLOGY | Facility: CLINIC | Age: 81
End: 2021-03-17

## 2021-03-17 NOTE — TELEPHONE ENCOUNTER
Called patient's daughter Riya back she stated based on her mother's CT coming up 3/29/21 she stated she may have some specific questions for Dr. Wisdom prior to her mother's visit on 4/15/21. Stated to Riya that it would be best to send Dr. Wisdom a Mychart message that Dr. Wisdom can respond to prior to the appointment. Riya stated that is a good option for her and her mother. Carine Norton RN,BSN,OCN

## 2021-03-17 NOTE — TELEPHONE ENCOUNTER
TARAS left on RN line from daughter Riya Rodgers With questions about upcoming CT scan and decision on treatment or not.   Daughter , Riya requesting a call back from Dr. Wisdom.

## 2021-03-29 ENCOUNTER — HOSPITAL ENCOUNTER (OUTPATIENT)
Dept: LAB | Facility: CLINIC | Age: 81
End: 2021-03-29
Attending: INTERNAL MEDICINE
Payer: COMMERCIAL

## 2021-03-29 ENCOUNTER — HOSPITAL ENCOUNTER (OUTPATIENT)
Dept: CT IMAGING | Facility: CLINIC | Age: 81
End: 2021-03-29
Attending: INTERNAL MEDICINE
Payer: COMMERCIAL

## 2021-03-29 DIAGNOSIS — C78.02 MALIGNANT NEOPLASM METASTATIC TO LEFT LUNG (H): ICD-10-CM

## 2021-03-29 DIAGNOSIS — C79.72 MALIGNANT NEOPLASM METASTATIC TO LEFT ADRENAL GLAND (H): ICD-10-CM

## 2021-03-29 DIAGNOSIS — R59.1 LYMPHADENOPATHY: ICD-10-CM

## 2021-03-29 DIAGNOSIS — C21.1 MALIGNANT NEOPLASM OF ANAL CANAL (H): ICD-10-CM

## 2021-03-29 DIAGNOSIS — D63.0 ANEMIA IN NEOPLASTIC DISEASE: ICD-10-CM

## 2021-03-29 DIAGNOSIS — C78.01 MALIGNANT NEOPLASM METASTATIC TO RIGHT LUNG (H): ICD-10-CM

## 2021-03-29 LAB
ALBUMIN SERPL-MCNC: 3.5 G/DL (ref 3.4–5)
ALP SERPL-CCNC: 73 U/L (ref 40–150)
ALT SERPL W P-5'-P-CCNC: 21 U/L (ref 0–50)
ANION GAP SERPL CALCULATED.3IONS-SCNC: 4 MMOL/L (ref 3–14)
AST SERPL W P-5'-P-CCNC: 18 U/L (ref 0–45)
BASOPHILS # BLD AUTO: 0 10E9/L (ref 0–0.2)
BASOPHILS NFR BLD AUTO: 0.6 %
BILIRUB SERPL-MCNC: 0.2 MG/DL (ref 0.2–1.3)
BUN SERPL-MCNC: 18 MG/DL (ref 7–30)
CALCIUM SERPL-MCNC: 9.3 MG/DL (ref 8.5–10.1)
CHLORIDE SERPL-SCNC: 109 MMOL/L (ref 94–109)
CO2 SERPL-SCNC: 27 MMOL/L (ref 20–32)
CREAT BLD-MCNC: 0.7 MG/DL (ref 0.52–1.04)
CREAT SERPL-MCNC: 0.82 MG/DL (ref 0.52–1.04)
DIFFERENTIAL METHOD BLD: ABNORMAL
EOSINOPHIL # BLD AUTO: 0.1 10E9/L (ref 0–0.7)
EOSINOPHIL NFR BLD AUTO: 1.2 %
ERYTHROCYTE [DISTWIDTH] IN BLOOD BY AUTOMATED COUNT: 21.1 % (ref 10–15)
GFR SERPL CREATININE-BSD FRML MDRD: 68 ML/MIN/{1.73_M2}
GFR SERPL CREATININE-BSD FRML MDRD: 81 ML/MIN/{1.73_M2}
GLUCOSE SERPL-MCNC: 93 MG/DL (ref 70–99)
HCT VFR BLD AUTO: 40.5 % (ref 35–47)
HGB BLD-MCNC: 12.9 G/DL (ref 11.7–15.7)
IMM GRANULOCYTES # BLD: 0 10E9/L (ref 0–0.4)
IMM GRANULOCYTES NFR BLD: 0.4 %
LYMPHOCYTES # BLD AUTO: 1.2 10E9/L (ref 0.8–5.3)
LYMPHOCYTES NFR BLD AUTO: 24 %
MCH RBC QN AUTO: 29.4 PG (ref 26.5–33)
MCHC RBC AUTO-ENTMCNC: 31.9 G/DL (ref 31.5–36.5)
MCV RBC AUTO: 92 FL (ref 78–100)
MONOCYTES # BLD AUTO: 0.6 10E9/L (ref 0–1.3)
MONOCYTES NFR BLD AUTO: 11.9 %
NEUTROPHILS # BLD AUTO: 3 10E9/L (ref 1.6–8.3)
NEUTROPHILS NFR BLD AUTO: 61.9 %
NRBC # BLD AUTO: 0 10*3/UL
NRBC BLD AUTO-RTO: 0 /100
PLATELET # BLD AUTO: 190 10E9/L (ref 150–450)
POTASSIUM SERPL-SCNC: 3.9 MMOL/L (ref 3.4–5.3)
PROT SERPL-MCNC: 7.1 G/DL (ref 6.8–8.8)
RBC # BLD AUTO: 4.39 10E12/L (ref 3.8–5.2)
SODIUM SERPL-SCNC: 140 MMOL/L (ref 133–144)
WBC # BLD AUTO: 4.9 10E9/L (ref 4–11)

## 2021-03-29 PROCEDURE — 80053 COMPREHEN METABOLIC PANEL: CPT | Performed by: INTERNAL MEDICINE

## 2021-03-29 PROCEDURE — 85025 COMPLETE CBC W/AUTO DIFF WBC: CPT | Performed by: INTERNAL MEDICINE

## 2021-03-29 PROCEDURE — 250N000009 HC RX 250: Performed by: INTERNAL MEDICINE

## 2021-03-29 PROCEDURE — 71260 CT THORAX DX C+: CPT

## 2021-03-29 PROCEDURE — 82565 ASSAY OF CREATININE: CPT | Mod: 91

## 2021-03-29 PROCEDURE — 250N000011 HC RX IP 250 OP 636: Performed by: INTERNAL MEDICINE

## 2021-03-29 RX ORDER — IOPAMIDOL 755 MG/ML
80 INJECTION, SOLUTION INTRAVASCULAR ONCE
Status: COMPLETED | OUTPATIENT
Start: 2021-03-29 | End: 2021-03-29

## 2021-03-29 RX ADMIN — IOPAMIDOL 80 ML: 755 INJECTION, SOLUTION INTRAVENOUS at 16:05

## 2021-03-29 RX ADMIN — SODIUM CHLORIDE 70 ML: 9 INJECTION, SOLUTION INTRAVENOUS at 16:09

## 2021-04-09 NOTE — PROGRESS NOTES
"[facility]    Hematology/Oncology Established Patient Follow-up Note      Today's Date: 4/15/21    Reason for Follow-up: Metastatic anal adenocarcinoma.    Sara Lehman is a 80 year old female who is being evaluated via a billable video visit.      The patient has been notified of following:     \"This video visit will be conducted via a call between you and your physician/provider. We have found that certain health care needs can be provided without the need for an in-person physical exam.  This service lets us provide the care you need with a video conversation.  If a prescription is necessary we can send it directly to your pharmacy.  If lab work is needed we can place an order for that and you can then stop by our lab to have the test done at a later time.    If during the course of the call the physician/provider feels a video visit is not appropriate, you will not be charged for this service.\"     Patient has given verbal consent for Video visit? Yes    Patient is in virtual lobby    Video-Visit Details    Type of service:  Video Visit    Originating Location (pt. Location): Home    Distant Location (provider location):  [facility]     Mode of Communication:  Video Conference via [employer]    Video visit duration: 7:37 minutes    HISTORY OF PRESENT ILLNESS: Sara Lehman is a 80 year old female who presents with the following oncologic history:  1.  4/1/2019: Initially presented to her primary care provider with complaints of hemorrhoids intermittently for 2 months with associated pain and itching as well as blood in the underwear at night and intermixed with stool, no melena.  No associated weight loss or night sweats.  Reports occasional constipation.  Physical exam by primary care provider showed an anal mass about 1 inch in size with perianal erythema and skin breakdown.  2.  4/9/2019: Colonoscopy showed a perianal mass in the left lateral quadrant, severe diverticulosis " in the sigmoid colon, descending colon, and ascending colon.  Biopsy of the anal mass showed invasive moderately differentiated adenocarcinoma with normal mismatch repair protein expression.  Specimen consisted of multiple fragments of necrotic tissue with dysplastic appearing glands with foci of invasion.  Surface mucosa is not present and therefore precludes differentiation between colorectal carcinoma or perianal glandular carcinoma.  Depth of invasion cannot be assessed.  KRAS wild-type.  3.  4/16/2019: CT chest/abdomen/pelvis with contrast showed a 1.3 cm left upper lobe lung nodule concerning for metastatic disease as well as 2 tiny nodules in the right lower lobe near the diaphragm.  Diffusely aneurysmal abdominal aorta measures up to 3.2 cm.  Prominent bilateral inguinal adenopathy with a lymph node in the upper right thigh adjacent to the common femoral vessels measures 2.9 x 2.3 cm.  Lymph node in the left medial upper thigh adjacent to the common femoral vessels measures 2.6 x 3.5 cm.  No evidence of obstruction.  Bony structures showed no destruction.  4.  4/19/2019: Consultation with Dr. Miller Casas.  Due to high suspicion of metastatic disease to the lung and bilateral inguinal regions, patient referred to medical oncology and radiation oncology.  5.  4/24/2019: PET/CT scan showed hypermetabolic posterior left upper lobe mid chest nodule adjacent to the major fissure measuring 1.3 x 1.2 cm and emphysematous changes.  Bilateral renal cysts present.  Nonobstructing stone of the lower right kidney measures 0.3 cm.  Nodular thickening of the left adrenal is 0.7 cm.  Intense hypermetabolism at the anal level with heterogeneous appearing soft tissue with SUV max 19.5.  Enlarged inguinal hypermetabolic lymph nodes on the right measuring 2.9 x 2.1 cm and on the left 3.6 x 2.4 cm.  A distal infrarenal abdominal aorta measures 3.4 cm.  6.  5/6/2019: Left lung mass biopsy performed and showed discordant  immunohistochemical findings.  Lesion showed a strong reactivity for cytokeratin 7 and focal staining for P40.  Patient's anal adenocarcinoma reportedly positive for both immunohistochemical staining pattern not typical for primary lung tumor and metastatic disease cannot be excluded.  7.  5/9/2019-6/25/2019: Completed radiation therapy to the anal cancer and inguinal lymph nodes.  She received concurrent radiosensitizing chemotherapy with capecitabine.  8.  8/28/2019: PET/CT scan showed hypermetabolic nodule in the left upper lobe measuring 1.5 x 1.4 cm with SUV max 10.5, increased in size and metabolic activity from 1.4 x 1.2 cm with SUV max 9.5 on 4/24/2019 PET/CT scan.  A 0.5 cm pleural-based nodule with low metabolic activity in the left lower lobe is unchanged.  A 3.4 cm infrarenal abdominal aortic aneurysm is present.  The previously noted hypermetabolic anal mass has significantly improved with SUV max 5.1, decreased from SUV max 24.6, consistent with response to treatment.  The bilateral inguinal lymphadenopathy has markedly improved on the current study with nodes measuring up to 1.6 x 1.1 cm with SUV max 3.5, decreased from 4 x 2.6 cm with SUV max 14, consistent with response to treatment.  9.  10/21/2019: Completed stereotactic radiation to the left upper lobe lung nodule/metastasis in 5 fractions under the care of Dr. Clay Monroy.  10. 12/16/2019: PET scan showed new hypermetabolic medial right middle lobe pulmonary nodule; left upper lobe nodule decreased in size and metabolism; improvement of bilateral inguinal adenopathy with decreased sizes and metabolism; hypermetabolism slightly increased at the left adrenal nodule but stable in size; decreased hypermetabolism at anal level; new small focal hypermetabolism within muscle anterior to left hip joint with no mass effect.  11. 2/07/2020: Completed stereotactic radiation to the right middle lobe nodule and left adrenal nodule in 5 fractions.  12.   4/6/2020: PET/CT scan showed a 0.8 cm nodular density near the right lung apex with no significant hypermetabolism, unchanged from the prior exam.  The previously seen hypermetabolic right middle lobe pulmonary nodule is not seen on the current exam.  Small hypermetabolic left adrenal nodule measuring 1 cm with SUV max 3.3 is unchanged.  Infrarenal abdominal aortic aneurysm also not significantly changed and measures 3.3 x 3.6 cm containing a mild amount of mural thrombus.  The small hypermetabolic focus within the muscle anterior to the left hip joint is unchanged with SUV max 3.6.  13. 7/13/2020: PET/CT scan showed interval decrease in size of the right upper lobe lung nodule with no associated FDG activity; posttreatment changes in the lateral left upper lung; no new lung lesions; hypermetabolic left suprahilar area with no associated CT abnormality, possibly vascular in nature; no adenopathy; stable left adrenal nodule; indeterminate FDG activity in region of anal canal.   14. 10/26/2020: PET/CT scan showed FDG avid lymph nodes in left hilar region (measuring 1.1 cm with SUV 6.3; anterior to this is another 1.4 x 0.7 cm with SUV 8.9) concerning for progressive metastatic disease; remaining scan negative.  15. 1/25/2021: PET scan showed signs concerning for progressive metastatic disease, with increased FDG uptake within a left hilar lymph node, several new FDG-avid mediastinal lymph nodes; unchanged FDG-avid 10 mm left adrenal nodule; no new or enlarging lung nodules; unchanged 3.2 cm infrarenal AAA.  16. 3/29/2021: CT chest showed stable mild precarinal (1 cm) and left hilar (8 mm) lymphadenopathy; no new or enlarging lymphadenopathy.    INTERIM HISTORY:  Trixie reports intermittent hematochezia that is scant. She denies rectal pain but has occasional infrequent diarrhea responsive to loperamide.  She denies any recent chest pain, cough, significant shortness of breath or recent fevers or chills.       REVIEW OF  SYSTEMS:   14 point ROS was reviewed and is negative other than as noted above in HPI.       HOME MEDICATIONS:  Current Outpatient Medications   Medication Sig Dispense Refill     acetaminophen (TYLENOL) 325 MG tablet Take 2 tablets (650 mg) by mouth every 4 hours as needed for mild pain or fever       amLODIPine (NORVASC) 5 MG tablet Take 1 tablet (5 mg) by mouth daily 90 tablet 3     ferrous sulfate (FEROSUL) 325 (65 Fe) MG tablet Take 1 tablet (325 mg) by mouth daily (with breakfast) (do not take with calcium or dairy products) 90 tablet 3     metoprolol succinate ER (TOPROL-XL) 25 MG 24 hr tablet Take 0.5 tablets (12.5 mg) by mouth daily 45 tablet 3     ondansetron (ZOFRAN) 8 MG tablet Take 8 mg by mouth every 8 hours as needed for nausea       SUMAtriptan (IMITREX) 50 MG tablet Take 1 tablet (50 mg) by mouth at onset of headache for migraine 18 tablet 1         ALLERGIES:  Allergies   Allergen Reactions     Ace Inhibitors Cough     Aleve [Naproxen Sodium] GI Disturbance     Asa Buff (Mag [Aspirin Buffered] GI Disturbance     Atorvastatin Calcium GI Disturbance     Celebrex [Celecoxib] GI Disturbance     Codeine Sulfate Hives     Pravastatin GI Disturbance     Simvastatin GI Disturbance         PAST MEDICAL HISTORY:  Past Medical History:   Diagnosis Date     Benign essential hypertension      Malignant neoplasm of anal canal (H)     metastatic; s/p chemoradiation and stereotactic radiation of lung mets     Migraines          PAST SURGICAL HISTORY:  Past Surgical History:   Procedure Laterality Date     ARTHROSCOPY KNEE Right      COLONOSCOPY N/A 04/09/2019    Procedure: COMBINED COLONOSCOPY, SINGLE OR MULTIPLE BIOPSY/POLYPECTOMY BY BIOPSY;  Surgeon: Saleem Shaw MD;  Location:  GI     EXTRACORPOREAL SHOCK WAVE LITHOTRIPSY (ESWL)       TUBAL LIGATION           SOCIAL HISTORY:  Social History     Socioeconomic History     Marital status:      Spouse name: Not on file     Number of children: Not on  file     Years of education: Not on file     Highest education level: Not on file   Occupational History     Occupation: Retired - homemaker, temp work   Social Needs     Financial resource strain: Not on file     Food insecurity     Worry: Not on file     Inability: Not on file     Transportation needs     Medical: Not on file     Non-medical: Not on file   Tobacco Use     Smoking status: Former Smoker     Packs/day: 2.00     Years: 40.00     Pack years: 80.00     Types: Cigarettes     Quit date: 2016     Years since quittin.2     Smokeless tobacco: Never Used   Substance and Sexual Activity     Alcohol use: Yes     Frequency: Monthly or less     Drinks per session: 1 or 2     Binge frequency: Never     Drug use: No     Sexual activity: Yes     Partners: Male     Birth control/protection: Post-menopausal   Lifestyle     Physical activity     Days per week: Not on file     Minutes per session: Not on file     Stress: Not on file   Relationships     Social connections     Talks on phone: Not on file     Gets together: Not on file     Attends Sabianism service: Not on file     Active member of club or organization: Not on file     Attends meetings of clubs or organizations: Not on file     Relationship status: Not on file     Intimate partner violence     Fear of current or ex partner: Not on file     Emotionally abused: Not on file     Physically abused: Not on file     Forced sexual activity: Not on file   Other Topics Concern     Parent/sibling w/ CABG, MI or angioplasty before 65F 55M? Not Asked   Social History Narrative    Maried.    Lives at home with  - fully independent.     4 adult children.    8 grandchildren.    Active, walks when able.          FAMILY HISTORY:  Family History   Problem Relation Age of Onset     Uterine Cancer Mother      Hypertension Maternal Grandmother      Breast Cancer Daughter      Diabetes Type 2  No family hx of      Myocardial Infarction No family hx of       Cerebrovascular Disease No family hx of      Ovarian Cancer No family hx of      Colon Cancer No family hx of          PHYSICAL EXAM:  Vital signs:  Not taken.  ECO  GENERAL: No acute distress.  EYES: No scleral icterus. No overt erythema.  RESPIRATORY: No cough.  No labored breathing.  MUSCULOSKELETAL: Range of motion in the neck, shoulders, and arms appear normal.  SKIN: No overt rashes, discolorations, or lesions over the face and neck.  NEUROLOGIC: Alert.  No overt tremors.  PSYCHIATRIC: Normal affect and mood.  Does not appear anxious.    The rest of a comprehensive physical examination is deferred due to PHE (public health emergency) video visit restrictions.    LABS:  CBC RESULTS:   Recent Labs   Lab Test 21  1600   WBC 4.9   RBC 4.39   HGB 12.9   HCT 40.5   MCV 92   MCH 29.4   MCHC 31.9   RDW 21.1*          Last Comprehensive Metabolic Panel:  Sodium   Date Value Ref Range Status   2021 140 133 - 144 mmol/L Final     Potassium   Date Value Ref Range Status   2021 3.9 3.4 - 5.3 mmol/L Final     Chloride   Date Value Ref Range Status   2021 109 94 - 109 mmol/L Final     Carbon Dioxide   Date Value Ref Range Status   2021 27 20 - 32 mmol/L Final     Anion Gap   Date Value Ref Range Status   2021 4 3 - 14 mmol/L Final     Glucose   Date Value Ref Range Status   2021 93 70 - 99 mg/dL Final     Urea Nitrogen   Date Value Ref Range Status   2021 18 7 - 30 mg/dL Final     Creatinine   Date Value Ref Range Status   2021 0.82 0.52 - 1.04 mg/dL Final     GFR Estimate   Date Value Ref Range Status   2021 81 >60 mL/min/[1.73_m2] Final     Calcium   Date Value Ref Range Status   2021 9.3 8.5 - 10.1 mg/dL Final     Bilirubin Total   Date Value Ref Range Status   2021 0.2 0.2 - 1.3 mg/dL Final     Alkaline Phosphatase   Date Value Ref Range Status   2021 73 40 - 150 U/L Final     ALT   Date Value Ref Range Status   2021 21 0 - 50  U/L Final     AST   Date Value Ref Range Status   2021 18 0 - 45 U/L Final         PATHOLOGY:  None new since prior visit.    IMAGIN2021 PET scan showed:  1. Findings concerning for progression of metastatic disease.  Increased FDG uptake within a left hilar lymph node, since the PET/CT  on 10/26/2020. Several additional mediastinal lymph nodes also  demonstrate new or increased FDG uptake.  2. Unchanged FDG avid 10 mm left adrenal nodule.  3. Stable postradiation changes in the left upper lobe. No new or  enlarging pulmonary nodules are identified.  4. Unchanged 3.2 cm infrarenal abdominal aortic aneurysm.    3/29/2021: Chest CT w/contrast:  Mild precarinal and left hilar lymphadenopathy is unchanged from prior  PET/CT 2021.    ASSESSMENT/PLAN:  Sara Lehman is a 80 year old female with the following issues:  1.  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  Left lung and right middle lobe lung metastases s/p radiation  3. Left adrenal nodule, stable  4. Left hilar and mediastinal hypermetabolic lymphadenopathy, stable  -Sara completed chemoradiation with capecitabine radiosensitizer on 2019, stereotactic radiation to the left upper lobe lung metastasis 10/21/2019, and stereotactic radiation to the right middle lobe lung metastasis and left adrenal nodule 2020.  -I personally reviewed the CT scan from 3/29/2021 and discussed the results with Sara.  The mild precarinal and left hilar lymphadenopathy are stable compared to her prior 2021 PET scan.   -I recommended Trixie continue on observation.  She is asymptomatic with respect to her mild mediastinal lymphadenopathy.   -Prior testing for KRAS mutation showed wild-type.  May consider adding either bevacizumab (or an epidermal growth factor receptor inhibitor such as panitumumab) to 5-fluorouracil for systemic therapy.  However, would be cautious with side effects  of systemic therapy.   -Will repeat PET scan in 3 months.    5. Intermittent hematochezia  6. Iron deficiency anemia  -Stools have some darker appearance related to iron supplement.  -Repeat CBC prior to next visit.  -Will also repeat iron studies prior to next visit.    Return in 3 months.    Rhona Wisdom MD  Hematology/Oncology  [facility]

## 2021-04-15 ENCOUNTER — VIRTUAL VISIT (OUTPATIENT)
Dept: ONCOLOGY | Facility: CLINIC | Age: 81
End: 2021-04-15
Attending: INTERNAL MEDICINE
Payer: COMMERCIAL

## 2021-04-15 DIAGNOSIS — C21.1 MALIGNANT NEOPLASM OF ANAL CANAL (H): Primary | ICD-10-CM

## 2021-04-15 DIAGNOSIS — R59.1 LYMPHADENOPATHY: ICD-10-CM

## 2021-04-15 DIAGNOSIS — C78.01 MALIGNANT NEOPLASM METASTATIC TO RIGHT LUNG (H): ICD-10-CM

## 2021-04-15 DIAGNOSIS — D50.0 IRON DEFICIENCY ANEMIA DUE TO CHRONIC BLOOD LOSS: ICD-10-CM

## 2021-04-15 DIAGNOSIS — C79.72 MALIGNANT NEOPLASM METASTATIC TO LEFT ADRENAL GLAND (H): ICD-10-CM

## 2021-04-15 DIAGNOSIS — C78.02 MALIGNANT NEOPLASM METASTATIC TO LEFT LUNG (H): ICD-10-CM

## 2021-04-15 DIAGNOSIS — C78.7 METASTASIS TO LIVER (H): ICD-10-CM

## 2021-04-15 PROCEDURE — 99214 OFFICE O/P EST MOD 30 MIN: CPT | Mod: 95 | Performed by: INTERNAL MEDICINE

## 2021-04-15 ASSESSMENT — PAIN SCALES - GENERAL: PAINLEVEL: NO PAIN (0)

## 2021-04-15 NOTE — PROGRESS NOTES
Trixie is a 80 year old who is being evaluated via a billable video visit.      How would you like to obtain your AVS? MyChart  If the video visit is dropped, the invitation should be resent by: Text to cell phone: - [phone].  Please send email to: [email or web address]      Will anyone else be joining your video visit? No      Video-Visit Details    Type of service:  Video Visit    Originating Location (pt. Location): Home    Distant Location (provider location):  [facility]     Platform used for Video Visit: Mario    Send email invite:  [email or web address]

## 2021-04-15 NOTE — LETTER
"    4/15/2021         RE: Sara Lehman  [address] [facility] [id number]        Dear Colleague,    Thank you for referring your patient, Sara Lehman, to the [facility]. Please see a copy of my visit note below.    [facility]    Hematology/Oncology Established Patient Follow-up Note      Today's Date: 4/15/21    Reason for Follow-up: Metastatic anal adenocarcinoma.    Sara Lehman is a 80 year old female who is being evaluated via a billable video visit.      The patient has been notified of following:     \"This video visit will be conducted via a call between you and your physician/provider. We have found that certain health care needs can be provided without the need for an in-person physical exam.  This service lets us provide the care you need with a video conversation.  If a prescription is necessary we can send it directly to your pharmacy.  If lab work is needed we can place an order for that and you can then stop by our lab to have the test done at a later time.    If during the course of the call the physician/provider feels a video visit is not appropriate, you will not be charged for this service.\"     Patient has given verbal consent for Video visit? Yes    Patient is in virtual lobby    Video-Visit Details    Type of service:  Video Visit    Originating Location (pt. Location): Home    Distant Location (provider location):  [facility]     Mode of Communication:  Video Conference via [employer]    Video visit duration: 7:37 minutes    HISTORY OF PRESENT ILLNESS: Sara Lehman is a 80 year old female who presents with the following oncologic history:  1.  4/1/2019: Initially presented to her primary care provider with complaints of hemorrhoids intermittently for 2 months with associated pain and itching as well as blood in the underwear at night and intermixed with stool, no melena.  No associated weight loss or night " sweats.  Reports occasional constipation.  Physical exam by primary care provider showed an anal mass about 1 inch in size with perianal erythema and skin breakdown.  2.  4/9/2019: Colonoscopy showed a perianal mass in the left lateral quadrant, severe diverticulosis in the sigmoid colon, descending colon, and ascending colon.  Biopsy of the anal mass showed invasive moderately differentiated adenocarcinoma with normal mismatch repair protein expression.  Specimen consisted of multiple fragments of necrotic tissue with dysplastic appearing glands with foci of invasion.  Surface mucosa is not present and therefore precludes differentiation between colorectal carcinoma or perianal glandular carcinoma.  Depth of invasion cannot be assessed.  KRAS wild-type.  3.  4/16/2019: CT chest/abdomen/pelvis with contrast showed a 1.3 cm left upper lobe lung nodule concerning for metastatic disease as well as 2 tiny nodules in the right lower lobe near the diaphragm.  Diffusely aneurysmal abdominal aorta measures up to 3.2 cm.  Prominent bilateral inguinal adenopathy with a lymph node in the upper right thigh adjacent to the common femoral vessels measures 2.9 x 2.3 cm.  Lymph node in the left medial upper thigh adjacent to the common femoral vessels measures 2.6 x 3.5 cm.  No evidence of obstruction.  Bony structures showed no destruction.  4.  4/19/2019: Consultation with Dr. Miller Casas.  Due to high suspicion of metastatic disease to the lung and bilateral inguinal regions, patient referred to medical oncology and radiation oncology.  5.  4/24/2019: PET/CT scan showed hypermetabolic posterior left upper lobe mid chest nodule adjacent to the major fissure measuring 1.3 x 1.2 cm and emphysematous changes.  Bilateral renal cysts present.  Nonobstructing stone of the lower right kidney measures 0.3 cm.  Nodular thickening of the left adrenal is 0.7 cm.  Intense hypermetabolism at the anal level with heterogeneous appearing soft  tissue with SUV max 19.5.  Enlarged inguinal hypermetabolic lymph nodes on the right measuring 2.9 x 2.1 cm and on the left 3.6 x 2.4 cm.  A distal infrarenal abdominal aorta measures 3.4 cm.  6. 5/6/2019: Left lung mass biopsy performed and showed discordant immunohistochemical findings.  Lesion showed a strong reactivity for cytokeratin 7 and focal staining for P40.  Patient's anal adenocarcinoma reportedly positive for both immunohistochemical staining pattern not typical for primary lung tumor and metastatic disease cannot be excluded.  7.  5/9/2019-6/25/2019: Completed radiation therapy to the anal cancer and inguinal lymph nodes.  She received concurrent radiosensitizing chemotherapy with capecitabine.  8. 8/28/2019: PET/CT scan showed hypermetabolic nodule in the left upper lobe measuring 1.5 x 1.4 cm with SUV max 10.5, increased in size and metabolic activity from 1.4 x 1.2 cm with SUV max 9.5 on 4/24/2019 PET/CT scan.  A 0.5 cm pleural-based nodule with low metabolic activity in the left lower lobe is unchanged.  A 3.4 cm infrarenal abdominal aortic aneurysm is present.  The previously noted hypermetabolic anal mass has significantly improved with SUV max 5.1, decreased from SUV max 24.6, consistent with response to treatment.  The bilateral inguinal lymphadenopathy has markedly improved on the current study with nodes measuring up to 1.6 x 1.1 cm with SUV max 3.5, decreased from 4 x 2.6 cm with SUV max 14, consistent with response to treatment.  9.  10/21/2019: Completed stereotactic radiation to the left upper lobe lung nodule/metastasis in 5 fractions under the care of Dr. Clay Monroy.  10. 12/16/2019: PET scan showed new hypermetabolic medial right middle lobe pulmonary nodule; left upper lobe nodule decreased in size and metabolism; improvement of bilateral inguinal adenopathy with decreased sizes and metabolism; hypermetabolism slightly increased at the left adrenal nodule but stable in size;  decreased hypermetabolism at anal level; new small focal hypermetabolism within muscle anterior to left hip joint with no mass effect.  11. 2/07/2020: Completed stereotactic radiation to the right middle lobe nodule and left adrenal nodule in 5 fractions.  12.  4/6/2020: PET/CT scan showed a 0.8 cm nodular density near the right lung apex with no significant hypermetabolism, unchanged from the prior exam.  The previously seen hypermetabolic right middle lobe pulmonary nodule is not seen on the current exam.  Small hypermetabolic left adrenal nodule measuring 1 cm with SUV max 3.3 is unchanged.  Infrarenal abdominal aortic aneurysm also not significantly changed and measures 3.3 x 3.6 cm containing a mild amount of mural thrombus.  The small hypermetabolic focus within the muscle anterior to the left hip joint is unchanged with SUV max 3.6.  13. 7/13/2020: PET/CT scan showed interval decrease in size of the right upper lobe lung nodule with no associated FDG activity; posttreatment changes in the lateral left upper lung; no new lung lesions; hypermetabolic left suprahilar area with no associated CT abnormality, possibly vascular in nature; no adenopathy; stable left adrenal nodule; indeterminate FDG activity in region of anal canal.   14. 10/26/2020: PET/CT scan showed FDG avid lymph nodes in left hilar region (measuring 1.1 cm with SUV 6.3; anterior to this is another 1.4 x 0.7 cm with SUV 8.9) concerning for progressive metastatic disease; remaining scan negative.  15. 1/25/2021: PET scan showed signs concerning for progressive metastatic disease, with increased FDG uptake within a left hilar lymph node, several new FDG-avid mediastinal lymph nodes; unchanged FDG-avid 10 mm left adrenal nodule; no new or enlarging lung nodules; unchanged 3.2 cm infrarenal AAA.  16. 3/29/2021: CT chest showed stable mild precarinal (1 cm) and left hilar (8 mm) lymphadenopathy; no new or enlarging lymphadenopathy.    INTERIM  HISTORY:  Trixie reports intermittent hematochezia that is scant. She denies rectal pain but has occasional infrequent diarrhea responsive to loperamide.  She denies any recent chest pain, cough, significant shortness of breath or recent fevers or chills.       REVIEW OF SYSTEMS:   14 point ROS was reviewed and is negative other than as noted above in HPI.       HOME MEDICATIONS:  Current Outpatient Medications   Medication Sig Dispense Refill     acetaminophen (TYLENOL) 325 MG tablet Take 2 tablets (650 mg) by mouth every 4 hours as needed for mild pain or fever       amLODIPine (NORVASC) 5 MG tablet Take 1 tablet (5 mg) by mouth daily 90 tablet 3     ferrous sulfate (FEROSUL) 325 (65 Fe) MG tablet Take 1 tablet (325 mg) by mouth daily (with breakfast) (do not take with calcium or dairy products) 90 tablet 3     metoprolol succinate ER (TOPROL-XL) 25 MG 24 hr tablet Take 0.5 tablets (12.5 mg) by mouth daily 45 tablet 3     ondansetron (ZOFRAN) 8 MG tablet Take 8 mg by mouth every 8 hours as needed for nausea       SUMAtriptan (IMITREX) 50 MG tablet Take 1 tablet (50 mg) by mouth at onset of headache for migraine 18 tablet 1         ALLERGIES:  Allergies   Allergen Reactions     Ace Inhibitors Cough     Aleve [Naproxen Sodium] GI Disturbance     Asa Buff (Mag [Aspirin Buffered] GI Disturbance     Atorvastatin Calcium GI Disturbance     Celebrex [Celecoxib] GI Disturbance     Codeine Sulfate Hives     Pravastatin GI Disturbance     Simvastatin GI Disturbance         PAST MEDICAL HISTORY:  Past Medical History:   Diagnosis Date     Benign essential hypertension      Malignant neoplasm of anal canal (H)     metastatic; s/p chemoradiation and stereotactic radiation of lung mets     Migraines          PAST SURGICAL HISTORY:  Past Surgical History:   Procedure Laterality Date     ARTHROSCOPY KNEE Right      COLONOSCOPY N/A 04/09/2019    Procedure: COMBINED COLONOSCOPY, SINGLE OR MULTIPLE BIOPSY/POLYPECTOMY BY BIOPSY;   Surgeon: Saleem Shaw MD;  Location: [facility]     EXTRACORPOREAL SHOCK WAVE LITHOTRIPSY (ESWL)       TUBAL LIGATION           SOCIAL HISTORY:  Social History     Socioeconomic History     Marital status:      Spouse name: Not on file     Number of children: Not on file     Years of education: Not on file     Highest education level: Not on file   Occupational History     Occupation: Retired - homemaker, temp work   Social Needs     Financial resource strain: Not on file     Food insecurity     Worry: Not on file     Inability: Not on file     Transportation needs     Medical: Not on file     Non-medical: Not on file   Tobacco Use     Smoking status: Former Smoker     Packs/day: 2.00     Years: 40.00     Pack years: 80.00     Types: Cigarettes     Quit date: 2016     Years since quittin.2     Smokeless tobacco: Never Used   Substance and Sexual Activity     Alcohol use: Yes     Frequency: Monthly or less     Drinks per session: 1 or 2     Binge frequency: Never     Drug use: No     Sexual activity: Yes     Partners: Male     Birth control/protection: Post-menopausal   Lifestyle     Physical activity     Days per week: Not on file     Minutes per session: Not on file     Stress: Not on file   Relationships     Social connections     Talks on phone: Not on file     Gets together: Not on file     Attends Judaism service: Not on file     Active member of club or organization: Not on file     Attends meetings of clubs or organizations: Not on file     Relationship status: Not on file     Intimate partner violence     Fear of current or ex partner: Not on file     Emotionally abused: Not on file     Physically abused: Not on file     Forced sexual activity: Not on file   Other Topics Concern     Parent/sibling w/ CABG, MI or angioplasty before 65F 55M? Not Asked   Social History Narrative    Maried.    Lives at home with  - fully independent.     4 adult children.    8 grandchildren.    Active,  walks when able.          FAMILY HISTORY:  Family History   Problem Relation Age of Onset     Uterine Cancer Mother      Hypertension Maternal Grandmother      Breast Cancer Daughter      Diabetes Type 2  No family hx of      Myocardial Infarction No family hx of      Cerebrovascular Disease No family hx of      Ovarian Cancer No family hx of      Colon Cancer No family hx of          PHYSICAL EXAM:  Vital signs:  Not taken.  ECO  GENERAL: No acute distress.  EYES: No scleral icterus. No overt erythema.  RESPIRATORY: No cough.  No labored breathing.  MUSCULOSKELETAL: Range of motion in the neck, shoulders, and arms appear normal.  SKIN: No overt rashes, discolorations, or lesions over the face and neck.  NEUROLOGIC: Alert.  No overt tremors.  PSYCHIATRIC: Normal affect and mood.  Does not appear anxious.    The rest of a comprehensive physical examination is deferred due to PHE (public health emergency) video visit restrictions.    LABS:  CBC RESULTS:   Recent Labs   Lab Test 21  1600   WBC 4.9   RBC 4.39   HGB 12.9   HCT 40.5   MCV 92   MCH 29.4   MCHC 31.9   RDW 21.1*          Last Comprehensive Metabolic Panel:  Sodium   Date Value Ref Range Status   2021 140 133 - 144 mmol/L Final     Potassium   Date Value Ref Range Status   2021 3.9 3.4 - 5.3 mmol/L Final     Chloride   Date Value Ref Range Status   2021 109 94 - 109 mmol/L Final     Carbon Dioxide   Date Value Ref Range Status   2021 27 20 - 32 mmol/L Final     Anion Gap   Date Value Ref Range Status   2021 4 3 - 14 mmol/L Final     Glucose   Date Value Ref Range Status   2021 93 70 - 99 mg/dL Final     Urea Nitrogen   Date Value Ref Range Status   2021 18 7 - 30 mg/dL Final     Creatinine   Date Value Ref Range Status   2021 0.82 0.52 - 1.04 mg/dL Final     GFR Estimate   Date Value Ref Range Status   2021 81 >60 mL/min/[1.73_m2] Final     Calcium   Date Value Ref Range Status    2021 9.3 8.5 - 10.1 mg/dL Final     Bilirubin Total   Date Value Ref Range Status   2021 0.2 0.2 - 1.3 mg/dL Final     Alkaline Phosphatase   Date Value Ref Range Status   2021 73 40 - 150 U/L Final     ALT   Date Value Ref Range Status   2021 21 0 - 50 U/L Final     AST   Date Value Ref Range Status   2021 18 0 - 45 U/L Final         PATHOLOGY:  None new since prior visit.    IMAGIN2021 PET scan showed:  1. Findings concerning for progression of metastatic disease.  Increased FDG uptake within a left hilar lymph node, since the PET/CT  on 10/26/2020. Several additional mediastinal lymph nodes also  demonstrate new or increased FDG uptake.  2. Unchanged FDG avid 10 mm left adrenal nodule.  3. Stable postradiation changes in the left upper lobe. No new or  enlarging pulmonary nodules are identified.  4. Unchanged 3.2 cm infrarenal abdominal aortic aneurysm.    3/29/2021: Chest CT w/contrast:  Mild precarinal and left hilar lymphadenopathy is unchanged from prior  PET/CT 2021.    ASSESSMENT/PLAN:  Sara Lehman is a 80 year old female with the following issues:  1.  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  Left lung and right middle lobe lung metastases s/p radiation  3. Left adrenal nodule, stable  4. Left hilar and mediastinal hypermetabolic lymphadenopathy, stable  -Sara completed chemoradiation with capecitabine radiosensitizer on 2019, stereotactic radiation to the left upper lobe lung metastasis 10/21/2019, and stereotactic radiation to the right middle lobe lung metastasis and left adrenal nodule 2020.  -I personally reviewed the CT scan from 3/29/2021 and discussed the results with Sara.  The mild precarinal and left hilar lymphadenopathy are stable compared to her prior 2021 PET scan.   -I recommended Trixie continue on observation.  She is asymptomatic with respect to her  mild mediastinal lymphadenopathy.   -Prior testing for KRAS mutation showed wild-type.  May consider adding either bevacizumab (or an epidermal growth factor receptor inhibitor such as panitumumab) to 5-fluorouracil for systemic therapy.  However, would be cautious with side effects of systemic therapy.   -Will repeat PET scan in 3 months.    5. Intermittent hematochezia  6. Iron deficiency anemia  -Stools have some darker appearance related to iron supplement.  -Repeat CBC prior to next visit.  -Will also repeat iron studies prior to next visit.    Return in 3 months.    Rhona Wisdom MD  Hematology/Oncology  [facility]    Trixie is a 80 year old who is being evaluated via a billable video visit.      How would you like to obtain your AVS? MyChart  If the video visit is dropped, the invitation should be resent by: Text to cell phone: - [phone].  Please send email to: [email or web address]      Will anyone else be joining your video visit? No      Video-Visit Details    Type of service:  Video Visit    Originating Location (pt. Location): Home    Distant Location (provider location):  [facility]     Platform used for Video Visit: [employer]    Send email invite:  [email or web address]        Again, thank you for allowing me to participate in the care of your patient.        Sincerely,        Rhona Wisdom MD

## 2021-04-15 NOTE — LETTER
"    4/15/2021         RE: Sara Lehman  [address] [facility] [id number]        Dear Colleague,    Thank you for referring your patient, Sara Lehman, to the [facility]. Please see a copy of my visit note below.    [facility]    Hematology/Oncology Established Patient Follow-up Note      Today's Date: 4/15/21    Reason for Follow-up: Metastatic anal adenocarcinoma.    Sara Lehman is a 80 year old female who is being evaluated via a billable video visit.      The patient has been notified of following:     \"This video visit will be conducted via a call between you and your physician/provider. We have found that certain health care needs can be provided without the need for an in-person physical exam.  This service lets us provide the care you need with a video conversation.  If a prescription is necessary we can send it directly to your pharmacy.  If lab work is needed we can place an order for that and you can then stop by our lab to have the test done at a later time.    If during the course of the call the physician/provider feels a video visit is not appropriate, you will not be charged for this service.\"     Patient has given verbal consent for Video visit? Yes    Patient is in virtual lobby    Video-Visit Details    Type of service:  Video Visit    Originating Location (pt. Location): Home    Distant Location (provider location):  [facility]     Mode of Communication:  Video Conference via Room    Video visit duration: 7:37 minutes    HISTORY OF PRESENT ILLNESS: Sara Lehman is a 80 year old female who presents with the following oncologic history:  1.  4/1/2019: Initially presented to her primary care provider with complaints of hemorrhoids intermittently for 2 months with associated pain and itching as well as blood in the underwear at night and intermixed with stool, no melena.  No associated weight loss or night " sweats.  Reports occasional constipation.  Physical exam by primary care provider showed an anal mass about 1 inch in size with perianal erythema and skin breakdown.  2.  4/9/2019: Colonoscopy showed a perianal mass in the left lateral quadrant, severe diverticulosis in the sigmoid colon, descending colon, and ascending colon.  Biopsy of the anal mass showed invasive moderately differentiated adenocarcinoma with normal mismatch repair protein expression.  Specimen consisted of multiple fragments of necrotic tissue with dysplastic appearing glands with foci of invasion.  Surface mucosa is not present and therefore precludes differentiation between colorectal carcinoma or perianal glandular carcinoma.  Depth of invasion cannot be assessed.  KRAS wild-type.  3.  4/16/2019: CT chest/abdomen/pelvis with contrast showed a 1.3 cm left upper lobe lung nodule concerning for metastatic disease as well as 2 tiny nodules in the right lower lobe near the diaphragm.  Diffusely aneurysmal abdominal aorta measures up to 3.2 cm.  Prominent bilateral inguinal adenopathy with a lymph node in the upper right thigh adjacent to the common femoral vessels measures 2.9 x 2.3 cm.  Lymph node in the left medial upper thigh adjacent to the common femoral vessels measures 2.6 x 3.5 cm.  No evidence of obstruction.  Bony structures showed no destruction.  4.  4/19/2019: Consultation with Dr. Miller Casas.  Due to high suspicion of metastatic disease to the lung and bilateral inguinal regions, patient referred to medical oncology and radiation oncology.  5.  4/24/2019: PET/CT scan showed hypermetabolic posterior left upper lobe mid chest nodule adjacent to the major fissure measuring 1.3 x 1.2 cm and emphysematous changes.  Bilateral renal cysts present.  Nonobstructing stone of the lower right kidney measures 0.3 cm.  Nodular thickening of the left adrenal is 0.7 cm.  Intense hypermetabolism at the anal level with heterogeneous appearing soft  tissue with SUV max 19.5.  Enlarged inguinal hypermetabolic lymph nodes on the right measuring 2.9 x 2.1 cm and on the left 3.6 x 2.4 cm.  A distal infrarenal abdominal aorta measures 3.4 cm.  6. 5/6/2019: Left lung mass biopsy performed and showed discordant immunohistochemical findings.  Lesion showed a strong reactivity for cytokeratin 7 and focal staining for P40.  Patient's anal adenocarcinoma reportedly positive for both immunohistochemical staining pattern not typical for primary lung tumor and metastatic disease cannot be excluded.  7.  5/9/2019-6/25/2019: Completed radiation therapy to the anal cancer and inguinal lymph nodes.  She received concurrent radiosensitizing chemotherapy with capecitabine.  8. 8/28/2019: PET/CT scan showed hypermetabolic nodule in the left upper lobe measuring 1.5 x 1.4 cm with SUV max 10.5, increased in size and metabolic activity from 1.4 x 1.2 cm with SUV max 9.5 on 4/24/2019 PET/CT scan.  A 0.5 cm pleural-based nodule with low metabolic activity in the left lower lobe is unchanged.  A 3.4 cm infrarenal abdominal aortic aneurysm is present.  The previously noted hypermetabolic anal mass has significantly improved with SUV max 5.1, decreased from SUV max 24.6, consistent with response to treatment.  The bilateral inguinal lymphadenopathy has markedly improved on the current study with nodes measuring up to 1.6 x 1.1 cm with SUV max 3.5, decreased from 4 x 2.6 cm with SUV max 14, consistent with response to treatment.  9.  10/21/2019: Completed stereotactic radiation to the left upper lobe lung nodule/metastasis in 5 fractions under the care of Dr. Clay Monroy.  10. 12/16/2019: PET scan showed new hypermetabolic medial right middle lobe pulmonary nodule; left upper lobe nodule decreased in size and metabolism; improvement of bilateral inguinal adenopathy with decreased sizes and metabolism; hypermetabolism slightly increased at the left adrenal nodule but stable in size;  decreased hypermetabolism at anal level; new small focal hypermetabolism within muscle anterior to left hip joint with no mass effect.  11. 2/07/2020: Completed stereotactic radiation to the right middle lobe nodule and left adrenal nodule in 5 fractions.  12.  4/6/2020: PET/CT scan showed a 0.8 cm nodular density near the right lung apex with no significant hypermetabolism, unchanged from the prior exam.  The previously seen hypermetabolic right middle lobe pulmonary nodule is not seen on the current exam.  Small hypermetabolic left adrenal nodule measuring 1 cm with SUV max 3.3 is unchanged.  Infrarenal abdominal aortic aneurysm also not significantly changed and measures 3.3 x 3.6 cm containing a mild amount of mural thrombus.  The small hypermetabolic focus within the muscle anterior to the left hip joint is unchanged with SUV max 3.6.  13. 7/13/2020: PET/CT scan showed interval decrease in size of the right upper lobe lung nodule with no associated FDG activity; posttreatment changes in the lateral left upper lung; no new lung lesions; hypermetabolic left suprahilar area with no associated CT abnormality, possibly vascular in nature; no adenopathy; stable left adrenal nodule; indeterminate FDG activity in region of anal canal.   14. 10/26/2020: PET/CT scan showed FDG avid lymph nodes in left hilar region (measuring 1.1 cm with SUV 6.3; anterior to this is another 1.4 x 0.7 cm with SUV 8.9) concerning for progressive metastatic disease; remaining scan negative.  15. 1/25/2021: PET scan showed signs concerning for progressive metastatic disease, with increased FDG uptake within a left hilar lymph node, several new FDG-avid mediastinal lymph nodes; unchanged FDG-avid 10 mm left adrenal nodule; no new or enlarging lung nodules; unchanged 3.2 cm infrarenal AAA.  16. 3/29/2021: CT chest showed stable mild precarinal (1 cm) and left hilar (8 mm) lymphadenopathy; no new or enlarging lymphadenopathy.    INTERIM  HISTORY:  Trixie reports intermittent hematochezia that is scant. She denies rectal pain but has occasional infrequent diarrhea responsive to loperamide.  She denies any recent chest pain, cough, significant shortness of breath or recent fevers or chills.       REVIEW OF SYSTEMS:   14 point ROS was reviewed and is negative other than as noted above in HPI.       HOME MEDICATIONS:  Current Outpatient Medications   Medication Sig Dispense Refill     acetaminophen (TYLENOL) 325 MG tablet Take 2 tablets (650 mg) by mouth every 4 hours as needed for mild pain or fever       amLODIPine (NORVASC) 5 MG tablet Take 1 tablet (5 mg) by mouth daily 90 tablet 3     ferrous sulfate (FEROSUL) 325 (65 Fe) MG tablet Take 1 tablet (325 mg) by mouth daily (with breakfast) (do not take with calcium or dairy products) 90 tablet 3     metoprolol succinate ER (TOPROL-XL) 25 MG 24 hr tablet Take 0.5 tablets (12.5 mg) by mouth daily 45 tablet 3     ondansetron (ZOFRAN) 8 MG tablet Take 8 mg by mouth every 8 hours as needed for nausea       SUMAtriptan (IMITREX) 50 MG tablet Take 1 tablet (50 mg) by mouth at onset of headache for migraine 18 tablet 1         ALLERGIES:  Allergies   Allergen Reactions     Ace Inhibitors Cough     Aleve [Naproxen Sodium] GI Disturbance     Asa Buff (Mag [Aspirin Buffered] GI Disturbance     Atorvastatin Calcium GI Disturbance     Celebrex [Celecoxib] GI Disturbance     Codeine Sulfate Hives     Pravastatin GI Disturbance     Simvastatin GI Disturbance         PAST MEDICAL HISTORY:  Past Medical History:   Diagnosis Date     Benign essential hypertension      Malignant neoplasm of anal canal (H)     metastatic; s/p chemoradiation and stereotactic radiation of lung mets     Migraines          PAST SURGICAL HISTORY:  Past Surgical History:   Procedure Laterality Date     ARTHROSCOPY KNEE Right      COLONOSCOPY N/A 04/09/2019    Procedure: COMBINED COLONOSCOPY, SINGLE OR MULTIPLE BIOPSY/POLYPECTOMY BY BIOPSY;   Surgeon: Saleem Shaw MD;  [facility]     EXTRACORPOREAL SHOCK WAVE LITHOTRIPSY (ESWL)       TUBAL LIGATION           SOCIAL HISTORY:  Social History     Socioeconomic History     Marital status:      Spouse name: Not on file     Number of children: Not on file     Years of education: Not on file     Highest education level: Not on file   Occupational History     Occupation: Retired - homemaker, temp work   Social Needs     Financial resource strain: Not on file     Food insecurity     Worry: Not on file     Inability: Not on file     Transportation needs     Medical: Not on file     Non-medical: Not on file   Tobacco Use     Smoking status: Former Smoker     Packs/day: 2.00     Years: 40.00     Pack years: 80.00     Types: Cigarettes     Quit date: 2016     Years since quittin.2     Smokeless tobacco: Never Used   Substance and Sexual Activity     Alcohol use: Yes     Frequency: Monthly or less     Drinks per session: 1 or 2     Binge frequency: Never     Drug use: No     Sexual activity: Yes     Partners: Male     Birth control/protection: Post-menopausal   Lifestyle     Physical activity     Days per week: Not on file     Minutes per session: Not on file     Stress: Not on file   Relationships     Social connections     Talks on phone: Not on file     Gets together: Not on file     Attends Yazidism service: Not on file     Active member of club or organization: Not on file     Attends meetings of clubs or organizations: Not on file     Relationship status: Not on file     Intimate partner violence     Fear of current or ex partner: Not on file     Emotionally abused: Not on file     Physically abused: Not on file     Forced sexual activity: Not on file   Other Topics Concern     Parent/sibling w/ CABG, MI or angioplasty before 65F 55M? Not Asked   Social History Narrative    Maried.    Lives at home with  - fully independent.     4 adult children.    8 grandchildren.    Active,  walks when able.          FAMILY HISTORY:  Family History   Problem Relation Age of Onset     Uterine Cancer Mother      Hypertension Maternal Grandmother      Breast Cancer Daughter      Diabetes Type 2  No family hx of      Myocardial Infarction No family hx of      Cerebrovascular Disease No family hx of      Ovarian Cancer No family hx of      Colon Cancer No family hx of          PHYSICAL EXAM:  Vital signs:  Not taken.  ECO  GENERAL: No acute distress.  EYES: No scleral icterus. No overt erythema.  RESPIRATORY: No cough.  No labored breathing.  MUSCULOSKELETAL: Range of motion in the neck, shoulders, and arms appear normal.  SKIN: No overt rashes, discolorations, or lesions over the face and neck.  NEUROLOGIC: Alert.  No overt tremors.  PSYCHIATRIC: Normal affect and mood.  Does not appear anxious.    The rest of a comprehensive physical examination is deferred due to PHE (public health emergency) video visit restrictions.    LABS:  CBC RESULTS:   Recent Labs   Lab Test 21  1600   WBC 4.9   RBC 4.39   HGB 12.9   HCT 40.5   MCV 92   MCH 29.4   MCHC 31.9   RDW 21.1*          Last Comprehensive Metabolic Panel:  Sodium   Date Value Ref Range Status   2021 140 133 - 144 mmol/L Final     Potassium   Date Value Ref Range Status   2021 3.9 3.4 - 5.3 mmol/L Final     Chloride   Date Value Ref Range Status   2021 109 94 - 109 mmol/L Final     Carbon Dioxide   Date Value Ref Range Status   2021 27 20 - 32 mmol/L Final     Anion Gap   Date Value Ref Range Status   2021 4 3 - 14 mmol/L Final     Glucose   Date Value Ref Range Status   2021 93 70 - 99 mg/dL Final     Urea Nitrogen   Date Value Ref Range Status   2021 18 7 - 30 mg/dL Final     Creatinine   Date Value Ref Range Status   2021 0.82 0.52 - 1.04 mg/dL Final     GFR Estimate   Date Value Ref Range Status   2021 81 >60 mL/min/[1.73_m2] Final     Calcium   Date Value Ref Range Status    2021 9.3 8.5 - 10.1 mg/dL Final     Bilirubin Total   Date Value Ref Range Status   2021 0.2 0.2 - 1.3 mg/dL Final     Alkaline Phosphatase   Date Value Ref Range Status   2021 73 40 - 150 U/L Final     ALT   Date Value Ref Range Status   2021 21 0 - 50 U/L Final     AST   Date Value Ref Range Status   2021 18 0 - 45 U/L Final         PATHOLOGY:  None new since prior visit.    IMAGIN2021 PET scan showed:  1. Findings concerning for progression of metastatic disease.  Increased FDG uptake within a left hilar lymph node, since the PET/CT  on 10/26/2020. Several additional mediastinal lymph nodes also  demonstrate new or increased FDG uptake.  2. Unchanged FDG avid 10 mm left adrenal nodule.  3. Stable postradiation changes in the left upper lobe. No new or  enlarging pulmonary nodules are identified.  4. Unchanged 3.2 cm infrarenal abdominal aortic aneurysm.    3/29/2021: Chest CT w/contrast:  Mild precarinal and left hilar lymphadenopathy is unchanged from prior  PET/CT 2021.    ASSESSMENT/PLAN:  Sara Lehman is a 80 year old female with the following issues:  1.  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  Left lung and right middle lobe lung metastases s/p radiation  3. Left adrenal nodule, stable  4. Left hilar and mediastinal hypermetabolic lymphadenopathy, stable  -Sara completed chemoradiation with capecitabine radiosensitizer on 2019, stereotactic radiation to the left upper lobe lung metastasis 10/21/2019, and stereotactic radiation to the right middle lobe lung metastasis and left adrenal nodule 2020.  -I personally reviewed the CT scan from 3/29/2021 and discussed the results with Sara.  The mild precarinal and left hilar lymphadenopathy are stable compared to her prior 2021 PET scan.   -I recommended Trixie continue on observation.  She is asymptomatic with respect to her  mild mediastinal lymphadenopathy.   -Prior testing for KRAS mutation showed wild-type.  May consider adding either bevacizumab (or an epidermal growth factor receptor inhibitor such as panitumumab) to 5-fluorouracil for systemic therapy.  However, would be cautious with side effects of systemic therapy.   -Will repeat PET scan in 3 months.    5. Intermittent hematochezia  6. Iron deficiency anemia  -Stools have some darker appearance related to iron supplement.  -Repeat CBC prior to next visit.  -Will also repeat iron studies prior to next visit.    Return in 3 months.    Rhona Wisdom MD  Hematology/Oncology  [facility]    Trixie is a 80 year old who is being evaluated via a billable video visit.      How would you like to obtain your AVS? MyChart  If the video visit is dropped, the invitation should be resent by: Text to cell phone: - [phone].  Please send email to: [email or web address]      Will anyone else be joining your video visit? No      Video-Visit Details    Type of service:  Video Visit    Originating Location (pt. Location): Home    Distant Location (provider location):  [facility]     Platform used for Video Visit: [employer]    Send email invite:  [email or web address]        Again, thank you for allowing me to participate in the care of your patient.        Sincerely,        Rhona Wisdom MD

## 2021-07-23 NOTE — PROGRESS NOTES
[facility]    Hematology/Oncology Established Patient Follow-up Note      Today's Date: 7/29/21    Reason for Follow-up: Metastatic anal adenocarcinoma.    Video visit duration: 22:18 minutes    HISTORY OF PRESENT ILLNESS: Sara Lehman is a 80 year old female who presents with the following oncologic history:  1.  4/1/2019: Initially presented to her primary care provider with complaints of hemorrhoids intermittently for 2 months with associated pain and itching as well as blood in the underwear at night and intermixed with stool, no melena.  No associated weight loss or night sweats.  Reports occasional constipation.  Physical exam by primary care provider showed an anal mass about 1 inch in size with perianal erythema and skin breakdown.  2.  4/9/2019: Colonoscopy showed a perianal mass in the left lateral quadrant, severe diverticulosis in the sigmoid colon, descending colon, and ascending colon.  Biopsy of the anal mass showed invasive moderately differentiated adenocarcinoma with normal mismatch repair protein expression.  Specimen consisted of multiple fragments of necrotic tissue with dysplastic appearing glands with foci of invasion.  Surface mucosa is not present and therefore precludes differentiation between colorectal carcinoma or perianal glandular carcinoma.  Depth of invasion cannot be assessed.  KRAS wild-type.  3.  4/16/2019: CT chest/abdomen/pelvis with contrast showed a 1.3 cm left upper lobe lung nodule concerning for metastatic disease as well as 2 tiny nodules in the right lower lobe near the diaphragm.  Diffusely aneurysmal abdominal aorta measures up to 3.2 cm.  Prominent bilateral inguinal adenopathy with a lymph node in the upper right thigh adjacent to the common femoral vessels measures 2.9 x 2.3 cm.  Lymph node in the left medial upper thigh adjacent to the common femoral vessels measures 2.6 x 3.5 cm.  No evidence of obstruction.  Bony structures showed no  destruction.  4.  4/19/2019: Consultation with Dr. Miller Casas.  Due to high suspicion of metastatic disease to the lung and bilateral inguinal regions, patient referred to medical oncology and radiation oncology.  5.  4/24/2019: PET/CT scan showed hypermetabolic posterior left upper lobe mid chest nodule adjacent to the major fissure measuring 1.3 x 1.2 cm and emphysematous changes.  Bilateral renal cysts present.  Nonobstructing stone of the lower right kidney measures 0.3 cm.  Nodular thickening of the left adrenal is 0.7 cm.  Intense hypermetabolism at the anal level with heterogeneous appearing soft tissue with SUV max 19.5.  Enlarged inguinal hypermetabolic lymph nodes on the right measuring 2.9 x 2.1 cm and on the left 3.6 x 2.4 cm.  A distal infrarenal abdominal aorta measures 3.4 cm.  6.  5/6/2019: Left lung mass biopsy performed and showed discordant immunohistochemical findings.  Lesion showed a strong reactivity for cytokeratin 7 and focal staining for P40.  Patient's anal adenocarcinoma reportedly positive for both immunohistochemical staining pattern not typical for primary lung tumor and metastatic disease cannot be excluded.  7.  5/9/2019-6/25/2019: Completed radiation therapy to the anal cancer and inguinal lymph nodes.  She received concurrent radiosensitizing chemotherapy with capecitabine.  8.  8/28/2019: PET/CT scan showed hypermetabolic nodule in the left upper lobe measuring 1.5 x 1.4 cm with SUV max 10.5, increased in size and metabolic activity from 1.4 x 1.2 cm with SUV max 9.5 on 4/24/2019 PET/CT scan.  A 0.5 cm pleural-based nodule with low metabolic activity in the left lower lobe is unchanged.  A 3.4 cm infrarenal abdominal aortic aneurysm is present.  The previously noted hypermetabolic anal mass has significantly improved with SUV max 5.1, decreased from SUV max 24.6, consistent with response to treatment.  The bilateral inguinal lymphadenopathy has markedly improved on the current  study with nodes measuring up to 1.6 x 1.1 cm with SUV max 3.5, decreased from 4 x 2.6 cm with SUV max 14, consistent with response to treatment.  9.  10/21/2019: Completed stereotactic radiation to the left upper lobe lung nodule/metastasis in 5 fractions under the care of Dr. Clay Monroy.  10. 12/16/2019: PET scan showed new hypermetabolic medial right middle lobe pulmonary nodule; left upper lobe nodule decreased in size and metabolism; improvement of bilateral inguinal adenopathy with decreased sizes and metabolism; hypermetabolism slightly increased at the left adrenal nodule but stable in size; decreased hypermetabolism at anal level; new small focal hypermetabolism within muscle anterior to left hip joint with no mass effect.  11. 2/07/2020: Completed stereotactic radiation to the right middle lobe nodule and left adrenal nodule in 5 fractions.  12. 4/6/2020: PET/CT scan showed a 0.8 cm nodular density near the right lung apex with no significant hypermetabolism, unchanged from the prior exam.  The previously seen hypermetabolic right middle lobe pulmonary nodule is not seen on the current exam.  Small hypermetabolic left adrenal nodule measuring 1 cm with SUV max 3.3 is unchanged.  Infrarenal abdominal aortic aneurysm also not significantly changed and measures 3.3 x 3.6 cm containing a mild amount of mural thrombus.  The small hypermetabolic focus within the muscle anterior to the left hip joint is unchanged with SUV max 3.6.  13. 7/13/2020: PET/CT scan showed interval decrease in size of the right upper lobe lung nodule with no associated FDG activity; posttreatment changes in the lateral left upper lung; no new lung lesions; hypermetabolic left suprahilar area with no associated CT abnormality, possibly vascular in nature; no adenopathy; stable left adrenal nodule; indeterminate FDG activity in region of anal canal.   14. 10/26/2020: PET/CT scan showed FDG avid lymph nodes in left hilar region (measuring  1.1 cm with SUV 6.3; anterior to this is another 1.4 x 0.7 cm with SUV 8.9) concerning for progressive metastatic disease; remaining scan negative.  15. 1/25/2021: PET scan showed signs concerning for progressive metastatic disease, with increased FDG uptake within a left hilar lymph node, several new FDG-avid mediastinal lymph nodes; unchanged FDG-avid 10 mm left adrenal nodule; no new or enlarging lung nodules; unchanged 3.2 cm infrarenal AAA.  16. 3/29/2021: CT chest showed stable mild precarinal (1 cm) and left hilar (8 mm) lymphadenopathy; no new or enlarging lymphadenopathy.  17. 7/26/2021: PET scan showed increased size and metabolic activity of mediastinal and left hilar lymph nodes, new multifocal metastatic disease in liver; new left level 3 cervical lymph node metastases, stable FDG avid left adrenal nodule.    INTERIM HISTORY:  Trixie reports feeling well and denies any recent chest pain, cough, significant shortness of breath or recent fevers or chills.       REVIEW OF SYSTEMS:   14 point ROS was reviewed and is negative other than as noted above in HPI.       HOME MEDICATIONS:  Current Outpatient Medications   Medication Sig Dispense Refill     acetaminophen (TYLENOL) 325 MG tablet Take 2 tablets (650 mg) by mouth every 4 hours as needed for mild pain or fever       amLODIPine (NORVASC) 5 MG tablet Take 1 tablet (5 mg) by mouth daily 90 tablet 3     ferrous sulfate (FEROSUL) 325 (65 Fe) MG tablet Take 1 tablet (325 mg) by mouth daily (with breakfast) (do not take with calcium or dairy products) 90 tablet 3     metoprolol succinate ER (TOPROL-XL) 25 MG 24 hr tablet Take 0.5 tablets (12.5 mg) by mouth daily 45 tablet 3     ondansetron (ZOFRAN) 8 MG tablet Take 8 mg by mouth every 8 hours as needed for nausea       SUMAtriptan (IMITREX) 50 MG tablet Take 1 tablet (50 mg) by mouth at onset of headache for migraine 18 tablet 1         ALLERGIES:  Allergies   Allergen Reactions     Ace Inhibitors Cough      Aleve [Naproxen Sodium] GI Disturbance     Asa Buff (Mag [Aspirin Buffered] GI Disturbance     Atorvastatin Calcium GI Disturbance     Celebrex [Celecoxib] GI Disturbance     Codeine Sulfate Hives     Pravastatin GI Disturbance     Simvastatin GI Disturbance         PAST MEDICAL HISTORY:  Past Medical History:   Diagnosis Date     Benign essential hypertension      Malignant neoplasm of anal canal (H)     metastatic; s/p chemoradiation and stereotactic radiation of lung mets     Migraines          PAST SURGICAL HISTORY:  Past Surgical History:   Procedure Laterality Date     ARTHROSCOPY KNEE Right      COLONOSCOPY N/A 2019    Procedure: COMBINED COLONOSCOPY, SINGLE OR MULTIPLE BIOPSY/POLYPECTOMY BY BIOPSY;  Surgeon: Saleem Shaw MD;  Location:  GI     EXTRACORPOREAL SHOCK WAVE LITHOTRIPSY (ESWL)       TUBAL LIGATION           SOCIAL HISTORY:  Social History     Socioeconomic History     Marital status:      Spouse name: Not on file     Number of children: Not on file     Years of education: Not on file     Highest education level: Not on file   Occupational History     Occupation: Retired - homemaker, temp work   Tobacco Use     Smoking status: Former Smoker     Packs/day: 2.00     Years: 40.00     Pack years: 80.00     Types: Cigarettes     Quit date: 2016     Years since quittin.5     Smokeless tobacco: Never Used   Substance and Sexual Activity     Alcohol use: Yes     Drug use: No     Sexual activity: Yes     Partners: Male     Birth control/protection: Post-menopausal   Other Topics Concern     Parent/sibling w/ CABG, MI or angioplasty before 65F 55M? Not Asked   Social History Narrative    Maried.    Lives at home with  - fully independent.     4 adult children.    8 grandchildren.    Active, walks when able.      Social Determinants of Health     Financial Resource Strain:      Difficulty of Paying Living Expenses:    Food Insecurity:      Worried About Running Out of Food in  the Last Year:      Ran Out of Food in the Last Year:    Transportation Needs:      Lack of Transportation (Medical):      Lack of Transportation (Non-Medical):    Physical Activity:      Days of Exercise per Week:      Minutes of Exercise per Session:    Stress:      Feeling of Stress :    Social Connections:      Frequency of Communication with Friends and Family:      Frequency of Social Gatherings with Friends and Family:      Attends Anabaptist Services:      Active Member of Clubs or Organizations:      Attends Club or Organization Meetings:      Marital Status:    Intimate Partner Violence:      Fear of Current or Ex-Partner:      Emotionally Abused:      Physically Abused:      Sexually Abused:          FAMILY HISTORY:  Family History   Problem Relation Age of Onset     Uterine Cancer Mother      Hypertension Maternal Grandmother      Breast Cancer Daughter      Diabetes Type 2  No family hx of      Myocardial Infarction No family hx of      Cerebrovascular Disease No family hx of      Ovarian Cancer No family hx of      Colon Cancer No family hx of          PHYSICAL EXAM:  Vital signs:  Not taken.  ECO  GENERAL: No acute distress.  EYES: No scleral icterus. No overt erythema.  RESPIRATORY: No cough.  No labored breathing.  MUSCULOSKELETAL: Range of motion in the neck, shoulders, and arms appear normal.  SKIN: No overt rashes, discolorations, or lesions over the face and neck.  NEUROLOGIC: Alert.  No overt tremors.  PSYCHIATRIC: Normal affect and mood.  Does not appear anxious.    LABS:        PATHOLOGY:  None new since prior visit.    IMAGIN2021 PET scan showed:  1. Findings concerning for progression of metastatic disease.  Increased FDG uptake within a left hilar lymph node, since the PET/CT  on 10/26/2020. Several additional mediastinal lymph nodes also  demonstrate new or increased FDG uptake.  2. Unchanged FDG avid 10 mm left adrenal nodule.  3. Stable postradiation changes in the left  upper lobe. No new or  enlarging pulmonary nodules are identified.  4. Unchanged 3.2 cm infrarenal abdominal aortic aneurysm.    3/29/2021: Chest CT w/contrast:  Mild precarinal and left hilar lymphadenopathy is unchanged from prior  PET/CT 1/25/2021.    ASSESSMENT/PLAN:  Sara Lehman is a 80 year old female with the following issues:  1.  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  Left lung and right middle lobe lung metastases s/p radiation  3. Left adrenal nodule, stable  4. Progressive hilar and mediastinal hypermetabolic lymphadenopathy  5. Metastases to liver  -I personally reviewed the PET scan from 7/26/2021 and discussed the results with Trixie.  Unfortunately, she has disease progression in the mediastinal and left hilar lymph nodes, new liver metastases, and left cervical lymph node metastases. The left adrenal nodule is stable.  -Trixie would like to avoid cytotoxic chemo if possible but wishes to treat her metastatic disease as long as the treatment can be tolerable.  -She has had prior capecitabine, so we could have her try pembrolizumab.  -Paclitaxel and carboplatin would likely be less tolerable for her due to the risk of neuropathy and significant bone marrow suppression.  -I discussed the potential adverse effects of pembrolizumab including but not limited to inflammation of the brain, lungs, liver, kidneys, heart, skin, GI tract, thyroid. She would like to proceed.    6. Iron deficiency anemia  -Iron deficiency anemia now resolved.  -Hgb now normal.    Rhona Wisdom MD  Hematology/Oncology  [facility]    Total time spent: 40 minutes in patient evaluation, counseling, documentation, and coordination of care.

## 2021-07-29 PROBLEM — C78.01 MALIGNANT NEOPLASM METASTATIC TO RIGHT LUNG (H): Status: ACTIVE | Noted: 2021-01-01

## 2021-07-29 PROBLEM — C78.7 METASTASIS TO LIVER (H): Status: ACTIVE | Noted: 2021-01-01

## 2021-07-29 PROBLEM — C78.02 MALIGNANT NEOPLASM METASTATIC TO LEFT LUNG (H): Status: ACTIVE | Noted: 2021-01-01

## 2021-07-29 PROBLEM — C79.72 MALIGNANT NEOPLASM METASTATIC TO LEFT ADRENAL GLAND (H): Status: ACTIVE | Noted: 2021-01-01

## 2021-07-29 NOTE — LETTER
7/29/2021         RE: Sara Lehman  [id number] [facility] [id number]        Dear Colleague,    Thank you for referring your patient, Sara Lehman, to the [facility]. Please see a copy of my visit note below.    [facility]    Hematology/Oncology Established Patient Follow-up Note      Today's Date: 7/29/21    Reason for Follow-up: Metastatic anal adenocarcinoma.    Video visit duration: 22:18 minutes    HISTORY OF PRESENT ILLNESS: Sara Lehman is a 80 year old female who presents with the following oncologic history:  1.  4/1/2019: Initially presented to her primary care provider with complaints of hemorrhoids intermittently for 2 months with associated pain and itching as well as blood in the underwear at night and intermixed with stool, no melena.  No associated weight loss or night sweats.  Reports occasional constipation.  Physical exam by primary care provider showed an anal mass about 1 inch in size with perianal erythema and skin breakdown.  2.  4/9/2019: Colonoscopy showed a perianal mass in the left lateral quadrant, severe diverticulosis in the sigmoid colon, descending colon, and ascending colon.  Biopsy of the anal mass showed invasive moderately differentiated adenocarcinoma with normal mismatch repair protein expression.  Specimen consisted of multiple fragments of necrotic tissue with dysplastic appearing glands with foci of invasion.  Surface mucosa is not present and therefore precludes differentiation between colorectal carcinoma or perianal glandular carcinoma.  Depth of invasion cannot be assessed.  KRAS wild-type.  3.  4/16/2019: CT chest/abdomen/pelvis with contrast showed a 1.3 cm left upper lobe lung nodule concerning for metastatic disease as well as 2 tiny nodules in the right lower lobe near the diaphragm.  Diffusely aneurysmal abdominal aorta measures up to 3.2 cm.  Prominent bilateral inguinal adenopathy with a  lymph node in the upper right thigh adjacent to the common femoral vessels measures 2.9 x 2.3 cm.  Lymph node in the left medial upper thigh adjacent to the common femoral vessels measures 2.6 x 3.5 cm.  No evidence of obstruction.  Bony structures showed no destruction.  4.  4/19/2019: Consultation with Dr. Miller Casas.  Due to high suspicion of metastatic disease to the lung and bilateral inguinal regions, patient referred to medical oncology and radiation oncology.  5.  4/24/2019: PET/CT scan showed hypermetabolic posterior left upper lobe mid chest nodule adjacent to the major fissure measuring 1.3 x 1.2 cm and emphysematous changes.  Bilateral renal cysts present.  Nonobstructing stone of the lower right kidney measures 0.3 cm.  Nodular thickening of the left adrenal is 0.7 cm.  Intense hypermetabolism at the anal level with heterogeneous appearing soft tissue with SUV max 19.5.  Enlarged inguinal hypermetabolic lymph nodes on the right measuring 2.9 x 2.1 cm and on the left 3.6 x 2.4 cm.  A distal infrarenal abdominal aorta measures 3.4 cm.  6.  5/6/2019: Left lung mass biopsy performed and showed discordant immunohistochemical findings.  Lesion showed a strong reactivity for cytokeratin 7 and focal staining for P40.  Patient's anal adenocarcinoma reportedly positive for both immunohistochemical staining pattern not typical for primary lung tumor and metastatic disease cannot be excluded.  7.  5/9/2019-6/25/2019: Completed radiation therapy to the anal cancer and inguinal lymph nodes.  She received concurrent radiosensitizing chemotherapy with capecitabine.  8.  8/28/2019: PET/CT scan showed hypermetabolic nodule in the left upper lobe measuring 1.5 x 1.4 cm with SUV max 10.5, increased in size and metabolic activity from 1.4 x 1.2 cm with SUV max 9.5 on 4/24/2019 PET/CT scan.  A 0.5 cm pleural-based nodule with low metabolic activity in the left lower lobe is unchanged.  A 3.4 cm infrarenal abdominal aortic  aneurysm is present.  The previously noted hypermetabolic anal mass has significantly improved with SUV max 5.1, decreased from SUV max 24.6, consistent with response to treatment.  The bilateral inguinal lymphadenopathy has markedly improved on the current study with nodes measuring up to 1.6 x 1.1 cm with SUV max 3.5, decreased from 4 x 2.6 cm with SUV max 14, consistent with response to treatment.  9.  10/21/2019: Completed stereotactic radiation to the left upper lobe lung nodule/metastasis in 5 fractions under the care of Dr. Clay Monroy.  10. 12/16/2019: PET scan showed new hypermetabolic medial right middle lobe pulmonary nodule; left upper lobe nodule decreased in size and metabolism; improvement of bilateral inguinal adenopathy with decreased sizes and metabolism; hypermetabolism slightly increased at the left adrenal nodule but stable in size; decreased hypermetabolism at anal level; new small focal hypermetabolism within muscle anterior to left hip joint with no mass effect.  11. 2/07/2020: Completed stereotactic radiation to the right middle lobe nodule and left adrenal nodule in 5 fractions.  12. 4/6/2020: PET/CT scan showed a 0.8 cm nodular density near the right lung apex with no significant hypermetabolism, unchanged from the prior exam.  The previously seen hypermetabolic right middle lobe pulmonary nodule is not seen on the current exam.  Small hypermetabolic left adrenal nodule measuring 1 cm with SUV max 3.3 is unchanged.  Infrarenal abdominal aortic aneurysm also not significantly changed and measures 3.3 x 3.6 cm containing a mild amount of mural thrombus.  The small hypermetabolic focus within the muscle anterior to the left hip joint is unchanged with SUV max 3.6.  13. 7/13/2020: PET/CT scan showed interval decrease in size of the right upper lobe lung nodule with no associated FDG activity; posttreatment changes in the lateral left upper lung; no new lung lesions; hypermetabolic left  suprahilar area with no associated CT abnormality, possibly vascular in nature; no adenopathy; stable left adrenal nodule; indeterminate FDG activity in region of anal canal.   14. 10/26/2020: PET/CT scan showed FDG avid lymph nodes in left hilar region (measuring 1.1 cm with SUV 6.3; anterior to this is another 1.4 x 0.7 cm with SUV 8.9) concerning for progressive metastatic disease; remaining scan negative.  15. 1/25/2021: PET scan showed signs concerning for progressive metastatic disease, with increased FDG uptake within a left hilar lymph node, several new FDG-avid mediastinal lymph nodes; unchanged FDG-avid 10 mm left adrenal nodule; no new or enlarging lung nodules; unchanged 3.2 cm infrarenal AAA.  16. 3/29/2021: CT chest showed stable mild precarinal (1 cm) and left hilar (8 mm) lymphadenopathy; no new or enlarging lymphadenopathy.  17. 7/26/2021: PET scan showed increased size and metabolic activity of mediastinal and left hilar lymph nodes, new multifocal metastatic disease in liver; new left level 3 cervical lymph node metastases, stable FDG avid left adrenal nodule.    INTERIM HISTORY:  Trixie reports feeling well and denies any recent chest pain, cough, significant shortness of breath or recent fevers or chills.       REVIEW OF SYSTEMS:   14 point ROS was reviewed and is negative other than as noted above in HPI.       HOME MEDICATIONS:  Current Outpatient Medications   Medication Sig Dispense Refill     acetaminophen (TYLENOL) 325 MG tablet Take 2 tablets (650 mg) by mouth every 4 hours as needed for mild pain or fever       amLODIPine (NORVASC) 5 MG tablet Take 1 tablet (5 mg) by mouth daily 90 tablet 3     ferrous sulfate (FEROSUL) 325 (65 Fe) MG tablet Take 1 tablet (325 mg) by mouth daily (with breakfast) (do not take with calcium or dairy products) 90 tablet 3     metoprolol succinate ER (TOPROL-XL) 25 MG 24 hr tablet Take 0.5 tablets (12.5 mg) by mouth daily 45 tablet 3     ondansetron (ZOFRAN) 8  MG tablet Take 8 mg by mouth every 8 hours as needed for nausea       SUMAtriptan (IMITREX) 50 MG tablet Take 1 tablet (50 mg) by mouth at onset of headache for migraine 18 tablet 1         ALLERGIES:  Allergies   Allergen Reactions     Ace Inhibitors Cough     Aleve [Naproxen Sodium] GI Disturbance     Asa Buff (Mag [Aspirin Buffered] GI Disturbance     Atorvastatin Calcium GI Disturbance     Celebrex [Celecoxib] GI Disturbance     Codeine Sulfate Hives     Pravastatin GI Disturbance     Simvastatin GI Disturbance         PAST MEDICAL HISTORY:  Past Medical History:   Diagnosis Date     Benign essential hypertension      Malignant neoplasm of anal canal (H)     metastatic; s/p chemoradiation and stereotactic radiation of lung mets     Migraines          PAST SURGICAL HISTORY:  Past Surgical History:   Procedure Laterality Date     ARTHROSCOPY KNEE Right      COLONOSCOPY N/A 2019    Procedure: COMBINED COLONOSCOPY, SINGLE OR MULTIPLE BIOPSY/POLYPECTOMY BY BIOPSY;  Surgeon: Saleem Shaw MD;  Location:  GI     EXTRACORPOREAL SHOCK WAVE LITHOTRIPSY (ESWL)       TUBAL LIGATION           SOCIAL HISTORY:  Social History     Socioeconomic History     Marital status:      Spouse name: Not on file     Number of children: Not on file     Years of education: Not on file     Highest education level: Not on file   Occupational History     Occupation: Retired - homemaker, temp work   Tobacco Use     Smoking status: Former Smoker     Packs/day: 2.00     Years: 40.00     Pack years: 80.00     Types: Cigarettes     Quit date: 2016     Years since quittin.5     Smokeless tobacco: Never Used   Substance and Sexual Activity     Alcohol use: Yes     Drug use: No     Sexual activity: Yes     Partners: Male     Birth control/protection: Post-menopausal   Other Topics Concern     Parent/sibling w/ CABG, MI or angioplasty before 65F 55M? Not Asked   Social History Narrative    Maried.    Lives at home with   - fully independent.     4 adult children.    8 grandchildren.    Active, walks when able.      Social Determinants of Health     Financial Resource Strain:      Difficulty of Paying Living Expenses:    Food Insecurity:      Worried About Running Out of Food in the Last Year:      Ran Out of Food in the Last Year:    Transportation Needs:      Lack of Transportation (Medical):      Lack of Transportation (Non-Medical):    Physical Activity:      Days of Exercise per Week:      Minutes of Exercise per Session:    Stress:      Feeling of Stress :    Social Connections:      Frequency of Communication with Friends and Family:      Frequency of Social Gatherings with Friends and Family:      Attends [employer] Services:      Active Member of Clubs or Organizations:      Attends Club or Organization Meetings:      Marital Status:    Intimate Partner Violence:      Fear of Current or Ex-Partner:      Emotionally Abused:      Physically Abused:      Sexually Abused:          FAMILY HISTORY:  Family History   Problem Relation Age of Onset     Uterine Cancer Mother      Hypertension Maternal Grandmother      Breast Cancer Daughter      Diabetes Type 2  No family hx of      Myocardial Infarction No family hx of      Cerebrovascular Disease No family hx of      Ovarian Cancer No family hx of      Colon Cancer No family hx of          PHYSICAL EXAM:  Vital signs:  Not taken.  ECO  GENERAL: No acute distress.  EYES: No scleral icterus. No overt erythema.  RESPIRATORY: No cough.  No labored breathing.  MUSCULOSKELETAL: Range of motion in the neck, shoulders, and arms appear normal.  SKIN: No overt rashes, discolorations, or lesions over the face and neck.  NEUROLOGIC: Alert.  No overt tremors.  PSYCHIATRIC: Normal affect and mood.  Does not appear anxious.    LABS:        PATHOLOGY:  None new since prior visit.    IMAGIN2021 PET scan showed:  1. Findings concerning for progression of metastatic disease.  Increased FDG  uptake within a left hilar lymph node, since the PET/CT  on 10/26/2020. Several additional mediastinal lymph nodes also  demonstrate new or increased FDG uptake.  2. Unchanged FDG avid 10 mm left adrenal nodule.  3. Stable postradiation changes in the left upper lobe. No new or  enlarging pulmonary nodules are identified.  4. Unchanged 3.2 cm infrarenal abdominal aortic aneurysm.    3/29/2021: Chest CT w/contrast:  Mild precarinal and left hilar lymphadenopathy is unchanged from prior  PET/CT 1/25/2021.    ASSESSMENT/PLAN:  Sara Lehman is a 80 year old female with the following issues:  1.  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  Left lung and right middle lobe lung metastases s/p radiation  3. Left adrenal nodule, stable  4. Progressive hilar and mediastinal hypermetabolic lymphadenopathy  5. Metastases to liver  -I personally reviewed the PET scan from 7/26/2021 and discussed the results with Trixie.  Unfortunately, she has disease progression in the mediastinal and left hilar lymph nodes, new liver metastases, and left cervical lymph node metastases. The left adrenal nodule is stable.  -Trixie would like to avoid cytotoxic chemo if possible but wishes to treat her metastatic disease as long as the treatment can be tolerable.  -She has had prior capecitabine, so we could have her try pembrolizumab.  -Paclitaxel and carboplatin would likely be less tolerable for her due to the risk of neuropathy and significant bone marrow suppression.  -I discussed the potential adverse effects of pembrolizumab including but not limited to inflammation of the brain, lungs, liver, kidneys, heart, skin, GI tract, thyroid. She would like to proceed.    6. Iron deficiency anemia  -Iron deficiency anemia now resolved.  -Hgb now normal.    Rhona Wisdom MD  Hematology/Oncology  [facility]    Total time spent: 40 minutes in patient  evaluation, counseling, documentation, and coordination of care.    Trixie is a 80 year old who is being evaluated via a billable video visit.      How would you like to obtain your AVS? MyChart  If the video visit is dropped, the invitation should be resent by: Text to cell phone: - [phone]    Will anyone else be joining your video visit? No  {If patient encounters technical issues they should call [phone]}      Video-Visit Details    Type of service:  Video Visit    Originating Location (pt. Location): Home    Distant Location (provider location):  [facility]     Platform used for Video Visit: [employer]          Again, thank you for allowing me to participate in the care of your patient.        Sincerely,        Rhona Wisdom MD

## 2021-07-29 NOTE — PATIENT INSTRUCTIONS
1. Arrange for pembrolizumab every 42 days with lab draw every 42 days, pending drug insurance approval.  2. RTC NP in 2 weeks and with cycle 2.  3. RTC MD with cycle 3.

## 2021-07-29 NOTE — LETTER
7/29/2021         RE: Sara Lehman  [address] [facility] [id number]        Dear Colleague,    Thank you for referring your patient, Sara Lehman, to the [facility]. Please see a copy of my visit note below.    [facility]    Hematology/Oncology Established Patient Follow-up Note      Today's Date: 7/29/21    Reason for Follow-up: Metastatic anal adenocarcinoma.    Video visit duration: 22:18 minutes    HISTORY OF PRESENT ILLNESS: Sara Lehman is a 80 year old female who presents with the following oncologic history:  1.  4/1/2019: Initially presented to her primary care provider with complaints of hemorrhoids intermittently for 2 months with associated pain and itching as well as blood in the underwear at night and intermixed with stool, no melena.  No associated weight loss or night sweats.  Reports occasional constipation.  Physical exam by primary care provider showed an anal mass about 1 inch in size with perianal erythema and skin breakdown.  2.  4/9/2019: Colonoscopy showed a perianal mass in the left lateral quadrant, severe diverticulosis in the sigmoid colon, descending colon, and ascending colon.  Biopsy of the anal mass showed invasive moderately differentiated adenocarcinoma with normal mismatch repair protein expression.  Specimen consisted of multiple fragments of necrotic tissue with dysplastic appearing glands with foci of invasion.  Surface mucosa is not present and therefore precludes differentiation between colorectal carcinoma or perianal glandular carcinoma.  Depth of invasion cannot be assessed.  KRAS wild-type.  3.  4/16/2019: CT chest/abdomen/pelvis with contrast showed a 1.3 cm left upper lobe lung nodule concerning for metastatic disease as well as 2 tiny nodules in the right lower lobe near the diaphragm.  Diffusely aneurysmal abdominal aorta measures up to 3.2 cm.  Prominent bilateral inguinal adenopathy with a  lymph node in the upper right thigh adjacent to the common femoral vessels measures 2.9 x 2.3 cm.  Lymph node in the left medial upper thigh adjacent to the common femoral vessels measures 2.6 x 3.5 cm.  No evidence of obstruction.  Bony structures showed no destruction.  4.  4/19/2019: Consultation with Dr. Miller Casas.  Due to high suspicion of metastatic disease to the lung and bilateral inguinal regions, patient referred to medical oncology and radiation oncology.  5.  4/24/2019: PET/CT scan showed hypermetabolic posterior left upper lobe mid chest nodule adjacent to the major fissure measuring 1.3 x 1.2 cm and emphysematous changes.  Bilateral renal cysts present.  Nonobstructing stone of the lower right kidney measures 0.3 cm.  Nodular thickening of the left adrenal is 0.7 cm.  Intense hypermetabolism at the anal level with heterogeneous appearing soft tissue with SUV max 19.5.  Enlarged inguinal hypermetabolic lymph nodes on the right measuring 2.9 x 2.1 cm and on the left 3.6 x 2.4 cm.  A distal infrarenal abdominal aorta measures 3.4 cm.  6.  5/6/2019: Left lung mass biopsy performed and showed discordant immunohistochemical findings.  Lesion showed a strong reactivity for cytokeratin 7 and focal staining for P40.  Patient's anal adenocarcinoma reportedly positive for both immunohistochemical staining pattern not typical for primary lung tumor and metastatic disease cannot be excluded.  7.  5/9/2019-6/25/2019: Completed radiation therapy to the anal cancer and inguinal lymph nodes.  She received concurrent radiosensitizing chemotherapy with capecitabine.  8.  8/28/2019: PET/CT scan showed hypermetabolic nodule in the left upper lobe measuring 1.5 x 1.4 cm with SUV max 10.5, increased in size and metabolic activity from 1.4 x 1.2 cm with SUV max 9.5 on 4/24/2019 PET/CT scan.  A 0.5 cm pleural-based nodule with low metabolic activity in the left lower lobe is unchanged.  A 3.4 cm infrarenal abdominal aortic  aneurysm is present.  The previously noted hypermetabolic anal mass has significantly improved with SUV max 5.1, decreased from SUV max 24.6, consistent with response to treatment.  The bilateral inguinal lymphadenopathy has markedly improved on the current study with nodes measuring up to 1.6 x 1.1 cm with SUV max 3.5, decreased from 4 x 2.6 cm with SUV max 14, consistent with response to treatment.  9.  10/21/2019: Completed stereotactic radiation to the left upper lobe lung nodule/metastasis in 5 fractions under the care of Dr. Clay Monroy.  10. 12/16/2019: PET scan showed new hypermetabolic medial right middle lobe pulmonary nodule; left upper lobe nodule decreased in size and metabolism; improvement of bilateral inguinal adenopathy with decreased sizes and metabolism; hypermetabolism slightly increased at the left adrenal nodule but stable in size; decreased hypermetabolism at anal level; new small focal hypermetabolism within muscle anterior to left hip joint with no mass effect.  11. 2/07/2020: Completed stereotactic radiation to the right middle lobe nodule and left adrenal nodule in 5 fractions.  12. 4/6/2020: PET/CT scan showed a 0.8 cm nodular density near the right lung apex with no significant hypermetabolism, unchanged from the prior exam.  The previously seen hypermetabolic right middle lobe pulmonary nodule is not seen on the current exam.  Small hypermetabolic left adrenal nodule measuring 1 cm with SUV max 3.3 is unchanged.  Infrarenal abdominal aortic aneurysm also not significantly changed and measures 3.3 x 3.6 cm containing a mild amount of mural thrombus.  The small hypermetabolic focus within the muscle anterior to the left hip joint is unchanged with SUV max 3.6.  13. 7/13/2020: PET/CT scan showed interval decrease in size of the right upper lobe lung nodule with no associated FDG activity; posttreatment changes in the lateral left upper lung; no new lung lesions; hypermetabolic left  suprahilar area with no associated CT abnormality, possibly vascular in nature; no adenopathy; stable left adrenal nodule; indeterminate FDG activity in region of anal canal.   14. 10/26/2020: PET/CT scan showed FDG avid lymph nodes in left hilar region (measuring 1.1 cm with SUV 6.3; anterior to this is another 1.4 x 0.7 cm with SUV 8.9) concerning for progressive metastatic disease; remaining scan negative.  15. 1/25/2021: PET scan showed signs concerning for progressive metastatic disease, with increased FDG uptake within a left hilar lymph node, several new FDG-avid mediastinal lymph nodes; unchanged FDG-avid 10 mm left adrenal nodule; no new or enlarging lung nodules; unchanged 3.2 cm infrarenal AAA.  16. 3/29/2021: CT chest showed stable mild precarinal (1 cm) and left hilar (8 mm) lymphadenopathy; no new or enlarging lymphadenopathy.  17. 7/26/2021: PET scan showed increased size and metabolic activity of mediastinal and left hilar lymph nodes, new multifocal metastatic disease in liver; new left level 3 cervical lymph node metastases, stable FDG avid left adrenal nodule.    INTERIM HISTORY:  Trixie reports feeling well and denies any recent chest pain, cough, significant shortness of breath or recent fevers or chills.       REVIEW OF SYSTEMS:   14 point ROS was reviewed and is negative other than as noted above in HPI.       HOME MEDICATIONS:  Current Outpatient Medications   Medication Sig Dispense Refill     acetaminophen (TYLENOL) 325 MG tablet Take 2 tablets (650 mg) by mouth every 4 hours as needed for mild pain or fever       amLODIPine (NORVASC) 5 MG tablet Take 1 tablet (5 mg) by mouth daily 90 tablet 3     ferrous sulfate (FEROSUL) 325 (65 Fe) MG tablet Take 1 tablet (325 mg) by mouth daily (with breakfast) (do not take with calcium or dairy products) 90 tablet 3     metoprolol succinate ER (TOPROL-XL) 25 MG 24 hr tablet Take 0.5 tablets (12.5 mg) by mouth daily 45 tablet 3     ondansetron (ZOFRAN) 8  MG tablet Take 8 mg by mouth every 8 hours as needed for nausea       SUMAtriptan (IMITREX) 50 MG tablet Take 1 tablet (50 mg) by mouth at onset of headache for migraine 18 tablet 1         ALLERGIES:  Allergies   Allergen Reactions     Ace Inhibitors Cough     Aleve [Naproxen Sodium] GI Disturbance     Asa Buff (Mag [Aspirin Buffered] GI Disturbance     Atorvastatin Calcium GI Disturbance     Celebrex [Celecoxib] GI Disturbance     Codeine Sulfate Hives     Pravastatin GI Disturbance     Simvastatin GI Disturbance         PAST MEDICAL HISTORY:  Past Medical History:   Diagnosis Date     Benign essential hypertension      Malignant neoplasm of anal canal (H)     metastatic; s/p chemoradiation and stereotactic radiation of lung mets     Migraines          PAST SURGICAL HISTORY:  Past Surgical History:   Procedure Laterality Date     ARTHROSCOPY KNEE Right      COLONOSCOPY N/A 2019    Procedure: COMBINED COLONOSCOPY, SINGLE OR MULTIPLE BIOPSY/POLYPECTOMY BY BIOPSY;  Surgeon: Saleem Shaw MD;  Location:  GI     EXTRACORPOREAL SHOCK WAVE LITHOTRIPSY (ESWL)       TUBAL LIGATION           SOCIAL HISTORY:  Social History     Socioeconomic History     Marital status:      Spouse name: Not on file     Number of children: Not on file     Years of education: Not on file     Highest education level: Not on file   Occupational History     Occupation: Retired - homemaker, temp work   Tobacco Use     Smoking status: Former Smoker     Packs/day: 2.00     Years: 40.00     Pack years: 80.00     Types: Cigarettes     Quit date: 2016     Years since quittin.5     Smokeless tobacco: Never Used   Substance and Sexual Activity     Alcohol use: Yes     Drug use: No     Sexual activity: Yes     Partners: Male     Birth control/protection: Post-menopausal   Other Topics Concern     Parent/sibling w/ CABG, MI or angioplasty before 65F 55M? Not Asked   Social History Narrative    Maried.    Lives at home with   - fully independent.     4 adult children.    8 grandchildren.    Active, walks when able.      Social Determinants of Health     Financial Resource Strain:      Difficulty of Paying Living Expenses:    Food Insecurity:      Worried About Running Out of Food in the Last Year:      Ran Out of Food in the Last Year:    Transportation Needs:      Lack of Transportation (Medical):      Lack of Transportation (Non-Medical):    Physical Activity:      Days of Exercise per Week:      Minutes of Exercise per Session:    Stress:      Feeling of Stress :    Social Connections:      Frequency of Communication with Friends and Family:      Frequency of Social Gatherings with Friends and Family:      Attends Hindu Services:      Active Member of Clubs or Organizations:      Attends Club or Organization Meetings:      Marital Status:    Intimate Partner Violence:      Fear of Current or Ex-Partner:      Emotionally Abused:      Physically Abused:      Sexually Abused:          FAMILY HISTORY:  Family History   Problem Relation Age of Onset     Uterine Cancer Mother      Hypertension Maternal Grandmother      Breast Cancer Daughter      Diabetes Type 2  No family hx of      Myocardial Infarction No family hx of      Cerebrovascular Disease No family hx of      Ovarian Cancer No family hx of      Colon Cancer No family hx of          PHYSICAL EXAM:  Vital signs:  Not taken.  ECO  GENERAL: No acute distress.  EYES: No scleral icterus. No overt erythema.  RESPIRATORY: No cough.  No labored breathing.  MUSCULOSKELETAL: Range of motion in the neck, shoulders, and arms appear normal.  SKIN: No overt rashes, discolorations, or lesions over the face and neck.  NEUROLOGIC: Alert.  No overt tremors.  PSYCHIATRIC: Normal affect and mood.  Does not appear anxious.    LABS:        PATHOLOGY:  None new since prior visit.    IMAGIN2021 PET scan showed:  1. Findings concerning for progression of metastatic disease.  Increased FDG  uptake within a left hilar lymph node, since the PET/CT  on 10/26/2020. Several additional mediastinal lymph nodes also  demonstrate new or increased FDG uptake.  2. Unchanged FDG avid 10 mm left adrenal nodule.  3. Stable postradiation changes in the left upper lobe. No new or  enlarging pulmonary nodules are identified.  4. Unchanged 3.2 cm infrarenal abdominal aortic aneurysm.    3/29/2021: Chest CT w/contrast:  Mild precarinal and left hilar lymphadenopathy is unchanged from prior  PET/CT 1/25/2021.    ASSESSMENT/PLAN:  Sara Lehman is a 80 year old female with the following issues:  1.  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  Left lung and right middle lobe lung metastases s/p radiation  3. Left adrenal nodule, stable  4. Progressive hilar and mediastinal hypermetabolic lymphadenopathy  5. Metastases to liver  -I personally reviewed the PET scan from 7/26/2021 and discussed the results with Trixie.  Unfortunately, she has disease progression in the mediastinal and left hilar lymph nodes, new liver metastases, and left cervical lymph node metastases. The left adrenal nodule is stable.  -Trixie would like to avoid cytotoxic chemo if possible but wishes to treat her metastatic disease as long as the treatment can be tolerable.  -She has had prior capecitabine, so we could have her try pembrolizumab.  -Paclitaxel and carboplatin would likely be less tolerable for her due to the risk of neuropathy and significant bone marrow suppression.  -I discussed the potential adverse effects of pembrolizumab including but not limited to inflammation of the brain, lungs, liver, kidneys, heart, skin, GI tract, thyroid. She would like to proceed.    6. Iron deficiency anemia  -Iron deficiency anemia now resolved.  -Hgb now normal.    Rhona Wisdom MD  Hematology/Oncology  [facility]    Total time spent: 40 minutes in patient  evaluation, counseling, documentation, and coordination of care.    Trixie is a 80 year old who is being evaluated via a billable video visit.      How would you like to obtain your AVS? MyChart  If the video visit is dropped, the invitation should be resent by: Text to cell phone: - [phone]    Will anyone else be joining your video visit? No  {If patient encounters technical issues they should call [phone]}      Video-Visit Details    Type of service:  Video Visit    Originating Location (pt. Location): Home    Distant Location (provider location):  [facility]     Platform used for Video Visit: Nuji          Again, thank you for allowing me to participate in the care of your patient.        Sincerely,        Rhona Wisdom MD

## 2021-07-29 NOTE — PROGRESS NOTES
Trixie is a 80 year old who is being evaluated via a billable video visit.      How would you like to obtain your AVS? [email or web address]  If the video visit is dropped, the invitation should be resent by: Text to cell phone: - [phone]    Will anyone else be joining your video visit? No        Video-Visit Details    Type of service:  Video Visit    Originating Location (pt. Location): Home    Distant Location (provider location):  [facility]     Platform used for Video Visit: AMGas

## 2021-08-03 NOTE — TELEPHONE ENCOUNTER
A message received on VM returning ca ll received earlier today from nurse Fairchild.    Message routed.    Giulia Larson RN     no

## 2021-08-05 NOTE — PROGRESS NOTES
LYLE Ritter to call back to set up time for chemo teaching. Also, left information with dates/times for labs and Keytruda infusion.    Devorah Pitts RN

## 2021-08-10 NOTE — PROGRESS NOTES
Chemo Teach  re: Keytruda  treatment plan for diagnosis: Anal adenocarcinoma   -See Pt Education documentation - Chemo Effects (Adult)  -Reviewed treatment schedule including cycle length, lab monitoring and take home medications.    Verbal and written instruction provided using:     [facility]  Keytruda Drug Information   -Reviewed What Immunotherapy  Is Used For, How Immunotherapy  is Given, Side Effects, When to Contact Your Doctor of Health Care Provider and Self Care Tips sections.      [place] literature:   -Spoke with patient's daughter Riya who will review side effects with her mother. [facility] will further review with her. Patient will be given chemotherapy  information on her first day of treatment. Pre-medications will be released to [facility] pharmacy in [place] and Riya was informed to bring her medications with her on her first day of treatment on 8/16/21.     Carine Norton RN,BSN,OCN

## 2021-08-13 NOTE — PROGRESS NOTES
Medical Assistant Note:  Sara Lehman presents today for Blood Draw.    Patient seen by provider today: No.   present during visit today: Not Applicable.    Concerns: No Concerns.    Procedure:  Lab draw site: RAC, Needle type: BF, Gauge: 23.    Post Assessment:  Labs drawn without difficulty: Yes.    Discharge Plan:  Departure Mode: Ambulatory.    Face to Face Time: 5.    Darling Valdivia MA

## 2021-08-16 NOTE — PROGRESS NOTES
Infusion Nursing Note:  Sara Lehman presents today for C1D1 keytruda.    Patient seen by provider today: No   present during visit today: Not Applicable.    Note: Patient is forgetful and would benefit from a family member staying with her throughout appts.       Intravenous Access:  Peripheral IV placed.    Treatment Conditions:  Lab Results   Component Value Date    HGB 13.5 07/26/2021    HGB 12.9 03/29/2021     Lab Results   Component Value Date    WBC 6.2 07/26/2021    WBC 4.9 03/29/2021      Lab Results   Component Value Date    ANEU 3.0 03/29/2021     Lab Results   Component Value Date     07/26/2021     03/29/2021      Lab Results   Component Value Date     08/13/2021     03/29/2021                   Lab Results   Component Value Date    POTASSIUM 4.4 08/13/2021    POTASSIUM 3.9 03/29/2021           Lab Results   Component Value Date    MAG 2.1 05/06/2019            Lab Results   Component Value Date    CR 0.76 08/13/2021    CR 0.82 03/29/2021                   Lab Results   Component Value Date    ALTHEA 9.9 08/13/2021    ALTHEA 9.3 03/29/2021                Lab Results   Component Value Date    BILITOTAL 0.4 08/13/2021    BILITOTAL 0.2 03/29/2021           Lab Results   Component Value Date    ALBUMIN 3.4 08/13/2021    ALBUMIN 3.5 03/29/2021                    Lab Results   Component Value Date    ALT 20 08/13/2021    ALT 21 03/29/2021           Lab Results   Component Value Date    AST 24 08/13/2021    AST 18 03/29/2021       Results reviewed, labs MET treatment parameters, ok to proceed with treatment.      Post Infusion Assessment:  Patient tolerated infusion without incident.  Blood return noted pre and post infusion.  Site patent and intact, free from redness, edema or discomfort.  No evidence of extravasations.  Access discontinued per protocol.       Discharge Plan:   Discharge instructions reviewed with: Patient.  Patient and/or family verbalized understanding of  discharge instructions and all questions answered.  Patient discharged in stable condition accompanied by: self.  Departure Mode: Ambulatory.      Vilma Conteh RN                       MARYANN Tanner MD: Pt is an 89 y/o male with PMH HTN, HLD, DM2, CAD who p/w c/o acute worsening of chronic abd pain. States that he has had pain in lower abdomen x 2 months. Pain is not better or worse after eating. No known aggravating/relieving factors. Pt also c/o low back pain, which has been going on x weeks, since a mechanical fall in the bathroom. LBP is L-sided, non-radiating, worse when he lies flat, no extension down leg. Denies urinary sx, bowel/bladder incontinence, f/c, n/v/d, HA, dizziness, CP, SOB. Last BM 2 days ago. Passing flatus. No black/bloody stools.  DDx: constipation,  intraabdominal infection, AAA, uti  Plan: basic labs, lactate, CTA/P, analtesia MARYANN Tanner MD: Pt is an 89 y/o male with PMH HTN, HLD, DM2, CAD who p/w c/o acute worsening of chronic abd pain. States that he has had pain in lower abdomen x 2 months. Pain is not better or worse after eating. No known aggravating/relieving factors. Pt also c/o low back pain, which has been going on x weeks, since a mechanical fall in the bathroom. LBP is L-sided, non-radiating, worse when he lies flat, no extension down leg. Denies urinary sx, bowel/bladder incontinence, f/c, n/v/d, HA, dizziness, CP, SOB. Last BM 2 days ago. Passing flatus. No black/bloody stools.  DDx: constipation,  intraabdominal infection, AAA, uti  Plan: basic labs, lactate, CTA/P, analgesia

## 2021-08-17 NOTE — TELEPHONE ENCOUNTER
Called Trixie to see how she is feeling post Keytruda. She stated that she is feeling ok and that she is expereincing some nausea. Advised her to take Compazine during the day and to take Ativan in the evening.     Trixie has some underlying diarrhea from her radiation therapy treatments but is aware to call clinic if diarrhea becomes worse. She is also aware to call with any changes in her breathing including shortness of breath or cough. She has phone number to call clinic with any questions/concerns. Carine Norton RN,BSN,OCN

## 2021-08-19 NOTE — PROGRESS NOTES
Per chart review, chemo teaching done 8/10.    Page Peraza, BSN, RN, PHN, OCN  Oncology Care Coordinator  [facility]

## 2021-09-24 NOTE — PROGRESS NOTES
Medical Assistant Note:  Sara Lehman presents today for blood draw.    Patient seen by provider today: No.   present during visit today: Not Applicable.    Concerns: No Concerns.    Procedure:  Lab draw site: lac, Needle type: bf, Gauge: 23.    Post Assessment:  Labs drawn without difficulty: Yes.    Discharge Plan:  Departure Mode: Ambulatory.    Face to Face Time: 5 min.    Poornima Dumont, CMA

## 2021-09-27 NOTE — Clinical Note
9/27/2021         RE: Sara Lehman  [id number] [facility] [id number]        Dear Colleague,    Thank you for referring your patient, Sara Lehman, to the [facility]. Please see a copy of my visit note below.    Oncology Rooming Note    September 27, 2021 12:39 PM   Sara Lehman is a 80 year old female who presents for:    Chief Complaint   Patient presents with     Oncology Clinic Visit     Initial Vitals: LMP  (LMP Unknown)  Estimated body mass index is 21.87 kg/m  as calculated from the following:    Height as of 2/11/21: 1.524 m (5').    Weight as of 8/16/21: 50.8 kg (112 lb). There is no height or weight on file to calculate BSA.  Data Unavailable Comment: Data Unavailable   No LMP recorded (lmp unknown). Patient is postmenopausal.  Allergies reviewed: Yes  Medications reviewed: Yes    Medications: Medication refills not needed today.  Pharmacy name entered into [employer]:    [facility] #[id number] - [place], [place] - [address] [place] [address] [facility] #[id number] - [place], [place] - [address] AT [facility] [address]  [facility] #[id number] - [place], [place] - [address] [facility]    Clinical concerns:  NP was notified.      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  Metastatic to lungs liver and biliary and mediastinal lymphadenopathy  Status post radiation to left and right lobe lung metastasis  Currently getting treatment with pembrolizumab    Interval History:  Patient reports overall well she continues to have chronic diarrhea she takes Imodium which helps with diarrhea denies blood in stool denies abdominal pain nausea vomiting denies fever chills sweats    Review of Systems:  14 point ROS of systems including Constitutional, Eyes, Respiratory, Cardiovascular, Gastroenterology, Genitourinary,  Integumentary, Muscularskeletal, Psychiatric were all negative except for pertinent positives noted in my HPI.    Physical Examination:  General: The patient is a pleasant female in no acute distress.  /81   Pulse 96   Temp 98.6  F (37  C)   Resp 16   Ht 1.524 m (5')   Wt 48.4 kg (106 lb 9.6 oz)   LMP  (LMP Unknown)   SpO2 97%   BMI 20.82 kg/m    HEENT: EOMI, PERRL. Sclerae are anicteric. Oral mucosa is pink and moist with no lesions or thrush.   Lymph: Neck is supple with no lymphadenopathy in the cervical or supraclavicular areas.   Heart: Regular rate and rhythm.   Lungs: Clear to auscultation bilaterally.   GI: Bowel sounds present, soft, nontender with no palpable hepatosplenomegaly or masses.   Extremities: No lower extremity edema noted bilaterally.   Skin: No rashes, petechiae, or bruising noted on exposed skin.    Laboratory Data:  CMP results reviewed    Assessment and Plan:    This is a 80-year-old female with     Metastatic anal adenocarcinoma  Stage IV, wild-type kras  -Patient would like to avoid chemotherapy if possible  07/2021-PET scan reveals progression of disease  Patient currently getting treatment with pembrolizumab okay to begin pembrolizumab  Patient is scheduled for PET scan next month and follow-up with Dr. Wisdom      Diarrhea  Chronic issue for the patient even before the start of pembrolizumab  Reports Imodium helps  Continue with Imodium as needed call our clinic with any worsening of diarrhea      Call our clinic with any changes in health condition or questions       ANDRY Alcazar CNP  [facility]     Chart documentation with Dragon Voice recognition Software. Although reviewed after completion, some words and grammatical errors may remain.            Again, thank you for allowing me to participate in the care of your patient.        Sincerely,        ANDRY Alcazar CNP

## 2021-09-27 NOTE — PROGRESS NOTES
Oncology Rooming Note    September 27, 2021 12:39 PM   Sara Lehman is a 80 year old female who presents for:    Chief Complaint   Patient presents with     Oncology Clinic Visit     Initial Vitals: LMP  (LMP Unknown)  Estimated body mass index is 21.87 kg/m  as calculated from the following:    Height as of 2/11/21: 1.524 m (5').    Weight as of 8/16/21: 50.8 kg (112 lb). There is no height or weight on file to calculate BSA.  Data Unavailable Comment: Data Unavailable   No LMP recorded (lmp unknown). Patient is postmenopausal.  Allergies reviewed: Yes  Medications reviewed: Yes    Medications: Medication refills not needed today.  Pharmacy name entered into [facility]:    [facility] #[id number] - [place], [place] - [address] [place] [address] [facility] STORE #[id number] - [place] - [address] AT [facility] [address]  [facility] #[id number] - [place], [place] - [address] [facility]    Clinical concerns:  NP was notified.      Cary Arroyo MA

## 2021-09-27 NOTE — PROGRESS NOTES
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  Metastatic to lungs liver and biliary and mediastinal lymphadenopathy  Status post radiation to left and right lobe lung metastasis  Currently getting treatment with pembrolizumab    Interval History:  Patient reports overall well she continues to have chronic diarrhea she takes Imodium which helps with diarrhea denies blood in stool denies abdominal pain nausea vomiting denies fever chills sweats    Review of Systems:  14 point ROS of systems including Constitutional, Eyes, Respiratory, Cardiovascular, Gastroenterology, Genitourinary, Integumentary, Muscularskeletal, Psychiatric were all negative except for pertinent positives noted in my HPI.    Physical Examination:  General: The patient is a pleasant female in no acute distress.  /81   Pulse 96   Temp 98.6  F (37  C)   Resp 16   Ht 1.524 m (5')   Wt 48.4 kg (106 lb 9.6 oz)   LMP  (LMP Unknown)   SpO2 97%   BMI 20.82 kg/m    HEENT: EOMI, PERRL. Sclerae are anicteric. Oral mucosa is pink and moist with no lesions or thrush.   Lymph: Neck is supple with no lymphadenopathy in the cervical or supraclavicular areas.   Heart: Regular rate and rhythm.   Lungs: Clear to auscultation bilaterally.   GI: Bowel sounds present, soft, nontender with no palpable hepatosplenomegaly or masses.   Extremities: No lower extremity edema noted bilaterally.   Skin: No rashes, petechiae, or bruising noted on exposed skin.    Laboratory Data:  CMP results reviewed    Assessment and Plan:    This is a 80-year-old female with     Metastatic anal adenocarcinoma  Stage IV, wild-type kras  -Patient would like to avoid chemotherapy if possible  07/2021-PET scan reveals progression of disease  Patient currently getting treatment with pembrolizumab okay to begin pembrolizumab  Patient is scheduled for PET scan next month and follow-up with  Dr. Wisdom      Diarrhea  Chronic issue for the patient even before the start of pembrolizumab  Reports Imodium helps  Continue with Imodium as needed call our clinic with any worsening of diarrhea      Call our clinic with any changes in health condition or questions       ANDRY Alcazar [facility]     Chart documentation with Dragon Voice recognition Software. Although reviewed after completion, some words and grammatical errors may remain.

## 2021-09-27 NOTE — PROGRESS NOTES
Infusion Nursing Note:  Sara Lehman presents today for C2D1 Keytruda.    Patient seen by provider today: Yes: Rasta Patel NP   present during visit today: Not Applicable.    Note: Per Rasta Patel NP, ok to proceed with keytruda today. Orders placed to draw CBC today, but no need to wait for results prior to proceeding with keytruda per Rasta. Confirmed patient is not taking any oral steroids.      Intravenous Access:  Labs drawn without difficulty.  Peripheral IV placed.    Treatment Conditions:  Lab Results   Component Value Date    HGB 12.4 09/27/2021    WBC 6.3 09/27/2021    ANEU 3.0 03/29/2021    ANEUTAUTO 4.8 09/27/2021     09/27/2021      Lab Results   Component Value Date     09/24/2021    POTASSIUM 4.3 09/24/2021    MAG 2.1 05/06/2019    CR 0.84 09/24/2021    ALTHEA 9.3 09/24/2021    BILITOTAL 0.5 09/24/2021    ALBUMIN 3.1 (L) 09/24/2021    ALT 14 09/24/2021    AST 22 09/24/2021     Results reviewed, labs MET treatment parameters, ok to proceed with treatment.      Post Infusion Assessment:  Patient tolerated infusion without incident.  Blood return noted pre and post infusion.  Site patent and intact, free from redness, edema or discomfort.  No evidence of extravasations.  Access discontinued per protocol.       Discharge Plan:   Discharge instructions reviewed with: Patient.  Patient and/or family verbalized understanding of discharge instructions and all questions answered.  AVS to patient via [employer].  Patient will return 11/5 & 11/8 for next appointment.   Patient discharged in stable condition accompanied by: daughter.  Departure Mode: Ambulatory.      Taylor Eller RN

## 2021-11-02 NOTE — PROGRESS NOTES
Trixie is a 81 year old who is being evaluated via a billable video visit.  Patient reviewed medications and allergies via [employer] e-check in.       How would you like to obtain your AVS? [employer]  If the video visit is dropped, the invitation should be resent by: Text to cell phone: [phone]  Will anyone else be joining your video visit? Yes: daughter Riya. How would they like to receive their invitation? Text to cell phone: n/a        [facility]    Hematology/Oncology Established Patient Follow-up Note      Today's Date: 11/8/21    Reason for Follow-up: Metastatic anal adenocarcinoma.    Video visit duration (Doximity): 15 minutes    HISTORY OF PRESENT ILLNESS: Sara Lehman is an 81 year old female who presents with the following oncologic history:  1.  4/1/2019: Initially presented to her primary care provider with complaints of hemorrhoids intermittently for 2 months with associated pain and itching as well as blood in the underwear at night and intermixed with stool, no melena.  No associated weight loss or night sweats.  Reports occasional constipation.  Physical exam by primary care provider showed an anal mass about 1 inch in size with perianal erythema and skin breakdown.  2.  4/9/2019: Colonoscopy showed a perianal mass in the left lateral quadrant, severe diverticulosis in the sigmoid colon, descending colon, and ascending colon.  Biopsy of the anal mass showed invasive moderately differentiated adenocarcinoma with normal mismatch repair protein expression.  Specimen consisted of multiple fragments of necrotic tissue with dysplastic appearing glands with foci of invasion.  Surface mucosa is not present and therefore precludes differentiation between colorectal carcinoma or perianal glandular carcinoma.  Depth of invasion cannot be assessed.  KRAS wild-type.  3.  4/16/2019: CT chest/abdomen/pelvis with contrast showed a 1.3 cm left upper lobe lung nodule concerning for metastatic  disease as well as 2 tiny nodules in the right lower lobe near the diaphragm.  Diffusely aneurysmal abdominal aorta measures up to 3.2 cm.  Prominent bilateral inguinal adenopathy with a lymph node in the upper right thigh adjacent to the common femoral vessels measures 2.9 x 2.3 cm.  Lymph node in the left medial upper thigh adjacent to the common femoral vessels measures 2.6 x 3.5 cm.  No evidence of obstruction.  Bony structures showed no destruction.  4.  4/19/2019: Consultation with Dr. Miller Casas.  Due to high suspicion of metastatic disease to the lung and bilateral inguinal regions, patient referred to medical oncology and radiation oncology.  5.  4/24/2019: PET/CT scan showed hypermetabolic posterior left upper lobe mid chest nodule adjacent to the major fissure measuring 1.3 x 1.2 cm and emphysematous changes.  Bilateral renal cysts present.  Nonobstructing stone of the lower right kidney measures 0.3 cm.  Nodular thickening of the left adrenal is 0.7 cm.  Intense hypermetabolism at the anal level with heterogeneous appearing soft tissue with SUV max 19.5.  Enlarged inguinal hypermetabolic lymph nodes on the right measuring 2.9 x 2.1 cm and on the left 3.6 x 2.4 cm.  A distal infrarenal abdominal aorta measures 3.4 cm.  6.  5/6/2019: Left lung mass biopsy performed and showed discordant immunohistochemical findings.  Lesion showed a strong reactivity for cytokeratin 7 and focal staining for P40.  Patient's anal adenocarcinoma reportedly positive for both immunohistochemical staining pattern not typical for primary lung tumor and metastatic disease cannot be excluded.  7.  5/9/2019-6/25/2019: Completed radiation therapy to the anal cancer and inguinal lymph nodes.  She received concurrent radiosensitizing chemotherapy with capecitabine.  8.  8/28/2019: PET/CT scan showed hypermetabolic nodule in the left upper lobe measuring 1.5 x 1.4 cm with SUV max 10.5, increased in size and metabolic activity from 1.4  x 1.2 cm with SUV max 9.5 on 4/24/2019 PET/CT scan.  A 0.5 cm pleural-based nodule with low metabolic activity in the left lower lobe is unchanged.  A 3.4 cm infrarenal abdominal aortic aneurysm is present.  The previously noted hypermetabolic anal mass has significantly improved with SUV max 5.1, decreased from SUV max 24.6, consistent with response to treatment.  The bilateral inguinal lymphadenopathy has markedly improved on the current study with nodes measuring up to 1.6 x 1.1 cm with SUV max 3.5, decreased from 4 x 2.6 cm with SUV max 14, consistent with response to treatment.  9.  10/21/2019: Completed stereotactic radiation to the left upper lobe lung nodule/metastasis in 5 fractions under the care of Dr. Clay Monroy.  10. 12/16/2019: PET scan showed new hypermetabolic medial right middle lobe pulmonary nodule; left upper lobe nodule decreased in size and metabolism; improvement of bilateral inguinal adenopathy with decreased sizes and metabolism; hypermetabolism slightly increased at the left adrenal nodule but stable in size; decreased hypermetabolism at anal level; new small focal hypermetabolism within muscle anterior to left hip joint with no mass effect.  11. 2/07/2020: Completed stereotactic radiation to the right middle lobe nodule and left adrenal nodule in 5 fractions.  12. 4/6/2020: PET/CT scan showed a 0.8 cm nodular density near the right lung apex with no significant hypermetabolism, unchanged from the prior exam.  The previously seen hypermetabolic right middle lobe pulmonary nodule is not seen on the current exam.  Small hypermetabolic left adrenal nodule measuring 1 cm with SUV max 3.3 is unchanged.  Infrarenal abdominal aortic aneurysm also not significantly changed and measures 3.3 x 3.6 cm containing a mild amount of mural thrombus.  The small hypermetabolic focus within the muscle anterior to the left hip joint is unchanged with SUV max 3.6.  13. 7/13/2020: PET/CT scan showed interval  decrease in size of the right upper lobe lung nodule with no associated FDG activity; posttreatment changes in the lateral left upper lung; no new lung lesions; hypermetabolic left suprahilar area with no associated CT abnormality, possibly vascular in nature; no adenopathy; stable left adrenal nodule; indeterminate FDG activity in region of anal canal.   14. 10/26/2020: PET/CT scan showed FDG avid lymph nodes in left hilar region (measuring 1.1 cm with SUV 6.3; anterior to this is another 1.4 x 0.7 cm with SUV 8.9) concerning for progressive metastatic disease; remaining scan negative.  15. 1/25/2021: PET scan showed signs concerning for progressive metastatic disease, with increased FDG uptake within a left hilar lymph node, several new FDG-avid mediastinal lymph nodes; unchanged FDG-avid 10 mm left adrenal nodule; no new or enlarging lung nodules; unchanged 3.2 cm infrarenal AAA.  16. 3/29/2021: CT chest showed stable mild precarinal (1 cm) and left hilar (8 mm) lymphadenopathy; no new or enlarging lymphadenopathy.  17. 7/26/2021: PET scan showed increased size and metabolic activity of mediastinal and left hilar lymph nodes, new multifocal metastatic disease in liver; new left level 3 cervical lymph node metastases, stable FDG avid left adrenal nodule.  18. 8/16/2021: Started pembrolizumab every 42 days with palliative intent.  19. 11/01/2021: PET scan showed overall mixed response: resolved hepatic metastases in left lobe; increased size of FDG avid metastatic liver lesions in right lobe; decreased size and avidity of mediastinal and left hilar lymph nodes; decreased size and resolution of avidity of left level 3 cervical lymph nodes; sigmoid wall thickening and mild FDG avidity suggestive of colitis.    INTERIM HISTORY:  Trixie reports loose stools but she is not sure if it is worse since starting pembrolizumab.  She has about one bowel movement per day. She has occasional sweats at night.  No new pain or  fever.    REVIEW OF SYSTEMS:   14 point ROS was reviewed and is negative other than as noted above in HPI.       HOME MEDICATIONS:  Current Outpatient Medications   Medication Sig Dispense Refill     acetaminophen (TYLENOL) 325 MG tablet Take 2 tablets (650 mg) by mouth every 4 hours as needed for mild pain or fever       amLODIPine (NORVASC) 5 MG tablet Take 1 tablet (5 mg) by mouth daily 90 tablet 3     ferrous sulfate (FEROSUL) 325 (65 Fe) MG tablet Take 1 tablet (325 mg) by mouth daily (with breakfast) (do not take with calcium or dairy products) 90 tablet 3     LORazepam (ATIVAN) 0.5 MG tablet Take 1 tablet (0.5 mg) by mouth every 4 hours as needed (Anxiety, Nausea/Vomiting or Sleep) 30 tablet 2     metoprolol succinate ER (TOPROL-XL) 25 MG 24 hr tablet Take 0.5 tablets (12.5 mg) by mouth daily 45 tablet 3     ondansetron (ZOFRAN) 8 MG tablet Take 8 mg by mouth every 8 hours as needed for nausea       prochlorperazine (COMPAZINE) 10 MG tablet Take 1 tablet (10 mg) by mouth every 6 hours as needed (Nausea/Vomiting) 30 tablet 2     SUMAtriptan (IMITREX) 50 MG tablet Take 1 tablet (50 mg) by mouth at onset of headache for migraine 18 tablet 1         ALLERGIES:  Allergies   Allergen Reactions     Ace Inhibitors Cough     Aleve [Naproxen Sodium] GI Disturbance     Asa Buff (Mag [Aspirin Buffered] GI Disturbance     Atorvastatin Calcium GI Disturbance     Celebrex [Celecoxib] GI Disturbance     Codeine Sulfate Hives     Pravastatin GI Disturbance     Simvastatin GI Disturbance         PAST MEDICAL HISTORY:  Past Medical History:   Diagnosis Date     Benign essential hypertension      Malignant neoplasm of anal canal (H)     metastatic; s/p chemoradiation and stereotactic radiation of lung mets     Migraines          PAST SURGICAL HISTORY:  Past Surgical History:   Procedure Laterality Date     ARTHROSCOPY KNEE Right      COLONOSCOPY N/A 04/09/2019    Procedure: COMBINED COLONOSCOPY, SINGLE OR MULTIPLE  BIOPSY/POLYPECTOMY BY BIOPSY;  Surgeon: Saleem Shaw MD;  Location:  GI     EXTRACORPOREAL SHOCK WAVE LITHOTRIPSY (ESWL)       TUBAL LIGATION           SOCIAL HISTORY:  Social History     Socioeconomic History     Marital status:      Spouse name: Not on file     Number of children: Not on file     Years of education: Not on file     Highest education level: Not on file   Occupational History     Occupation: Retired - homemaker, temp work   Tobacco Use     Smoking status: Former Smoker     Packs/day: 2.00     Years: 40.00     Pack years: 80.00     Types: Cigarettes     Quit date: 2016     Years since quittin.8     Smokeless tobacco: Never Used   Substance and Sexual Activity     Alcohol use: Yes     Drug use: No     Sexual activity: Yes     Partners: Male     Birth control/protection: Post-menopausal   Other Topics Concern     Parent/sibling w/ CABG, MI or angioplasty before 65F 55M? Not Asked   Social History Narrative    Maried.    Lives at home with  - fully independent.     4 adult children.    8 grandchildren.    Active, walks when able.      Social Determinants of Health     Financial Resource Strain:      Difficulty of Paying Living Expenses:    Food Insecurity:      Worried About Running Out of Food in the Last Year:      Ran Out of Food in the Last Year:    Transportation Needs:      Lack of Transportation (Medical):      Lack of Transportation (Non-Medical):    Physical Activity:      Days of Exercise per Week:      Minutes of Exercise per Session:    Stress:      Feeling of Stress :    Social Connections:      Frequency of Communication with Friends and Family:      Frequency of Social Gatherings with Friends and Family:      Attends [employer] Services:      Active Member of Clubs or Organizations:      Attends Club or Organization Meetings:      Marital Status:    Intimate Partner Violence:      Fear of Current or Ex-Partner:      Emotionally Abused:      Physically Abused:       Sexually Abused:          FAMILY HISTORY:  Family History   Problem Relation Age of Onset     Uterine Cancer Mother      Hypertension Maternal Grandmother      Breast Cancer Daughter      Diabetes Type 2  No family hx of      Myocardial Infarction No family hx of      Cerebrovascular Disease No family hx of      Ovarian Cancer No family hx of      Colon Cancer No family hx of          PHYSICAL EXAM:  Vital signs:  Not taken.  ECO  GENERAL: No acute distress.  EYES: No scleral icterus. No overt erythema.  RESPIRATORY: No cough.  No labored breathing.  MUSCULOSKELETAL: Range of motion in the neck, shoulders, and arms appear normal.  SKIN: No overt rashes, discolorations, or lesions over the face and neck.  NEUROLOGIC: Alert.  No overt tremors.  PSYCHIATRIC: Normal affect and mood.  Does not appear anxious.    LABS:  CBC RESULTS: Recent Labs   Lab Test 21  1320   WBC 6.3   RBC 3.98   HGB 12.4   HCT 38.4   MCV 97   MCH 31.2   MCHC 32.3   RDW 12.8        Last Comprehensive Metabolic Panel:  Sodium   Date Value Ref Range Status   2021 139 133 - 144 mmol/L Final   2021 140 133 - 144 mmol/L Final     Potassium   Date Value Ref Range Status   2021 3.8 3.4 - 5.3 mmol/L Final   2021 3.9 3.4 - 5.3 mmol/L Final     Chloride   Date Value Ref Range Status   2021 108 94 - 109 mmol/L Final   2021 109 94 - 109 mmol/L Final     Carbon Dioxide   Date Value Ref Range Status   2021 27 20 - 32 mmol/L Final     Carbon Dioxide (CO2)   Date Value Ref Range Status   2021 25 20 - 32 mmol/L Final     Anion Gap   Date Value Ref Range Status   2021 6 3 - 14 mmol/L Final   2021 4 3 - 14 mmol/L Final     Glucose   Date Value Ref Range Status   2021 93 70 - 99 mg/dL Final   2021 93 70 - 99 mg/dL Final     Urea Nitrogen   Date Value Ref Range Status   2021 15 7 - 30 mg/dL Final   2021 18 7 - 30 mg/dL Final     Creatinine   Date Value Ref Range  Status   11/01/2021 0.81 0.52 - 1.04 mg/dL Final   03/29/2021 0.82 0.52 - 1.04 mg/dL Final     GFR Estimate   Date Value Ref Range Status   11/01/2021 69 >60 mL/min/1.73m2 Final     Comment:     As of July 11, 2021, eGFR is calculated by the CKD-EPI creatinine equation, without race adjustment. eGFR can be influenced by muscle mass, exercise, and diet. The reported eGFR is an estimation only and is only applicable if the renal function is stable.   03/29/2021 81 >60 mL/min/[1.73_m2] Final     Calcium   Date Value Ref Range Status   11/01/2021 9.4 8.5 - 10.1 mg/dL Final   03/29/2021 9.3 8.5 - 10.1 mg/dL Final     Bilirubin Total   Date Value Ref Range Status   11/01/2021 0.4 0.2 - 1.3 mg/dL Final   03/29/2021 0.2 0.2 - 1.3 mg/dL Final     Alkaline Phosphatase   Date Value Ref Range Status   11/01/2021 89 40 - 150 U/L Final   03/29/2021 73 40 - 150 U/L Final     ALT   Date Value Ref Range Status   11/01/2021 14 0 - 50 U/L Final   03/29/2021 21 0 - 50 U/L Final     AST   Date Value Ref Range Status   11/01/2021 21 0 - 45 U/L Final   03/29/2021 18 0 - 45 U/L Final     PATHOLOGY:  None new since prior visit.    IMAGING:  Reviewed as per HPI.    ASSESSMENT/PLAN:  Sara Lehman is a 81 year old female with the following issues:  1.  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  Left lung and right middle lobe lung metastases s/p radiation  3. Left adrenal nodule, stable  4. Hilar and mediastinal hypermetabolic lymphadenopathy, improved  5. Metastases to liver, mixed response  --I personally reviewed the PET scan from 11/1/2021 and discussed the results with Trixie. Overall, after 2 doses of pembrolizumab, she has a mixed response radiographically.  The left hepatic metastases have resolved, yet the right lobe lesions are slightly larger and it is uncertain if these right liver metastases represent pseudo-progression or true progrssion.  The mediastinal and  left hilar lymph nodes, left cervical lymph nodes have improved. The left adrenal nodule is stable. The sigmoid wall thickening is suggestive of colitis. However, she is not having multiple episodes of loose stool; only one per day.  --I recommended she continue with pembrolizumab and repeat PET/CT prior to cycle 5.    6. Iron deficiency anemia  -Iron deficiency anemia now resolved.  -Hgb now normal.    Rhona Wisdom MD  Hematology/Oncology  [facility]    Total time spent: 30 minutes in patient evaluation, counseling, documentation, and coordination of care.

## 2021-11-08 NOTE — PROGRESS NOTES
Infusion Nursing Note:  Sara Lehman presents today for C3D1 Keytruda.    Patient seen by provider today: Yes: Virtual visit with Dr. Wisdom   present during visit today: Not Applicable.    Note: Ok to proceed with treatment today per Dr. Wisdom. Patient/daughter confirmed she is not taking any oral steroids.       Intravenous Access:  Peripheral IV placed.    Treatment Conditions:  Lab Results   Component Value Date     11/01/2021    POTASSIUM 3.8 11/01/2021    MAG 2.1 05/06/2019    CR 0.81 11/01/2021    ALTHEA 9.4 11/01/2021    BILITOTAL 0.4 11/01/2021    ALBUMIN 3.1 (L) 11/01/2021    ALT 14 11/01/2021    AST 21 11/01/2021     TSH: 1.04  Results reviewed, labs MET treatment parameters, ok to proceed with treatment.      Post Infusion Assessment:  Patient tolerated infusion without incident.  Blood return noted pre and post infusion.  Site patent and intact, free from redness, edema or discomfort.  No evidence of extravasations.  Access discontinued per protocol.       Discharge Plan:   Discharge instructions reviewed with: Patient.  Patient and/or family verbalized understanding of discharge instructions and all questions answered.  AVS to patient via [employer].  Patient will return in 6 weeks for next appointment-not yet scheduled. Patient/daughter aware to watch for appointments.   Patient discharged in stable condition accompanied by: daughter.  Departure Mode: Ambulatory.      Taylor Eller RN

## 2021-11-08 NOTE — PATIENT INSTRUCTIONS
1. Proceed with pembrolizumab today 11/8.  2. Arrange for pembrolizumab every 42 days through 1/31/2022.  3. Lab draw every 42 days.  4. RTC NP with cycle 4 in 42 days.  5. RTC MD prior to 1/31/2022 with PET scan prior to visit.

## 2021-11-08 NOTE — LETTER
11/8/2021         RE: Sara Lehman  [id number] [facility] [id number]        Dear Colleague,    Thank you for referring your patient, Sara Lehman, to the [facility]. Please see a copy of my visit note below.    Trixie is a 81 year old who is being evaluated via a billable video visit.  Patient reviewed medications and allergies via EqualEyes e-check in.       How would you like to obtain your AVS? [employer]  If the video visit is dropped, the invitation should be resent by: Text to cell phone: [phone]  Will anyone else be joining your video visit? Yes: daughter Riya. How would they like to receive their invitation? Text to cell phone: n/a  {If patient encounters technical issues they should call [phone] :[phone]}      [facility]    Hematology/Oncology Established Patient Follow-up Note      Today's Date: 11/8/21    Reason for Follow-up: Metastatic anal adenocarcinoma.    Video visit duration (Doximity): 15 minutes    HISTORY OF PRESENT ILLNESS: Sara Lehman is an 81 year old female who presents with the following oncologic history:  1.  4/1/2019: Initially presented to her primary care provider with complaints of hemorrhoids intermittently for 2 months with associated pain and itching as well as blood in the underwear at night and intermixed with stool, no melena.  No associated weight loss or night sweats.  Reports occasional constipation.  Physical exam by primary care provider showed an anal mass about 1 inch in size with perianal erythema and skin breakdown.  2.  4/9/2019: Colonoscopy showed a perianal mass in the left lateral quadrant, severe diverticulosis in the sigmoid colon, descending colon, and ascending colon.  Biopsy of the anal mass showed invasive moderately differentiated adenocarcinoma with normal mismatch repair protein expression.  Specimen consisted of multiple fragments of necrotic tissue with dysplastic appearing  glands with foci of invasion.  Surface mucosa is not present and therefore precludes differentiation between colorectal carcinoma or perianal glandular carcinoma.  Depth of invasion cannot be assessed.  KRAS wild-type.  3.  4/16/2019: CT chest/abdomen/pelvis with contrast showed a 1.3 cm left upper lobe lung nodule concerning for metastatic disease as well as 2 tiny nodules in the right lower lobe near the diaphragm.  Diffusely aneurysmal abdominal aorta measures up to 3.2 cm.  Prominent bilateral inguinal adenopathy with a lymph node in the upper right thigh adjacent to the common femoral vessels measures 2.9 x 2.3 cm.  Lymph node in the left medial upper thigh adjacent to the common femoral vessels measures 2.6 x 3.5 cm.  No evidence of obstruction.  Bony structures showed no destruction.  4.  4/19/2019: Consultation with Dr. Miller Casas.  Due to high suspicion of metastatic disease to the lung and bilateral inguinal regions, patient referred to medical oncology and radiation oncology.  5.  4/24/2019: PET/CT scan showed hypermetabolic posterior left upper lobe mid chest nodule adjacent to the major fissure measuring 1.3 x 1.2 cm and emphysematous changes.  Bilateral renal cysts present.  Nonobstructing stone of the lower right kidney measures 0.3 cm.  Nodular thickening of the left adrenal is 0.7 cm.  Intense hypermetabolism at the anal level with heterogeneous appearing soft tissue with SUV max 19.5.  Enlarged inguinal hypermetabolic lymph nodes on the right measuring 2.9 x 2.1 cm and on the left 3.6 x 2.4 cm.  A distal infrarenal abdominal aorta measures 3.4 cm.  6.  5/6/2019: Left lung mass biopsy performed and showed discordant immunohistochemical findings.  Lesion showed a strong reactivity for cytokeratin 7 and focal staining for P40.  Patient's anal adenocarcinoma reportedly positive for both immunohistochemical staining pattern not typical for primary lung tumor and metastatic disease cannot be  excluded.  7.  5/9/2019-6/25/2019: Completed radiation therapy to the anal cancer and inguinal lymph nodes.  She received concurrent radiosensitizing chemotherapy with capecitabine.  8. 8/28/2019: PET/CT scan showed hypermetabolic nodule in the left upper lobe measuring 1.5 x 1.4 cm with SUV max 10.5, increased in size and metabolic activity from 1.4 x 1.2 cm with SUV max 9.5 on 4/24/2019 PET/CT scan.  A 0.5 cm pleural-based nodule with low metabolic activity in the left lower lobe is unchanged.  A 3.4 cm infrarenal abdominal aortic aneurysm is present.  The previously noted hypermetabolic anal mass has significantly improved with SUV max 5.1, decreased from SUV max 24.6, consistent with response to treatment.  The bilateral inguinal lymphadenopathy has markedly improved on the current study with nodes measuring up to 1.6 x 1.1 cm with SUV max 3.5, decreased from 4 x 2.6 cm with SUV max 14, consistent with response to treatment.  9.  10/21/2019: Completed stereotactic radiation to the left upper lobe lung nodule/metastasis in 5 fractions under the care of Dr. Clay Monroy.  10. 12/16/2019: PET scan showed new hypermetabolic medial right middle lobe pulmonary nodule; left upper lobe nodule decreased in size and metabolism; improvement of bilateral inguinal adenopathy with decreased sizes and metabolism; hypermetabolism slightly increased at the left adrenal nodule but stable in size; decreased hypermetabolism at anal level; new small focal hypermetabolism within muscle anterior to left hip joint with no mass effect.  11. 2/07/2020: Completed stereotactic radiation to the right middle lobe nodule and left adrenal nodule in 5 fractions.  12. 4/6/2020: PET/CT scan showed a 0.8 cm nodular density near the right lung apex with no significant hypermetabolism, unchanged from the prior exam.  The previously seen hypermetabolic right middle lobe pulmonary nodule is not seen on the current exam.  Small hypermetabolic left  adrenal nodule measuring 1 cm with SUV max 3.3 is unchanged.  Infrarenal abdominal aortic aneurysm also not significantly changed and measures 3.3 x 3.6 cm containing a mild amount of mural thrombus.  The small hypermetabolic focus within the muscle anterior to the left hip joint is unchanged with SUV max 3.6.  13. 7/13/2020: PET/CT scan showed interval decrease in size of the right upper lobe lung nodule with no associated FDG activity; posttreatment changes in the lateral left upper lung; no new lung lesions; hypermetabolic left suprahilar area with no associated CT abnormality, possibly vascular in nature; no adenopathy; stable left adrenal nodule; indeterminate FDG activity in region of anal canal.   14. 10/26/2020: PET/CT scan showed FDG avid lymph nodes in left hilar region (measuring 1.1 cm with SUV 6.3; anterior to this is another 1.4 x 0.7 cm with SUV 8.9) concerning for progressive metastatic disease; remaining scan negative.  15. 1/25/2021: PET scan showed signs concerning for progressive metastatic disease, with increased FDG uptake within a left hilar lymph node, several new FDG-avid mediastinal lymph nodes; unchanged FDG-avid 10 mm left adrenal nodule; no new or enlarging lung nodules; unchanged 3.2 cm infrarenal AAA.  16. 3/29/2021: CT chest showed stable mild precarinal (1 cm) and left hilar (8 mm) lymphadenopathy; no new or enlarging lymphadenopathy.  17. 7/26/2021: PET scan showed increased size and metabolic activity of mediastinal and left hilar lymph nodes, new multifocal metastatic disease in liver; new left level 3 cervical lymph node metastases, stable FDG avid left adrenal nodule.  18. 8/16/2021: Started pembrolizumab every 42 days with palliative intent.  19. 11/01/2021: PET scan showed overall mixed response: resolved hepatic metastases in left lobe; increased size of FDG avid metastatic liver lesions in right lobe; decreased size and avidity of mediastinal and left hilar lymph nodes;  decreased size and resolution of avidity of left level 3 cervical lymph nodes; sigmoid wall thickening and mild FDG avidity suggestive of colitis.    INTERIM HISTORY:  Trixie reports loose stools but she is not sure if it is worse since starting pembrolizumab.  She has about one bowel movement per day. She has occasional sweats at night.  No new pain or fever.    REVIEW OF SYSTEMS:   14 point ROS was reviewed and is negative other than as noted above in HPI.       HOME MEDICATIONS:  Current Outpatient Medications   Medication Sig Dispense Refill     acetaminophen (TYLENOL) 325 MG tablet Take 2 tablets (650 mg) by mouth every 4 hours as needed for mild pain or fever       amLODIPine (NORVASC) 5 MG tablet Take 1 tablet (5 mg) by mouth daily 90 tablet 3     ferrous sulfate (FEROSUL) 325 (65 Fe) MG tablet Take 1 tablet (325 mg) by mouth daily (with breakfast) (do not take with calcium or dairy products) 90 tablet 3     LORazepam (ATIVAN) 0.5 MG tablet Take 1 tablet (0.5 mg) by mouth every 4 hours as needed (Anxiety, Nausea/Vomiting or Sleep) 30 tablet 2     metoprolol succinate ER (TOPROL-XL) 25 MG 24 hr tablet Take 0.5 tablets (12.5 mg) by mouth daily 45 tablet 3     ondansetron (ZOFRAN) 8 MG tablet Take 8 mg by mouth every 8 hours as needed for nausea       prochlorperazine (COMPAZINE) 10 MG tablet Take 1 tablet (10 mg) by mouth every 6 hours as needed (Nausea/Vomiting) 30 tablet 2     SUMAtriptan (IMITREX) 50 MG tablet Take 1 tablet (50 mg) by mouth at onset of headache for migraine 18 tablet 1         ALLERGIES:  Allergies   Allergen Reactions     Ace Inhibitors Cough     Aleve [Naproxen Sodium] GI Disturbance     Asa Buff (Mag [Aspirin Buffered] GI Disturbance     Atorvastatin Calcium GI Disturbance     Celebrex [Celecoxib] GI Disturbance     Codeine Sulfate Hives     Pravastatin GI Disturbance     Simvastatin GI Disturbance         PAST MEDICAL HISTORY:  Past Medical History:   Diagnosis Date     Benign essential  hypertension      Malignant neoplasm of anal canal (H)     metastatic; s/p chemoradiation and stereotactic radiation of lung mets     Migraines          PAST SURGICAL HISTORY:  Past Surgical History:   Procedure Laterality Date     ARTHROSCOPY KNEE Right      COLONOSCOPY N/A 2019    Procedure: COMBINED COLONOSCOPY, SINGLE OR MULTIPLE BIOPSY/POLYPECTOMY BY BIOPSY;  Surgeon: Saleem Shaw MD;  Location:  GI     EXTRACORPOREAL SHOCK WAVE LITHOTRIPSY (ESWL)       TUBAL LIGATION           SOCIAL HISTORY:  Social History     Socioeconomic History     Marital status:      Spouse name: Not on file     Number of children: Not on file     Years of education: Not on file     Highest education level: Not on file   Occupational History     Occupation: Retired - homemaker, temp work   Tobacco Use     Smoking status: Former Smoker     Packs/day: 2.00     Years: 40.00     Pack years: 80.00     Types: Cigarettes     Quit date: 2016     Years since quittin.8     Smokeless tobacco: Never Used   Substance and Sexual Activity     Alcohol use: Yes     Drug use: No     Sexual activity: Yes     Partners: Male     Birth control/protection: Post-menopausal   Other Topics Concern     Parent/sibling w/ CABG, MI or angioplasty before 65F 55M? Not Asked   Social History Narrative    Maried.    Lives at home with  - fully independent.     4 adult children.    8 grandchildren.    Active, walks when able.      Social Determinants of Health     Financial Resource Strain:      Difficulty of Paying Living Expenses:    Food Insecurity:      Worried About Running Out of Food in the Last Year:      Ran Out of Food in the Last Year:    Transportation Needs:      Lack of Transportation (Medical):      Lack of Transportation (Non-Medical):    Physical Activity:      Days of Exercise per Week:      Minutes of Exercise per Session:    Stress:      Feeling of Stress :    Social Connections:      Frequency of Communication with  Friends and Family:      Frequency of Social Gatherings with Friends and Family:      Attends Islam Services:      Active Member of Clubs or Organizations:      Attends Club or Organization Meetings:      Marital Status:    Intimate Partner Violence:      Fear of Current or Ex-Partner:      Emotionally Abused:      Physically Abused:      Sexually Abused:          FAMILY HISTORY:  Family History   Problem Relation Age of Onset     Uterine Cancer Mother      Hypertension Maternal Grandmother      Breast Cancer Daughter      Diabetes Type 2  No family hx of      Myocardial Infarction No family hx of      Cerebrovascular Disease No family hx of      Ovarian Cancer No family hx of      Colon Cancer No family hx of          PHYSICAL EXAM:  Vital signs:  Not taken.  ECO  GENERAL: No acute distress.  EYES: No scleral icterus. No overt erythema.  RESPIRATORY: No cough.  No labored breathing.  MUSCULOSKELETAL: Range of motion in the neck, shoulders, and arms appear normal.  SKIN: No overt rashes, discolorations, or lesions over the face and neck.  NEUROLOGIC: Alert.  No overt tremors.  PSYCHIATRIC: Normal affect and mood.  Does not appear anxious.    LABS:  CBC RESULTS: Recent Labs   Lab Test 21  1320   WBC 6.3   RBC 3.98   HGB 12.4   HCT 38.4   MCV 97   MCH 31.2   MCHC 32.3   RDW 12.8        Last Comprehensive Metabolic Panel:  Sodium   Date Value Ref Range Status   2021 139 133 - 144 mmol/L Final   2021 140 133 - 144 mmol/L Final     Potassium   Date Value Ref Range Status   2021 3.8 3.4 - 5.3 mmol/L Final   2021 3.9 3.4 - 5.3 mmol/L Final     Chloride   Date Value Ref Range Status   2021 108 94 - 109 mmol/L Final   2021 109 94 - 109 mmol/L Final     Carbon Dioxide   Date Value Ref Range Status   2021 27 20 - 32 mmol/L Final     Carbon Dioxide (CO2)   Date Value Ref Range Status   2021 25 20 - 32 mmol/L Final     Anion Gap   Date Value Ref Range Status    11/01/2021 6 3 - 14 mmol/L Final   03/29/2021 4 3 - 14 mmol/L Final     Glucose   Date Value Ref Range Status   11/01/2021 93 70 - 99 mg/dL Final   03/29/2021 93 70 - 99 mg/dL Final     Urea Nitrogen   Date Value Ref Range Status   11/01/2021 15 7 - 30 mg/dL Final   03/29/2021 18 7 - 30 mg/dL Final     Creatinine   Date Value Ref Range Status   11/01/2021 0.81 0.52 - 1.04 mg/dL Final   03/29/2021 0.82 0.52 - 1.04 mg/dL Final     GFR Estimate   Date Value Ref Range Status   11/01/2021 69 >60 mL/min/1.73m2 Final     Comment:     As of July 11, 2021, eGFR is calculated by the CKD-EPI creatinine equation, without race adjustment. eGFR can be influenced by muscle mass, exercise, and diet. The reported eGFR is an estimation only and is only applicable if the renal function is stable.   03/29/2021 81 >60 mL/min/[1.73_m2] Final     Calcium   Date Value Ref Range Status   11/01/2021 9.4 8.5 - 10.1 mg/dL Final   03/29/2021 9.3 8.5 - 10.1 mg/dL Final     Bilirubin Total   Date Value Ref Range Status   11/01/2021 0.4 0.2 - 1.3 mg/dL Final   03/29/2021 0.2 0.2 - 1.3 mg/dL Final     Alkaline Phosphatase   Date Value Ref Range Status   11/01/2021 89 40 - 150 U/L Final   03/29/2021 73 40 - 150 U/L Final     ALT   Date Value Ref Range Status   11/01/2021 14 0 - 50 U/L Final   03/29/2021 21 0 - 50 U/L Final     AST   Date Value Ref Range Status   11/01/2021 21 0 - 45 U/L Final   03/29/2021 18 0 - 45 U/L Final     PATHOLOGY:  None new since prior visit.    IMAGING:  Reviewed as per HPI.    ASSESSMENT/PLAN:  Sara Lehman is a 81 year old female with the following issues:  1.  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  Left lung and right middle lobe lung metastases s/p radiation  3. Left adrenal nodule, stable  4. Hilar and mediastinal hypermetabolic lymphadenopathy, improved  5. Metastases to liver, mixed response  --I personally reviewed the PET scan from  11/1/2021 and discussed the results with Trixie. Overall, after 2 doses of pembrolizumab, she has a mixed response radiographically.  The left hepatic metastases have resolved, yet the right lobe lesions are slightly larger and it is uncertain if these right liver metastases represent pseudo-progression or true progrssion.  The mediastinal and left hilar lymph nodes, left cervical lymph nodes have improved. The left adrenal nodule is stable. The sigmoid wall thickening is suggestive of colitis. However, she is not having multiple episodes of loose stool; only one per day.  --I recommended she continue with pembrolizumab and repeat PET/CT prior to cycle 5.    6. Iron deficiency anemia  -Iron deficiency anemia now resolved.  -Hgb now normal.    Rhona Wisdom MD  Hematology/Oncology  [facility]    Total time spent: 30 minutes in patient evaluation, counseling, documentation, and coordination of care.      Again, thank you for allowing me to participate in the care of your patient.        Sincerely,        Rhona Wisdom MD

## 2021-11-08 NOTE — LETTER
11/8/2021         RE: Sara Lehman  [id number] [facility] [place] [address]        Dear Colleague,    Thank you for referring your patient, Sara Lehman, to the [facility]. Please see a copy of my visit note below.    Trixie is a 81 year old who is being evaluated via a billable video visit.  Patient reviewed medications and allergies via iMapData e-check in.       How would you like to obtain your AVS? iMapData  If the video visit is dropped, the invitation should be resent by: Text to cell phone: [phone]  Will anyone else be joining your video visit? Yes: daughter Riya. How would they like to receive their invitation? Text to cell phone: n/a  {If patient encounters technical issues they should call [phone] :[id number]}      [facility]    Hematology/Oncology Established Patient Follow-up Note      Today's Date: 11/8/21    Reason for Follow-up: Metastatic anal adenocarcinoma.    Video visit duration (Doximity): 15 minutes    HISTORY OF PRESENT ILLNESS: Sara Lehman is an 81 year old female who presents with the following oncologic history:  1.  4/1/2019: Initially presented to her primary care provider with complaints of hemorrhoids intermittently for 2 months with associated pain and itching as well as blood in the underwear at night and intermixed with stool, no melena.  No associated weight loss or night sweats.  Reports occasional constipation.  Physical exam by primary care provider showed an anal mass about 1 inch in size with perianal erythema and skin breakdown.  2.  4/9/2019: Colonoscopy showed a perianal mass in the left lateral quadrant, severe diverticulosis in the sigmoid colon, descending colon, and ascending colon.  Biopsy of the anal mass showed invasive moderately differentiated adenocarcinoma with normal mismatch repair protein expression.  Specimen consisted of multiple fragments of necrotic tissue with dysplastic appearing  glands with foci of invasion.  Surface mucosa is not present and therefore precludes differentiation between colorectal carcinoma or perianal glandular carcinoma.  Depth of invasion cannot be assessed.  KRAS wild-type.  3.  4/16/2019: CT chest/abdomen/pelvis with contrast showed a 1.3 cm left upper lobe lung nodule concerning for metastatic disease as well as 2 tiny nodules in the right lower lobe near the diaphragm.  Diffusely aneurysmal abdominal aorta measures up to 3.2 cm.  Prominent bilateral inguinal adenopathy with a lymph node in the upper right thigh adjacent to the common femoral vessels measures 2.9 x 2.3 cm.  Lymph node in the left medial upper thigh adjacent to the common femoral vessels measures 2.6 x 3.5 cm.  No evidence of obstruction.  Bony structures showed no destruction.  4.  4/19/2019: Consultation with Dr. Miller Casas.  Due to high suspicion of metastatic disease to the lung and bilateral inguinal regions, patient referred to medical oncology and radiation oncology.  5.  4/24/2019: PET/CT scan showed hypermetabolic posterior left upper lobe mid chest nodule adjacent to the major fissure measuring 1.3 x 1.2 cm and emphysematous changes.  Bilateral renal cysts present.  Nonobstructing stone of the lower right kidney measures 0.3 cm.  Nodular thickening of the left adrenal is 0.7 cm.  Intense hypermetabolism at the anal level with heterogeneous appearing soft tissue with SUV max 19.5.  Enlarged inguinal hypermetabolic lymph nodes on the right measuring 2.9 x 2.1 cm and on the left 3.6 x 2.4 cm.  A distal infrarenal abdominal aorta measures 3.4 cm.  6.  5/6/2019: Left lung mass biopsy performed and showed discordant immunohistochemical findings.  Lesion showed a strong reactivity for cytokeratin 7 and focal staining for P40.  Patient's anal adenocarcinoma reportedly positive for both immunohistochemical staining pattern not typical for primary lung tumor and metastatic disease cannot be  excluded.  7.  5/9/2019-6/25/2019: Completed radiation therapy to the anal cancer and inguinal lymph nodes.  She received concurrent radiosensitizing chemotherapy with capecitabine.  8. 8/28/2019: PET/CT scan showed hypermetabolic nodule in the left upper lobe measuring 1.5 x 1.4 cm with SUV max 10.5, increased in size and metabolic activity from 1.4 x 1.2 cm with SUV max 9.5 on 4/24/2019 PET/CT scan.  A 0.5 cm pleural-based nodule with low metabolic activity in the left lower lobe is unchanged.  A 3.4 cm infrarenal abdominal aortic aneurysm is present.  The previously noted hypermetabolic anal mass has significantly improved with SUV max 5.1, decreased from SUV max 24.6, consistent with response to treatment.  The bilateral inguinal lymphadenopathy has markedly improved on the current study with nodes measuring up to 1.6 x 1.1 cm with SUV max 3.5, decreased from 4 x 2.6 cm with SUV max 14, consistent with response to treatment.  9.  10/21/2019: Completed stereotactic radiation to the left upper lobe lung nodule/metastasis in 5 fractions under the care of Dr. Clay Monroy.  10. 12/16/2019: PET scan showed new hypermetabolic medial right middle lobe pulmonary nodule; left upper lobe nodule decreased in size and metabolism; improvement of bilateral inguinal adenopathy with decreased sizes and metabolism; hypermetabolism slightly increased at the left adrenal nodule but stable in size; decreased hypermetabolism at anal level; new small focal hypermetabolism within muscle anterior to left hip joint with no mass effect.  11. 2/07/2020: Completed stereotactic radiation to the right middle lobe nodule and left adrenal nodule in 5 fractions.  12. 4/6/2020: PET/CT scan showed a 0.8 cm nodular density near the right lung apex with no significant hypermetabolism, unchanged from the prior exam.  The previously seen hypermetabolic right middle lobe pulmonary nodule is not seen on the current exam.  Small hypermetabolic left  adrenal nodule measuring 1 cm with SUV max 3.3 is unchanged.  Infrarenal abdominal aortic aneurysm also not significantly changed and measures 3.3 x 3.6 cm containing a mild amount of mural thrombus.  The small hypermetabolic focus within the muscle anterior to the left hip joint is unchanged with SUV max 3.6.  13. 7/13/2020: PET/CT scan showed interval decrease in size of the right upper lobe lung nodule with no associated FDG activity; posttreatment changes in the lateral left upper lung; no new lung lesions; hypermetabolic left suprahilar area with no associated CT abnormality, possibly vascular in nature; no adenopathy; stable left adrenal nodule; indeterminate FDG activity in region of anal canal.   14. 10/26/2020: PET/CT scan showed FDG avid lymph nodes in left hilar region (measuring 1.1 cm with SUV 6.3; anterior to this is another 1.4 x 0.7 cm with SUV 8.9) concerning for progressive metastatic disease; remaining scan negative.  15. 1/25/2021: PET scan showed signs concerning for progressive metastatic disease, with increased FDG uptake within a left hilar lymph node, several new FDG-avid mediastinal lymph nodes; unchanged FDG-avid 10 mm left adrenal nodule; no new or enlarging lung nodules; unchanged 3.2 cm infrarenal AAA.  16. 3/29/2021: CT chest showed stable mild precarinal (1 cm) and left hilar (8 mm) lymphadenopathy; no new or enlarging lymphadenopathy.  17. 7/26/2021: PET scan showed increased size and metabolic activity of mediastinal and left hilar lymph nodes, new multifocal metastatic disease in liver; new left level 3 cervical lymph node metastases, stable FDG avid left adrenal nodule.  18. 8/16/2021: Started pembrolizumab every 42 days with palliative intent.  19. 11/01/2021: PET scan showed overall mixed response: resolved hepatic metastases in left lobe; increased size of FDG avid metastatic liver lesions in right lobe; decreased size and avidity of mediastinal and left hilar lymph nodes;  decreased size and resolution of avidity of left level 3 cervical lymph nodes; sigmoid wall thickening and mild FDG avidity suggestive of colitis.    INTERIM HISTORY:  Trixie reports loose stools but she is not sure if it is worse since starting pembrolizumab.  She has about one bowel movement per day. She has occasional sweats at night.  No new pain or fever.    REVIEW OF SYSTEMS:   14 point ROS was reviewed and is negative other than as noted above in HPI.       HOME MEDICATIONS:  Current Outpatient Medications   Medication Sig Dispense Refill     acetaminophen (TYLENOL) 325 MG tablet Take 2 tablets (650 mg) by mouth every 4 hours as needed for mild pain or fever       amLODIPine (NORVASC) 5 MG tablet Take 1 tablet (5 mg) by mouth daily 90 tablet 3     ferrous sulfate (FEROSUL) 325 (65 Fe) MG tablet Take 1 tablet (325 mg) by mouth daily (with breakfast) (do not take with calcium or dairy products) 90 tablet 3     LORazepam (ATIVAN) 0.5 MG tablet Take 1 tablet (0.5 mg) by mouth every 4 hours as needed (Anxiety, Nausea/Vomiting or Sleep) 30 tablet 2     metoprolol succinate ER (TOPROL-XL) 25 MG 24 hr tablet Take 0.5 tablets (12.5 mg) by mouth daily 45 tablet 3     ondansetron (ZOFRAN) 8 MG tablet Take 8 mg by mouth every 8 hours as needed for nausea       prochlorperazine (COMPAZINE) 10 MG tablet Take 1 tablet (10 mg) by mouth every 6 hours as needed (Nausea/Vomiting) 30 tablet 2     SUMAtriptan (IMITREX) 50 MG tablet Take 1 tablet (50 mg) by mouth at onset of headache for migraine 18 tablet 1         ALLERGIES:  Allergies   Allergen Reactions     Ace Inhibitors Cough     Aleve [Naproxen Sodium] GI Disturbance     Asa Buff (Mag [Aspirin Buffered] GI Disturbance     Atorvastatin Calcium GI Disturbance     Celebrex [Celecoxib] GI Disturbance     Codeine Sulfate Hives     Pravastatin GI Disturbance     Simvastatin GI Disturbance         PAST MEDICAL HISTORY:  Past Medical History:   Diagnosis Date     Benign essential  hypertension      Malignant neoplasm of anal canal (H)     metastatic; s/p chemoradiation and stereotactic radiation of lung mets     Migraines          PAST SURGICAL HISTORY:  Past Surgical History:   Procedure Laterality Date     ARTHROSCOPY KNEE Right      COLONOSCOPY N/A 2019    Procedure: COMBINED COLONOSCOPY, SINGLE OR MULTIPLE BIOPSY/POLYPECTOMY BY BIOPSY;  Surgeon: Saleem Shaw MD;  Location:  GI     EXTRACORPOREAL SHOCK WAVE LITHOTRIPSY (ESWL)       TUBAL LIGATION           SOCIAL HISTORY:  Social History     Socioeconomic History     Marital status:      Spouse name: Not on file     Number of children: Not on file     Years of education: Not on file     Highest education level: Not on file   Occupational History     Occupation: Retired - homemaker, temp work   Tobacco Use     Smoking status: Former Smoker     Packs/day: 2.00     Years: 40.00     Pack years: 80.00     Types: Cigarettes     Quit date: 2016     Years since quittin.8     Smokeless tobacco: Never Used   Substance and Sexual Activity     Alcohol use: Yes     Drug use: No     Sexual activity: Yes     Partners: Male     Birth control/protection: Post-menopausal   Other Topics Concern     Parent/sibling w/ CABG, MI or angioplasty before 65F 55M? Not Asked   Social History Narrative    Maried.    Lives at home with  - fully independent.     4 adult children.    8 grandchildren.    Active, walks when able.      Social Determinants of Health     Financial Resource Strain:      Difficulty of Paying Living Expenses:    Food Insecurity:      Worried About Running Out of Food in the Last Year:      Ran Out of Food in the Last Year:    Transportation Needs:      Lack of Transportation (Medical):      Lack of Transportation (Non-Medical):    Physical Activity:      Days of Exercise per Week:      Minutes of Exercise per Session:    Stress:      Feeling of Stress :    Social Connections:      Frequency of Communication with  Friends and Family:      Frequency of Social Gatherings with Friends and Family:      Attends Jewish Services:      Active Member of Clubs or Organizations:      Attends Club or Organization Meetings:      Marital Status:    Intimate Partner Violence:      Fear of Current or Ex-Partner:      Emotionally Abused:      Physically Abused:      Sexually Abused:          FAMILY HISTORY:  Family History   Problem Relation Age of Onset     Uterine Cancer Mother      Hypertension Maternal Grandmother      Breast Cancer Daughter      Diabetes Type 2  No family hx of      Myocardial Infarction No family hx of      Cerebrovascular Disease No family hx of      Ovarian Cancer No family hx of      Colon Cancer No family hx of          PHYSICAL EXAM:  Vital signs:  Not taken.  ECO  GENERAL: No acute distress.  EYES: No scleral icterus. No overt erythema.  RESPIRATORY: No cough.  No labored breathing.  MUSCULOSKELETAL: Range of motion in the neck, shoulders, and arms appear normal.  SKIN: No overt rashes, discolorations, or lesions over the face and neck.  NEUROLOGIC: Alert.  No overt tremors.  PSYCHIATRIC: Normal affect and mood.  Does not appear anxious.    LABS:  CBC RESULTS: Recent Labs   Lab Test 21  1320   WBC 6.3   RBC 3.98   HGB 12.4   HCT 38.4   MCV 97   MCH 31.2   MCHC 32.3   RDW 12.8        Last Comprehensive Metabolic Panel:  Sodium   Date Value Ref Range Status   2021 139 133 - 144 mmol/L Final   2021 140 133 - 144 mmol/L Final     Potassium   Date Value Ref Range Status   2021 3.8 3.4 - 5.3 mmol/L Final   2021 3.9 3.4 - 5.3 mmol/L Final     Chloride   Date Value Ref Range Status   2021 108 94 - 109 mmol/L Final   2021 109 94 - 109 mmol/L Final     Carbon Dioxide   Date Value Ref Range Status   2021 27 20 - 32 mmol/L Final     Carbon Dioxide (CO2)   Date Value Ref Range Status   2021 25 20 - 32 mmol/L Final     Anion Gap   Date Value Ref Range Status    11/01/2021 6 3 - 14 mmol/L Final   03/29/2021 4 3 - 14 mmol/L Final     Glucose   Date Value Ref Range Status   11/01/2021 93 70 - 99 mg/dL Final   03/29/2021 93 70 - 99 mg/dL Final     Urea Nitrogen   Date Value Ref Range Status   11/01/2021 15 7 - 30 mg/dL Final   03/29/2021 18 7 - 30 mg/dL Final     Creatinine   Date Value Ref Range Status   11/01/2021 0.81 0.52 - 1.04 mg/dL Final   03/29/2021 0.82 0.52 - 1.04 mg/dL Final     GFR Estimate   Date Value Ref Range Status   11/01/2021 69 >60 mL/min/1.73m2 Final     Comment:     As of July 11, 2021, eGFR is calculated by the CKD-EPI creatinine equation, without race adjustment. eGFR can be influenced by muscle mass, exercise, and diet. The reported eGFR is an estimation only and is only applicable if the renal function is stable.   03/29/2021 81 >60 mL/min/[1.73_m2] Final     Calcium   Date Value Ref Range Status   11/01/2021 9.4 8.5 - 10.1 mg/dL Final   03/29/2021 9.3 8.5 - 10.1 mg/dL Final     Bilirubin Total   Date Value Ref Range Status   11/01/2021 0.4 0.2 - 1.3 mg/dL Final   03/29/2021 0.2 0.2 - 1.3 mg/dL Final     Alkaline Phosphatase   Date Value Ref Range Status   11/01/2021 89 40 - 150 U/L Final   03/29/2021 73 40 - 150 U/L Final     ALT   Date Value Ref Range Status   11/01/2021 14 0 - 50 U/L Final   03/29/2021 21 0 - 50 U/L Final     AST   Date Value Ref Range Status   11/01/2021 21 0 - 45 U/L Final   03/29/2021 18 0 - 45 U/L Final     PATHOLOGY:  None new since prior visit.    IMAGING:  Reviewed as per HPI.    ASSESSMENT/PLAN:  Sara Lehman is a 81 year old female with the following issues:  1.  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  Left lung and right middle lobe lung metastases s/p radiation  3. Left adrenal nodule, stable  4. Hilar and mediastinal hypermetabolic lymphadenopathy, improved  5. Metastases to liver, mixed response  --I personally reviewed the PET scan from  11/1/2021 and discussed the results with Trixie. Overall, after 2 doses of pembrolizumab, she has a mixed response radiographically.  The left hepatic metastases have resolved, yet the right lobe lesions are slightly larger and it is uncertain if these right liver metastases represent pseudo-progression or true progrssion.  The mediastinal and left hilar lymph nodes, left cervical lymph nodes have improved. The left adrenal nodule is stable. The sigmoid wall thickening is suggestive of colitis. However, she is not having multiple episodes of loose stool; only one per day.  --I recommended she continue with pembrolizumab and repeat PET/CT prior to cycle 5.    6. Iron deficiency anemia  -Iron deficiency anemia now resolved.  -Hgb now normal.    Rhona Wisdom MD  Hematology/Oncology  [facility]    Total time spent: 30 minutes in patient evaluation, counseling, documentation, and coordination of care.      Again, thank you for allowing me to participate in the care of your patient.        Sincerely,        Rhona Wisdom MD

## 2021-11-10 NOTE — PROGRESS NOTES
Social Work Progress Note      Data/Intervention:  Patient Name:  Sara Lehman  /Age:  1940 (81 year old)    Reason for Follow-Up:  Darrian is an 81-year-old woman with a diagnosis of metastatic anal adenocarcinoma. This clinician received referral from MAIK Hawk to reach out to Trixie's daughter Riya.    Intervention:   Daughter expressed concerns about Trixie's cost for treatment living on a fixed income. Riya reported that Trixie had applied for [facility], and are still awaiting determination. Trixie appears to be responding well to Keytruda medication, and family would like to continue with this medication, but are concerned about affordability.     SW discussed connection with Senior Linkage Line to make sure that current health insurance plan offers best coverage with care needs. SW discussed elif option available through [employer], and offered a connection with pharmacy financial securing to explore other resources for IV drug support.     Family receptive to this clinician sending additional resources via email: [email or web address].     Resources Provided:  Onc SW contact Information  Triage Cancer Guide to Medicare  Senior Linkage Line  [facility]  [facility]    Plan:  1) This clinician will ask Sebastian Moya to outreach to family for additional infusion financing support.   2) Ongoing SW availability. Family knows to reach out in case of psychosocial concern or need.     Please call or page if needs or concerns arise.     FELA So, LICSW  Direct Phone: [phone]  Pager: [phone]

## 2021-12-09 NOTE — TELEPHONE ENCOUNTER
"Pt daughter calling in to report symptoms of increased forgetfulness. Bladder infection found on 11/29 ( E. Coli) through UA at [facility]. Antibiotics finished on 12/7.     Daughter states that pts memory was \"off yesterday\". Pt is baseline forgetful, but the forgetfulness was \"noticebale different\". Daughter did not notice any physical symptoms.    Daughter also notes that pt's  had passed away within the last two weeks, and it could be caused by additional stress. She also is worried that the bladder infection could be not completely resolved.      Pt denies fevers/chills, cough, sore throat, SOB, n/v, bowel & bladder issues, abdominal pain, rash, new or increased bleeding or worsening fatigue. Advised if pt does become febrile to seek care in ED.    Pt has another UA scheduled today at [facility] at 4pm. Daughter wondering if pt should have any additional testing done at this time.    Will route to provider for further recomemndation        "

## 2021-12-09 NOTE — TELEPHONE ENCOUNTER
Writer called daughter Ryia back to discuss Dr. Isaac's recommendations.    Eva Isaac MD Barta, Cody, RN  Cc: Valerie Zhou RN  Caller: Unspecified (Today,  9:43 AM)  No further recommendations at this  time.  Would have her follow-up with HEATHER or Dr. Wisdom next week if symptoms persist.     Riya understands and is in agreement with the plan. Will call clinic back if symptoms persist.

## 2021-12-17 NOTE — PROGRESS NOTES
Medical Assistant Note:  Sara Lehman presents today for Blood Draw.    Patient seen by provider today: No.   present during visit today: Not Applicable.    Concerns: No Concerns.    Procedure:  Lab draw site: LAC, Needle type: BF, Gauge: 23.    Post Assessment:  Labs drawn without difficulty: Yes.    Discharge Plan:  Departure Mode: Ambulatory.    Face to Face Time: 5 min.    Darling Valdivia MA

## 2021-12-21 NOTE — PROGRESS NOTES
Infusion Nursing Note:  Sara Lehman presents today for C4D1 keytruda  Patient seen by provider today: Yes: NIYA Magdaleno   present during visit today: Not Applicable.    Note: N/A.      Intravenous Access:  Peripheral IV placed.    Treatment Conditions:  Lab Results   Component Value Date    HGB 13.6 12/17/2021    WBC 5.3 12/17/2021    ANEU 3.0 03/29/2021    ANEUTAUTO 3.8 12/17/2021     12/17/2021      Lab Results   Component Value Date     12/17/2021    POTASSIUM 4.1 12/17/2021    MAG 2.1 05/06/2019    CR 0.69 12/17/2021    ALTHEA 9.7 12/17/2021    BILITOTAL 0.4 12/17/2021    ALBUMIN 3.4 12/17/2021    ALT 17 12/17/2021    AST 18 12/17/2021     Results reviewed, labs MET treatment parameters, ok to proceed with treatment.      Post Infusion Assessment:  Patient tolerated infusion without incident.  Site patent and intact, free from redness, edema or discomfort.  No evidence of extravasations.  Access discontinued per protocol.       Discharge Plan:   AVS to patient via MYCHART.  Patient will return as prev mello for next appointment.   Patient discharged in stable condition accompanied by: self.  Departure Mode: Ambulatory.      Sebastian Leal RN                       Visit Information Date & Time Provider Department Dept. Phone Encounter #  
 7/6/2017 10:00 AM MD Albin Bernal Oncology at [address] [place] [address] Rd [id number] Follow-up Instructions Return in about 4 months (around 11/6/2017) for raghu Go. Your Appointments 11/14/2017 10:00 AM  
ESTABLISHED PATIENT with MD Albin Bernal Oncology at [facility] Appt Note: 4 mo fu, [place] [address] [facility] [id number]  
[phone]  
  
   
 [facility], [address] [facility] Upcoming Health Maintenance Date Due DTaP/Tdap/Td series (1 - Tdap) 3/28/1960 ZOSTER VACCINE AGE 60> 3/28/2000 GLAUCOMA SCREENING Q2Y 3/28/2005 Pneumococcal 65+ High/Highest Risk (1 of 2 - PCV13) 3/28/2005 MEDICARE YEARLY EXAM 3/28/2005 INFLUENZA AGE 9 TO ADULT 8/1/2017 Allergies as of 7/6/2017  Review Complete On: 7/6/2017 By: Cris Gomez MD  
 No Known Allergies Current Immunizations  Never Reviewed No immunizations on file. Not reviewed this visit You Were Diagnosed With   
  
 Codes Comments [facility]    -  Primary ICD-10-CM: D69.6 ICD-9-CM: 287.5 Vitals BP Pulse Temp Resp Height(growth percentile) (!) 196/109 (BP 1 Location: Left arm, BP Patient Position: Supine) (!) 58 96.2 °F (35.7 °C) (Temporal) 16 5' 10\" (1.778 m) Weight(growth percentile) SpO2 BMI Smoking Status 164 lb (74.4 kg) 98% 23.53 kg/m2 Never Smoker Vitals History BMI and BSA Data Body Mass Index Body Surface Area  
 23.53 kg/m 2 1.92 m 2 Preferred Pharmacy Pharmacy Name Phone [facility] [id number] [facility], [address] [place] [address], [address] [phone] Your Updated Medication List  
  
   
This list is accurate as of: 7/6/17 10:50 AM.  Always use your most recent med list.  
  
  
  
  
 ELIQUIS 5 mg tablet Generic drug:  apixaban Take 5 mg by mouth two (2) times a day. LIPITOR 10 mg tablet Generic drug:  atorvastatin Take  by mouth daily. lisinopril 2.5 mg tablet Commonly known as:  Aidee Dowdy Take  by mouth daily. metoprolol tartrate 100 mg IR tablet Commonly known as:  LOPRESSOR Take  by mouth two (2) times a day. Follow-up Instructions Return in about 4 months (around 11/6/2017) for raghu Go. Introducing [facility]! Charlie Dewitt introduces [employer] patient portal. Now you can access parts of your medical record, email your doctor's office, and request medication refills online. 1. In your internet browser, go to [email or web address] 2. Click on the First Time User? Click Here link in the Sign In box. You will see the New Member Sign Up page. 3. Enter your [employer] Access Code exactly as it appears below. You will not need to use this code after youve completed the sign-up process. If you do not sign up before the expiration date, you must request a new code. · [employer] Access Code: [email or web address] Expires: 10/4/2017 10:50 AM 
 
4. Enter the last four digits of your Social Security Number (xxxx) and Date of Birth (mm/dd/yyyy) as indicated and click Submit. You will be taken to the next sign-up page. 5. Create a [employer] ID. This will be your [employer] login ID and cannot be changed, so think of one that is secure and easy to remember. 6. Create a [employer] password. You can change your password at any time. 7. Enter your Password Reset Question and Answer. This can be used at a later time if you forget your password. 8. Enter your e-mail address. You will receive e-mail notification when new information is available in [address] 9. Click Sign Up. You can now view and download portions of your medical record. 10. Click the Download Summary menu link to download a portable copy of your medical information. If you have questions, please visit the Frequently Asked Questions section of the [employer] website. Remember, [employer] is NOT to be used for urgent needs. For medical emergencies, dial 911. Now available from your iPhone and Android! Please provide this summary of care documentation to your next provider. Your primary care clinician is listed as Rashida Barrientos. If you have any questions after today's visit, please call [phone].

## 2021-12-21 NOTE — PROGRESS NOTES
Oncology Rooming Note    December 21, 2021 12:42 PM   Sara Lehman is a 81 year old female who presents for:    Chief Complaint   Patient presents with     Oncology Clinic Visit     Initial Vitals: /82   Pulse 83   Temp 98.3  F (36.8  C) (Oral)   Resp 16   Ht 1.524 m (5')   Wt 48.9 kg (107 lb 12.8 oz)   LMP  (LMP Unknown)   SpO2 97%   BMI 21.05 kg/m   Estimated body mass index is 21.05 kg/m  as calculated from the following:    Height as of this encounter: 1.524 m (5').    Weight as of this encounter: 48.9 kg (107 lb 12.8 oz). Body surface area is 1.44 meters squared.  No Pain (0) Comment: Data Unavailable   No LMP recorded (lmp unknown). Patient is postmenopausal.  Allergies reviewed: Yes  Medications reviewed: Yes    Medications: Medication refills not needed today.  Pharmacy name entered into WowOwow:    [id number] - [place], [place] - [address] [place] [address] DRUG STORE #[id number] - [place], [place] - [address] AT [facility] [address]  [facility] #[id number] - [place], [place] - [address] [facility]    Clinical concerns: None       Darling Valdivia MA

## 2021-12-21 NOTE — LETTER
12/21/2021         RE: Sara Lehman  [address] [facility] [address]        Dear Colleague,    Thank you for referring your patient, Sara Lehman, to the [facility]. Please see a copy of my visit note below.    Oncology Rooming Note    December 21, 2021 12:42 PM   Sara Lehman is a 81 year old female who presents for:    Chief Complaint   Patient presents with     Oncology Clinic Visit     Initial Vitals: /82   Pulse 83   Temp 98.3  F (36.8  C) (Oral)   Resp 16   Ht 1.524 m (5')   Wt 48.9 kg (107 lb 12.8 oz)   LMP  (LMP Unknown)   SpO2 97%   BMI 21.05 kg/m   Estimated body mass index is 21.05 kg/m  as calculated from the following:    Height as of this encounter: 1.524 m (5').    Weight as of this encounter: 48.9 kg (107 lb 12.8 oz). Body surface area is 1.44 meters squared.  No Pain (0) Comment: Data Unavailable   No LMP recorded (lmp unknown). Patient is postmenopausal.  Allergies reviewed: Yes  Medications reviewed: Yes    Medications: Medication refills not needed today.  Pharmacy name entered into Ganjiwang:    [facility] #[id number] - [place], [place] - [address] [facility]UG STORE #[id number] - [place], [place] - [address] AT [facility] [address]  [facility] #[id number] - [place], [place] - [address] 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  Denies fever chills sweats cough shortness of breath chest pain nausea vomiting diarrhea abdominal pain bleeding skin rash      Review  of Systems:  14 point ROS of systems including Constitutional, Eyes, Respiratory, Cardiovascular, Gastroenterology, Genitourinary, Integumentary, Muscularskeletal, Psychiatric were all negative except for pertinent positives noted in my HPI.    Physical Examination:  General: The patient is a pleasant female in no acute distress.  /82   Pulse 83   Temp 98.3  F (36.8  C) (Oral)   Resp 16   Ht 1.524 m (5')   Wt 48.9 kg (107 lb 12.8 oz)   LMP  (LMP Unknown)   SpO2 97%   BMI 21.05 kg/m    HEENT: EOMI, PERRL. Sclerae are anicteric. Oral mucosa is pink and moist with no lesions or thrush.   Lymph: Neck is supple with no lymphadenopathy in the cervical or supraclavicular areas.   Heart: Regular rate and rhythm.   Lungs: Clear to auscultation bilaterally.   GI: Bowel sounds present, soft, nontender with no palpable hepatosplenomegaly or masses.   Extremities: No lower extremity edema noted bilaterally.   Skin: No rashes, petechiae, or bruising noted on exposed skin.    Laboratory Data:  CMP CMP and TSH results reviewed    Assessment and Plan:    This is a 81-year-old female with     Metastatic anal adenocarcinoma  Stage IV, wild-type kras  -Patient would like to avoid chemotherapy if possible  07/2021-PET scan reveals progression of disease  -Currently getting treatment with pembrolizumab  Patient is scheduled for PET scan in January prior to cycle 5 and follow-up with Dr. Wisdom       Call our clinic with any changes in health condition or questions       ANDRY Alcazar CNP  [facility]     Chart documentation with Dragon Voice recognition Software. Although reviewed after completion, some words and grammatical errors may remain.            Again, thank you for allowing me to participate in the care of your patient.        Sincerely,        ANDRY Alcazar CNP

## 2021-12-21 NOTE — PROGRESS NOTES
Oncology/Hematology Visit Note  Dec 21, 2021    Reason for Visit: follow up of metastatic anal adenocarcinoma  -Stage IV wild-type type KRAS  Metastatic to lungs liver and biliary and mediastinal lymphadenopathy  Status post radiation to left and right lobe lung metastasis  Currently getting treatment with pembrolizumab    Interval History:  Patient reports overall she has been tolerating pembrolizumab well.  Denies fever chills sweats cough shortness of breath chest pain nausea vomiting diarrhea abdominal pain bleeding skin rash      Review of Systems:  14 point ROS of systems including Constitutional, Eyes, Respiratory, Cardiovascular, Gastroenterology, Genitourinary, Integumentary, Muscularskeletal, Psychiatric were all negative except for pertinent positives noted in my HPI.    Physical Examination:  General: The patient is a pleasant female in no acute distress.  /82   Pulse 83   Temp 98.3  F (36.8  C) (Oral)   Resp 16   Ht 1.524 m (5')   Wt 48.9 kg (107 lb 12.8 oz)   LMP  (LMP Unknown)   SpO2 97%   BMI 21.05 kg/m    HEENT: EOMI, PERRL. Sclerae are anicteric. Oral mucosa is pink and moist with no lesions or thrush.   Lymph: Neck is supple with no lymphadenopathy in the cervical or supraclavicular areas.   Heart: Regular rate and rhythm.   Lungs: Clear to auscultation bilaterally.   GI: Bowel sounds present, soft, nontender with no palpable hepatosplenomegaly or masses.   Extremities: No lower extremity edema noted bilaterally.   Skin: No rashes, petechiae, or bruising noted on exposed skin.    Laboratory Data:  CMP CMP and TSH results reviewed    Assessment and Plan:    This is a 81-year-old female with     Metastatic anal adenocarcinoma  Stage IV, wild-type kras  -Patient would like to avoid chemotherapy if possible  07/2021-PET scan reveals progression of disease  -Currently getting treatment with pembrolizumab  Patient is scheduled for PET scan in January prior to cycle 5 and follow-up with   Artis       Call our clinic with any changes in health condition or questions       ANDRY Alcazar CNP  [facility]     Chart documentation with Dragon Voice recognition Software. Although reviewed after completion, some words and grammatical errors may remain.

## 2022-01-01 ENCOUNTER — TELEPHONE (OUTPATIENT)
Dept: ONCOLOGY | Facility: CLINIC | Age: 82
End: 2022-01-01
Payer: COMMERCIAL

## 2022-01-01 ENCOUNTER — HOSPITAL ENCOUNTER (OUTPATIENT)
Dept: PET IMAGING | Facility: CLINIC | Age: 82
End: 2022-01-24
Attending: INTERNAL MEDICINE
Payer: COMMERCIAL

## 2022-01-01 ENCOUNTER — LAB (OUTPATIENT)
Dept: INFUSION THERAPY | Facility: CLINIC | Age: 82
End: 2022-01-01
Attending: NURSE PRACTITIONER
Payer: COMMERCIAL

## 2022-01-01 ENCOUNTER — LAB (OUTPATIENT)
Dept: INFUSION THERAPY | Facility: CLINIC | Age: 82
End: 2022-01-01
Attending: INTERNAL MEDICINE
Payer: COMMERCIAL

## 2022-01-01 ENCOUNTER — VIRTUAL VISIT (OUTPATIENT)
Dept: ONCOLOGY | Facility: CLINIC | Age: 82
End: 2022-01-01
Attending: INTERNAL MEDICINE
Payer: COMMERCIAL

## 2022-01-01 ENCOUNTER — PATIENT OUTREACH (OUTPATIENT)
Dept: ONCOLOGY | Facility: CLINIC | Age: 82
End: 2022-01-01
Payer: COMMERCIAL

## 2022-01-01 ENCOUNTER — HOSPITAL ENCOUNTER (OUTPATIENT)
Dept: PET IMAGING | Facility: CLINIC | Age: 82
Discharge: HOME | End: 2022-04-18
Attending: INTERNAL MEDICINE
Payer: COMMERCIAL

## 2022-01-01 ENCOUNTER — TRANSFERRED RECORDS (OUTPATIENT)
Dept: HEALTH INFORMATION MANAGEMENT | Facility: CLINIC | Age: 82
End: 2022-01-01
Payer: COMMERCIAL

## 2022-01-01 ENCOUNTER — LAB (OUTPATIENT)
Dept: LAB | Facility: CLINIC | Age: 82
End: 2022-01-01

## 2022-01-01 ENCOUNTER — VIRTUAL VISIT (OUTPATIENT)
Dept: INTERNAL MEDICINE | Facility: CLINIC | Age: 82
End: 2022-01-01
Payer: COMMERCIAL

## 2022-01-01 ENCOUNTER — ONCOLOGY VISIT (OUTPATIENT)
Dept: ONCOLOGY | Facility: CLINIC | Age: 82
End: 2022-01-01
Attending: NURSE PRACTITIONER
Payer: COMMERCIAL

## 2022-01-01 ENCOUNTER — LAB (OUTPATIENT)
Dept: LAB | Facility: CLINIC | Age: 82
End: 2022-01-01
Attending: INTERNAL MEDICINE
Payer: COMMERCIAL

## 2022-01-01 ENCOUNTER — HOSPITAL ENCOUNTER (EMERGENCY)
Facility: CLINIC | Age: 82
Discharge: LEFT BEFORE DISCHARGE | End: 2022-06-07
Admitting: EMERGENCY MEDICINE
Payer: COMMERCIAL

## 2022-01-01 ENCOUNTER — TELEPHONE (OUTPATIENT)
Dept: ONCOLOGY | Facility: CLINIC | Age: 82
End: 2022-01-01

## 2022-01-01 ENCOUNTER — PATIENT OUTREACH (OUTPATIENT)
Dept: ONCOLOGY | Facility: CLINIC | Age: 82
End: 2022-01-01

## 2022-01-01 ENCOUNTER — TELEPHONE (OUTPATIENT)
Dept: INTERNAL MEDICINE | Facility: CLINIC | Age: 82
End: 2022-01-01

## 2022-01-01 ENCOUNTER — HEALTH MAINTENANCE LETTER (OUTPATIENT)
Age: 82
End: 2022-01-01

## 2022-01-01 VITALS
HEIGHT: 60 IN | BODY MASS INDEX: 19.44 KG/M2 | OXYGEN SATURATION: 100 % | RESPIRATION RATE: 16 BRPM | TEMPERATURE: 97.5 F | WEIGHT: 99 LBS | DIASTOLIC BLOOD PRESSURE: 93 MMHG | HEART RATE: 91 BPM | SYSTOLIC BLOOD PRESSURE: 144 MMHG

## 2022-01-01 VITALS
WEIGHT: 99 LBS | BODY MASS INDEX: 18.22 KG/M2 | SYSTOLIC BLOOD PRESSURE: 144 MMHG | OXYGEN SATURATION: 93 % | TEMPERATURE: 97.5 F | DIASTOLIC BLOOD PRESSURE: 77 MMHG | HEART RATE: 43 BPM | HEIGHT: 62 IN | RESPIRATION RATE: 22 BRPM

## 2022-01-01 VITALS
RESPIRATION RATE: 16 BRPM | SYSTOLIC BLOOD PRESSURE: 122 MMHG | DIASTOLIC BLOOD PRESSURE: 80 MMHG | BODY MASS INDEX: 21.29 KG/M2 | WEIGHT: 109 LBS | HEART RATE: 63 BPM | TEMPERATURE: 97.5 F

## 2022-01-01 VITALS
OXYGEN SATURATION: 99 % | BODY MASS INDEX: 21.17 KG/M2 | RESPIRATION RATE: 16 BRPM | TEMPERATURE: 98.1 F | SYSTOLIC BLOOD PRESSURE: 132 MMHG | WEIGHT: 108.4 LBS | DIASTOLIC BLOOD PRESSURE: 81 MMHG | HEART RATE: 80 BPM

## 2022-01-01 VITALS
DIASTOLIC BLOOD PRESSURE: 80 MMHG | TEMPERATURE: 97.5 F | HEART RATE: 85 BPM | SYSTOLIC BLOOD PRESSURE: 126 MMHG | HEIGHT: 60 IN | RESPIRATION RATE: 16 BRPM | WEIGHT: 107 LBS | BODY MASS INDEX: 21.01 KG/M2

## 2022-01-01 VITALS
TEMPERATURE: 98.2 F | HEART RATE: 82 BPM | DIASTOLIC BLOOD PRESSURE: 72 MMHG | SYSTOLIC BLOOD PRESSURE: 120 MMHG | RESPIRATION RATE: 16 BRPM

## 2022-01-01 DIAGNOSIS — C78.02 MALIGNANT NEOPLASM METASTATIC TO LEFT LUNG (H): ICD-10-CM

## 2022-01-01 DIAGNOSIS — R59.1 LYMPHADENOPATHY: ICD-10-CM

## 2022-01-01 DIAGNOSIS — I10 BENIGN ESSENTIAL HYPERTENSION: ICD-10-CM

## 2022-01-01 DIAGNOSIS — C78.01 MALIGNANT NEOPLASM METASTATIC TO RIGHT LUNG (H): ICD-10-CM

## 2022-01-01 DIAGNOSIS — C21.1 MALIGNANT NEOPLASM OF ANAL CANAL (H): Primary | ICD-10-CM

## 2022-01-01 DIAGNOSIS — C21.1 MALIGNANT NEOPLASM OF ANAL CANAL (H): ICD-10-CM

## 2022-01-01 DIAGNOSIS — C79.72 MALIGNANT NEOPLASM METASTATIC TO LEFT ADRENAL GLAND (H): ICD-10-CM

## 2022-01-01 DIAGNOSIS — R53.0 NEOPLASTIC (MALIGNANT) RELATED FATIGUE: ICD-10-CM

## 2022-01-01 DIAGNOSIS — C79.9 SECONDARY MALIGNANT NEOPLASM OF UNSPECIFIED SITE (CODE) (H): ICD-10-CM

## 2022-01-01 DIAGNOSIS — I10 ESSENTIAL HYPERTENSION: Primary | ICD-10-CM

## 2022-01-01 DIAGNOSIS — M54.50 ACUTE MIDLINE LOW BACK PAIN, UNSPECIFIED WHETHER SCIATICA PRESENT: ICD-10-CM

## 2022-01-01 DIAGNOSIS — C78.7 METASTASIS TO LIVER (H): ICD-10-CM

## 2022-01-01 DIAGNOSIS — C78.02 MALIGNANT NEOPLASM METASTATIC TO LEFT LUNG (H): Primary | ICD-10-CM

## 2022-01-01 DIAGNOSIS — R63.4 WEIGHT LOSS: ICD-10-CM

## 2022-01-01 LAB
ALBUMIN SERPL-MCNC: 3.1 G/DL (ref 3.4–5)
ALBUMIN SERPL-MCNC: 3.3 G/DL (ref 3.4–5)
ALBUMIN SERPL-MCNC: 3.3 G/DL (ref 3.4–5)
ALBUMIN SERPL-MCNC: 3.4 G/DL (ref 3.4–5)
ALBUMIN SERPL-MCNC: 3.5 G/DL (ref 3.4–5)
ALBUMIN UR-MCNC: 10 MG/DL
ALBUMIN UR-MCNC: 70 MG/DL
ALP SERPL-CCNC: 108 U/L (ref 40–150)
ALP SERPL-CCNC: 77 U/L (ref 40–150)
ALP SERPL-CCNC: 88 U/L (ref 40–150)
ALP SERPL-CCNC: 89 U/L (ref 40–150)
ALP SERPL-CCNC: 91 U/L (ref 40–150)
ALT SERPL W P-5'-P-CCNC: 16 U/L (ref 0–50)
ALT SERPL W P-5'-P-CCNC: 16 U/L (ref 0–50)
ALT SERPL W P-5'-P-CCNC: 17 U/L (ref 0–50)
AMORPH CRY #/AREA URNS HPF: ABNORMAL /HPF
ANION GAP SERPL CALCULATED.3IONS-SCNC: 3 MMOL/L (ref 3–14)
ANION GAP SERPL CALCULATED.3IONS-SCNC: 4 MMOL/L (ref 3–14)
ANION GAP SERPL CALCULATED.3IONS-SCNC: 4 MMOL/L (ref 3–14)
ANION GAP SERPL CALCULATED.3IONS-SCNC: 6 MMOL/L (ref 3–14)
ANION GAP SERPL CALCULATED.3IONS-SCNC: 7 MMOL/L (ref 3–14)
APPEARANCE UR: ABNORMAL
APPEARANCE UR: CLEAR
AST SERPL W P-5'-P-CCNC: 18 U/L (ref 0–45)
AST SERPL W P-5'-P-CCNC: 20 U/L (ref 0–45)
AST SERPL W P-5'-P-CCNC: 20 U/L (ref 0–45)
AST SERPL W P-5'-P-CCNC: 21 U/L (ref 0–45)
AST SERPL W P-5'-P-CCNC: 27 U/L (ref 0–45)
ATRIAL RATE - MUSE: 89 BPM
BACTERIA #/AREA URNS HPF: ABNORMAL /HPF
BACTERIA UR CULT: NORMAL
BASOPHILS # BLD AUTO: 0 10E3/UL (ref 0–0.2)
BASOPHILS NFR BLD AUTO: 0 %
BASOPHILS NFR BLD AUTO: 1 %
BASOPHILS NFR BLD AUTO: 1 %
BILIRUB SERPL-MCNC: 0.2 MG/DL (ref 0.2–1.3)
BILIRUB SERPL-MCNC: 0.3 MG/DL (ref 0.2–1.3)
BILIRUB SERPL-MCNC: 0.4 MG/DL (ref 0.2–1.3)
BILIRUB SERPL-MCNC: 0.5 MG/DL (ref 0.2–1.3)
BILIRUB SERPL-MCNC: 0.7 MG/DL (ref 0.2–1.3)
BILIRUB UR QL STRIP: NEGATIVE
BILIRUB UR QL STRIP: NEGATIVE
BUN SERPL-MCNC: 16 MG/DL (ref 7–30)
BUN SERPL-MCNC: 17 MG/DL (ref 7–30)
BUN SERPL-MCNC: 19 MG/DL (ref 7–30)
BUN SERPL-MCNC: 20 MG/DL (ref 7–30)
BUN SERPL-MCNC: 23 MG/DL (ref 7–30)
CALCIUM SERPL-MCNC: 9.3 MG/DL (ref 8.5–10.1)
CALCIUM SERPL-MCNC: 9.4 MG/DL (ref 8.5–10.1)
CALCIUM SERPL-MCNC: 9.6 MG/DL (ref 8.5–10.1)
CALCIUM SERPL-MCNC: 9.7 MG/DL (ref 8.5–10.1)
CALCIUM SERPL-MCNC: 9.8 MG/DL (ref 8.5–10.1)
CHLORIDE BLD-SCNC: 104 MMOL/L (ref 94–109)
CHLORIDE BLD-SCNC: 104 MMOL/L (ref 94–109)
CHLORIDE BLD-SCNC: 105 MMOL/L (ref 94–109)
CHLORIDE BLD-SCNC: 105 MMOL/L (ref 94–109)
CHLORIDE BLD-SCNC: 99 MMOL/L (ref 94–109)
CO2 SERPL-SCNC: 26 MMOL/L (ref 20–32)
CO2 SERPL-SCNC: 27 MMOL/L (ref 20–32)
CO2 SERPL-SCNC: 28 MMOL/L (ref 20–32)
CO2 SERPL-SCNC: 28 MMOL/L (ref 20–32)
CO2 SERPL-SCNC: 29 MMOL/L (ref 20–32)
COLOR UR AUTO: YELLOW
COLOR UR AUTO: YELLOW
CREAT SERPL-MCNC: 0.68 MG/DL (ref 0.52–1.04)
CREAT SERPL-MCNC: 0.69 MG/DL (ref 0.52–1.04)
CREAT SERPL-MCNC: 0.71 MG/DL (ref 0.52–1.04)
CREAT SERPL-MCNC: 0.79 MG/DL (ref 0.52–1.04)
CREAT SERPL-MCNC: 0.92 MG/DL (ref 0.52–1.04)
DIASTOLIC BLOOD PRESSURE - MUSE: NORMAL MMHG
EOSINOPHIL # BLD AUTO: 0 10E3/UL (ref 0–0.7)
EOSINOPHIL # BLD AUTO: 0.1 10E3/UL (ref 0–0.7)
EOSINOPHIL NFR BLD AUTO: 0 %
EOSINOPHIL NFR BLD AUTO: 0 %
EOSINOPHIL NFR BLD AUTO: 1 %
EOSINOPHIL NFR BLD AUTO: 2 %
ERYTHROCYTE [DISTWIDTH] IN BLOOD BY AUTOMATED COUNT: 13.2 % (ref 10–15)
ERYTHROCYTE [DISTWIDTH] IN BLOOD BY AUTOMATED COUNT: 13.3 % (ref 10–15)
ERYTHROCYTE [DISTWIDTH] IN BLOOD BY AUTOMATED COUNT: 13.6 % (ref 10–15)
ERYTHROCYTE [DISTWIDTH] IN BLOOD BY AUTOMATED COUNT: 13.7 % (ref 10–15)
ERYTHROCYTE [DISTWIDTH] IN BLOOD BY AUTOMATED COUNT: 14.6 % (ref 10–15)
ERYTHROCYTE [DISTWIDTH] IN BLOOD BY AUTOMATED COUNT: 14.7 % (ref 10–15)
GFR SERPL CREATININE-BSD FRML MDRD: 63 ML/MIN/1.73M2
GFR SERPL CREATININE-BSD FRML MDRD: 75 ML/MIN/1.73M2
GFR SERPL CREATININE-BSD FRML MDRD: 85 ML/MIN/1.73M2
GFR SERPL CREATININE-BSD FRML MDRD: 87 ML/MIN/1.73M2
GFR SERPL CREATININE-BSD FRML MDRD: 88 ML/MIN/1.73M2
GLUCOSE BLD-MCNC: 101 MG/DL (ref 70–99)
GLUCOSE BLD-MCNC: 91 MG/DL (ref 70–99)
GLUCOSE BLD-MCNC: 91 MG/DL (ref 70–99)
GLUCOSE BLD-MCNC: 92 MG/DL (ref 70–99)
GLUCOSE BLD-MCNC: 96 MG/DL (ref 70–99)
GLUCOSE UR STRIP-MCNC: 30 MG/DL
GLUCOSE UR STRIP-MCNC: NEGATIVE MG/DL
HCT VFR BLD AUTO: 36.2 % (ref 35–47)
HCT VFR BLD AUTO: 42 % (ref 35–47)
HCT VFR BLD AUTO: 42.3 % (ref 35–47)
HCT VFR BLD AUTO: 42.9 % (ref 35–47)
HCT VFR BLD AUTO: 43.5 % (ref 35–47)
HCT VFR BLD AUTO: 45.5 % (ref 35–47)
HGB BLD-MCNC: 11.8 G/DL (ref 11.7–15.7)
HGB BLD-MCNC: 12.8 G/DL (ref 11.7–15.7)
HGB BLD-MCNC: 13.3 G/DL (ref 11.7–15.7)
HGB BLD-MCNC: 13.6 G/DL (ref 11.7–15.7)
HGB BLD-MCNC: 14 G/DL (ref 11.7–15.7)
HGB BLD-MCNC: 14.2 G/DL (ref 11.7–15.7)
HGB UR QL STRIP: ABNORMAL
HGB UR QL STRIP: ABNORMAL
HYALINE CASTS: 43 /LPF
IMM GRANULOCYTES # BLD: 0 10E3/UL
IMM GRANULOCYTES NFR BLD: 0 %
IMM GRANULOCYTES NFR BLD: 1 %
INTERPRETATION ECG - MUSE: NORMAL
KETONES UR STRIP-MCNC: 10 MG/DL
KETONES UR STRIP-MCNC: NEGATIVE MG/DL
LEUKOCYTE ESTERASE UR QL STRIP: ABNORMAL
LEUKOCYTE ESTERASE UR QL STRIP: ABNORMAL
LYMPHOCYTES # BLD AUTO: 0.7 10E3/UL (ref 0.8–5.3)
LYMPHOCYTES # BLD AUTO: 0.8 10E3/UL (ref 0.8–5.3)
LYMPHOCYTES # BLD AUTO: 0.8 10E3/UL (ref 0.8–5.3)
LYMPHOCYTES # BLD AUTO: 0.9 10E3/UL (ref 0.8–5.3)
LYMPHOCYTES NFR BLD AUTO: 13 %
LYMPHOCYTES NFR BLD AUTO: 14 %
LYMPHOCYTES NFR BLD AUTO: 16 %
LYMPHOCYTES NFR BLD AUTO: 18 %
LYMPHOCYTES NFR BLD AUTO: 21 %
LYMPHOCYTES NFR BLD AUTO: 23 %
MCH RBC QN AUTO: 29.9 PG (ref 26.5–33)
MCH RBC QN AUTO: 30.4 PG (ref 26.5–33)
MCH RBC QN AUTO: 30.5 PG (ref 26.5–33)
MCH RBC QN AUTO: 30.5 PG (ref 26.5–33)
MCH RBC QN AUTO: 30.6 PG (ref 26.5–33)
MCH RBC QN AUTO: 31.1 PG (ref 26.5–33)
MCHC RBC AUTO-ENTMCNC: 30.3 G/DL (ref 31.5–36.5)
MCHC RBC AUTO-ENTMCNC: 31.2 G/DL (ref 31.5–36.5)
MCHC RBC AUTO-ENTMCNC: 31.7 G/DL (ref 31.5–36.5)
MCHC RBC AUTO-ENTMCNC: 31.7 G/DL (ref 31.5–36.5)
MCHC RBC AUTO-ENTMCNC: 32.2 G/DL (ref 31.5–36.5)
MCHC RBC AUTO-ENTMCNC: 32.6 G/DL (ref 31.5–36.5)
MCV RBC AUTO: 94 FL (ref 78–100)
MCV RBC AUTO: 95 FL (ref 78–100)
MCV RBC AUTO: 96 FL (ref 78–100)
MCV RBC AUTO: 98 FL (ref 78–100)
MCV RBC AUTO: 98 FL (ref 78–100)
MCV RBC AUTO: 99 FL (ref 78–100)
MONOCYTES # BLD AUTO: 0.5 10E3/UL (ref 0–1.3)
MONOCYTES # BLD AUTO: 0.6 10E3/UL (ref 0–1.3)
MONOCYTES # BLD AUTO: 0.9 10E3/UL (ref 0–1.3)
MONOCYTES NFR BLD AUTO: 11 %
MONOCYTES NFR BLD AUTO: 12 %
MONOCYTES NFR BLD AUTO: 13 %
MONOCYTES NFR BLD AUTO: 13 %
MONOCYTES NFR BLD AUTO: 14 %
MONOCYTES NFR BLD AUTO: 16 %
MUCOUS THREADS #/AREA URNS LPF: PRESENT /LPF
MUCOUS THREADS #/AREA URNS LPF: PRESENT /LPF
NEUTROPHILS # BLD AUTO: 2.4 10E3/UL (ref 1.6–8.3)
NEUTROPHILS # BLD AUTO: 2.9 10E3/UL (ref 1.6–8.3)
NEUTROPHILS # BLD AUTO: 3.2 10E3/UL (ref 1.6–8.3)
NEUTROPHILS # BLD AUTO: 3.7 10E3/UL (ref 1.6–8.3)
NEUTROPHILS # BLD AUTO: 3.8 10E3/UL (ref 1.6–8.3)
NEUTROPHILS # BLD AUTO: 3.8 10E3/UL (ref 1.6–8.3)
NEUTROPHILS NFR BLD AUTO: 61 %
NEUTROPHILS NFR BLD AUTO: 64 %
NEUTROPHILS NFR BLD AUTO: 68 %
NEUTROPHILS NFR BLD AUTO: 70 %
NEUTROPHILS NFR BLD AUTO: 71 %
NEUTROPHILS NFR BLD AUTO: 74 %
NITRATE UR QL: NEGATIVE
NITRATE UR QL: NEGATIVE
NRBC # BLD AUTO: 0 10E3/UL
NRBC BLD AUTO-RTO: 0 /100
P AXIS - MUSE: 65 DEGREES
PH UR STRIP: 5 [PH] (ref 5–7)
PH UR STRIP: 6 [PH] (ref 5–7)
PLATELET # BLD AUTO: 183 10E3/UL (ref 150–450)
PLATELET # BLD AUTO: 184 10E3/UL (ref 150–450)
PLATELET # BLD AUTO: 185 10E3/UL (ref 150–450)
PLATELET # BLD AUTO: 185 10E3/UL (ref 150–450)
PLATELET # BLD AUTO: 259 10E3/UL (ref 150–450)
PLATELET # BLD AUTO: 268 10E3/UL (ref 150–450)
POTASSIUM BLD-SCNC: 4 MMOL/L (ref 3.4–5.3)
POTASSIUM BLD-SCNC: 4 MMOL/L (ref 3.4–5.3)
POTASSIUM BLD-SCNC: 4.3 MMOL/L (ref 3.4–5.3)
PR INTERVAL - MUSE: 126 MS
PROT SERPL-MCNC: 7 G/DL (ref 6.8–8.8)
PROT SERPL-MCNC: 7.3 G/DL (ref 6.8–8.8)
PROT SERPL-MCNC: 7.5 G/DL (ref 6.8–8.8)
PROT SERPL-MCNC: 7.6 G/DL (ref 6.8–8.8)
PROT SERPL-MCNC: 7.8 G/DL (ref 6.8–8.8)
QRS DURATION - MUSE: 66 MS
QT - MUSE: 350 MS
QTC - MUSE: 425 MS
R AXIS - MUSE: 64 DEGREES
RBC # BLD AUTO: 3.86 10E6/UL (ref 3.8–5.2)
RBC # BLD AUTO: 4.28 10E6/UL (ref 3.8–5.2)
RBC # BLD AUTO: 4.36 10E6/UL (ref 3.8–5.2)
RBC # BLD AUTO: 4.38 10E6/UL (ref 3.8–5.2)
RBC # BLD AUTO: 4.6 10E6/UL (ref 3.8–5.2)
RBC # BLD AUTO: 4.66 10E6/UL (ref 3.8–5.2)
RBC URINE: 1 /HPF
RBC URINE: 4 /HPF
SODIUM SERPL-SCNC: 132 MMOL/L (ref 133–144)
SODIUM SERPL-SCNC: 135 MMOL/L (ref 133–144)
SODIUM SERPL-SCNC: 137 MMOL/L (ref 133–144)
SODIUM SERPL-SCNC: 137 MMOL/L (ref 133–144)
SODIUM SERPL-SCNC: 138 MMOL/L (ref 133–144)
SP GR UR STRIP: 1.02 (ref 1–1.03)
SP GR UR STRIP: 1.02 (ref 1–1.03)
SQUAMOUS EPITHELIAL: 1 /HPF
SQUAMOUS EPITHELIAL: 1 /HPF
SYSTOLIC BLOOD PRESSURE - MUSE: NORMAL MMHG
T AXIS - MUSE: 59 DEGREES
TSH SERPL DL<=0.005 MIU/L-ACNC: 1.19 MU/L (ref 0.4–4)
TSH SERPL DL<=0.005 MIU/L-ACNC: 1.22 MU/L (ref 0.4–4)
TSH SERPL DL<=0.005 MIU/L-ACNC: 1.29 MU/L (ref 0.4–4)
TSH SERPL DL<=0.005 MIU/L-ACNC: 1.33 MU/L (ref 0.4–4)
UROBILINOGEN UR STRIP-MCNC: NORMAL MG/DL
UROBILINOGEN UR STRIP-MCNC: NORMAL MG/DL
VENTRICULAR RATE- MUSE: 89 BPM
WBC # BLD AUTO: 4 10E3/UL (ref 4–11)
WBC # BLD AUTO: 4.4 10E3/UL (ref 4–11)
WBC # BLD AUTO: 4.7 10E3/UL (ref 4–11)
WBC # BLD AUTO: 5.1 10E3/UL (ref 4–11)
WBC # BLD AUTO: 5.4 10E3/UL (ref 4–11)
WBC # BLD AUTO: 5.4 10E3/UL (ref 4–11)
WBC URINE: 4 /HPF
WBC URINE: 45 /HPF

## 2022-01-01 PROCEDURE — 74177 CT ABD & PELVIS W/CONTRAST: CPT | Mod: 26 | Performed by: STUDENT IN AN ORGANIZED HEALTH CARE EDUCATION/TRAINING PROGRAM

## 2022-01-01 PROCEDURE — 99214 OFFICE O/P EST MOD 30 MIN: CPT | Mod: 95 | Performed by: INTERNAL MEDICINE

## 2022-01-01 PROCEDURE — 258N000003 HC RX IP 258 OP 636: Performed by: INTERNAL MEDICINE

## 2022-01-01 PROCEDURE — 71260 CT THORAX DX C+: CPT | Mod: 26 | Performed by: STUDENT IN AN ORGANIZED HEALTH CARE EDUCATION/TRAINING PROGRAM

## 2022-01-01 PROCEDURE — 80053 COMPREHEN METABOLIC PANEL: CPT

## 2022-01-01 PROCEDURE — 36415 COLL VENOUS BLD VENIPUNCTURE: CPT

## 2022-01-01 PROCEDURE — 250N000011 HC RX IP 250 OP 636: Performed by: INTERNAL MEDICINE

## 2022-01-01 PROCEDURE — 78816 PET IMAGE W/CT FULL BODY: CPT | Mod: 26 | Performed by: RADIOLOGY

## 2022-01-01 PROCEDURE — 82040 ASSAY OF SERUM ALBUMIN: CPT

## 2022-01-01 PROCEDURE — 84443 ASSAY THYROID STIM HORMONE: CPT | Mod: GZ

## 2022-01-01 PROCEDURE — 258N000003 HC RX IP 258 OP 636: Performed by: NURSE PRACTITIONER

## 2022-01-01 PROCEDURE — 96413 CHEMO IV INFUSION 1 HR: CPT

## 2022-01-01 PROCEDURE — 85025 COMPLETE CBC W/AUTO DIFF WBC: CPT | Performed by: INTERNAL MEDICINE

## 2022-01-01 PROCEDURE — 80053 COMPREHEN METABOLIC PANEL: CPT | Performed by: INTERNAL MEDICINE

## 2022-01-01 PROCEDURE — 84443 ASSAY THYROID STIM HORMONE: CPT | Performed by: INTERNAL MEDICINE

## 2022-01-01 PROCEDURE — 250N000011 HC RX IP 250 OP 636: Performed by: NURSE PRACTITIONER

## 2022-01-01 PROCEDURE — 81001 URINALYSIS AUTO W/SCOPE: CPT | Performed by: INTERNAL MEDICINE

## 2022-01-01 PROCEDURE — 93005 ELECTROCARDIOGRAM TRACING: CPT

## 2022-01-01 PROCEDURE — 99215 OFFICE O/P EST HI 40 MIN: CPT | Performed by: NURSE PRACTITIONER

## 2022-01-01 PROCEDURE — 78816 PET IMAGE W/CT FULL BODY: CPT | Mod: PS

## 2022-01-01 PROCEDURE — 85004 AUTOMATED DIFF WBC COUNT: CPT | Performed by: INTERNAL MEDICINE

## 2022-01-01 PROCEDURE — 87086 URINE CULTURE/COLONY COUNT: CPT | Performed by: INTERNAL MEDICINE

## 2022-01-01 PROCEDURE — 85018 HEMOGLOBIN: CPT | Performed by: INTERNAL MEDICINE

## 2022-01-01 PROCEDURE — 343N000001 HC RX 343: Performed by: INTERNAL MEDICINE

## 2022-01-01 PROCEDURE — 96360 HYDRATION IV INFUSION INIT: CPT

## 2022-01-01 PROCEDURE — 36415 COLL VENOUS BLD VENIPUNCTURE: CPT | Performed by: INTERNAL MEDICINE

## 2022-01-01 PROCEDURE — 85004 AUTOMATED DIFF WBC COUNT: CPT

## 2022-01-01 PROCEDURE — 85025 COMPLETE CBC W/AUTO DIFF WBC: CPT

## 2022-01-01 PROCEDURE — 74177 CT ABD & PELVIS W/CONTRAST: CPT | Mod: 59,PS

## 2022-01-01 PROCEDURE — A9552 F18 FDG: HCPCS | Performed by: INTERNAL MEDICINE

## 2022-01-01 PROCEDURE — 74177 CT ABD & PELVIS W/CONTRAST: CPT | Mod: 26 | Performed by: RADIOLOGY

## 2022-01-01 PROCEDURE — G0463 HOSPITAL OUTPT CLINIC VISIT: HCPCS | Mod: 25

## 2022-01-01 PROCEDURE — G0463 HOSPITAL OUTPT CLINIC VISIT: HCPCS | Mod: PN,RTG | Performed by: INTERNAL MEDICINE

## 2022-01-01 PROCEDURE — 84443 ASSAY THYROID STIM HORMONE: CPT

## 2022-01-01 PROCEDURE — 81001 URINALYSIS AUTO W/SCOPE: CPT | Performed by: NURSE PRACTITIONER

## 2022-01-01 PROCEDURE — 71260 CT THORAX DX C+: CPT | Mod: 26 | Performed by: RADIOLOGY

## 2022-01-01 PROCEDURE — 99214 OFFICE O/P EST MOD 30 MIN: CPT | Performed by: NURSE PRACTITIONER

## 2022-01-01 PROCEDURE — 999N000104 HC STATISTIC NO CHARGE

## 2022-01-01 PROCEDURE — 78816 PET IMAGE W/CT FULL BODY: CPT | Mod: 26 | Performed by: STUDENT IN AN ORGANIZED HEALTH CARE EDUCATION/TRAINING PROGRAM

## 2022-01-01 PROCEDURE — G0463 HOSPITAL OUTPT CLINIC VISIT: HCPCS

## 2022-01-01 RX ORDER — HYDROCODONE BITARTRATE AND ACETAMINOPHEN 5; 325 MG/1; MG/1
1 TABLET ORAL EVERY 6 HOURS PRN
Qty: 10 TABLET | Refills: 0 | Status: SHIPPED | OUTPATIENT
Start: 2022-01-01 | End: 2022-01-01

## 2022-01-01 RX ORDER — HEPARIN SODIUM,PORCINE 10 UNIT/ML
5 VIAL (ML) INTRAVENOUS
Status: CANCELLED | OUTPATIENT
Start: 2022-01-01

## 2022-01-01 RX ORDER — MEPERIDINE HYDROCHLORIDE 25 MG/ML
25 INJECTION INTRAMUSCULAR; INTRAVENOUS; SUBCUTANEOUS EVERY 30 MIN PRN
Status: CANCELLED | OUTPATIENT
Start: 2022-01-01

## 2022-01-01 RX ORDER — LORAZEPAM 0.5 MG/1
0.5 TABLET ORAL EVERY 4 HOURS PRN
Qty: 30 TABLET | Refills: 2 | Status: SHIPPED | OUTPATIENT
Start: 2022-01-01

## 2022-01-01 RX ORDER — METHYLPREDNISOLONE SODIUM SUCCINATE 125 MG/2ML
125 INJECTION, POWDER, LYOPHILIZED, FOR SOLUTION INTRAMUSCULAR; INTRAVENOUS
Status: CANCELLED
Start: 2022-01-01

## 2022-01-01 RX ORDER — ALBUTEROL SULFATE 90 UG/1
1-2 AEROSOL, METERED RESPIRATORY (INHALATION)
Status: CANCELLED
Start: 2022-01-01

## 2022-01-01 RX ORDER — LORAZEPAM 2 MG/ML
0.5 INJECTION INTRAMUSCULAR EVERY 4 HOURS PRN
Status: CANCELLED | OUTPATIENT
Start: 2022-01-01

## 2022-01-01 RX ORDER — HYDROCODONE BITARTRATE AND ACETAMINOPHEN 5; 325 MG/1; MG/1
1 TABLET ORAL EVERY 6 HOURS PRN
Qty: 30 TABLET | Refills: 0 | Status: SHIPPED | OUTPATIENT
Start: 2022-01-01

## 2022-01-01 RX ORDER — CYCLOBENZAPRINE HCL 5 MG
5 TABLET ORAL 3 TIMES DAILY PRN
Qty: 30 TABLET | Refills: 0 | Status: SHIPPED | OUTPATIENT
Start: 2022-01-01 | End: 2022-01-01

## 2022-01-01 RX ORDER — HEPARIN SODIUM (PORCINE) LOCK FLUSH IV SOLN 100 UNIT/ML 100 UNIT/ML
5 SOLUTION INTRAVENOUS
Status: CANCELLED | OUTPATIENT
Start: 2022-01-01

## 2022-01-01 RX ORDER — NALOXONE HYDROCHLORIDE 0.4 MG/ML
0.2 INJECTION, SOLUTION INTRAMUSCULAR; INTRAVENOUS; SUBCUTANEOUS
Status: CANCELLED | OUTPATIENT
Start: 2022-01-01

## 2022-01-01 RX ORDER — AMLODIPINE BESYLATE 5 MG/1
5 TABLET ORAL DAILY
Qty: 90 TABLET | Refills: 0 | OUTPATIENT
Start: 2022-01-01

## 2022-01-01 RX ORDER — EPINEPHRINE 1 MG/ML
0.3 INJECTION, SOLUTION INTRAMUSCULAR; SUBCUTANEOUS EVERY 5 MIN PRN
Status: CANCELLED | OUTPATIENT
Start: 2022-01-01

## 2022-01-01 RX ORDER — DIPHENHYDRAMINE HYDROCHLORIDE 50 MG/ML
50 INJECTION INTRAMUSCULAR; INTRAVENOUS
Status: CANCELLED
Start: 2022-01-01

## 2022-01-01 RX ORDER — IOPAMIDOL 755 MG/ML
20-135 INJECTION, SOLUTION INTRAVASCULAR ONCE
Status: COMPLETED | OUTPATIENT
Start: 2022-01-01 | End: 2022-01-01

## 2022-01-01 RX ORDER — ALBUTEROL SULFATE 0.83 MG/ML
2.5 SOLUTION RESPIRATORY (INHALATION)
Status: CANCELLED | OUTPATIENT
Start: 2022-01-01

## 2022-01-01 RX ORDER — METOPROLOL SUCCINATE 25 MG/1
12.5 TABLET, EXTENDED RELEASE ORAL DAILY
Qty: 45 TABLET | Refills: 0 | Status: SHIPPED | OUTPATIENT
Start: 2022-01-01 | End: 2022-01-01

## 2022-01-01 RX ORDER — AMLODIPINE BESYLATE 5 MG/1
5 TABLET ORAL DAILY
Qty: 90 TABLET | Refills: 0 | Status: SHIPPED | OUTPATIENT
Start: 2022-01-01 | End: 2022-01-01

## 2022-01-01 RX ORDER — METOPROLOL SUCCINATE 25 MG/1
12.5 TABLET, EXTENDED RELEASE ORAL DAILY
Qty: 45 TABLET | Refills: 0 | OUTPATIENT
Start: 2022-01-01

## 2022-01-01 RX ORDER — AMLODIPINE BESYLATE 5 MG/1
5 TABLET ORAL DAILY
Qty: 90 TABLET | Refills: 0 | Status: SHIPPED | OUTPATIENT
Start: 2022-01-01

## 2022-01-01 RX ADMIN — SODIUM CHLORIDE 250 ML: 9 INJECTION, SOLUTION INTRAVENOUS at 15:04

## 2022-01-01 RX ADMIN — FLUDEOXYGLUCOSE F-18 11.6 MCI.: 500 INJECTION, SOLUTION INTRAVENOUS at 12:25

## 2022-01-01 RX ADMIN — SODIUM CHLORIDE 250 ML: 9 INJECTION, SOLUTION INTRAVENOUS at 13:49

## 2022-01-01 RX ADMIN — SODIUM CHLORIDE 400 MG: 9 INJECTION, SOLUTION INTRAVENOUS at 14:12

## 2022-01-01 RX ADMIN — SODIUM CHLORIDE 400 MG: 9 INJECTION, SOLUTION INTRAVENOUS at 15:05

## 2022-01-01 RX ADMIN — SODIUM CHLORIDE 400 MG: 9 INJECTION, SOLUTION INTRAVENOUS at 13:49

## 2022-01-01 RX ADMIN — IOPAMIDOL 66 ML: 755 INJECTION, SOLUTION INTRAVENOUS at 13:11

## 2022-01-01 RX ADMIN — FLUDEOXYGLUCOSE F-18 13.92 MCI.: 500 INJECTION, SOLUTION INTRAVENOUS at 13:46

## 2022-01-01 RX ADMIN — SODIUM CHLORIDE 250 ML: 9 INJECTION, SOLUTION INTRAVENOUS at 14:12

## 2022-01-01 RX ADMIN — SODIUM CHLORIDE 1000 ML: 9 INJECTION, SOLUTION INTRAVENOUS at 15:14

## 2022-01-01 ASSESSMENT — MIFFLIN-ST. JEOR: SCORE: 876.85

## 2022-01-01 ASSESSMENT — PAIN SCALES - GENERAL
PAINLEVEL: EXTREME PAIN (8)
PAINLEVEL: NO PAIN (0)

## 2022-01-01 NOTE — TELEPHONE ENCOUNTER
ED / Discharge Outreach Protocol    Patient Contact    Attempt # 1    Was call answered?  No.  Unable to leave message.    
ED / Discharge Outreach Protocol    Patient Contact    Attempt # 2    Was call answered?  No.  Unable to leave message.    
ED / Discharge Outreach Protocol    Patient Contact    Attempt # 3    Was call answered?  No.  Unable to leave message.    
Please call patient for hospital follow up.    
immune

## 2022-01-21 NOTE — PROGRESS NOTES
Trixie is a 81 year old who is being evaluated via a billable video visit.  Currently in State of [place].      How would you like to obtain your AVS? MyChart  If the video visit is dropped, the invitation should be resent by: Text to cell phone: [phone]  Will anyone else be joining your video visit? Yes: daughter n/a. How would they like to receive their invitation? Other e-mail: n/a    Eladia Bobby      [facility]    Hematology/Oncology Established Patient Follow-up Note      Today's Date: 1/31/2022    Reason for Follow-up: Metastatic anal adenocarcinoma.    Video visit duration (Doximity; later switched to phone due to audio difficulty): 20 minutes    HISTORY OF PRESENT ILLNESS: Sara Lehman is an 81 year old female who presents with the following oncologic history:  1.  4/1/2019: Initially presented to her primary care provider with complaints of hemorrhoids intermittently for 2 months with associated pain and itching as well as blood in the underwear at night and intermixed with stool, no melena.  No associated weight loss or night sweats.  Reports occasional constipation.  Physical exam by primary care provider showed an anal mass about 1 inch in size with perianal erythema and skin breakdown.  2.  4/9/2019: Colonoscopy showed a perianal mass in the left lateral quadrant, severe diverticulosis in the sigmoid colon, descending colon, and ascending colon.  Biopsy of the anal mass showed invasive moderately differentiated adenocarcinoma with normal mismatch repair protein expression.  Specimen consisted of multiple fragments of necrotic tissue with dysplastic appearing glands with foci of invasion.  Surface mucosa is not present and therefore precludes differentiation between colorectal carcinoma or perianal glandular carcinoma.  Depth of invasion cannot be assessed.  KRAS wild-type.  3.  4/16/2019: CT chest/abdomen/pelvis with contrast showed a 1.3 cm left upper lobe lung nodule  concerning for metastatic disease as well as 2 tiny nodules in the right lower lobe near the diaphragm.  Diffusely aneurysmal abdominal aorta measures up to 3.2 cm.  Prominent bilateral inguinal adenopathy with a lymph node in the upper right thigh adjacent to the common femoral vessels measures 2.9 x 2.3 cm.  Lymph node in the left medial upper thigh adjacent to the common femoral vessels measures 2.6 x 3.5 cm.  No evidence of obstruction.  Bony structures showed no destruction.  4.  4/19/2019: Consultation with Dr. Miller Casas.  Due to high suspicion of metastatic disease to the lung and bilateral inguinal regions, patient referred to medical oncology and radiation oncology.  5.  4/24/2019: PET/CT scan showed hypermetabolic posterior left upper lobe mid chest nodule adjacent to the major fissure measuring 1.3 x 1.2 cm and emphysematous changes.  Bilateral renal cysts present.  Nonobstructing stone of the lower right kidney measures 0.3 cm.  Nodular thickening of the left adrenal is 0.7 cm.  Intense hypermetabolism at the anal level with heterogeneous appearing soft tissue with SUV max 19.5.  Enlarged inguinal hypermetabolic lymph nodes on the right measuring 2.9 x 2.1 cm and on the left 3.6 x 2.4 cm.  A distal infrarenal abdominal aorta measures 3.4 cm.  6.  5/6/2019: Left lung mass biopsy performed and showed discordant immunohistochemical findings.  Lesion showed a strong reactivity for cytokeratin 7 and focal staining for P40.  Patient's anal adenocarcinoma reportedly positive for both immunohistochemical staining pattern not typical for primary lung tumor and metastatic disease cannot be excluded.  7.  5/9/2019-6/25/2019: Completed radiation therapy to the anal cancer and inguinal lymph nodes.  She received concurrent radiosensitizing chemotherapy with capecitabine.  8.  8/28/2019: PET/CT scan showed hypermetabolic nodule in the left upper lobe measuring 1.5 x 1.4 cm with SUV max 10.5, increased in size and  metabolic activity from 1.4 x 1.2 cm with SUV max 9.5 on 4/24/2019 PET/CT scan.  A 0.5 cm pleural-based nodule with low metabolic activity in the left lower lobe is unchanged.  A 3.4 cm infrarenal abdominal aortic aneurysm is present.  The previously noted hypermetabolic anal mass has significantly improved with SUV max 5.1, decreased from SUV max 24.6, consistent with response to treatment.  The bilateral inguinal lymphadenopathy has markedly improved on the current study with nodes measuring up to 1.6 x 1.1 cm with SUV max 3.5, decreased from 4 x 2.6 cm with SUV max 14, consistent with response to treatment.  9.  10/21/2019: Completed stereotactic radiation to the left upper lobe lung nodule/metastasis in 5 fractions under the care of Dr. Clay Monroy.  10. 12/16/2019: PET scan showed new hypermetabolic medial right middle lobe pulmonary nodule; left upper lobe nodule decreased in size and metabolism; improvement of bilateral inguinal adenopathy with decreased sizes and metabolism; hypermetabolism slightly increased at the left adrenal nodule but stable in size; decreased hypermetabolism at anal level; new small focal hypermetabolism within muscle anterior to left hip joint with no mass effect.  11. 2/07/2020: Completed stereotactic radiation to the right middle lobe nodule and left adrenal nodule in 5 fractions.  12. 4/6/2020: PET/CT scan showed a 0.8 cm nodular density near the right lung apex with no significant hypermetabolism, unchanged from the prior exam.  The previously seen hypermetabolic right middle lobe pulmonary nodule is not seen on the current exam.  Small hypermetabolic left adrenal nodule measuring 1 cm with SUV max 3.3 is unchanged.  Infrarenal abdominal aortic aneurysm also not significantly changed and measures 3.3 x 3.6 cm containing a mild amount of mural thrombus.  The small hypermetabolic focus within the muscle anterior to the left hip joint is unchanged with SUV max 3.6.  13. 7/13/2020:  PET/CT scan showed interval decrease in size of the right upper lobe lung nodule with no associated FDG activity; posttreatment changes in the lateral left upper lung; no new lung lesions; hypermetabolic left suprahilar area with no associated CT abnormality, possibly vascular in nature; no adenopathy; stable left adrenal nodule; indeterminate FDG activity in region of anal canal.   14. 10/26/2020: PET/CT scan showed FDG avid lymph nodes in left hilar region (measuring 1.1 cm with SUV 6.3; anterior to this is another 1.4 x 0.7 cm with SUV 8.9) concerning for progressive metastatic disease; remaining scan negative.  15. 1/25/2021: PET scan showed signs concerning for progressive metastatic disease, with increased FDG uptake within a left hilar lymph node, several new FDG-avid mediastinal lymph nodes; unchanged FDG-avid 10 mm left adrenal nodule; no new or enlarging lung nodules; unchanged 3.2 cm infrarenal AAA.  16. 3/29/2021: CT chest showed stable mild precarinal (1 cm) and left hilar (8 mm) lymphadenopathy; no new or enlarging lymphadenopathy.  17. 7/26/2021: PET scan showed increased size and metabolic activity of mediastinal and left hilar lymph nodes, new multifocal metastatic disease in liver; new left level 3 cervical lymph node metastases, stable FDG avid left adrenal nodule.  18. 8/16/2021: Started pembrolizumab every 42 days with palliative intent.  19. 11/01/2021: PET scan showed overall mixed response: resolved hepatic metastases in left lobe; increased size of FDG avid metastatic liver lesions in right lobe; decreased size and avidity of mediastinal and left hilar lymph nodes; decreased size and resolution of avidity of left level 3 cervical lymph nodes; sigmoid wall thickening and mild FDG avidity suggestive of colitis.  20. 1/24/2022: PET scan showed decreased size of the right hepatic lesions. No recurrence of the left hepatic lesions. Recurrent hypermetabolism to a level 3 cervical node. Mildly  increased uptake to the paratracheal and left hilar lymph nodes.  Stable mildly avid left adrenal nodule    INTERIM HISTORY:  Trixie reports feeling well and denies any new persistent diarrhea. She denies any recent fevers, chills, or pain.    REVIEW OF SYSTEMS:   14 point ROS was reviewed and is negative other than as noted above in HPI.       HOME MEDICATIONS:  Current Outpatient Medications   Medication Sig Dispense Refill     acetaminophen (TYLENOL) 325 MG tablet Take 2 tablets (650 mg) by mouth every 4 hours as needed for mild pain or fever       amLODIPine (NORVASC) 5 MG tablet Take 1 tablet (5 mg) by mouth daily 90 tablet 3     ferrous sulfate (FEROSUL) 325 (65 Fe) MG tablet Take 1 tablet (325 mg) by mouth daily (with breakfast) (do not take with calcium or dairy products) 90 tablet 3     LORazepam (ATIVAN) 0.5 MG tablet Take 1 tablet (0.5 mg) by mouth every 4 hours as needed (Anxiety, Nausea/Vomiting or Sleep) 30 tablet 2     metoprolol succinate ER (TOPROL-XL) 25 MG 24 hr tablet Take 0.5 tablets (12.5 mg) by mouth daily 45 tablet 3     ondansetron (ZOFRAN) 8 MG tablet Take 8 mg by mouth every 8 hours as needed for nausea       prochlorperazine (COMPAZINE) 10 MG tablet Take 1 tablet (10 mg) by mouth every 6 hours as needed (Nausea/Vomiting) 30 tablet 2     SUMAtriptan (IMITREX) 50 MG tablet Take 1 tablet (50 mg) by mouth at onset of headache for migraine 18 tablet 1         ALLERGIES:  Allergies   Allergen Reactions     Ace Inhibitors Cough     Aleve [Naproxen Sodium] GI Disturbance     Asa Buff (Mag [Aspirin Buffered] GI Disturbance     Atorvastatin Calcium GI Disturbance     Celebrex [Celecoxib] GI Disturbance     Codeine Sulfate Hives     Pravastatin GI Disturbance     Simvastatin GI Disturbance         PAST MEDICAL HISTORY:  Past Medical History:   Diagnosis Date     Benign essential hypertension      Malignant neoplasm of anal canal (H)     metastatic; s/p chemoradiation and stereotactic radiation of  lung mets     Migraines          PAST SURGICAL HISTORY:  Past Surgical History:   Procedure Laterality Date     ARTHROSCOPY KNEE Right      COLONOSCOPY N/A 2019    Procedure: COMBINED COLONOSCOPY, SINGLE OR MULTIPLE BIOPSY/POLYPECTOMY BY BIOPSY;  Surgeon: Saleem Shaw MD;  Location:  GI     EXTRACORPOREAL SHOCK WAVE LITHOTRIPSY (ESWL)       TUBAL LIGATION           SOCIAL HISTORY:  Social History     Socioeconomic History     Marital status:      Spouse name: Not on file     Number of children: Not on file     Years of education: Not on file     Highest education level: Not on file   Occupational History     Occupation: Retired - homemaker, temp work   Tobacco Use     Smoking status: Former Smoker     Packs/day: 2.00     Years: 40.00     Pack years: 80.00     Types: Cigarettes     Quit date: 2016     Years since quittin.0     Smokeless tobacco: Never Used   Substance and Sexual Activity     Alcohol use: Yes     Drug use: No     Sexual activity: Yes     Partners: Male     Birth control/protection: Post-menopausal   Other Topics Concern     Parent/sibling w/ CABG, MI or angioplasty before 65F 55M? Not Asked   Social History Narrative    Maried.    Lives at home with  - fully independent.     4 adult children.    8 grandchildren.    Active, walks when able.      Social Determinants of Health     Financial Resource Strain: Not on file   Food Insecurity: Not on file   Transportation Needs: Not on file   Physical Activity: Not on file   Stress: Not on file   Social Connections: Not on file   Intimate Partner Violence: Not on file   Housing Stability: Not on file         FAMILY HISTORY:  Family History   Problem Relation Age of Onset     Uterine Cancer Mother      Hypertension Maternal Grandmother      Breast Cancer Daughter      Diabetes Type 2  No family hx of      Myocardial Infarction No family hx of      Cerebrovascular Disease No family hx of      Ovarian Cancer No family hx of       Colon Cancer No family hx of          PHYSICAL EXAM:  Vital signs:  Not taken.  GENERAL: No acute distress.  EYES: No scleral icterus. No overt erythema.  MUSCULOSKELETAL: Range of motion in the neck, shoulders, and arms appear normal.  SKIN: No overt rashes, discolorations, or lesions over the face and neck.  NEUROLOGIC: Alert.  No overt tremors.  PSYCHIATRIC: Normal affect and mood.  Does not appear anxious.    LABS:  CBC RESULTS: Recent Labs   Lab Test 01/24/22  1154   WBC 5.4   RBC 4.66   HGB 14.2   HCT 45.5   MCV 98   MCH 30.5   MCHC 31.2*   RDW 14.7        Last Comprehensive Metabolic Panel:  Sodium   Date Value Ref Range Status   01/24/2022 137 133 - 144 mmol/L Final   03/29/2021 140 133 - 144 mmol/L Final     Potassium   Date Value Ref Range Status   01/24/2022 4.3 3.4 - 5.3 mmol/L Final   03/29/2021 3.9 3.4 - 5.3 mmol/L Final     Chloride   Date Value Ref Range Status   01/24/2022 105 94 - 109 mmol/L Final   03/29/2021 109 94 - 109 mmol/L Final     Carbon Dioxide   Date Value Ref Range Status   03/29/2021 27 20 - 32 mmol/L Final     Carbon Dioxide (CO2)   Date Value Ref Range Status   01/24/2022 28 20 - 32 mmol/L Final     Anion Gap   Date Value Ref Range Status   01/24/2022 4 3 - 14 mmol/L Final   03/29/2021 4 3 - 14 mmol/L Final     Glucose   Date Value Ref Range Status   01/24/2022 91 70 - 99 mg/dL Final   03/29/2021 93 70 - 99 mg/dL Final     Urea Nitrogen   Date Value Ref Range Status   01/24/2022 17 7 - 30 mg/dL Final   03/29/2021 18 7 - 30 mg/dL Final     Creatinine   Date Value Ref Range Status   01/24/2022 0.71 0.52 - 1.04 mg/dL Final   03/29/2021 0.82 0.52 - 1.04 mg/dL Final     GFR Estimate   Date Value Ref Range Status   01/24/2022 85 >60 mL/min/1.73m2 Final     Comment:     Effective December 21, 2021 eGFRcr in adults is calculated using the 2021 CKD-EPI creatinine equation which includes age and gender ( et al., NEJM, DOI: 10.1056/HGTSri1973420)   03/29/2021 81 >60 mL/min/[1.73_m2]  Final     Calcium   Date Value Ref Range Status   01/24/2022 9.8 8.5 - 10.1 mg/dL Final   03/29/2021 9.3 8.5 - 10.1 mg/dL Final     Bilirubin Total   Date Value Ref Range Status   01/24/2022 0.5 0.2 - 1.3 mg/dL Final   03/29/2021 0.2 0.2 - 1.3 mg/dL Final     Alkaline Phosphatase   Date Value Ref Range Status   01/24/2022 91 40 - 150 U/L Final   03/29/2021 73 40 - 150 U/L Final     ALT   Date Value Ref Range Status   01/24/2022 16 0 - 50 U/L Final   03/29/2021 21 0 - 50 U/L Final     AST   Date Value Ref Range Status   01/24/2022 20 0 - 45 U/L Final   03/29/2021 18 0 - 45 U/L Final       PATHOLOGY:  None new since prior visit.    IMAGING:  Reviewed as per HPI.    ASSESSMENT/PLAN:  Sara Lehman is an 81 year old female with the following issues:  1.  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  Left lung and right middle lobe lung metastases s/p radiation  3. Left adrenal nodule, stable  4. Hilar and mediastinal hypermetabolic lymphadenopathy  5. Metastases to liver, mixed response  --I personally reviewed the PET scan from 1/24/2022 and discussed the results with Trixie. Overall, after 4 doses of pembrolizumab, she continues to have a mixed response radiographically.  The left hepatic metastases have not recurred, and the right lobe lesions have decreased in size.  There is recurrent hypermetabolism to level 3 cervical node, mildly increased uptake to paratracheal and left hilar lymph nodes; stable mildly avid left adrenal nodule.  --I recommended she continue with pembrolizumab and repeat PET/CT prior to cycle 7. She agrees to continue with pembrolizumab.      Rhona Wisdom MD  Hematology/Oncology  [facility]    Total time spent: 30 minutes in patient evaluation, counseling, documentation, and coordination of care.

## 2022-01-31 NOTE — LETTER
1/31/2022         RE: Sara Lehman  [address] [id number]        Dear Colleague,    Thank you for referring your patient, Sara Lehman, to the [facility] [place]. Please see a copy of my visit note below.    Trixie is a 81 year old who is being evaluated via a billable video visit.  Currently in State of [place].      How would you like to obtain your AVS? MyChart  If the video visit is dropped, the invitation should be resent by: Text to cell phone: [phone]  Will anyone else be joining your video visit? Yes: daughter n/a. How would they like to receive their invitation? Other e-mail: n/a  {If patient encounters technical issues they should call [phone] :[phone]}  Eladia Bobby      [facility]    Hematology/Oncology Established Patient Follow-up Note      Today's Date: 1/31/2022    Reason for Follow-up: Metastatic anal adenocarcinoma.    Video visit duration (Doximity; later switched to phone due to audio difficulty): 20 minutes    HISTORY OF PRESENT ILLNESS: Sara Lehman is an 81 year old female who presents with the following oncologic history:  1.  4/1/2019: Initially presented to her primary care provider with complaints of hemorrhoids intermittently for 2 months with associated pain and itching as well as blood in the underwear at night and intermixed with stool, no melena.  No associated weight loss or night sweats.  Reports occasional constipation.  Physical exam by primary care provider showed an anal mass about 1 inch in size with perianal erythema and skin breakdown.  2.  4/9/2019: Colonoscopy showed a perianal mass in the left lateral quadrant, severe diverticulosis in the sigmoid colon, descending colon, and ascending colon.  Biopsy of the anal mass showed invasive moderately differentiated adenocarcinoma with normal mismatch repair protein expression.  Specimen consisted of multiple fragments of necrotic tissue with  dysplastic appearing glands with foci of invasion.  Surface mucosa is not present and therefore precludes differentiation between colorectal carcinoma or perianal glandular carcinoma.  Depth of invasion cannot be assessed.  KRAS wild-type.  3.  4/16/2019: CT chest/abdomen/pelvis with contrast showed a 1.3 cm left upper lobe lung nodule concerning for metastatic disease as well as 2 tiny nodules in the right lower lobe near the diaphragm.  Diffusely aneurysmal abdominal aorta measures up to 3.2 cm.  Prominent bilateral inguinal adenopathy with a lymph node in the upper right thigh adjacent to the common femoral vessels measures 2.9 x 2.3 cm.  Lymph node in the left medial upper thigh adjacent to the common femoral vessels measures 2.6 x 3.5 cm.  No evidence of obstruction.  Bony structures showed no destruction.  4.  4/19/2019: Consultation with Dr. Miller Casas.  Due to high suspicion of metastatic disease to the lung and bilateral inguinal regions, patient referred to medical oncology and radiation oncology.  5.  4/24/2019: PET/CT scan showed hypermetabolic posterior left upper lobe mid chest nodule adjacent to the major fissure measuring 1.3 x 1.2 cm and emphysematous changes.  Bilateral renal cysts present.  Nonobstructing stone of the lower right kidney measures 0.3 cm.  Nodular thickening of the left adrenal is 0.7 cm.  Intense hypermetabolism at the anal level with heterogeneous appearing soft tissue with SUV max 19.5.  Enlarged inguinal hypermetabolic lymph nodes on the right measuring 2.9 x 2.1 cm and on the left 3.6 x 2.4 cm.  A distal infrarenal abdominal aorta measures 3.4 cm.  6.  5/6/2019: Left lung mass biopsy performed and showed discordant immunohistochemical findings.  Lesion showed a strong reactivity for cytokeratin 7 and focal staining for P40.  Patient's anal adenocarcinoma reportedly positive for both immunohistochemical staining pattern not typical for primary lung tumor and metastatic disease  cannot be excluded.  7.  5/9/2019-6/25/2019: Completed radiation therapy to the anal cancer and inguinal lymph nodes.  She received concurrent radiosensitizing chemotherapy with capecitabine.  8. 8/28/2019: PET/CT scan showed hypermetabolic nodule in the left upper lobe measuring 1.5 x 1.4 cm with SUV max 10.5, increased in size and metabolic activity from 1.4 x 1.2 cm with SUV max 9.5 on 4/24/2019 PET/CT scan.  A 0.5 cm pleural-based nodule with low metabolic activity in the left lower lobe is unchanged.  A 3.4 cm infrarenal abdominal aortic aneurysm is present.  The previously noted hypermetabolic anal mass has significantly improved with SUV max 5.1, decreased from SUV max 24.6, consistent with response to treatment.  The bilateral inguinal lymphadenopathy has markedly improved on the current study with nodes measuring up to 1.6 x 1.1 cm with SUV max 3.5, decreased from 4 x 2.6 cm with SUV max 14, consistent with response to treatment.  9.  10/21/2019: Completed stereotactic radiation to the left upper lobe lung nodule/metastasis in 5 fractions under the care of Dr. Clay Monroy.  10. 12/16/2019: PET scan showed new hypermetabolic medial right middle lobe pulmonary nodule; left upper lobe nodule decreased in size and metabolism; improvement of bilateral inguinal adenopathy with decreased sizes and metabolism; hypermetabolism slightly increased at the left adrenal nodule but stable in size; decreased hypermetabolism at anal level; new small focal hypermetabolism within muscle anterior to left hip joint with no mass effect.  11. 2/07/2020: Completed stereotactic radiation to the right middle lobe nodule and left adrenal nodule in 5 fractions.  12. 4/6/2020: PET/CT scan showed a 0.8 cm nodular density near the right lung apex with no significant hypermetabolism, unchanged from the prior exam.  The previously seen hypermetabolic right middle lobe pulmonary nodule is not seen on the current exam.  Small hypermetabolic  left adrenal nodule measuring 1 cm with SUV max 3.3 is unchanged.  Infrarenal abdominal aortic aneurysm also not significantly changed and measures 3.3 x 3.6 cm containing a mild amount of mural thrombus.  The small hypermetabolic focus within the muscle anterior to the left hip joint is unchanged with SUV max 3.6.  13. 7/13/2020: PET/CT scan showed interval decrease in size of the right upper lobe lung nodule with no associated FDG activity; posttreatment changes in the lateral left upper lung; no new lung lesions; hypermetabolic left suprahilar area with no associated CT abnormality, possibly vascular in nature; no adenopathy; stable left adrenal nodule; indeterminate FDG activity in region of anal canal.   14. 10/26/2020: PET/CT scan showed FDG avid lymph nodes in left hilar region (measuring 1.1 cm with SUV 6.3; anterior to this is another 1.4 x 0.7 cm with SUV 8.9) concerning for progressive metastatic disease; remaining scan negative.  15. 1/25/2021: PET scan showed signs concerning for progressive metastatic disease, with increased FDG uptake within a left hilar lymph node, several new FDG-avid mediastinal lymph nodes; unchanged FDG-avid 10 mm left adrenal nodule; no new or enlarging lung nodules; unchanged 3.2 cm infrarenal AAA.  16. 3/29/2021: CT chest showed stable mild precarinal (1 cm) and left hilar (8 mm) lymphadenopathy; no new or enlarging lymphadenopathy.  17. 7/26/2021: PET scan showed increased size and metabolic activity of mediastinal and left hilar lymph nodes, new multifocal metastatic disease in liver; new left level 3 cervical lymph node metastases, stable FDG avid left adrenal nodule.  18. 8/16/2021: Started pembrolizumab every 42 days with palliative intent.  19. 11/01/2021: PET scan showed overall mixed response: resolved hepatic metastases in left lobe; increased size of FDG avid metastatic liver lesions in right lobe; decreased size and avidity of mediastinal and left hilar lymph nodes;  decreased size and resolution of avidity of left level 3 cervical lymph nodes; sigmoid wall thickening and mild FDG avidity suggestive of colitis.  20. 1/24/2022: PET scan showed decreased size of the right hepatic lesions. No recurrence of the left hepatic lesions. Recurrent hypermetabolism to a level 3 cervical node. Mildly increased uptake to the paratracheal and left hilar lymph nodes.  Stable mildly avid left adrenal nodule    INTERIM HISTORY:  Trixie reports feeling well and denies any new persistent diarrhea. She denies any recent fevers, chills, or pain.    REVIEW OF SYSTEMS:   14 point ROS was reviewed and is negative other than as noted above in HPI.       HOME MEDICATIONS:  Current Outpatient Medications   Medication Sig Dispense Refill     acetaminophen (TYLENOL) 325 MG tablet Take 2 tablets (650 mg) by mouth every 4 hours as needed for mild pain or fever       amLODIPine (NORVASC) 5 MG tablet Take 1 tablet (5 mg) by mouth daily 90 tablet 3     ferrous sulfate (FEROSUL) 325 (65 Fe) MG tablet Take 1 tablet (325 mg) by mouth daily (with breakfast) (do not take with calcium or dairy products) 90 tablet 3     LORazepam (ATIVAN) 0.5 MG tablet Take 1 tablet (0.5 mg) by mouth every 4 hours as needed (Anxiety, Nausea/Vomiting or Sleep) 30 tablet 2     metoprolol succinate ER (TOPROL-XL) 25 MG 24 hr tablet Take 0.5 tablets (12.5 mg) by mouth daily 45 tablet 3     ondansetron (ZOFRAN) 8 MG tablet Take 8 mg by mouth every 8 hours as needed for nausea       prochlorperazine (COMPAZINE) 10 MG tablet Take 1 tablet (10 mg) by mouth every 6 hours as needed (Nausea/Vomiting) 30 tablet 2     SUMAtriptan (IMITREX) 50 MG tablet Take 1 tablet (50 mg) by mouth at onset of headache for migraine 18 tablet 1         ALLERGIES:  Allergies   Allergen Reactions     Ace Inhibitors Cough     Aleve [Naproxen Sodium] GI Disturbance     Asa Buff (Mag [Aspirin Buffered] GI Disturbance     Atorvastatin Calcium GI Disturbance     Celebrex  [Celecoxib] GI Disturbance     Codeine Sulfate Hives     Pravastatin GI Disturbance     Simvastatin GI Disturbance         PAST MEDICAL HISTORY:  Past Medical History:   Diagnosis Date     Benign essential hypertension      Malignant neoplasm of anal canal (H)     metastatic; s/p chemoradiation and stereotactic radiation of lung mets     Migraines          PAST SURGICAL HISTORY:  Past Surgical History:   Procedure Laterality Date     ARTHROSCOPY KNEE Right      COLONOSCOPY N/A 2019    Procedure: COMBINED COLONOSCOPY, SINGLE OR MULTIPLE BIOPSY/POLYPECTOMY BY BIOPSY;  Surgeon: Saleem Shaw MD;  Location:  GI     EXTRACORPOREAL SHOCK WAVE LITHOTRIPSY (ESWL)       TUBAL LIGATION           SOCIAL HISTORY:  Social History     Socioeconomic History     Marital status:      Spouse name: Not on file     Number of children: Not on file     Years of education: Not on file     Highest education level: Not on file   Occupational History     Occupation: Retired - homemaker, temp work   Tobacco Use     Smoking status: Former Smoker     Packs/day: 2.00     Years: 40.00     Pack years: 80.00     Types: Cigarettes     Quit date: 2016     Years since quittin.0     Smokeless tobacco: Never Used   Substance and Sexual Activity     Alcohol use: Yes     Drug use: No     Sexual activity: Yes     Partners: Male     Birth control/protection: Post-menopausal   Other Topics Concern     Parent/sibling w/ CABG, MI or angioplasty before 65F 55M? Not Asked   Social History Narrative    Maried.    Lives at home with  - fully independent.     4 adult children.    8 grandchildren.    Active, walks when able.      Social Determinants of Health     Financial Resource Strain: Not on file   Food Insecurity: Not on file   Transportation Needs: Not on file   Physical Activity: Not on file   Stress: Not on file   Social Connections: Not on file   Intimate Partner Violence: Not on file   Housing Stability: Not on file          FAMILY HISTORY:  Family History   Problem Relation Age of Onset     Uterine Cancer Mother      Hypertension Maternal Grandmother      Breast Cancer Daughter      Diabetes Type 2  No family hx of      Myocardial Infarction No family hx of      Cerebrovascular Disease No family hx of      Ovarian Cancer No family hx of      Colon Cancer No family hx of          PHYSICAL EXAM:  Vital signs:  Not taken.  GENERAL: No acute distress.  EYES: No scleral icterus. No overt erythema.  MUSCULOSKELETAL: Range of motion in the neck, shoulders, and arms appear normal.  SKIN: No overt rashes, discolorations, or lesions over the face and neck.  NEUROLOGIC: Alert.  No overt tremors.  PSYCHIATRIC: Normal affect and mood.  Does not appear anxious.    LABS:  CBC RESULTS: Recent Labs   Lab Test 01/24/22  1154   WBC 5.4   RBC 4.66   HGB 14.2   HCT 45.5   MCV 98   MCH 30.5   MCHC 31.2*   RDW 14.7        Last Comprehensive Metabolic Panel:  Sodium   Date Value Ref Range Status   01/24/2022 137 133 - 144 mmol/L Final   03/29/2021 140 133 - 144 mmol/L Final     Potassium   Date Value Ref Range Status   01/24/2022 4.3 3.4 - 5.3 mmol/L Final   03/29/2021 3.9 3.4 - 5.3 mmol/L Final     Chloride   Date Value Ref Range Status   01/24/2022 105 94 - 109 mmol/L Final   03/29/2021 109 94 - 109 mmol/L Final     Carbon Dioxide   Date Value Ref Range Status   03/29/2021 27 20 - 32 mmol/L Final     Carbon Dioxide (CO2)   Date Value Ref Range Status   01/24/2022 28 20 - 32 mmol/L Final     Anion Gap   Date Value Ref Range Status   01/24/2022 4 3 - 14 mmol/L Final   03/29/2021 4 3 - 14 mmol/L Final     Glucose   Date Value Ref Range Status   01/24/2022 91 70 - 99 mg/dL Final   03/29/2021 93 70 - 99 mg/dL Final     Urea Nitrogen   Date Value Ref Range Status   01/24/2022 17 7 - 30 mg/dL Final   03/29/2021 18 7 - 30 mg/dL Final     Creatinine   Date Value Ref Range Status   01/24/2022 0.71 0.52 - 1.04 mg/dL Final   03/29/2021 0.82 0.52 - 1.04  mg/dL Final     GFR Estimate   Date Value Ref Range Status   01/24/2022 85 >60 mL/min/1.73m2 Final     Comment:     Effective December 21, 2021 eGFRcr in adults is calculated using the 2021 CKD-EPI creatinine equation which includes age and gender (Chan et al., NEJ, DOI: 10.1056/IGHCfx0587248)   03/29/2021 81 >60 mL/min/[1.73_m2] Final     Calcium   Date Value Ref Range Status   01/24/2022 9.8 8.5 - 10.1 mg/dL Final   03/29/2021 9.3 8.5 - 10.1 mg/dL Final     Bilirubin Total   Date Value Ref Range Status   01/24/2022 0.5 0.2 - 1.3 mg/dL Final   03/29/2021 0.2 0.2 - 1.3 mg/dL Final     Alkaline Phosphatase   Date Value Ref Range Status   01/24/2022 91 40 - 150 U/L Final   03/29/2021 73 40 - 150 U/L Final     ALT   Date Value Ref Range Status   01/24/2022 16 0 - 50 U/L Final   03/29/2021 21 0 - 50 U/L Final     AST   Date Value Ref Range Status   01/24/2022 20 0 - 45 U/L Final   03/29/2021 18 0 - 45 U/L Final       PATHOLOGY:  None new since prior visit.    IMAGING:  Reviewed as per HPI.    ASSESSMENT/PLAN:  Sara Lehman is an 81 year old female with the following issues:  1.  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  Left lung and right middle lobe lung metastases s/p radiation  3. Left adrenal nodule, stable  4. Hilar and mediastinal hypermetabolic lymphadenopathy  5. Metastases to liver, mixed response  --I personally reviewed the PET scan from 1/24/2022 and discussed the results with Trixie. Overall, after 4 doses of pembrolizumab, she continues to have a mixed response radiographically.  The left hepatic metastases have not recurred, and the right lobe lesions have decreased in size.  There is recurrent hypermetabolism to level 3 cervical node, mildly increased uptake to paratracheal and left hilar lymph nodes; stable mildly avid left adrenal nodule.  --I recommended she continue with pembrolizumab and repeat PET/CT prior to cycle 7. She  agrees to continue with pembrolizumab.      Rhona Wisdom MD  Hematology/Oncology  [facility]    Total time spent: 30 minutes in patient evaluation, counseling, documentation, and coordination of care.      Again, thank you for allowing me to participate in the care of your patient.        Sincerely,        Rhona Wisdom MD

## 2022-01-31 NOTE — PATIENT INSTRUCTIONS
1. Proceed with pembrolizumab 2/1.  2. Arrange for pembrolizumab 3/15 and 4/26 with lab draw prior to each dose.  3. RTC NP on or prior to 3/15.  4. RTC MD prior to 4/26 with PET scan prior to visit.

## 2022-01-31 NOTE — LETTER
1/31/2022         RE: Sara Lehman  [address] [facility] [id number]        Dear Colleague,    Thank you for referring your patient, Sara Lehman, to the [facility] [place]. Please see a copy of my visit note below.    Trixie is a 81 year old who is being evaluated via a billable video visit.  Currently in State of [place].      How would you like to obtain your AVS? MyChart  If the video visit is dropped, the invitation should be resent by: Text to cell phone: [phone]  Will anyone else be joining your video visit? Yes: daughter n/a. How would they like to receive their invitation? Other e-mail: n/a  {If patient encounters technical issues they should call [phone] :[phone]}  Eladia Bobby      [facility]    Hematology/Oncology Established Patient Follow-up Note      Today's Date: 1/31/2022    Reason for Follow-up: Metastatic anal adenocarcinoma.    Video visit duration (Doximity; later switched to phone due to audio difficulty): 20 minutes    HISTORY OF PRESENT ILLNESS: Sara Lehman is an 81 year old female who presents with the following oncologic history:  1.  4/1/2019: Initially presented to her primary care provider with complaints of hemorrhoids intermittently for 2 months with associated pain and itching as well as blood in the underwear at night and intermixed with stool, no melena.  No associated weight loss or night sweats.  Reports occasional constipation.  Physical exam by primary care provider showed an anal mass about 1 inch in size with perianal erythema and skin breakdown.  2.  4/9/2019: Colonoscopy showed a perianal mass in the left lateral quadrant, severe diverticulosis in the sigmoid colon, descending colon, and ascending colon.  Biopsy of the anal mass showed invasive moderately differentiated adenocarcinoma with normal mismatch repair protein expression.  Specimen consisted of multiple fragments of necrotic tissue with  dysplastic appearing glands with foci of invasion.  Surface mucosa is not present and therefore precludes differentiation between colorectal carcinoma or perianal glandular carcinoma.  Depth of invasion cannot be assessed.  KRAS wild-type.  3.  4/16/2019: CT chest/abdomen/pelvis with contrast showed a 1.3 cm left upper lobe lung nodule concerning for metastatic disease as well as 2 tiny nodules in the right lower lobe near the diaphragm.  Diffusely aneurysmal abdominal aorta measures up to 3.2 cm.  Prominent bilateral inguinal adenopathy with a lymph node in the upper right thigh adjacent to the common femoral vessels measures 2.9 x 2.3 cm.  Lymph node in the left medial upper thigh adjacent to the common femoral vessels measures 2.6 x 3.5 cm.  No evidence of obstruction.  Bony structures showed no destruction.  4.  4/19/2019: Consultation with Dr. Miller Casas.  Due to high suspicion of metastatic disease to the lung and bilateral inguinal regions, patient referred to medical oncology and radiation oncology.  5.  4/24/2019: PET/CT scan showed hypermetabolic posterior left upper lobe mid chest nodule adjacent to the major fissure measuring 1.3 x 1.2 cm and emphysematous changes.  Bilateral renal cysts present.  Nonobstructing stone of the lower right kidney measures 0.3 cm.  Nodular thickening of the left adrenal is 0.7 cm.  Intense hypermetabolism at the anal level with heterogeneous appearing soft tissue with SUV max 19.5.  Enlarged inguinal hypermetabolic lymph nodes on the right measuring 2.9 x 2.1 cm and on the left 3.6 x 2.4 cm.  A distal infrarenal abdominal aorta measures 3.4 cm.  6.  5/6/2019: Left lung mass biopsy performed and showed discordant immunohistochemical findings.  Lesion showed a strong reactivity for cytokeratin 7 and focal staining for P40.  Patient's anal adenocarcinoma reportedly positive for both immunohistochemical staining pattern not typical for primary lung tumor and metastatic disease  cannot be excluded.  7.  5/9/2019-6/25/2019: Completed radiation therapy to the anal cancer and inguinal lymph nodes.  She received concurrent radiosensitizing chemotherapy with capecitabine.  8. 8/28/2019: PET/CT scan showed hypermetabolic nodule in the left upper lobe measuring 1.5 x 1.4 cm with SUV max 10.5, increased in size and metabolic activity from 1.4 x 1.2 cm with SUV max 9.5 on 4/24/2019 PET/CT scan.  A 0.5 cm pleural-based nodule with low metabolic activity in the left lower lobe is unchanged.  A 3.4 cm infrarenal abdominal aortic aneurysm is present.  The previously noted hypermetabolic anal mass has significantly improved with SUV max 5.1, decreased from SUV max 24.6, consistent with response to treatment.  The bilateral inguinal lymphadenopathy has markedly improved on the current study with nodes measuring up to 1.6 x 1.1 cm with SUV max 3.5, decreased from 4 x 2.6 cm with SUV max 14, consistent with response to treatment.  9.  10/21/2019: Completed stereotactic radiation to the left upper lobe lung nodule/metastasis in 5 fractions under the care of Dr. Clay Monroy.  10. 12/16/2019: PET scan showed new hypermetabolic medial right middle lobe pulmonary nodule; left upper lobe nodule decreased in size and metabolism; improvement of bilateral inguinal adenopathy with decreased sizes and metabolism; hypermetabolism slightly increased at the left adrenal nodule but stable in size; decreased hypermetabolism at anal level; new small focal hypermetabolism within muscle anterior to left hip joint with no mass effect.  11. 2/07/2020: Completed stereotactic radiation to the right middle lobe nodule and left adrenal nodule in 5 fractions.  12. 4/6/2020: PET/CT scan showed a 0.8 cm nodular density near the right lung apex with no significant hypermetabolism, unchanged from the prior exam.  The previously seen hypermetabolic right middle lobe pulmonary nodule is not seen on the current exam.  Small hypermetabolic  left adrenal nodule measuring 1 cm with SUV max 3.3 is unchanged.  Infrarenal abdominal aortic aneurysm also not significantly changed and measures 3.3 x 3.6 cm containing a mild amount of mural thrombus.  The small hypermetabolic focus within the muscle anterior to the left hip joint is unchanged with SUV max 3.6.  13. 7/13/2020: PET/CT scan showed interval decrease in size of the right upper lobe lung nodule with no associated FDG activity; posttreatment changes in the lateral left upper lung; no new lung lesions; hypermetabolic left suprahilar area with no associated CT abnormality, possibly vascular in nature; no adenopathy; stable left adrenal nodule; indeterminate FDG activity in region of anal canal.   14. 10/26/2020: PET/CT scan showed FDG avid lymph nodes in left hilar region (measuring 1.1 cm with SUV 6.3; anterior to this is another 1.4 x 0.7 cm with SUV 8.9) concerning for progressive metastatic disease; remaining scan negative.  15. 1/25/2021: PET scan showed signs concerning for progressive metastatic disease, with increased FDG uptake within a left hilar lymph node, several new FDG-avid mediastinal lymph nodes; unchanged FDG-avid 10 mm left adrenal nodule; no new or enlarging lung nodules; unchanged 3.2 cm infrarenal AAA.  16. 3/29/2021: CT chest showed stable mild precarinal (1 cm) and left hilar (8 mm) lymphadenopathy; no new or enlarging lymphadenopathy.  17. 7/26/2021: PET scan showed increased size and metabolic activity of mediastinal and left hilar lymph nodes, new multifocal metastatic disease in liver; new left level 3 cervical lymph node metastases, stable FDG avid left adrenal nodule.  18. 8/16/2021: Started pembrolizumab every 42 days with palliative intent.  19. 11/01/2021: PET scan showed overall mixed response: resolved hepatic metastases in left lobe; increased size of FDG avid metastatic liver lesions in right lobe; decreased size and avidity of mediastinal and left hilar lymph nodes;  decreased size and resolution of avidity of left level 3 cervical lymph nodes; sigmoid wall thickening and mild FDG avidity suggestive of colitis.  20. 1/24/2022: PET scan showed decreased size of the right hepatic lesions. No recurrence of the left hepatic lesions. Recurrent hypermetabolism to a level 3 cervical node. Mildly increased uptake to the paratracheal and left hilar lymph nodes.  Stable mildly avid left adrenal nodule    INTERIM HISTORY:  Trixie reports feeling well and denies any new persistent diarrhea. She denies any recent fevers, chills, or pain.    REVIEW OF SYSTEMS:   14 point ROS was reviewed and is negative other than as noted above in HPI.       HOME MEDICATIONS:  Current Outpatient Medications   Medication Sig Dispense Refill     acetaminophen (TYLENOL) 325 MG tablet Take 2 tablets (650 mg) by mouth every 4 hours as needed for mild pain or fever       amLODIPine (NORVASC) 5 MG tablet Take 1 tablet (5 mg) by mouth daily 90 tablet 3     ferrous sulfate (FEROSUL) 325 (65 Fe) MG tablet Take 1 tablet (325 mg) by mouth daily (with breakfast) (do not take with calcium or dairy products) 90 tablet 3     LORazepam (ATIVAN) 0.5 MG tablet Take 1 tablet (0.5 mg) by mouth every 4 hours as needed (Anxiety, Nausea/Vomiting or Sleep) 30 tablet 2     metoprolol succinate ER (TOPROL-XL) 25 MG 24 hr tablet Take 0.5 tablets (12.5 mg) by mouth daily 45 tablet 3     ondansetron (ZOFRAN) 8 MG tablet Take 8 mg by mouth every 8 hours as needed for nausea       prochlorperazine (COMPAZINE) 10 MG tablet Take 1 tablet (10 mg) by mouth every 6 hours as needed (Nausea/Vomiting) 30 tablet 2     SUMAtriptan (IMITREX) 50 MG tablet Take 1 tablet (50 mg) by mouth at onset of headache for migraine 18 tablet 1         ALLERGIES:  Allergies   Allergen Reactions     Ace Inhibitors Cough     Aleve [Naproxen Sodium] GI Disturbance     Asa Buff (Mag [Aspirin Buffered] GI Disturbance     Atorvastatin Calcium GI Disturbance     Celebrex  [Celecoxib] GI Disturbance     Codeine Sulfate Hives     Pravastatin GI Disturbance     Simvastatin GI Disturbance         PAST MEDICAL HISTORY:  Past Medical History:   Diagnosis Date     Benign essential hypertension      Malignant neoplasm of anal canal (H)     metastatic; s/p chemoradiation and stereotactic radiation of lung mets     Migraines          PAST SURGICAL HISTORY:  Past Surgical History:   Procedure Laterality Date     ARTHROSCOPY KNEE Right      COLONOSCOPY N/A 2019    Procedure: COMBINED COLONOSCOPY, SINGLE OR MULTIPLE BIOPSY/POLYPECTOMY BY BIOPSY;  Surgeon: Saleem Shaw MD;  Location:  GI     EXTRACORPOREAL SHOCK WAVE LITHOTRIPSY (ESWL)       TUBAL LIGATION           SOCIAL HISTORY:  Social History     Socioeconomic History     Marital status:      Spouse name: Not on file     Number of children: Not on file     Years of education: Not on file     Highest education level: Not on file   Occupational History     Occupation: Retired - homemaker, temp work   Tobacco Use     Smoking status: Former Smoker     Packs/day: 2.00     Years: 40.00     Pack years: 80.00     Types: Cigarettes     Quit date: 2016     Years since quittin.0     Smokeless tobacco: Never Used   Substance and Sexual Activity     Alcohol use: Yes     Drug use: No     Sexual activity: Yes     Partners: Male     Birth control/protection: Post-menopausal   Other Topics Concern     Parent/sibling w/ CABG, MI or angioplasty before 65F 55M? Not Asked   Social History Narrative    Maried.    Lives at home with  - fully independent.     4 adult children.    8 grandchildren.    Active, walks when able.      Social Determinants of Health     Financial Resource Strain: Not on file   Food Insecurity: Not on file   Transportation Needs: Not on file   Physical Activity: Not on file   Stress: Not on file   Social Connections: Not on file   Intimate Partner Violence: Not on file   Housing Stability: Not on file          FAMILY HISTORY:  Family History   Problem Relation Age of Onset     Uterine Cancer Mother      Hypertension Maternal Grandmother      Breast Cancer Daughter      Diabetes Type 2  No family hx of      Myocardial Infarction No family hx of      Cerebrovascular Disease No family hx of      Ovarian Cancer No family hx of      Colon Cancer No family hx of          PHYSICAL EXAM:  Vital signs:  Not taken.  GENERAL: No acute distress.  EYES: No scleral icterus. No overt erythema.  MUSCULOSKELETAL: Range of motion in the neck, shoulders, and arms appear normal.  SKIN: No overt rashes, discolorations, or lesions over the face and neck.  NEUROLOGIC: Alert.  No overt tremors.  PSYCHIATRIC: Normal affect and mood.  Does not appear anxious.    LABS:  CBC RESULTS: Recent Labs   Lab Test 01/24/22  1154   WBC 5.4   RBC 4.66   HGB 14.2   HCT 45.5   MCV 98   MCH 30.5   MCHC 31.2*   RDW 14.7        Last Comprehensive Metabolic Panel:  Sodium   Date Value Ref Range Status   01/24/2022 137 133 - 144 mmol/L Final   03/29/2021 140 133 - 144 mmol/L Final     Potassium   Date Value Ref Range Status   01/24/2022 4.3 3.4 - 5.3 mmol/L Final   03/29/2021 3.9 3.4 - 5.3 mmol/L Final     Chloride   Date Value Ref Range Status   01/24/2022 105 94 - 109 mmol/L Final   03/29/2021 109 94 - 109 mmol/L Final     Carbon Dioxide   Date Value Ref Range Status   03/29/2021 27 20 - 32 mmol/L Final     Carbon Dioxide (CO2)   Date Value Ref Range Status   01/24/2022 28 20 - 32 mmol/L Final     Anion Gap   Date Value Ref Range Status   01/24/2022 4 3 - 14 mmol/L Final   03/29/2021 4 3 - 14 mmol/L Final     Glucose   Date Value Ref Range Status   01/24/2022 91 70 - 99 mg/dL Final   03/29/2021 93 70 - 99 mg/dL Final     Urea Nitrogen   Date Value Ref Range Status   01/24/2022 17 7 - 30 mg/dL Final   03/29/2021 18 7 - 30 mg/dL Final     Creatinine   Date Value Ref Range Status   01/24/2022 0.71 0.52 - 1.04 mg/dL Final   03/29/2021 0.82 0.52 - 1.04  mg/dL Final     GFR Estimate   Date Value Ref Range Status   01/24/2022 85 >60 mL/min/1.73m2 Final     Comment:     Effective December 21, 2021 eGFRcr in adults is calculated using the 2021 CKD-EPI creatinine equation which includes age and gender (Chan et al., NEJ, DOI: 10.1056/LKLWee9018284)   03/29/2021 81 >60 mL/min/[1.73_m2] Final     Calcium   Date Value Ref Range Status   01/24/2022 9.8 8.5 - 10.1 mg/dL Final   03/29/2021 9.3 8.5 - 10.1 mg/dL Final     Bilirubin Total   Date Value Ref Range Status   01/24/2022 0.5 0.2 - 1.3 mg/dL Final   03/29/2021 0.2 0.2 - 1.3 mg/dL Final     Alkaline Phosphatase   Date Value Ref Range Status   01/24/2022 91 40 - 150 U/L Final   03/29/2021 73 40 - 150 U/L Final     ALT   Date Value Ref Range Status   01/24/2022 16 0 - 50 U/L Final   03/29/2021 21 0 - 50 U/L Final     AST   Date Value Ref Range Status   01/24/2022 20 0 - 45 U/L Final   03/29/2021 18 0 - 45 U/L Final       PATHOLOGY:  None new since prior visit.    IMAGING:  Reviewed as per HPI.    ASSESSMENT/PLAN:  Sara Lehman is an 81 year old female with the following issues:  1.  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  Left lung and right middle lobe lung metastases s/p radiation  3. Left adrenal nodule, stable  4. Hilar and mediastinal hypermetabolic lymphadenopathy  5. Metastases to liver, mixed response  --I personally reviewed the PET scan from 1/24/2022 and discussed the results with Trixie. Overall, after 4 doses of pembrolizumab, she continues to have a mixed response radiographically.  The left hepatic metastases have not recurred, and the right lobe lesions have decreased in size.  There is recurrent hypermetabolism to level 3 cervical node, mildly increased uptake to paratracheal and left hilar lymph nodes; stable mildly avid left adrenal nodule.  --I recommended she continue with pembrolizumab and repeat PET/CT prior to cycle 7. She  agrees to continue with pembrolizumab.      Rhona Wisdom MD  Hematology/Oncology  [facility]    Total time spent: 30 minutes in patient evaluation, counseling, documentation, and coordination of care.      Again, thank you for allowing me to participate in the care of your patient.        Sincerely,        Rhona Wisdom MD

## 2022-02-01 NOTE — PROGRESS NOTES
Infusion Nursing Note:  Sara Lehman presents today for keytruda.    Patient seen by provider: Yes: SHANNON Wisdom yesterday   present during visit today: Not Applicable.    Note: N/A.      Intravenous Access:  Peripheral IV placed.    Treatment Conditions:  Lab Results   Component Value Date     01/31/2022    POTASSIUM 4.3 01/31/2022    MAG 2.1 05/06/2019    CR 0.68 01/31/2022    ALTHEA 9.4 01/31/2022    BILITOTAL 0.2 01/31/2022    ALBUMIN 3.1 (L) 01/31/2022    ALT 17 01/31/2022    AST 18 01/31/2022     Results reviewed, labs MET treatment parameters, ok to proceed with treatment.      Post Infusion Assessment:  Patient tolerated infusion without incident.  Blood return noted pre and post infusion.  Site patent and intact, free from redness, edema or discomfort.  No evidence of extravasations.  Access discontinued per protocol.       Discharge Plan:   Discharge instructions reviewed with: Patient and Family.  Patient and/or family verbalized understanding of discharge instructions and all questions answered.  AVS to patient via [employer].  Patient will return 3/14 for labs for next appointment.   Patient discharged in stable condition accompanied by: self and daughter.  Departure Mode: Ambulatory.      Fabi Sloan RN

## 2022-03-08 NOTE — TELEPHONE ENCOUNTER
Medication filled one time only as pt is due for a follow-up for further refills.  Pharmacy has been notified to inform patient to call clinic and schedule appointment.  Adalgisa Ramos RN

## 2022-03-14 NOTE — PROGRESS NOTES
Medical Assistant Note:  Sara Lehman presents today for lab draw.    Patient seen by provider today: No.   present during visit today: Not Applicable.    Concerns: No Concerns.    Procedure:  Lab draw site: LAC, Needle type: BF, Gauge: 21. Gauze and coban applied    Post Assessment:  Labs drawn without difficulty: Yes.    Discharge Plan:  Departure Mode: Ambulatory.    Face to Face Time: 5.    Meredith Grant CMA

## 2022-03-15 NOTE — LETTER
3/15/2022         RE: Sara Lehman  [id number] [facility] [address]        Dear Colleague,    Thank you for referring your patient, Sara Lehman, to the [facility]. Please see a copy of my visit note below.    Oncology/Hematology Visit Note  Mar 15, 2022    Reason for Visit: follow up of metastatic anal adenocarcinoma  -Stage IV wild-type type KRAS  Metastatic to lungs liver and biliary and mediastinal lymphadenopathy  Status post radiation to left and right lobe lung metastasis  Patient would like to avoid chemotherapy  08/26/2021-started pembrolizumab 400 mg every 6 weeks  -01/24/22-Per Dr. Wisdom's note PET scan reveals mixed response  -Dr. Wisdom recommends continuing pembrolizumab      Interval History:  Patient reports.  Patient denies fever chills sweats cough shortness of breath chest pain nausea vomiting diarrhea abdominal pain or bleeding  Patient complains of urinary frequency denies blood in urine denies back pain    Review of Systems:  14 point ROS of systems including Constitutional, Eyes, Respiratory, Cardiovascular, Gastroenterology, Genitourinary, Integumentary, Muscularskeletal, Psychiatric were all negative except for pertinent positives noted in my HPI.    Physical Examination:  General: The patient is a pleasant female in no acute distress.  /81   Pulse 80   Temp 98.1  F (36.7  C) (Oral)   Resp 16   Wt 49.2 kg (108 lb 6.4 oz)   LMP  (LMP Unknown)   SpO2 99%   BMI 21.17 kg/m    HEENT: EOMI, PERRL. Sclerae are anicteric. Oral mucosa is pink and moist with no lesions or thrush.   Lymph: Neck is supple with no lymphadenopathy in the cervical or supraclavicular areas.   Heart: Regular rate and rhythm.   Lungs: Clear to auscultation bilaterally.   GI: Bowel sounds present, soft, nontender with no palpable hepatosplenomegaly or masses.   Extremities: No lower extremity edema noted bilaterally.   Skin: No rashes, petechiae, or bruising  noted on exposed skin.    Laboratory Data:  CMP CMP and TSH results reviewed    Assessment and Plan:    This is a 81-year-old female with     Metastatic anal adenocarcinoma  Stage IV, wild-type kras  -Patient would like to avoid chemotherapy if possible  08/26/2021-started pembrolizumab 400 mg every 6 weeks  -01/24/22-Per Dr. Wisdom's note PET scan reveals mixed response  -Dr. Wisdom recommends continuing pembrolizumab  -Patient is scheduled next month for PET scan and follow-up with Dr. Wisdom       Urinary frequency  Check UA and urine cultures      Call our clinic with any changes in health condition or questions       ANDRY Alcazar CNP  [facility] [place]     Chart documentation with Dragon Voice recognition Software. Although reviewed after completion, some words and grammatical errors may remain.            Again, thank you for allowing me to participate in the care of your patient.        Sincerely,        ANDRY Alcazar CNP

## 2022-03-15 NOTE — PROGRESS NOTES
Oncology/Hematology Visit Note  Mar 15, 2022    Reason for Visit: follow up of metastatic anal adenocarcinoma  -Stage IV wild-type type KRAS  Metastatic to lungs liver and biliary and mediastinal lymphadenopathy  Status post radiation to left and right lobe lung metastasis  Patient would like to avoid chemotherapy  08/26/2021-started pembrolizumab 400 mg every 6 weeks  -01/24/22-Per Dr. Wisdom's note PET scan reveals mixed response  -Dr. Wisdom recommends continuing pembrolizumab      Interval History:  Patient reports.  Patient denies fever chills sweats cough shortness of breath chest pain nausea vomiting diarrhea abdominal pain or bleeding  Patient complains of urinary frequency denies blood in urine denies back pain    Review of Systems:  14 point ROS of systems including Constitutional, Eyes, Respiratory, Cardiovascular, Gastroenterology, Genitourinary, Integumentary, Muscularskeletal, Psychiatric were all negative except for pertinent positives noted in my HPI.    Physical Examination:  General: The patient is a pleasant female in no acute distress.  /81   Pulse 80   Temp 98.1  F (36.7  C) (Oral)   Resp 16   Wt 49.2 kg (108 lb 6.4 oz)   LMP  (LMP Unknown)   SpO2 99%   BMI 21.17 kg/m    HEENT: EOMI, PERRL. Sclerae are anicteric. Oral mucosa is pink and moist with no lesions or thrush.   Lymph: Neck is supple with no lymphadenopathy in the cervical or supraclavicular areas.   Heart: Regular rate and rhythm.   Lungs: Clear to auscultation bilaterally.   GI: Bowel sounds present, soft, nontender with no palpable hepatosplenomegaly or masses.   Extremities: No lower extremity edema noted bilaterally.   Skin: No rashes, petechiae, or bruising noted on exposed skin.    Laboratory Data:  CMP CMP and TSH results reviewed    Assessment and Plan:    This is a 81-year-old female with     Metastatic anal adenocarcinoma  Stage IV, wild-type kras  -Patient would like to avoid chemotherapy if  possible  08/26/2021-started pembrolizumab 400 mg every 6 weeks  -01/24/22-Per Dr. Wisdom's note PET scan reveals mixed response  -Dr. Wisdom recommends continuing pembrolizumab  -Patient is scheduled next month for PET scan and follow-up with Dr. Wisdom       Urinary frequency  Check UA and urine cultures      Call our clinic with any changes in health condition or questions       ANDRY Alcazar [facility] [place] [place]     Chart documentation with Dragon Voice recognition Software. Although reviewed after completion, some words and grammatical errors may remain.

## 2022-03-15 NOTE — PROGRESS NOTES
Infusion Nursing Note:  Sara Lehman presents today for keytruda  Patient seen by provider today: Yes: NIYA Magdaleno   present during visit today: Not Applicable.    Note: N/A.      Intravenous Access:  Peripheral IV placed.    Treatment Conditions:  Lab Results   Component Value Date    HGB 13.6 03/14/2022    WBC 4.7 03/14/2022    ANEU 3.0 03/29/2021    ANEUTAUTO 3.2 03/14/2022     03/14/2022      Lab Results   Component Value Date     03/14/2022    POTASSIUM 4.0 03/14/2022    MAG 2.1 05/06/2019    CR 0.69 03/14/2022    ALTHEA 9.6 03/14/2022    BILITOTAL 0.4 03/14/2022    ALBUMIN 3.4 03/14/2022    ALT 16 03/14/2022    AST 21 03/14/2022     Results reviewed, labs MET treatment parameters, ok to proceed with treatment.      Post Infusion Assessment:  Patient tolerated infusion without incident.  Site patent and intact, free from redness, edema or discomfort.  No evidence of extravasations.  Access discontinued per protocol.       Discharge Plan:   AVS to patient via MYCHART.  Patient will return as [facility] for next appointment.   Patient discharged in stable condition accompanied by: self.  Departure Mode: Ambulatory.      Sebastian Leal RN

## 2022-04-17 NOTE — PROGRESS NOTES
Trixie is a 81 year old who is being evaluated via a billable video visit.  Currently in state of [place].    How would you like to obtain your AVS? MyChart  If the video visit is dropped, the invitation should be resent by: Text to cell phone: [phone]  Will anyone else be joining your video visit? Yes, patients daughters Riya and Yulia Howard Marcos, ADRIANA    Video Start Time: 8:44 AM  Video-Visit Details    Type of service:  Video Visit    Video End Time:9:00 AM    Originating Location (pt. Location): Home    Distant Location (provider location):  [facility] [place] [facility]     Platform used for Video Visit: [facility]    Hematology/Oncology Established Patient Follow-up Note      Today's Date: 4/25/2022    Reason for Follow-up: Metastatic anal adenocarcinoma.    HISTORY OF PRESENT ILLNESS: Sara Lehman is an 81 year old female who presents with the following oncologic history:  1.  4/1/2019: Initially presented to her primary care provider with complaints of hemorrhoids intermittently for 2 months with associated pain and itching as well as blood in the underwear at night and intermixed with stool, no melena.  No associated weight loss or night sweats.  Reports occasional constipation.  Physical exam by primary care provider showed an anal mass about 1 inch in size with perianal erythema and skin breakdown.  2.  4/9/2019: Colonoscopy showed a perianal mass in the left lateral quadrant, severe diverticulosis in the sigmoid colon, descending colon, and ascending colon.  Biopsy of the anal mass showed invasive moderately differentiated adenocarcinoma with normal mismatch repair protein expression.  Specimen consisted of multiple fragments of necrotic tissue with dysplastic appearing glands with foci of invasion.  Surface mucosa is not present and therefore precludes differentiation between colorectal carcinoma or perianal glandular carcinoma.  Depth of invasion  cannot be assessed.  KRAS wild-type.  3.  4/16/2019: CT chest/abdomen/pelvis with contrast showed a 1.3 cm left upper lobe lung nodule concerning for metastatic disease as well as 2 tiny nodules in the right lower lobe near the diaphragm.  Diffusely aneurysmal abdominal aorta measures up to 3.2 cm.  Prominent bilateral inguinal adenopathy with a lymph node in the upper right thigh adjacent to the common femoral vessels measures 2.9 x 2.3 cm.  Lymph node in the left medial upper thigh adjacent to the common femoral vessels measures 2.6 x 3.5 cm.  No evidence of obstruction.  Bony structures showed no destruction.  4.  4/19/2019: Consultation with Dr. Miller Casas.  Due to high suspicion of metastatic disease to the lung and bilateral inguinal regions, patient referred to medical oncology and radiation oncology.  5.  4/24/2019: PET/CT scan showed hypermetabolic posterior left upper lobe mid chest nodule adjacent to the major fissure measuring 1.3 x 1.2 cm and emphysematous changes.  Bilateral renal cysts present.  Nonobstructing stone of the lower right kidney measures 0.3 cm.  Nodular thickening of the left adrenal is 0.7 cm.  Intense hypermetabolism at the anal level with heterogeneous appearing soft tissue with SUV max 19.5.  Enlarged inguinal hypermetabolic lymph nodes on the right measuring 2.9 x 2.1 cm and on the left 3.6 x 2.4 cm.  A distal infrarenal abdominal aorta measures 3.4 cm.  6.  5/6/2019: Left lung mass biopsy performed and showed discordant immunohistochemical findings.  Lesion showed a strong reactivity for cytokeratin 7 and focal staining for P40.  Patient's anal adenocarcinoma reportedly positive for both immunohistochemical staining pattern not typical for primary lung tumor and metastatic disease cannot be excluded.  7.  5/9/2019-6/25/2019: Completed radiation therapy to the anal cancer and inguinal lymph nodes.  She received concurrent radiosensitizing chemotherapy with capecitabine.  8.   8/28/2019: PET/CT scan showed hypermetabolic nodule in the left upper lobe measuring 1.5 x 1.4 cm with SUV max 10.5, increased in size and metabolic activity from 1.4 x 1.2 cm with SUV max 9.5 on 4/24/2019 PET/CT scan.  A 0.5 cm pleural-based nodule with low metabolic activity in the left lower lobe is unchanged.  A 3.4 cm infrarenal abdominal aortic aneurysm is present.  The previously noted hypermetabolic anal mass has significantly improved with SUV max 5.1, decreased from SUV max 24.6, consistent with response to treatment.  The bilateral inguinal lymphadenopathy has markedly improved on the current study with nodes measuring up to 1.6 x 1.1 cm with SUV max 3.5, decreased from 4 x 2.6 cm with SUV max 14, consistent with response to treatment.  9.  10/21/2019: Completed stereotactic radiation to the left upper lobe lung nodule/metastasis in 5 fractions under the care of Dr. Clay Monroy.  10. 12/16/2019: PET scan showed new hypermetabolic medial right middle lobe pulmonary nodule; left upper lobe nodule decreased in size and metabolism; improvement of bilateral inguinal adenopathy with decreased sizes and metabolism; hypermetabolism slightly increased at the left adrenal nodule but stable in size; decreased hypermetabolism at anal level; new small focal hypermetabolism within muscle anterior to left hip joint with no mass effect.  11. 2/07/2020: Completed stereotactic radiation to the right middle lobe nodule and left adrenal nodule in 5 fractions.  12. 4/6/2020: PET/CT scan showed a 0.8 cm nodular density near the right lung apex with no significant hypermetabolism, unchanged from the prior exam.  The previously seen hypermetabolic right middle lobe pulmonary nodule is not seen on the current exam.  Small hypermetabolic left adrenal nodule measuring 1 cm with SUV max 3.3 is unchanged.  Infrarenal abdominal aortic aneurysm also not significantly changed and measures 3.3 x 3.6 cm containing a mild amount of mural  thrombus.  The small hypermetabolic focus within the muscle anterior to the left hip joint is unchanged with SUV max 3.6.  13. 7/13/2020: PET/CT scan showed interval decrease in size of the right upper lobe lung nodule with no associated FDG activity; posttreatment changes in the lateral left upper lung; no new lung lesions; hypermetabolic left suprahilar area with no associated CT abnormality, possibly vascular in nature; no adenopathy; stable left adrenal nodule; indeterminate FDG activity in region of anal canal.   14. 10/26/2020: PET/CT scan showed FDG avid lymph nodes in left hilar region (measuring 1.1 cm with SUV 6.3; anterior to this is another 1.4 x 0.7 cm with SUV 8.9) concerning for progressive metastatic disease; remaining scan negative.  15. 1/25/2021: PET scan showed signs concerning for progressive metastatic disease, with increased FDG uptake within a left hilar lymph node, several new FDG-avid mediastinal lymph nodes; unchanged FDG-avid 10 mm left adrenal nodule; no new or enlarging lung nodules; unchanged 3.2 cm infrarenal AAA.  16. 3/29/2021: CT chest showed stable mild precarinal (1 cm) and left hilar (8 mm) lymphadenopathy; no new or enlarging lymphadenopathy.  17. 7/26/2021: PET scan showed increased size and metabolic activity of mediastinal and left hilar lymph nodes, new multifocal metastatic disease in liver; new left level 3 cervical lymph node metastases, stable FDG avid left adrenal nodule.  18. 8/16/2021: Started pembrolizumab every 42 days with palliative intent.  19. 11/01/2021: PET scan showed overall mixed response: resolved hepatic metastases in left lobe; increased size of FDG avid metastatic liver lesions in right lobe; decreased size and avidity of mediastinal and left hilar lymph nodes; decreased size and resolution of avidity of left level 3 cervical lymph nodes; sigmoid wall thickening and mild FDG avidity suggestive of colitis.  20. 1/24/2022: PET scan showed decreased size  of the right hepatic lesions. No recurrence of the left hepatic lesions. Recurrent hypermetabolism to a level 3 cervical node. Mildly increased uptake to the paratracheal and left hilar lymph nodes.  Stable mildly avid left adrenal nodule  21. 4/18/2022: PET scan showed new FDG avid bud hepatis lesion; new 5 mm left retroclavicular node; interval slight progression of left level 3, precarinal, and left hilar nodes; overall grossly unchanged extensive hepatic FDG avid masses.    INTERIM HISTORY:  Trixie reports feeling well and denies any recent fevers, chills, or pain. She has occasional but tolerable diarrhea.    REVIEW OF SYSTEMS:   14 point ROS was reviewed and is negative other than as noted above in HPI.       HOME MEDICATIONS:  Current Outpatient Medications   Medication Sig Dispense Refill     acetaminophen (TYLENOL) 325 MG tablet Take 2 tablets (650 mg) by mouth every 4 hours as needed for mild pain or fever       amLODIPine (NORVASC) 5 MG tablet Take 1 tablet (5 mg) by mouth daily 90 tablet 0     ferrous sulfate (FEROSUL) 325 (65 Fe) MG tablet Take 1 tablet (325 mg) by mouth daily (with breakfast) (do not take with calcium or dairy products) 90 tablet 3     LORazepam (ATIVAN) 0.5 MG tablet Take 1 tablet (0.5 mg) by mouth every 4 hours as needed (Anxiety, Nausea/Vomiting or Sleep) 30 tablet 2     metoprolol succinate ER (TOPROL-XL) 25 MG 24 hr tablet Take 0.5 tablets (12.5 mg) by mouth daily 45 tablet 0     ondansetron (ZOFRAN) 8 MG tablet Take 8 mg by mouth every 8 hours as needed for nausea       prochlorperazine (COMPAZINE) 10 MG tablet Take 1 tablet (10 mg) by mouth every 6 hours as needed (Nausea/Vomiting) 30 tablet 2     SUMAtriptan (IMITREX) 50 MG tablet Take 1 tablet (50 mg) by mouth at onset of headache for migraine 18 tablet 1         ALLERGIES:  Allergies   Allergen Reactions     Ace Inhibitors Cough     Aleve [Naproxen Sodium] GI Disturbance     Asa Buff (Mag [Aspirin Buffered] GI Disturbance      Atorvastatin Calcium GI Disturbance     Celebrex [Celecoxib] GI Disturbance     Codeine Sulfate Hives     Pravastatin GI Disturbance     Simvastatin GI Disturbance         PAST MEDICAL HISTORY:  Past Medical History:   Diagnosis Date     Benign essential hypertension      Malignant neoplasm of anal canal (H)     metastatic; s/p chemoradiation and stereotactic radiation of lung mets     Migraines          PAST SURGICAL HISTORY:  Past Surgical History:   Procedure Laterality Date     ARTHROSCOPY KNEE Right      COLONOSCOPY N/A 2019    Procedure: COMBINED COLONOSCOPY, SINGLE OR MULTIPLE BIOPSY/POLYPECTOMY BY BIOPSY;  Surgeon: Saleem Shaw MD;  Location:  GI     EXTRACORPOREAL SHOCK WAVE LITHOTRIPSY (ESWL)       TUBAL LIGATION           SOCIAL HISTORY:  Social History     Socioeconomic History     Marital status:      Spouse name: Not on file     Number of children: Not on file     Years of education: Not on file     Highest education level: Not on file   Occupational History     Occupation: Retired - homemaker, temp work   Tobacco Use     Smoking status: Former Smoker     Packs/day: 2.00     Years: 40.00     Pack years: 80.00     Types: Cigarettes     Quit date: 2016     Years since quittin.2     Smokeless tobacco: Never Used   Substance and Sexual Activity     Alcohol use: Yes     Drug use: No     Sexual activity: Yes     Partners: Male     Birth control/protection: Post-menopausal   Other Topics Concern     Parent/sibling w/ CABG, MI or angioplasty before 65F 55M? Not Asked   Social History Narrative    Maried.    Lives at home with  - fully independent.     4 adult children.    8 grandchildren.    Active, walks when able.      Social Determinants of Health     Financial Resource Strain: Not on file   Food Insecurity: Not on file   Transportation Needs: Not on file   Physical Activity: Not on file   Stress: Not on file   Social Connections: Not on file   Intimate Partner  Violence: Not on file   Housing Stability: Not on file         FAMILY HISTORY:  Family History   Problem Relation Age of Onset     Uterine Cancer Mother      Hypertension Maternal Grandmother      Breast Cancer Daughter      Diabetes Type 2  No family hx of      Myocardial Infarction No family hx of      Cerebrovascular Disease No family hx of      Ovarian Cancer No family hx of      Colon Cancer No family hx of          PHYSICAL EXAM:  Vital signs:  Not taken.  GENERAL: No acute distress.  EYES: No scleral icterus. No overt erythema.  MUSCULOSKELETAL: Range of motion in the neck, shoulders, and arms appear normal.  SKIN: No overt rashes, discolorations, or lesions over the face and neck.  NEUROLOGIC: Alert.  No overt tremors.  PSYCHIATRIC: Normal affect and mood.  Does not appear anxious.    LABS:  CBC RESULTS: Recent Labs   Lab Test 04/18/22  1317   WBC 4.4   RBC 4.36   HGB 13.3   HCT 42.0   MCV 96   MCH 30.5   MCHC 31.7   RDW 13.6        Last Comprehensive Metabolic Panel:  Sodium   Date Value Ref Range Status   04/18/2022 138 133 - 144 mmol/L Final   03/29/2021 140 133 - 144 mmol/L Final     Potassium   Date Value Ref Range Status   04/18/2022 4.0 3.4 - 5.3 mmol/L Final   03/29/2021 3.9 3.4 - 5.3 mmol/L Final     Chloride   Date Value Ref Range Status   04/18/2022 104 94 - 109 mmol/L Final   03/29/2021 109 94 - 109 mmol/L Final     Carbon Dioxide   Date Value Ref Range Status   03/29/2021 27 20 - 32 mmol/L Final     Carbon Dioxide (CO2)   Date Value Ref Range Status   04/18/2022 28 20 - 32 mmol/L Final     Anion Gap   Date Value Ref Range Status   04/18/2022 6 3 - 14 mmol/L Final   03/29/2021 4 3 - 14 mmol/L Final     Glucose   Date Value Ref Range Status   04/18/2022 96 70 - 99 mg/dL Final   03/29/2021 93 70 - 99 mg/dL Final     Urea Nitrogen   Date Value Ref Range Status   04/18/2022 20 7 - 30 mg/dL Final   03/29/2021 18 7 - 30 mg/dL Final     Creatinine   Date Value Ref Range Status   04/18/2022 0.79  0.52 - 1.04 mg/dL Final   03/29/2021 0.82 0.52 - 1.04 mg/dL Final     GFR Estimate   Date Value Ref Range Status   04/18/2022 75 >60 mL/min/1.73m2 Final     Comment:     Effective December 21, 2021 eGFRcr in adults is calculated using the 2021 CKD-EPI creatinine equation which includes age and gender (Chan et al., [place], DOI: 10.1056/YXOJxn6108290)   03/29/2021 81 >60 mL/min/[1.73_m2] Final     Calcium   Date Value Ref Range Status   04/18/2022 9.3 8.5 - 10.1 mg/dL Final   03/29/2021 9.3 8.5 - 10.1 mg/dL Final     Bilirubin Total   Date Value Ref Range Status   04/18/2022 0.3 0.2 - 1.3 mg/dL Final   03/29/2021 0.2 0.2 - 1.3 mg/dL Final     Alkaline Phosphatase   Date Value Ref Range Status   04/18/2022 89 40 - 150 U/L Final   03/29/2021 73 40 - 150 U/L Final     ALT   Date Value Ref Range Status   04/18/2022 17 0 - 50 U/L Final   03/29/2021 21 0 - 50 U/L Final     AST   Date Value Ref Range Status   04/18/2022 20 0 - 45 U/L Final   03/29/2021 18 0 - 45 U/L Final       PATHOLOGY:  None new since prior visit.    IMAGING:  Reviewed as per HPI.    ASSESSMENT/PLAN:  Sara Lehman is an 81 year old female with the following issues:  1.  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  Left lung and right middle lobe lung metastases s/p radiation  3. Left adrenal nodule, stable  4. Hilar and mediastinal hypermetabolic lymphadenopathy  5. Metastases to liver, mixed response  --I personally reviewed the PET scan from 4/18/2022 and discussed the results with Trixie.  She has a new FDG avid bud hepatis lesion and new 5 mm left retroclavicular node, along with interval slight progression of left level 3, precardinal and left hilar nodes.    --We talked about options of switching to chemotherapy, such as FOLFOX, although this would likely cause more, multitude of adverse effects.  Other option is to continue pembrolizumab for another 3 months, as this may be  slowing the progression of disease.  Trixie would like to maintain as good quality of life as possible.  Ultimately, we agreed with the plan to have her continue with pembrolizumab and repeat PET/CT prior to cycle 10.     Rhona Wisdom MD  Hematology/Oncology  [facility]    Total time spent: 30 minutes in patient evaluation, counseling, documentation, and coordination of care.

## 2022-04-25 NOTE — PATIENT INSTRUCTIONS
Pembrolizumab every 42 days through 8/30/2022.  Lab draw every 42 days.  RTC NP on or prior to 6/7.  RTC MD prior to 7/19 and 8/30 with PET scan prior to 8/30.

## 2022-04-25 NOTE — NURSING NOTE
Patient denies any changes since echeck-in regarding medication and allergies and states all information entered during echeck-in remains accurate.    Anita Rodríguez, VF

## 2022-04-25 NOTE — LETTER
4/25/2022         RE: Sara Lehman  [id number] [facility] [id number]        Dear Colleague,    Thank you for referring your patient, Sara Lehman, to the [facility]. Please see a copy of my visit note below.    Trixie is a 81 year old who is being evaluated via a billable video visit.  Currently in state of [place].    How would you like to obtain your AVS? MyChart  If the video visit is dropped, the invitation should be resent by: Text to cell phone: [phone]  Will anyone else be joining your video visit? Yes, patients daughters Riya and Yulia       AnitaADRIANA Allan    Video Start Time: 8:44 AM  Video-Visit Details    Type of service:  Video Visit    Video End Time:9:00 AM    Originating Location (pt. Location): Home    Distant Location (provider location):  [facility]     Platform used for Video Visit: [facility]    Hematology/Oncology Established Patient Follow-up Note      Today's Date: 4/25/2022    Reason for Follow-up: Metastatic anal adenocarcinoma.    HISTORY OF PRESENT ILLNESS: Sara Lehman is an 81 year old female who presents with the following oncologic history:  1.  4/1/2019: Initially presented to her primary care provider with complaints of hemorrhoids intermittently for 2 months with associated pain and itching as well as blood in the underwear at night and intermixed with stool, no melena.  No associated weight loss or night sweats.  Reports occasional constipation.  Physical exam by primary care provider showed an anal mass about 1 inch in size with perianal erythema and skin breakdown.  2.  4/9/2019: Colonoscopy showed a perianal mass in the left lateral quadrant, severe diverticulosis in the sigmoid colon, descending colon, and ascending colon.  Biopsy of the anal mass showed invasive moderately differentiated adenocarcinoma with normal mismatch repair protein expression.   Specimen consisted of multiple fragments of necrotic tissue with dysplastic appearing glands with foci of invasion.  Surface mucosa is not present and therefore precludes differentiation between colorectal carcinoma or perianal glandular carcinoma.  Depth of invasion cannot be assessed.  KRAS wild-type.  3.  4/16/2019: CT chest/abdomen/pelvis with contrast showed a 1.3 cm left upper lobe lung nodule concerning for metastatic disease as well as 2 tiny nodules in the right lower lobe near the diaphragm.  Diffusely aneurysmal abdominal aorta measures up to 3.2 cm.  Prominent bilateral inguinal adenopathy with a lymph node in the upper right thigh adjacent to the common femoral vessels measures 2.9 x 2.3 cm.  Lymph node in the left medial upper thigh adjacent to the common femoral vessels measures 2.6 x 3.5 cm.  No evidence of obstruction.  Bony structures showed no destruction.  4.  4/19/2019: Consultation with Dr. Miller Casas.  Due to high suspicion of metastatic disease to the lung and bilateral inguinal regions, patient referred to medical oncology and radiation oncology.  5.  4/24/2019: PET/CT scan showed hypermetabolic posterior left upper lobe mid chest nodule adjacent to the major fissure measuring 1.3 x 1.2 cm and emphysematous changes.  Bilateral renal cysts present.  Nonobstructing stone of the lower right kidney measures 0.3 cm.  Nodular thickening of the left adrenal is 0.7 cm.  Intense hypermetabolism at the anal level with heterogeneous appearing soft tissue with SUV max 19.5.  Enlarged inguinal hypermetabolic lymph nodes on the right measuring 2.9 x 2.1 cm and on the left 3.6 x 2.4 cm.  A distal infrarenal abdominal aorta measures 3.4 cm.  6.  5/6/2019: Left lung mass biopsy performed and showed discordant immunohistochemical findings.  Lesion showed a strong reactivity for cytokeratin 7 and focal staining for P40.  Patient's anal adenocarcinoma reportedly positive for both immunohistochemical staining  pattern not typical for primary lung tumor and metastatic disease cannot be excluded.  7.  5/9/2019-6/25/2019: Completed radiation therapy to the anal cancer and inguinal lymph nodes.  She received concurrent radiosensitizing chemotherapy with capecitabine.  8.  8/28/2019: PET/CT scan showed hypermetabolic nodule in the left upper lobe measuring 1.5 x 1.4 cm with SUV max 10.5, increased in size and metabolic activity from 1.4 x 1.2 cm with SUV max 9.5 on 4/24/2019 PET/CT scan.  A 0.5 cm pleural-based nodule with low metabolic activity in the left lower lobe is unchanged.  A 3.4 cm infrarenal abdominal aortic aneurysm is present.  The previously noted hypermetabolic anal mass has significantly improved with SUV max 5.1, decreased from SUV max 24.6, consistent with response to treatment.  The bilateral inguinal lymphadenopathy has markedly improved on the current study with nodes measuring up to 1.6 x 1.1 cm with SUV max 3.5, decreased from 4 x 2.6 cm with SUV max 14, consistent with response to treatment.  9.  10/21/2019: Completed stereotactic radiation to the left upper lobe lung nodule/metastasis in 5 fractions under the care of Dr. Clay Monroy.  10. 12/16/2019: PET scan showed new hypermetabolic medial right middle lobe pulmonary nodule; left upper lobe nodule decreased in size and metabolism; improvement of bilateral inguinal adenopathy with decreased sizes and metabolism; hypermetabolism slightly increased at the left adrenal nodule but stable in size; decreased hypermetabolism at anal level; new small focal hypermetabolism within muscle anterior to left hip joint with no mass effect.  11. 2/07/2020: Completed stereotactic radiation to the right middle lobe nodule and left adrenal nodule in 5 fractions.  12. 4/6/2020: PET/CT scan showed a 0.8 cm nodular density near the right lung apex with no significant hypermetabolism, unchanged from the prior exam.  The previously seen hypermetabolic right middle lobe  pulmonary nodule is not seen on the current exam.  Small hypermetabolic left adrenal nodule measuring 1 cm with SUV max 3.3 is unchanged.  Infrarenal abdominal aortic aneurysm also not significantly changed and measures 3.3 x 3.6 cm containing a mild amount of mural thrombus.  The small hypermetabolic focus within the muscle anterior to the left hip joint is unchanged with SUV max 3.6.  13. 7/13/2020: PET/CT scan showed interval decrease in size of the right upper lobe lung nodule with no associated FDG activity; posttreatment changes in the lateral left upper lung; no new lung lesions; hypermetabolic left suprahilar area with no associated CT abnormality, possibly vascular in nature; no adenopathy; stable left adrenal nodule; indeterminate FDG activity in region of anal canal.   14. 10/26/2020: PET/CT scan showed FDG avid lymph nodes in left hilar region (measuring 1.1 cm with SUV 6.3; anterior to this is another 1.4 x 0.7 cm with SUV 8.9) concerning for progressive metastatic disease; remaining scan negative.  15. 1/25/2021: PET scan showed signs concerning for progressive metastatic disease, with increased FDG uptake within a left hilar lymph node, several new FDG-avid mediastinal lymph nodes; unchanged FDG-avid 10 mm left adrenal nodule; no new or enlarging lung nodules; unchanged 3.2 cm infrarenal AAA.  16. 3/29/2021: CT chest showed stable mild precarinal (1 cm) and left hilar (8 mm) lymphadenopathy; no new or enlarging lymphadenopathy.  17. 7/26/2021: PET scan showed increased size and metabolic activity of mediastinal and left hilar lymph nodes, new multifocal metastatic disease in liver; new left level 3 cervical lymph node metastases, stable FDG avid left adrenal nodule.  18. 8/16/2021: Started pembrolizumab every 42 days with palliative intent.  19. 11/01/2021: PET scan showed overall mixed response: resolved hepatic metastases in left lobe; increased size of FDG avid metastatic liver lesions in right  lobe; decreased size and avidity of mediastinal and left hilar lymph nodes; decreased size and resolution of avidity of left level 3 cervical lymph nodes; sigmoid wall thickening and mild FDG avidity suggestive of colitis.  20. 1/24/2022: PET scan showed decreased size of the right hepatic lesions. No recurrence of the left hepatic lesions. Recurrent hypermetabolism to a level 3 cervical node. Mildly increased uptake to the paratracheal and left hilar lymph nodes.  Stable mildly avid left adrenal nodule  21. 4/18/2022: PET scan showed new FDG avid bud hepatis lesion; new 5 mm left retroclavicular node; interval slight progression of left level 3, precarinal, and left hilar nodes; overall grossly unchanged extensive hepatic FDG avid masses.    INTERIM HISTORY:  Trixie reports feeling well and denies any recent fevers, chills, or pain. She has occasional but tolerable diarrhea.    REVIEW OF SYSTEMS:   14 point ROS was reviewed and is negative other than as noted above in HPI.       HOME MEDICATIONS:  Current Outpatient Medications   Medication Sig Dispense Refill     acetaminophen (TYLENOL) 325 MG tablet Take 2 tablets (650 mg) by mouth every 4 hours as needed for mild pain or fever       amLODIPine (NORVASC) 5 MG tablet Take 1 tablet (5 mg) by mouth daily 90 tablet 0     ferrous sulfate (FEROSUL) 325 (65 Fe) MG tablet Take 1 tablet (325 mg) by mouth daily (with breakfast) (do not take with calcium or dairy products) 90 tablet 3     LORazepam (ATIVAN) 0.5 MG tablet Take 1 tablet (0.5 mg) by mouth every 4 hours as needed (Anxiety, Nausea/Vomiting or Sleep) 30 tablet 2     metoprolol succinate ER (TOPROL-XL) 25 MG 24 hr tablet Take 0.5 tablets (12.5 mg) by mouth daily 45 tablet 0     ondansetron (ZOFRAN) 8 MG tablet Take 8 mg by mouth every 8 hours as needed for nausea       prochlorperazine (COMPAZINE) 10 MG tablet Take 1 tablet (10 mg) by mouth every 6 hours as needed (Nausea/Vomiting) 30 tablet 2     SUMAtriptan  (IMITREX) 50 MG tablet Take 1 tablet (50 mg) by mouth at onset of headache for migraine 18 tablet 1         ALLERGIES:  Allergies   Allergen Reactions     Ace Inhibitors Cough     Aleve [Naproxen Sodium] GI Disturbance     Asa Buff (Mag [Aspirin Buffered] GI Disturbance     Atorvastatin Calcium GI Disturbance     Celebrex [Celecoxib] GI Disturbance     Codeine Sulfate Hives     Pravastatin GI Disturbance     Simvastatin GI Disturbance         PAST MEDICAL HISTORY:  Past Medical History:   Diagnosis Date     Benign essential hypertension      Malignant neoplasm of anal canal (H)     metastatic; s/p chemoradiation and stereotactic radiation of lung mets     Migraines          PAST SURGICAL HISTORY:  Past Surgical History:   Procedure Laterality Date     ARTHROSCOPY KNEE Right      COLONOSCOPY N/A 2019    Procedure: COMBINED COLONOSCOPY, SINGLE OR MULTIPLE BIOPSY/POLYPECTOMY BY BIOPSY;  Surgeon: Saleem Shaw MD;  Location:  GI     EXTRACORPOREAL SHOCK WAVE LITHOTRIPSY (ESWL)       TUBAL LIGATION           SOCIAL HISTORY:  Social History     Socioeconomic History     Marital status:      Spouse name: Not on file     Number of children: Not on file     Years of education: Not on file     Highest education level: Not on file   Occupational History     Occupation: Retired - homemaker, temp work   Tobacco Use     Smoking status: Former Smoker     Packs/day: 2.00     Years: 40.00     Pack years: 80.00     Types: Cigarettes     Quit date: 2016     Years since quittin.2     Smokeless tobacco: Never Used   Substance and Sexual Activity     Alcohol use: Yes     Drug use: No     Sexual activity: Yes     Partners: Male     Birth control/protection: Post-menopausal   Other Topics Concern     Parent/sibling w/ CABG, MI or angioplasty before 65F 55M? Not Asked   Social History Narrative    Maried.    Lives at home with  - fully independent.     4 adult children.    8 grandchildren.    Active, walks  when able.      Social Determinants of Health     Financial Resource Strain: Not on file   Food Insecurity: Not on file   Transportation Needs: Not on file   Physical Activity: Not on file   Stress: Not on file   Social Connections: Not on file   Intimate Partner Violence: Not on file   Housing Stability: Not on file         FAMILY HISTORY:  Family History   Problem Relation Age of Onset     Uterine Cancer Mother      Hypertension Maternal Grandmother      Breast Cancer Daughter      Diabetes Type 2  No family hx of      Myocardial Infarction No family hx of      Cerebrovascular Disease No family hx of      Ovarian Cancer No family hx of      Colon Cancer No family hx of          PHYSICAL EXAM:  Vital signs:  Not taken.  GENERAL: No acute distress.  EYES: No scleral icterus. No overt erythema.  MUSCULOSKELETAL: Range of motion in the neck, shoulders, and arms appear normal.  SKIN: No overt rashes, discolorations, or lesions over the face and neck.  NEUROLOGIC: Alert.  No overt tremors.  PSYCHIATRIC: Normal affect and mood.  Does not appear anxious.    LABS:  CBC RESULTS: Recent Labs   Lab Test 04/18/22  1317   WBC 4.4   RBC 4.36   HGB 13.3   HCT 42.0   MCV 96   MCH 30.5   MCHC 31.7   RDW 13.6        Last Comprehensive Metabolic Panel:  Sodium   Date Value Ref Range Status   04/18/2022 138 133 - 144 mmol/L Final   03/29/2021 140 133 - 144 mmol/L Final     Potassium   Date Value Ref Range Status   04/18/2022 4.0 3.4 - 5.3 mmol/L Final   03/29/2021 3.9 3.4 - 5.3 mmol/L Final     Chloride   Date Value Ref Range Status   04/18/2022 104 94 - 109 mmol/L Final   03/29/2021 109 94 - 109 mmol/L Final     Carbon Dioxide   Date Value Ref Range Status   03/29/2021 27 20 - 32 mmol/L Final     Carbon Dioxide (CO2)   Date Value Ref Range Status   04/18/2022 28 20 - 32 mmol/L Final     Anion Gap   Date Value Ref Range Status   04/18/2022 6 3 - 14 mmol/L Final   03/29/2021 4 3 - 14 mmol/L Final     Glucose   Date Value Ref  Range Status   04/18/2022 96 70 - 99 mg/dL Final   03/29/2021 93 70 - 99 mg/dL Final     Urea Nitrogen   Date Value Ref Range Status   04/18/2022 20 7 - 30 mg/dL Final   03/29/2021 18 7 - 30 mg/dL Final     Creatinine   Date Value Ref Range Status   04/18/2022 0.79 0.52 - 1.04 mg/dL Final   03/29/2021 0.82 0.52 - 1.04 mg/dL Final     GFR Estimate   Date Value Ref Range Status   04/18/2022 75 >60 mL/min/1.73m2 Final     Comment:     Effective December 21, 2021 eGFRcr in adults is calculated using the 2021 CKD-EPI creatinine equation which includes age and gender (Chan et al., [place], DOI: 10.1056/TEHHyh4057331)   03/29/2021 81 >60 mL/min/[1.73_m2] Final     Calcium   Date Value Ref Range Status   04/18/2022 9.3 8.5 - 10.1 mg/dL Final   03/29/2021 9.3 8.5 - 10.1 mg/dL Final     Bilirubin Total   Date Value Ref Range Status   04/18/2022 0.3 0.2 - 1.3 mg/dL Final   03/29/2021 0.2 0.2 - 1.3 mg/dL Final     Alkaline Phosphatase   Date Value Ref Range Status   04/18/2022 89 40 - 150 U/L Final   03/29/2021 73 40 - 150 U/L Final     ALT   Date Value Ref Range Status   04/18/2022 17 0 - 50 U/L Final   03/29/2021 21 0 - 50 U/L Final     AST   Date Value Ref Range Status   04/18/2022 20 0 - 45 U/L Final   03/29/2021 18 0 - 45 U/L Final       PATHOLOGY:  None new since prior visit.    IMAGING:  Reviewed as per HPI.    ASSESSMENT/PLAN:  Sara Lehman is an 81 year old female with the following issues:  1.  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  Left lung and right middle lobe lung metastases s/p radiation  3. Left adrenal nodule, stable  4. Hilar and mediastinal hypermetabolic lymphadenopathy  5. Metastases to liver, mixed response  --I personally reviewed the PET scan from 4/18/2022 and discussed the results with Trixie.  She has a new FDG avid bud hepatis lesion and new 5 mm left retroclavicular node, along with interval slight progression of left level  3, precardinal and left hilar nodes.    --We talked about options of switching to chemotherapy, such as FOLFOX, although this would likely cause more, multitude of adverse effects.  Other option is to continue pembrolizumab for another 3 months, as this may be slowing the progression of disease.  Trixie would like to maintain as good quality of life as possible.  Ultimately, we agreed with the plan to have her continue with pembrolizumab and repeat PET/CT prior to cycle 10.     Rhona Wisdom MD  Hematology/Oncology  [facility]    Total time spent: 30 minutes in patient evaluation, counseling, documentation, and coordination of care.      Again, thank you for allowing me to participate in the care of your patient.        Sincerely,        Rhona Wisdom MD

## 2022-04-25 NOTE — LETTER
4/25/2022         RE: Sara Lehman  [id number] [facility] [id number]        Dear Colleague,    Thank you for referring your patient, Sara Lehman, to the [facility]. Please see a copy of my visit note below.    Trixie is a 81 year old who is being evaluated via a billable video visit.  Currently in state of [place].    How would you like to obtain your AVS? MyChart  If the video visit is dropped, the invitation should be resent by: Text to cell phone: [phone]  Will anyone else be joining your video visit? Yes, patients daughters Riya and Yulia       AnitaADRIANA Allan    Video Start Time: 8:44 AM  Video-Visit Details    Type of service:  Video Visit    Video End Time:9:00 AM    Originating Location (pt. Location): Home    Distant Location (provider location):  [facility]     Platform used for Video Visit: [facility]    Hematology/Oncology Established Patient Follow-up Note      Today's Date: 4/25/2022    Reason for Follow-up: Metastatic anal adenocarcinoma.    HISTORY OF PRESENT ILLNESS: Sara Lehman is an 81 year old female who presents with the following oncologic history:  1.  4/1/2019: Initially presented to her primary care provider with complaints of hemorrhoids intermittently for 2 months with associated pain and itching as well as blood in the underwear at night and intermixed with stool, no melena.  No associated weight loss or night sweats.  Reports occasional constipation.  Physical exam by primary care provider showed an anal mass about 1 inch in size with perianal erythema and skin breakdown.  2.  4/9/2019: Colonoscopy showed a perianal mass in the left lateral quadrant, severe diverticulosis in the sigmoid colon, descending colon, and ascending colon.  Biopsy of the anal mass showed invasive moderately differentiated adenocarcinoma with normal mismatch repair protein expression.   Specimen consisted of multiple fragments of necrotic tissue with dysplastic appearing glands with foci of invasion.  Surface mucosa is not present and therefore precludes differentiation between colorectal carcinoma or perianal glandular carcinoma.  Depth of invasion cannot be assessed.  KRAS wild-type.  3.  4/16/2019: CT chest/abdomen/pelvis with contrast showed a 1.3 cm left upper lobe lung nodule concerning for metastatic disease as well as 2 tiny nodules in the right lower lobe near the diaphragm.  Diffusely aneurysmal abdominal aorta measures up to 3.2 cm.  Prominent bilateral inguinal adenopathy with a lymph node in the upper right thigh adjacent to the common femoral vessels measures 2.9 x 2.3 cm.  Lymph node in the left medial upper thigh adjacent to the common femoral vessels measures 2.6 x 3.5 cm.  No evidence of obstruction.  Bony structures showed no destruction.  4.  4/19/2019: Consultation with Dr. Miller Casas.  Due to high suspicion of metastatic disease to the lung and bilateral inguinal regions, patient referred to medical oncology and radiation oncology.  5.  4/24/2019: PET/CT scan showed hypermetabolic posterior left upper lobe mid chest nodule adjacent to the major fissure measuring 1.3 x 1.2 cm and emphysematous changes.  Bilateral renal cysts present.  Nonobstructing stone of the lower right kidney measures 0.3 cm.  Nodular thickening of the left adrenal is 0.7 cm.  Intense hypermetabolism at the anal level with heterogeneous appearing soft tissue with SUV max 19.5.  Enlarged inguinal hypermetabolic lymph nodes on the right measuring 2.9 x 2.1 cm and on the left 3.6 x 2.4 cm.  A distal infrarenal abdominal aorta measures 3.4 cm.  6.  5/6/2019: Left lung mass biopsy performed and showed discordant immunohistochemical findings.  Lesion showed a strong reactivity for cytokeratin 7 and focal staining for P40.  Patient's anal adenocarcinoma reportedly positive for both immunohistochemical staining  pattern not typical for primary lung tumor and metastatic disease cannot be excluded.  7.  5/9/2019-6/25/2019: Completed radiation therapy to the anal cancer and inguinal lymph nodes.  She received concurrent radiosensitizing chemotherapy with capecitabine.  8.  8/28/2019: PET/CT scan showed hypermetabolic nodule in the left upper lobe measuring 1.5 x 1.4 cm with SUV max 10.5, increased in size and metabolic activity from 1.4 x 1.2 cm with SUV max 9.5 on 4/24/2019 PET/CT scan.  A 0.5 cm pleural-based nodule with low metabolic activity in the left lower lobe is unchanged.  A 3.4 cm infrarenal abdominal aortic aneurysm is present.  The previously noted hypermetabolic anal mass has significantly improved with SUV max 5.1, decreased from SUV max 24.6, consistent with response to treatment.  The bilateral inguinal lymphadenopathy has markedly improved on the current study with nodes measuring up to 1.6 x 1.1 cm with SUV max 3.5, decreased from 4 x 2.6 cm with SUV max 14, consistent with response to treatment.  9.  10/21/2019: Completed stereotactic radiation to the left upper lobe lung nodule/metastasis in 5 fractions under the care of Dr. Clay Monroy.  10. 12/16/2019: PET scan showed new hypermetabolic medial right middle lobe pulmonary nodule; left upper lobe nodule decreased in size and metabolism; improvement of bilateral inguinal adenopathy with decreased sizes and metabolism; hypermetabolism slightly increased at the left adrenal nodule but stable in size; decreased hypermetabolism at anal level; new small focal hypermetabolism within muscle anterior to left hip joint with no mass effect.  11. 2/07/2020: Completed stereotactic radiation to the right middle lobe nodule and left adrenal nodule in 5 fractions.  12. 4/6/2020: PET/CT scan showed a 0.8 cm nodular density near the right lung apex with no significant hypermetabolism, unchanged from the prior exam.  The previously seen hypermetabolic right middle lobe  pulmonary nodule is not seen on the current exam.  Small hypermetabolic left adrenal nodule measuring 1 cm with SUV max 3.3 is unchanged.  Infrarenal abdominal aortic aneurysm also not significantly changed and measures 3.3 x 3.6 cm containing a mild amount of mural thrombus.  The small hypermetabolic focus within the muscle anterior to the left hip joint is unchanged with SUV max 3.6.  13. 7/13/2020: PET/CT scan showed interval decrease in size of the right upper lobe lung nodule with no associated FDG activity; posttreatment changes in the lateral left upper lung; no new lung lesions; hypermetabolic left suprahilar area with no associated CT abnormality, possibly vascular in nature; no adenopathy; stable left adrenal nodule; indeterminate FDG activity in region of anal canal.   14. 10/26/2020: PET/CT scan showed FDG avid lymph nodes in left hilar region (measuring 1.1 cm with SUV 6.3; anterior to this is another 1.4 x 0.7 cm with SUV 8.9) concerning for progressive metastatic disease; remaining scan negative.  15. 1/25/2021: PET scan showed signs concerning for progressive metastatic disease, with increased FDG uptake within a left hilar lymph node, several new FDG-avid mediastinal lymph nodes; unchanged FDG-avid 10 mm left adrenal nodule; no new or enlarging lung nodules; unchanged 3.2 cm infrarenal AAA.  16. 3/29/2021: CT chest showed stable mild precarinal (1 cm) and left hilar (8 mm) lymphadenopathy; no new or enlarging lymphadenopathy.  17. 7/26/2021: PET scan showed increased size and metabolic activity of mediastinal and left hilar lymph nodes, new multifocal metastatic disease in liver; new left level 3 cervical lymph node metastases, stable FDG avid left adrenal nodule.  18. 8/16/2021: Started pembrolizumab every 42 days with palliative intent.  19. 11/01/2021: PET scan showed overall mixed response: resolved hepatic metastases in left lobe; increased size of FDG avid metastatic liver lesions in right  lobe; decreased size and avidity of mediastinal and left hilar lymph nodes; decreased size and resolution of avidity of left level 3 cervical lymph nodes; sigmoid wall thickening and mild FDG avidity suggestive of colitis.  20. 1/24/2022: PET scan showed decreased size of the right hepatic lesions. No recurrence of the left hepatic lesions. Recurrent hypermetabolism to a level 3 cervical node. Mildly increased uptake to the paratracheal and left hilar lymph nodes.  Stable mildly avid left adrenal nodule  21. 4/18/2022: PET scan showed new FDG avid bud hepatis lesion; new 5 mm left retroclavicular node; interval slight progression of left level 3, precarinal, and left hilar nodes; overall grossly unchanged extensive hepatic FDG avid masses.    INTERIM HISTORY:  Trixie reports feeling well and denies any recent fevers, chills, or pain. She has occasional but tolerable diarrhea.    REVIEW OF SYSTEMS:   14 point ROS was reviewed and is negative other than as noted above in HPI.       HOME MEDICATIONS:  Current Outpatient Medications   Medication Sig Dispense Refill     acetaminophen (TYLENOL) 325 MG tablet Take 2 tablets (650 mg) by mouth every 4 hours as needed for mild pain or fever       amLODIPine (NORVASC) 5 MG tablet Take 1 tablet (5 mg) by mouth daily 90 tablet 0     ferrous sulfate (FEROSUL) 325 (65 Fe) MG tablet Take 1 tablet (325 mg) by mouth daily (with breakfast) (do not take with calcium or dairy products) 90 tablet 3     LORazepam (ATIVAN) 0.5 MG tablet Take 1 tablet (0.5 mg) by mouth every 4 hours as needed (Anxiety, Nausea/Vomiting or Sleep) 30 tablet 2     metoprolol succinate ER (TOPROL-XL) 25 MG 24 hr tablet Take 0.5 tablets (12.5 mg) by mouth daily 45 tablet 0     ondansetron (ZOFRAN) 8 MG tablet Take 8 mg by mouth every 8 hours as needed for nausea       prochlorperazine (COMPAZINE) 10 MG tablet Take 1 tablet (10 mg) by mouth every 6 hours as needed (Nausea/Vomiting) 30 tablet 2     SUMAtriptan  (IMITREX) 50 MG tablet Take 1 tablet (50 mg) by mouth at onset of headache for migraine 18 tablet 1         ALLERGIES:  Allergies   Allergen Reactions     Ace Inhibitors Cough     Aleve [Naproxen Sodium] GI Disturbance     Asa Buff (Mag [Aspirin Buffered] GI Disturbance     Atorvastatin Calcium GI Disturbance     Celebrex [Celecoxib] GI Disturbance     Codeine Sulfate Hives     Pravastatin GI Disturbance     Simvastatin GI Disturbance         PAST MEDICAL HISTORY:  Past Medical History:   Diagnosis Date     Benign essential hypertension      Malignant neoplasm of anal canal (H)     metastatic; s/p chemoradiation and stereotactic radiation of lung mets     Migraines          PAST SURGICAL HISTORY:  Past Surgical History:   Procedure Laterality Date     ARTHROSCOPY KNEE Right      COLONOSCOPY N/A 2019    Procedure: COMBINED COLONOSCOPY, SINGLE OR MULTIPLE BIOPSY/POLYPECTOMY BY BIOPSY;  Surgeon: Saleem Shaw MD;  Location:  GI     EXTRACORPOREAL SHOCK WAVE LITHOTRIPSY (ESWL)       TUBAL LIGATION           SOCIAL HISTORY:  Social History     Socioeconomic History     Marital status:      Spouse name: Not on file     Number of children: Not on file     Years of education: Not on file     Highest education level: Not on file   Occupational History     Occupation: Retired - homemaker, temp work   Tobacco Use     Smoking status: Former Smoker     Packs/day: 2.00     Years: 40.00     Pack years: 80.00     Types: Cigarettes     Quit date: 2016     Years since quittin.2     Smokeless tobacco: Never Used   Substance and Sexual Activity     Alcohol use: Yes     Drug use: No     Sexual activity: Yes     Partners: Male     Birth control/protection: Post-menopausal   Other Topics Concern     Parent/sibling w/ CABG, MI or angioplasty before 65F 55M? Not Asked   Social History Narrative    Maried.    Lives at home with  - fully independent.     4 adult children.    8 grandchildren.    Active, walks  when able.      Social Determinants of Health     Financial Resource Strain: Not on file   Food Insecurity: Not on file   Transportation Needs: Not on file   Physical Activity: Not on file   Stress: Not on file   Social Connections: Not on file   Intimate Partner Violence: Not on file   Housing Stability: Not on file         FAMILY HISTORY:  Family History   Problem Relation Age of Onset     Uterine Cancer Mother      Hypertension Maternal Grandmother      Breast Cancer Daughter      Diabetes Type 2  No family hx of      Myocardial Infarction No family hx of      Cerebrovascular Disease No family hx of      Ovarian Cancer No family hx of      Colon Cancer No family hx of          PHYSICAL EXAM:  Vital signs:  Not taken.  GENERAL: No acute distress.  EYES: No scleral icterus. No overt erythema.  MUSCULOSKELETAL: Range of motion in the neck, shoulders, and arms appear normal.  SKIN: No overt rashes, discolorations, or lesions over the face and neck.  NEUROLOGIC: Alert.  No overt tremors.  PSYCHIATRIC: Normal affect and mood.  Does not appear anxious.    LABS:  CBC RESULTS: Recent Labs   Lab Test 04/18/22  1317   WBC 4.4   RBC 4.36   HGB 13.3   HCT 42.0   MCV 96   MCH 30.5   MCHC 31.7   RDW 13.6        Last Comprehensive Metabolic Panel:  Sodium   Date Value Ref Range Status   04/18/2022 138 133 - 144 mmol/L Final   03/29/2021 140 133 - 144 mmol/L Final     Potassium   Date Value Ref Range Status   04/18/2022 4.0 3.4 - 5.3 mmol/L Final   03/29/2021 3.9 3.4 - 5.3 mmol/L Final     Chloride   Date Value Ref Range Status   04/18/2022 104 94 - 109 mmol/L Final   03/29/2021 109 94 - 109 mmol/L Final     Carbon Dioxide   Date Value Ref Range Status   03/29/2021 27 20 - 32 mmol/L Final     Carbon Dioxide (CO2)   Date Value Ref Range Status   04/18/2022 28 20 - 32 mmol/L Final     Anion Gap   Date Value Ref Range Status   04/18/2022 6 3 - 14 mmol/L Final   03/29/2021 4 3 - 14 mmol/L Final     Glucose   Date Value Ref  Range Status   04/18/2022 96 70 - 99 mg/dL Final   03/29/2021 93 70 - 99 mg/dL Final     Urea Nitrogen   Date Value Ref Range Status   04/18/2022 20 7 - 30 mg/dL Final   03/29/2021 18 7 - 30 mg/dL Final     Creatinine   Date Value Ref Range Status   04/18/2022 0.79 0.52 - 1.04 mg/dL Final   03/29/2021 0.82 0.52 - 1.04 mg/dL Final     GFR Estimate   Date Value Ref Range Status   04/18/2022 75 >60 mL/min/1.73m2 Final     Comment:     Effective December 21, 2021 eGFRcr in adults is calculated using the 2021 CKD-EPI creatinine equation which includes age and gender (Chan et al., NE, DOI: 10.1056/WIUNvd5773937)   03/29/2021 81 >60 mL/min/[1.73_m2] Final     Calcium   Date Value Ref Range Status   04/18/2022 9.3 8.5 - 10.1 mg/dL Final   03/29/2021 9.3 8.5 - 10.1 mg/dL Final     Bilirubin Total   Date Value Ref Range Status   04/18/2022 0.3 0.2 - 1.3 mg/dL Final   03/29/2021 0.2 0.2 - 1.3 mg/dL Final     Alkaline Phosphatase   Date Value Ref Range Status   04/18/2022 89 40 - 150 U/L Final   03/29/2021 73 40 - 150 U/L Final     ALT   Date Value Ref Range Status   04/18/2022 17 0 - 50 U/L Final   03/29/2021 21 0 - 50 U/L Final     AST   Date Value Ref Range Status   04/18/2022 20 0 - 45 U/L Final   03/29/2021 18 0 - 45 U/L Final       PATHOLOGY:  None new since prior visit.    IMAGING:  Reviewed as per HPI.    ASSESSMENT/PLAN:  Sara Lehman is an 81 year old female with the following issues:  1.  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  Left lung and right middle lobe lung metastases s/p radiation  3. Left adrenal nodule, stable  4. Hilar and mediastinal hypermetabolic lymphadenopathy  5. Metastases to liver, mixed response  --I personally reviewed the PET scan from 4/18/2022 and discussed the results with Trixie.  She has a new FDG avid bud hepatis lesion and new 5 mm left retroclavicular node, along with interval slight progression of left level  3, precardinal and left hilar nodes.    --We talked about options of switching to chemotherapy, such as FOLFOX, although this would likely cause more, multitude of adverse effects.  Other option is to continue pembrolizumab for another 3 months, as this may be slowing the progression of disease.  Trixie would like to maintain as good quality of life as possible.  Ultimately, we agreed with the plan to have her continue with pembrolizumab and repeat PET/CT prior to cycle 10.     Rhona Wisdom MD  Hematology/Oncology  [facility]    Total time spent: 30 minutes in patient evaluation, counseling, documentation, and coordination of care.      Again, thank you for allowing me to participate in the care of your patient.        Sincerely,        Rhona Wisdom MD

## 2022-04-26 NOTE — PROGRESS NOTES
Infusion Nursing Note:  Sara Lehman presents today for C7D1 Keytruda.    Patient seen by provider today: No. Seen by Dr. Wisdom on 4/25/22   present during visit today: Not Applicable.    Note: Patient accompanied by her daughter today. Patient reports to feeling well Voices no new concerns since she was seen by Dr. Wisdom virtually on 4/25/22.      Intravenous Access:  Peripheral IV placed.    Treatment Conditions:  Lab Results   Component Value Date    HGB 13.3 04/18/2022    WBC 4.4 04/18/2022    ANEU 3.0 03/29/2021    ANEUTAUTO 2.9 04/18/2022     04/18/2022      Lab Results   Component Value Date     04/18/2022    POTASSIUM 4.0 04/18/2022    MAG 2.1 05/06/2019    CR 0.79 04/18/2022    ALTHEA 9.3 04/18/2022    BILITOTAL 0.3 04/18/2022    ALBUMIN 3.3 (L) 04/18/2022    ALT 17 04/18/2022    AST 20 04/18/2022     Results reviewed, labs MET treatment parameters, ok to proceed with treatment.      Post Infusion Assessment:  Patient tolerated infusion without incident.  Blood return noted pre and post infusion.  Site patent and intact, free from redness, edema or discomfort.  No evidence of extravasations.  Access discontinued per protocol.       Discharge Plan:   Patient declined prescription refills.  Discharge instructions reviewed with: Patient and daughter  Patient and/or family verbalized understanding of discharge instructions and all questions answered.  Copy of AVS reviewed with patient and/or family.  Patient will return 6/6/22 for labs for next appointment.  Patient discharged in stable condition accompanied by: self.  Departure Mode: Ambulatory.      Mattie Charles RN

## 2022-06-07 NOTE — ED TRIAGE NOTES
Pt was at oncology [facility] for an infusion  - they noted that the pt had some pretty intense back pain and requested that the pt come to the ed . Back pain 6/10 lower area.      Triage Assessment     Row Name 06/07/22 7919       Triage Assessment (Adult)    Airway WDL WDL       Respiratory WDL    Respiratory WDL X;rhythm/pattern  tacypnic at times - has lung canceer        Skin Circulation/Temperature WDL    Skin Circulation/Temperature WDL X  dusky        Cardiac WDL    Cardiac WDL WDL       Peripheral/Neurovascular WDL    Peripheral Neurovascular WDL WDL       Cognitive/Neuro/Behavioral WDL    Cognitive/Neuro/Behavioral WDL WDL       Lance Coma Scale    Best Eye Response 4-->(E4) spontaneous    Best Motor Response 6-->(M6) obeys commands    Best Verbal Response 5-->(V5) oriented    Refugio Coma Scale Score 15

## 2022-06-07 NOTE — LETTER
6/7/2022         RE: Sara Lehman  [address] [facility] [address]        Dear Colleague,    Thank you for referring your patient, Sara Lehman, to the [facility]. Please see a copy of my visit note below.    Oncology Rooming Note    June 7, 2022 12:44 PM   Sara Lehman is a 81 year old female who presents for:    Chief Complaint   Patient presents with     Oncology Clinic Visit     Initial Vitals: LMP  (LMP Unknown)  Estimated body mass index is 21.29 kg/m  as calculated from the following:    Height as of 2/1/22: 1.524 m (5').    Weight as of 4/26/22: 49.4 kg (109 lb). There is no height or weight on file to calculate BSA.  Data Unavailable Comment: Data Unavailable   No LMP recorded (lmp unknown). Patient is postmenopausal.  Allergies reviewed: Yes  Medications reviewed: Yes    Medications: Medication refills not needed today.  Pharmacy name entered into [email or web address]:    [id number] - [place], [place] - [address] [id number] - [place], [place] - [address] [facility] [address]  [id number] - [place], [place] - [address] [facility]    Clinical concerns:  NP was notified.      Cary Arroyo MA              Oncology/Hematology Visit Note  Jun 7, 2022    Reason for Visit: follow up of metastatic anal adenocarcinoma  -Stage IV wild-type type KRAS  Metastatic to lungs liver and biliary and mediastinal lymphadenopathy  Status post radiation to left and right lobe lung metastasis  Patient would like to avoid chemotherapy  08/26/2021-started pembrolizumab 400 mg every 6 weeks  4/18/2022: PET scan showed new FDG avid bud hepatis lesion; new 5 mm left retroclavicular node; interval slight progression of left level 3, precarinal, and left hilar nodes; overall grossly unchanged extensive hepatic FDG avid masses  -Dr. Wisdom discussed switching therapy to FOLFOX versus continuing pembrolizumab.  The  decision was to continue with pembrolizumab        Interval History:  Patient complains of severe lower back pain started about a week ago.  Patient has been taking Tylenol daily with no significant relief in pain.  Patient denies saddle anesthesia denies numbness and tingling in legs.  Denies fever chills sweats denies urinary symptoms denies nausea vomiting diarrhea abdominal pain    Review of Systems:  14 point ROS of systems including Constitutional, Eyes, Respiratory, Cardiovascular, Gastroenterology, Genitourinary, Integumentary, Muscularskeletal, Psychiatric were all negative except for pertinent positives noted in my HPI.    Physical Examination:  General: The patient is a pleasant female in no acute distress.  BP (!) 144/93   Pulse 91   Temp 97.5  F (36.4  C)   Resp 16   Ht 1.524 m (5')   Wt 44.9 kg (99 lb)   LMP  (LMP Unknown)   SpO2 100%   BMI 19.33 kg/m    HEENT: EOMI, PERRL. Sclerae are anicteric. Oral mucosa is pink and moist with no lesions or thrush.   Lymph: Neck is supple with no lymphadenopathy in the cervical or supraclavicular areas.   Heart: Regular rate and rhythm.   Lungs: Clear to auscultation bilaterally.   GI: Bowel sounds present, soft, nontender with no palpable hepatosplenomegaly or masses.   Extremities: No lower extremity edema noted bilaterally.   Skin: No rashes, petechiae, or bruising noted on exposed skin.    Laboratory Data:  CMP CMP and TSH results reviewed    Assessment and Plan:    This is a 81-year-old female with   Metastatic anal adenocarcinoma-Stage IV    wild-type KRAS  -Patient would like to avoid chemotherapy if possible  08/26/2021-started pembrolizumab 400 mg every 6 weeks  4/18/2022: PET scan showed new FDG avid bud hepatis lesion; new 5 mm left retroclavicular node; interval slight progression of left level 3, precarinal, and left hilar nodes; overall grossly unchanged extensive hepatic FDG avid masses  04/25-Dr. Wisdom discussed switching therapy to FOLFOX  versus continuing pembrolizumab   The decision was to continue with pembrolizumab with repeat PET CT scan prior to cycle 10  -Patient is scheduled for pembrolizumab cycle 8 today.  Patient has severe lower back pain she cannot sit in the chair.  She will be able to sit in the chair for an infusion.  -Daughter would also like to hold off on pembrolizumab  -Hold off on pembrolizumab today  Reschedule for pembrolizumab next week and follow-up with Dr. Wisdom to discuss treatment options      Lower back pain-severe  Patient appears to be in distress.  Unable to sit in chair for more than a few seconds.  Due to severity of the back pain I advised patient to go to the ER-for evaluation .Patient and daughter agree with plan         ANDRY Alcazar CNP  [facility]     Chart documentation with Dragon Voice recognition Software. Although reviewed after completion, some words and grammatical errors may remain.            Again, thank you for allowing me to participate in the care of your patient.        Sincerely,        ANDRY Alcazar CNP

## 2022-06-07 NOTE — PROGRESS NOTES
Oncology Rooming Note    June 7, 2022 12:44 PM   Sara Lehman is a 81 year old female who presents for:    Chief Complaint   Patient presents with     Oncology Clinic Visit     Initial Vitals: LMP  (LMP Unknown)  Estimated body mass index is 21.29 kg/m  as calculated from the following:    Height as of 2/1/22: 1.524 m (5').    Weight as of 4/26/22: 49.4 kg (109 lb). There is no height or weight on file to calculate BSA.  Data Unavailable Comment: Data Unavailable   No LMP recorded (lmp unknown). Patient is postmenopausal.  Allergies reviewed: Yes  Medications reviewed: Yes    Medications: Medication refills not needed today.  Pharmacy name entered into [facility]:    [facility] #[id number] - [place], [place] - [address] [place] [address] [id number] - [place], [place] - [address] [facility] [address]  [facility] #[id number] - [place], [place] - [address] [facility]    Clinical concerns:  NP was notified.      Cary Arroyo MA

## 2022-06-07 NOTE — PROGRESS NOTES
Patient's infusion not given today per Rasta Patel NP.  Patient sent to ED for back pain.  Treatment deferred until next week.

## 2022-06-08 NOTE — PROGRESS NOTES
Pts daughter, Riya, calling to request MRI orders. Pt was seen by HEATHER Alcazar yesterday and instructed to go to ED. States that they waited over 8 hours and were still far from being seen so they left.    Riya would like to see how soon they can get in for [place] outpt [place]    Routing to Rasta Pitts RN

## 2022-06-08 NOTE — TELEPHONE ENCOUNTER
Routing refill request to provider for review/approval because:  Sana given x1 and patient did not follow up, please advise  Patient needs to be seen because it has been more than 1 year since last office visit 2/11/2021    Natividad Dow RN

## 2022-06-08 NOTE — PROGRESS NOTES
Orders placed per Rasta. [facility] booked until 6/21. Pt is scheduled at [facility] in [place] on 6/15 at 1000. Pts daughter has been notified and is happy with plan. She knows that Trixie should go to ED with worsening pain or new sx.     Orders faxed to [facility] [phone] with receipt confirmation via [employer].

## 2022-06-09 NOTE — TELEPHONE ENCOUNTER
Daughter calling on behalf of patient. C2C on chart.     Daughter requesting urgent refill for Amlodipine and Metoprolol. Patient will be out of medication 6/11 and is unable to come in due to having stage 4 cancer and is in too much pain. Patient went to ED 6/7 and waited 6.5 hrs before leaving due to the pain.      Patient is scheduled 7/20 with new PCP    Routing for possible chace refill to get patient through until appointment      Can we leave a detailed message on this number? YES  Phone number patient can be reached at: [phone]    Natividad Dow, RN  [facility] Triage

## 2022-06-09 NOTE — TELEPHONE ENCOUNTER
Huddled with provider. Per provider, patient may be seen today 6/9 via VV at 11:30 double book.     Spoke with Daughter, Dalia, to discuss possible VV today. Daughter agrees to this plan and appointment is scheduled to discuss medication refills and regimen.

## 2022-06-09 NOTE — PROGRESS NOTES
Trixie is a 81 year old who is being evaluated via a billable video visit.      How would you like to obtain your AVS? [employer]  Invitation should be sent by: Text to cell phone: [phone]  Will anyone else be joining your video visit? No    Video Start Time: 11:39 AM    Assessment & Plan     Essential hypertension  Weight loss  Has lost ~20 lbs in the last 18 months or so per chart review, likely due to underlying cancer. On low dose of metoprolol. Recent HR in the 40s. Endorses fatigue which is certainly multifactorial. Encouraged her to STOP metoprolol and CONTINUE amlodipine, keep checking BP and HR at home. She may need less HTN control given the weight loss.  - amLODIPine (NORVASC) 5 MG tablet; Take 1 tablet (5 mg) by mouth daily    F/u next month as scheduled    Jasper Marie MD  [facility]   Trixie is a 81 year old who presents via video visit alongside her daughter for a med check. This is the first time I have met Trixie. I received a med refill request earlier today and recommended discussion about these meds and Trixie's health. Thankfully we were able to arrange a video visit today. Trixie has no acute complaints today.    Review of Systems   Constitutional,  cardiovascular systems are negative, except as otherwise noted.      Objective       Vitals: No vitals were obtained today due to virtual visit.    Physical Exam   GENERAL: Healthy, alert and no distress  EYES: Eyes grossly normal to inspection.  No discharge or erythema, or obvious scleral/conjunctival abnormalities.  RESP: No audible wheeze, cough, or visible cyanosis.  No visible retractions or increased work of breathing.    SKIN: Visible skin clear. No significant rash, abnormal pigmentation or lesions.  NEURO: Cranial nerves grossly intact.  Mentation and speech appropriate for age.  PSYCH: Mentation appears normal, affect normal/bright, judgement and insight intact, normal speech and appearance  well-groomed.    Video-Visit Details    Type of service: Video Visit    Video End Time: 11:46 AM    Originating Location (pt. Location): Home    Distant Location (provider location):  [facility]     Platform used for Video Visit: [employer]

## 2022-06-24 NOTE — TELEPHONE ENCOUNTER
Narcotic Refill Request    Medication(s) requested:  Norco 5-325 MG  Person Requesting Refill: Riya   What pain is the medication treating: lower back/ tailbone  How is the medication being taken?: 1 tablet q6h  Does pt have enough for today? Yes- but not for the weekend  Is pain being adequately controlled on the current regimen?: yes  Experiencing any side effects from medication?: none    Date of most recent appointment: 6/7/22 Rasta ROMERO  Any No Show Visits: none  Next appointment:   7/18 Dr. Wisdom  Last fill date and by whom:  6/16/22 Dr. Wisdom   Reviewed: no access    Routed/Paged provider: Rasta ROMERO

## 2022-06-24 NOTE — TELEPHONE ENCOUNTER
Writer called Riya to let her know that Rx had been sent by Rasta ROMERO.    Riya verbalized understanding.

## 2022-06-27 NOTE — PROGRESS NOTES
Spoke with Trixie's daughter, Riya, about getting in for fluids today. Orders place by Rasta Patel, HEATHER and appt scheduled. Trixie is having increased confusion and hallucinations. Does not feel that its associated with increase in norco from 1/2 tab to 1 tab as needed. Does have history of UTI with hallucinations. Does not have any urinary sx. Riya wondering if it would be worth at least checking UA when she is here today.    Routing to Dr Wisdom to advise.    Devorah Pitts RN      11:32 AM     UA order place per Dr Wisdom. Notified Riya and infusion nurse.     Riya would like Dr Wisdom to know that they would like to have a hospice discussion with her.

## 2022-06-27 NOTE — TELEPHONE ENCOUNTER
I'm a bit confused by this message. Is oncology hoping for us to perform cortisone injections? That is unfortunately not something I do. Are they hoping to involve the pain management specialists for them to do an injection? If so, would recommend oncology team place order to injection to be done by pain management. Any clarity on their needs would be helpful. Happy to help however I can within the scope of my practice.

## 2022-06-27 NOTE — PROGRESS NOTES
Infusion Nursing Note:  Sara Lehman presents today for IVF.    Patient seen by provider today: No   present during visit today: Not Applicable.    Note: N/A.    Intravenous Access:  Peripheral IV placed.    Treatment Conditions:  Not Applicable.    Post Infusion Assessment:  Patient tolerated infusion without incident.  Site patent and intact, free from redness, edema or discomfort.  No evidence of extravasations.  Access discontinued per protocol.     Discharge Plan:   Patient and/or family verbalized understanding of discharge instructions and all questions answered.  AVS to patient via [employer].  Patient will return 7/18 for next appointment.   Patient discharged in stable condition accompanied by: self.  Departure Mode: Ambulatory.      Mague Baer RN

## 2022-06-27 NOTE — TELEPHONE ENCOUNTER
Daughter calling on behalf of patient.   Per Oncology, patient had recent imaging that shows Degenerative Arthritis in back/hips/coccyx region, and is requesting referral for Cortisone Injection if this is possible without bringing patient in to clinic. Per daughter, it's difficult for patient to ambulate at this time due to pain.    Can we leave a detailed message on this number? YES  Phone number patient can be reached at: [phone] Riya (Daughter)    Natividad Dow RN  [facility] Triage

## 2022-06-28 NOTE — TELEPHONE ENCOUNTER
Writer called Riya to inform her of recommendations from Dr. Wisdom.    Rhona Wisdom MD  You; Devorah Pitts, RN 2 minutes ago (3:58 PM)         Urine culture with no growth - so no indication for antibiotics.         Message text       Riya verbalized understanding. Will call triage with any other symptom related concerns.

## 2022-06-28 NOTE — TELEPHONE ENCOUNTER
Riya calling in to question the results of the UA that pt had provided.        Riya is wondering if pt should be prescribed an antibiotic due to elevated WBC.    Will route to provider for recommendations.

## 2022-06-28 NOTE — TELEPHONE ENCOUNTER
Left detailed message relaying providers recommendations for recommend Oncology to place order for injection to be done by pain management, and to call clinic for any questions or requests.      Patient Contact    Attempt # 1    Was call answered?  No.  Left message on voicemail with information to call me back.

## 2024-01-04 NOTE — PROGRESS NOTES
[facility] [place]    Hematology/Oncology Established Patient Follow-up Note      Today's Date: 10/21/19    Reason for Follow-up: Metastatic anal adenocarcinoma.    HISTORY OF PRESENT ILLNESS: Sara Lehman is a 78 year old female who presents with the following oncologic history:  1.  4/1/2019: Initially presented to her primary care provider with complaints of hemorrhoids intermittently for 2 months with associated pain and itching as well as blood in the underwear at night and intermixed with stool, no melena.  No associated weight loss or night sweats.  Reports occasional constipation.  Physical exam by primary care provider showed an anal mass about 1 inch in size with perianal erythema and skin breakdown.  2.  4/9/2019: Colonoscopy showed a perianal mass in the left lateral quadrant, severe diverticulosis in the sigmoid colon, descending colon, and ascending colon.  Biopsy of the anal mass showed invasive moderately differentiated adenocarcinoma with normal mismatch repair protein expression.  Specimen consisted of multiple fragments of necrotic tissue with dysplastic appearing glands with foci of invasion.  Surface mucosa is not present and therefore precludes differentiation between colorectal carcinoma or perianal glandular carcinoma.  Depth of invasion cannot be assessed.  KRAS wild-type.  3.  4/16/2019: CT chest/abdomen/pelvis with contrast showed a 1.3 cm left upper lobe lung nodule concerning for metastatic disease as well as 2 tiny nodules in the right lower lobe near the diaphragm.  Diffusely aneurysmal abdominal aorta measures up to 3.2 cm.  Prominent bilateral inguinal adenopathy with a lymph node in the upper right thigh adjacent to the common femoral vessels measures 2.9 x 2.3 cm.  Lymph node in the left medial upper thigh adjacent to the common femoral vessels measures 2.6 x 3.5 cm.  No evidence of obstruction.  Bony structures showed no destruction.  4. 4/19/2019: Consultation with   Miller Casas.  Due to high suspicion of metastatic disease to the lung and bilateral inguinal regions, patient referred to medical oncology and radiation oncology.  5.  4/24/2019: PET/CT scan showed hypermetabolic posterior left upper lobe mid chest nodule adjacent to the major fissure measuring 1.3 x 1.2 cm and emphysematous changes.  Bilateral renal cysts present.  Nonobstructing stone of the lower right kidney measures 0.3 cm.  Nodular thickening of the left adrenal is 0.7 cm.  Intense hypermetabolism at the anal level with heterogeneous appearing soft tissue with SUV max 19.5.  Enlarged inguinal hypermetabolic lymph nodes on the right measuring 2.9 x 2.1 cm and on the left 3.6 x 2.4 cm.  A distal infrarenal abdominal aorta measures 3.4 cm.  6.  5/6/2019: Left lung mass biopsy performed and showed discordant immunohistochemical findings.  Lesion showed a strong reactivity for cytokeratin 7 and focal staining for P40.  Patient's anal adenocarcinoma reportedly positive for both immunohistochemical staining pattern not typical for primary lung tumor and metastatic disease cannot be excluded.  7.  5/9/2019-6/25/2019: Completed radiation therapy to the anal cancer and inguinal lymph nodes.  She received concurrent radiosensitizing chemotherapy with capecitabine.  8.  8/28/2019: PET/CT scan showed hypermetabolic nodule in the left upper lobe measuring 1.5 x 1.4 cm with SUV max 10.5, increased in size and metabolic activity from 1.4 x 1.2 cm with SUV max 9.5 on 4/24/2019 PET/CT scan.  A 0.5 cm pleural-based nodule with low metabolic activity in the left lower lobe is unchanged.  A 3.4 cm infrarenal abdominal aortic aneurysm is present.  The previously noted hypermetabolic anal mass has significantly improved with SUV max 5.1, decreased from SUV max 24.6, consistent with response to treatment.  The bilateral inguinal lymphadenopathy has markedly improved on the current study with nodes measuring up to 1.6 x 1.1 cm with  SUV max 3.5, decreased from 4 x 2.6 cm with SUV max 14, consistent with response to treatment.  9.  10/21/2019: Completed stereotactic radiation to the left upper lobe lung nodule/metastasis in 5 fractions under the care of Dr. Clay Monroy.    INTERIM HISTORY:  Trixie reports mild fatigue but no anal pain, chest pain, cough, or recent fevers or chills.  She also denies any dysuria.      REVIEW OF SYSTEMS:   14 point ROS was reviewed and is negative other than as noted above in HPI.       HOME MEDICATIONS:  Current Outpatient Medications   Medication Sig Dispense Refill     acetaminophen (TYLENOL) 325 MG tablet Take 2 tablets (650 mg) by mouth every 4 hours as needed for mild pain or fever       amLODIPine (NORVASC) 5 MG tablet TAKE 1 TABLET(5 MG) BY MOUTH DAILY 30 tablet 0     hydrochlorothiazide (MICROZIDE) 12.5 MG capsule        metoprolol succinate ER (TOPROL-XL) 25 MG 24 hr tablet Take 1 tablet (25 mg) by mouth daily 30 tablet 0     ondansetron (ZOFRAN) 8 MG tablet Take 8 mg by mouth every 8 hours as needed for nausea       SUMAtriptan (IMITREX) 50 MG tablet Take 1 tablet (50 mg) by mouth at onset of headache for migraine 6 tablet 0         ALLERGIES:  Allergies   Allergen Reactions     Ace Inhibitors Cough     Aleve [Naproxen Sodium] GI Disturbance     Asa Buff (Mag [Aspirin Buffered] GI Disturbance     Internal bleeding     Atorvastatin Calcium GI Disturbance     Celebrex [Celecoxib] GI Disturbance     Codeine Sulfate Hives     Pravastatin GI Disturbance     Simvastatin GI Disturbance         PAST MEDICAL HISTORY:  Past Medical History:   Diagnosis Date     Benign essential hypertension 8/30/2017     Bronchospasm      COPD (chronic obstructive pulmonary disease) (H) 5/28/2014     Hypertension      Migraine      Neck pain      Nephrolithiasis          PAST SURGICAL HISTORY:  Past Surgical History:   Procedure Laterality Date     COLONOSCOPY N/A 4/9/2019    Procedure: COMBINED COLONOSCOPY, SINGLE OR MULTIPLE  BIOPSY/POLYPECTOMY BY BIOPSY;  Surgeon: Saleem Shaw MD;  Location:  GI     LITHOTRIPSY      Lithotrypsy     ORTHOPEDIC SURGERY      right knee exploration     TUBAL LIGATION           SOCIAL HISTORY:  Social History     Socioeconomic History     Marital status:      Spouse name: Not on file     Number of children: Not on file     Years of education: Not on file     Highest education level: Not on file   Occupational History     Not on file   Social Needs     Financial resource strain: Not on file     Food insecurity:     Worry: Not on file     Inability: Not on file     Transportation needs:     Medical: Not on file     Non-medical: Not on file   Tobacco Use     Smoking status: Former Smoker     Packs/day: 0.50     Types: Cigarettes     Start date: 01/2016     Last attempt to quit: 4/7/2016     Years since quitting: 3.5     Smokeless tobacco: Never Used   Substance and Sexual Activity     Alcohol use: Yes     Alcohol/week: 0.0 standard drinks     Comment: socially only- one per month     Drug use: No     Sexual activity: Yes   Lifestyle     Physical activity:     Days per week: Not on file     Minutes per session: Not on file     Stress: Not on file   Relationships     Social connections:     Talks on phone: Not on file     Gets together: Not on file     Attends Religion service: Not on file     Active member of club or organization: Not on file     Attends meetings of clubs or organizations: Not on file     Relationship status: Not on file     Intimate partner violence:     Fear of current or ex partner: Not on file     Emotionally abused: Not on file     Physically abused: Not on file     Forced sexual activity: Not on file   Other Topics Concern     Parent/sibling w/ CABG, MI or angioplasty before 65F 55M? Not Asked   Social History Narrative     Not on file         FAMILY HISTORY:  Family History   Problem Relation Age of Onset     Cancer Mother 76        uterine     Cerebrovascular Disease Father  "75     C.A.D. Brother 66         PHYSICAL EXAM:  Vital signs:  /81 (BP Location: Left arm, Patient Position: Sitting, Cuff Size: Adult Regular)   Pulse 72   Temp 97.9  F (36.6  C) (Oral)   Resp 18   Ht 1.651 m (5' 5\")   Wt 48.4 kg (106 lb 12.8 oz)   SpO2 96%   BMI 17.77 kg/m     ECO  GENERAL/CONSTITUTIONAL: No acute distress.  Exam deferred for detailed discussion.    LABS:  CBC RESULTS:   Recent Labs   Lab Test 10/21/19  1455   WBC 4.4   RBC 4.51   HGB 14.1   HCT 43.3   MCV 96   MCH 31.3   MCHC 32.6   RDW 12.9        Last Comprehensive Metabolic Panel:  Sodium   Date Value Ref Range Status   10/21/2019 140 133 - 144 mmol/L Final     Potassium   Date Value Ref Range Status   10/21/2019 4.1 3.4 - 5.3 mmol/L Final     Chloride   Date Value Ref Range Status   10/21/2019 107 94 - 109 mmol/L Final     Carbon Dioxide   Date Value Ref Range Status   10/21/2019 31 20 - 32 mmol/L Final     Anion Gap   Date Value Ref Range Status   10/21/2019 2 (L) 3 - 14 mmol/L Final     Glucose   Date Value Ref Range Status   10/21/2019 82 70 - 99 mg/dL Final     Urea Nitrogen   Date Value Ref Range Status   10/21/2019 20 7 - 30 mg/dL Final     Creatinine   Date Value Ref Range Status   10/21/2019 0.70 0.52 - 1.04 mg/dL Final     GFR Estimate   Date Value Ref Range Status   10/21/2019 83 >60 mL/min/[1.73_m2] Final     Comment:     Non  GFR Calc  Starting 2018, serum creatinine based estimated GFR (eGFR) will be   calculated using the Chronic Kidney Disease Epidemiology Collaboration   (CKD-EPI) equation.       Calcium   Date Value Ref Range Status   10/21/2019 8.9 8.5 - 10.1 mg/dL Final     Bilirubin Total   Date Value Ref Range Status   10/21/2019 0.3 0.2 - 1.3 mg/dL Final     Alkaline Phosphatase   Date Value Ref Range Status   10/21/2019 70 40 - 150 U/L Final     ALT   Date Value Ref Range Status   10/21/2019 23 0 - 50 U/L Final     AST   Date Value Ref Range Status   10/21/2019 21 0 - 45 U/L " Final     PATHOLOGY:  Reviewed as per HPI.    IMAGING:  Reviewed as per HPI.    ASSESSMENT/PLAN:  Sara Lehman is a 78 year old female with the following issues:  1.  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  Left lung nodule, metastasis versus primary non-small cell carcinoma  -Sara tolerated radiosensitizing capecitabine with concurrent radiation moderately well.  She completed radiation on 6/25/2019.  -She subsequently underwent stereotactic radiation to the left lung metastasis, completing her treatment today, 10/21/2019.  -Testing for KRAS mutation showed wild-type.  May consider adding either bevacizumab or an epidermal growth factor receptor inhibitor such as panitumumab to FOLFOX for future systemic therapy if needed.  However, would be cautious with side effects of systemic therapy.  -Plan for repeat PET scan in 2 months.  -If scans shows recurrent or progressive metastatic disease, will consider mFOLFOX with bevacizumab or panitumumab.  Discussed with Trixie and her family members that such regimens would have different side effects compared to what she has received in the past.  We would need to decide carefully on what she could tolerate if we need to consider systemic therapy.    3.  Chronic nausea  -Of unclear etiology as she has had this for many years.  This has improved overall.  Continue ondansetron as needed.    4.  Essential hypertension  -Blood pressure well controlled and stable.  She needs a new primary care physician.  She will establish care with one sometime in the next month or so.  I did refill her metoprolol and amlodipine in the meantime.  She is no longer taking hydrochlorothiazide.    Return in 2 months.    Rhona Wisdom MD  Hematology/Oncology  [facility]    I spent a total of 30 minutes with the patient, with 100% in counseling.     Awake/Alert/Cooperative

## (undated) DEVICE — SOL WATER IRRIG 1000ML BOTTLE 2F7114

## (undated) RX ORDER — NALOXONE HYDROCHLORIDE 0.4 MG/ML
INJECTION, SOLUTION INTRAMUSCULAR; INTRAVENOUS; SUBCUTANEOUS
Status: DISPENSED
Start: 2019-05-06

## (undated) RX ORDER — FENTANYL CITRATE 50 UG/ML
INJECTION, SOLUTION INTRAMUSCULAR; INTRAVENOUS
Status: DISPENSED
Start: 2019-04-09

## (undated) RX ORDER — FLUMAZENIL 0.1 MG/ML
INJECTION, SOLUTION INTRAVENOUS
Status: DISPENSED
Start: 2019-05-06

## (undated) RX ORDER — FENTANYL CITRATE 50 UG/ML
INJECTION, SOLUTION INTRAMUSCULAR; INTRAVENOUS
Status: DISPENSED
Start: 2019-05-06